# Patient Record
Sex: FEMALE | Race: WHITE | Employment: OTHER | URBAN - METROPOLITAN AREA
[De-identification: names, ages, dates, MRNs, and addresses within clinical notes are randomized per-mention and may not be internally consistent; named-entity substitution may affect disease eponyms.]

---

## 2018-02-08 RX ORDER — OSELTAMIVIR PHOSPHATE 75 MG/1
75 CAPSULE ORAL 2 TIMES DAILY
Qty: 10 CAP | Refills: 0 | Status: SHIPPED | OUTPATIENT
Start: 2018-02-08 | End: 2018-02-13

## 2018-02-08 NOTE — TELEPHONE ENCOUNTER
Pt states she is not feeling well and doesn't really want to come in. She is asking if there is going to be a script called in for her today?

## 2018-02-08 NOTE — TELEPHONE ENCOUNTER
Called patient. Two patient identifiers verified. Patient states she is currently experiencing flu like symptoms. Was exposed by her . She is requesting Tamiflu. Advised patient request is pending with Dr. Everette Martinez. Will update her of status of request as soon as possible. Patient verbalized understanding and states no further questions at this time.

## 2018-02-08 NOTE — TELEPHONE ENCOUNTER
Pt and  have been dx with flu and Dr. Mine Khan filled Tamiflu for , but recommended asking Dr. Pebbles Cifuentes for Tamiflu for pt. Pt's  is out picking up Rx today and would like this expedited to [facility] DR WEI DEL VALLE in [place] on file. Best contact number [phone].        Message received & copied from [facility]

## 2018-04-28 ENCOUNTER — HOSPITAL ENCOUNTER (EMERGENCY)
Age: 78
Discharge: HOME | End: 2018-04-28
Attending: EMERGENCY MEDICINE
Payer: MEDICARE

## 2018-04-28 ENCOUNTER — APPOINTMENT (OUTPATIENT)
Dept: ULTRASOUND IMAGING | Age: 78
End: 2018-04-28
Attending: EMERGENCY MEDICINE
Payer: MEDICARE

## 2018-04-28 VITALS
HEIGHT: 66 IN | TEMPERATURE: 97.7 F | BODY MASS INDEX: 22.85 KG/M2 | SYSTOLIC BLOOD PRESSURE: 107 MMHG | RESPIRATION RATE: 17 BRPM | WEIGHT: 142.2 LBS | HEART RATE: 78 BPM | OXYGEN SATURATION: 97 % | DIASTOLIC BLOOD PRESSURE: 58 MMHG

## 2018-04-28 DIAGNOSIS — R20.0 NUMBNESS OF RIGHT FOOT: Primary | ICD-10-CM

## 2018-04-28 LAB
ALBUMIN SERPL-MCNC: 3.6 G/DL (ref 3.5–5)
ALBUMIN/GLOB SERPL: 1.1 {RATIO} (ref 1.1–2.2)
ALP SERPL-CCNC: 75 U/L (ref 45–117)
ALT SERPL-CCNC: 25 U/L (ref 12–78)
ANION GAP SERPL CALC-SCNC: 7 MMOL/L (ref 5–15)
AST SERPL-CCNC: 17 U/L (ref 15–37)
BASOPHILS # BLD: 0 K/UL (ref 0–0.1)
BASOPHILS NFR BLD: 1 % (ref 0–1)
BILIRUB SERPL-MCNC: 0.4 MG/DL (ref 0.2–1)
BUN SERPL-MCNC: 15 MG/DL (ref 6–20)
BUN/CREAT SERPL: 19 (ref 12–20)
CALCIUM SERPL-MCNC: 9.2 MG/DL (ref 8.5–10.1)
CHLORIDE SERPL-SCNC: 107 MMOL/L (ref 97–108)
CO2 SERPL-SCNC: 28 MMOL/L (ref 21–32)
CREAT SERPL-MCNC: 0.8 MG/DL (ref 0.55–1.02)
DIFFERENTIAL METHOD BLD: NORMAL
EOSINOPHIL # BLD: 0.1 K/UL (ref 0–0.4)
EOSINOPHIL NFR BLD: 2 % (ref 0–7)
ERYTHROCYTE [DISTWIDTH] IN BLOOD BY AUTOMATED COUNT: 12.8 % (ref 11.5–14.5)
GLOBULIN SER CALC-MCNC: 3.2 G/DL (ref 2–4)
GLUCOSE SERPL-MCNC: 105 MG/DL (ref 65–100)
HCT VFR BLD AUTO: 40.9 % (ref 35–47)
HGB BLD-MCNC: 13.6 G/DL (ref 11.5–16)
IMM GRANULOCYTES # BLD: 0 K/UL (ref 0–0.04)
IMM GRANULOCYTES NFR BLD AUTO: 0 % (ref 0–0.5)
LYMPHOCYTES # BLD: 2.1 K/UL (ref 0.8–3.5)
LYMPHOCYTES NFR BLD: 37 % (ref 12–49)
MCH RBC QN AUTO: 30 PG (ref 26–34)
MCHC RBC AUTO-ENTMCNC: 33.3 G/DL (ref 30–36.5)
MCV RBC AUTO: 90.3 FL (ref 80–99)
MONOCYTES # BLD: 0.6 K/UL (ref 0–1)
MONOCYTES NFR BLD: 10 % (ref 5–13)
NEUTS SEG # BLD: 3 K/UL (ref 1.8–8)
NEUTS SEG NFR BLD: 51 % (ref 32–75)
NRBC # BLD: 0 K/UL (ref 0–0.01)
NRBC BLD-RTO: 0 PER 100 WBC
PLATELET # BLD AUTO: 186 K/UL (ref 150–400)
PMV BLD AUTO: 12.3 FL (ref 8.9–12.9)
POTASSIUM SERPL-SCNC: 3.7 MMOL/L (ref 3.5–5.1)
PROT SERPL-MCNC: 6.8 G/DL (ref 6.4–8.2)
RBC # BLD AUTO: 4.53 M/UL (ref 3.8–5.2)
SODIUM SERPL-SCNC: 142 MMOL/L (ref 136–145)
WBC # BLD AUTO: 5.9 K/UL (ref 3.6–11)

## 2018-04-28 PROCEDURE — 99284 EMERGENCY DEPT VISIT MOD MDM: CPT

## 2018-04-28 PROCEDURE — 36415 COLL VENOUS BLD VENIPUNCTURE: CPT | Performed by: EMERGENCY MEDICINE

## 2018-04-28 PROCEDURE — 93922 UPR/L XTREMITY ART 2 LEVELS: CPT

## 2018-04-28 PROCEDURE — 80053 COMPREHEN METABOLIC PANEL: CPT | Performed by: EMERGENCY MEDICINE

## 2018-04-28 PROCEDURE — 85025 COMPLETE CBC W/AUTO DIFF WBC: CPT | Performed by: EMERGENCY MEDICINE

## 2018-04-28 PROCEDURE — 93971 EXTREMITY STUDY: CPT

## 2018-04-28 RX ORDER — CEPHALEXIN 750 MG/1
750 CAPSULE ORAL 4 TIMES DAILY
COMMUNITY
End: 2018-05-10

## 2018-04-28 NOTE — ED NOTES
Dr. Trcai Fowler at bedside to provide discharge paperwork. Vital signs stable. Pt in no apparent distress at this time. Mental status at baseline. Ambulatory to waiting room with steady gate, discharge paperwork in hand. Accompanied by .

## 2018-04-28 NOTE — PROGRESS NOTES
[id number] [place] [facility]  Preliminary Report: Ankle Brachial Index    Pressure (mmHg) Right    Left    Brachial:  136   132  Ankle PTA:  135   157  Ankle DPA:  133   155  Great Toe:  77   100    Right THA:   0.99  Left THA: 1.15  Right TBI: 0.57  Left TBI: 0.74    Final report to follow.     CFA = Common Femoral Artery  PTA = Posterior Tibial Artery  DPA = Dorsalis Pedis Artery  THA = Ankle Brachial Index  TBI = Toe Brachial Index  NC = Non-compressible

## 2018-04-28 NOTE — ED NOTES
Patient reports to ED with complaints of right foot numbness that has been going on for approximately 1 year. She states \"It's not totally numb, it just feels like I have a stocking on. \" She states within the last few months, she has been noticing a purple tinted discoloration to her right foot. She states she decided to come in today because it looked worse than it usually does.  at bedside. Call bell within reach. Patient sitting in position of comfort on stretcher. No further complaints noted.

## 2018-04-28 NOTE — ED NOTES
Bedside and Verbal shift change report given to Reyes Marlow RN (oncoming nurse) by Kelvin Ace RN (offgoing nurse). Report included the following information SBAR, Kardex, ED Summary, Recent Results and Med Rec Status.

## 2018-04-28 NOTE — ED PROVIDER NOTES
EMERGENCY DEPARTMENT HISTORY AND PHYSICAL EXAM      Date: 4/28/2018  Patient Name: Buddy Concepcion    History of Presenting Illness     Chief Complaint   Patient presents with    Numbness     Ambulatory w/ c/o R foot numbness for a year worsened recently, no injury/trauma. 1340  I have seen and evaluated this patient in the Express Care portion of triage for R foot numbness with associated intermittent purple color to diffuse R foot x one year, worse x today. The patients care will begin now and orders have been placed. This patient will be seen and provided further care in the Emergency Room. Written by Rubia Olson, a scribe for [facility][1], ANATOLIY. History Provided By: Patient    HPI: Buddy Concepcion, 78 y.o. female with PMHx significant for GERD, presents via wheelchair to the ED for further evaluation of persistent intermittent right foot color changes progressively worsening over the last several weeks. The pt reports associated sx of right foot numbness x1 year but exacerbated in the last several weeks. She expresses a h/o back surgery ~1 year ago noting that she has had mild numbness since then but over the last several weeks the numbness has increased an her right foot has been intermittently turning white to a dark purple in color. The pt discloses that around 1330 her right foot turned dark purple leading her to the ED for evaluation. She ensures that her sx have improved since arrival to the ED. The pt denies being evaluated by a vascular surgeon PTA and denies being on any blood thinners. She denies any fevers, chills, weakness, gait problem, chest pain, SOB, leg swelling, abdominal pain, nausea, vomiting, or diarrhea. There are no other complaints, changes, or physical findings at this time.     PCP: Jagdish Carty MD    Current Outpatient Prescriptions   Medication Sig Dispense Refill    cyclobenzaprine (FLEXERIL) 10 mg tablet Take 1 Tab by mouth three (3) times daily as needed for Muscle Spasm(s). 30 Tab 1    cholecalciferol (VITAMIN D3) 1,000 unit tablet Take  by mouth daily.  esomeprazole (NEXIUM) 40 mg capsule Take 40 mg by mouth daily. Past History     Past Medical History:  Past Medical History:   Diagnosis Date    Acid reflux     Endocrine disease     thyroid nodules    Thyroid disease     thyroid nodules       Past Surgical History:  Past Surgical History:   Procedure Laterality Date    HX GYN  1996    hysterectomy ? BSO    HX ORTHOPAEDIC      back       Family History:  Family History   Problem Relation Age of Onset    Cancer Mother      lymphoma    Diabetes Paternal Grandmother     Heart Disease Sister     Heart Disease Son     No Known Problems Son     No Known Problems Daughter     No Known Problems Daughter     Heart Disease Maternal Grandfather        Social History:  Social History   Substance Use Topics    Smoking status: Former Smoker    Smokeless tobacco: Never Used    Alcohol use No       Allergies: Allergies   Allergen Reactions    Amoxicillin Other (comments)     \"It makes my heart race\"    Oxycodone Nausea Only         Review of Systems   Review of Systems   Constitutional: Negative for activity change, chills and fever. HENT: Negative for congestion and sore throat. Eyes: Negative for pain and redness. Respiratory: Negative for cough, chest tightness and shortness of breath. Cardiovascular: Negative for chest pain, palpitations and leg swelling. Gastrointestinal: Negative for abdominal pain, diarrhea, nausea and vomiting. Genitourinary: Negative for dysuria, frequency and urgency. Musculoskeletal: Negative for back pain, gait problem and neck pain. Skin: Positive for color change (right foot ). Negative for rash. Neurological: Positive for numbness (right foot ). Negative for syncope, weakness, light-headedness and headaches. Psychiatric/Behavioral: Negative for confusion.    All other systems reviewed and are negative. Physical Exam   Physical Exam   Constitutional: She appears well-developed and well-nourished. No distress. HENT:   Head: Normocephalic and atraumatic. Nose: Nose normal.   Mouth/Throat: Oropharynx is clear and moist.   Eyes: Conjunctivae and EOM are normal. Pupils are equal, round, and reactive to light. No scleral icterus. Conjunctiva clear bilaterally; Neck: Normal range of motion. Neck supple. No JVD present. No tracheal deviation present. No thyromegaly present. Cardiovascular: Normal rate, regular rhythm and intact distal pulses. Exam reveals no gallop and no friction rub. No murmur heard. Pulses:       Dorsalis pedis pulses are 1+ on the right side        Posterior tibial pulses are 1+ on the right side   Pulmonary/Chest: Effort normal and breath sounds normal. No stridor. No respiratory distress. She has no wheezes. She has no rales. She exhibits no tenderness. Abdominal: Soft. Bowel sounds are normal. She exhibits no distension. There is no tenderness. There is no rebound and no guarding. Musculoskeletal: Normal range of motion. She exhibits no edema or tenderness. No calf tenderness or leg swelling    Neurological: She is alert. She displays normal reflexes. No cranial nerve deficit. She exhibits normal muscle tone. Coordination normal.   5/5 strength throughout; no past pointing; good heel to shin; negative romberg; holds both arms extended in front of them for 10 seconds without difficulty or drift; lifts legs off of bed without difficulty; no pronator drift; good equal  strength bilaterally; motor and sensory grossly intact;    Skin: She is not diaphoretic. Right foot is mildly pale and mildly decreased warmth compared to the left foot, no rash   Nursing note and vitals reviewed.         Diagnostic Study Results     Labs -     Recent Results (from the past 12 hour(s))   CBC WITH AUTOMATED DIFF    Collection Time: 04/28/18  2:30 PM   Result Value Ref Range    WBC 5.9 3.6 - 11.0 K/uL    RBC 4.53 3.80 - 5.20 M/uL    HGB 13.6 11.5 - 16.0 g/dL    HCT 40.9 35.0 - 47.0 %    MCV 90.3 80.0 - 99.0 FL    MCH 30.0 26.0 - 34.0 PG    MCHC 33.3 30.0 - 36.5 g/dL    RDW 12.8 11.5 - 14.5 %    PLATELET 116 903 - 793 K/uL    MPV 12.3 8.9 - 12.9 FL    NRBC 0.0 0  WBC    ABSOLUTE NRBC 0.00 0.00 - 0.01 K/uL    NEUTROPHILS 51 32 - 75 %    LYMPHOCYTES 37 12 - 49 %    MONOCYTES 10 5 - 13 %    EOSINOPHILS 2 0 - 7 %    BASOPHILS 1 0 - 1 %    IMMATURE GRANULOCYTES 0 0.0 - 0.5 %    ABS. NEUTROPHILS 3.0 1.8 - 8.0 K/UL    ABS. LYMPHOCYTES 2.1 0.8 - 3.5 K/UL    ABS. MONOCYTES 0.6 0.0 - 1.0 K/UL    ABS. EOSINOPHILS 0.1 0.0 - 0.4 K/UL    ABS. BASOPHILS 0.0 0.0 - 0.1 K/UL    ABS. IMM. GRANS. 0.0 0.00 - 0.04 K/UL    DF AUTOMATED     METABOLIC PANEL, COMPREHENSIVE    Collection Time: 04/28/18  2:30 PM   Result Value Ref Range    Sodium 142 136 - 145 mmol/L    Potassium 3.7 3.5 - 5.1 mmol/L    Chloride 107 97 - 108 mmol/L    CO2 28 21 - 32 mmol/L    Anion gap 7 5 - 15 mmol/L    Glucose 105 (H) 65 - 100 mg/dL    BUN 15 6 - 20 MG/DL    Creatinine 0.80 0.55 - 1.02 MG/DL    BUN/Creatinine ratio 19 12 - 20      GFR est AA >60 >60 ml/min/1.73m2    GFR est non-AA >60 >60 ml/min/1.73m2    Calcium 9.2 8.5 - 10.1 MG/DL    Bilirubin, total 0.4 0.2 - 1.0 MG/DL    ALT (SGPT) 25 12 - 78 U/L    AST (SGOT) 17 15 - 37 U/L    Alk. phosphatase 75 45 - 117 U/L    Protein, total 6.8 6.4 - 8.2 g/dL    Albumin 3.6 3.5 - 5.0 g/dL    Globulin 3.2 2.0 - 4.0 g/dL    A-G Ratio 1.1 1.1 - 2.2         Radiologic Studies -   DUPLEX LOWER EXT VENOUS RIGHT   Final Result   HISTORY: Leg swelling, pain, DVT suspected     COMPARISON: None     FINDINGS:     Grayscale and color Doppler sonographic imaging of the right lower extremity was  performed. There is no evidence of filling defect within the lower extremity  veins, with normal compressibility and flow. Spectral analysis was utilized.     IMPRESSION  IMPRESSION:     1. No deep venous thrombosis. Medical Decision Making   I am the first provider for this patient. I reviewed the vital signs, available nursing notes, past medical history, past surgical history, family history and social history. Vital Signs-Reviewed the patient's vital signs. Patient Vitals for the past 12 hrs:   Temp Pulse Resp BP SpO2   04/28/18 1800 - - - 107/58 97 %   04/28/18 1700 - - - 122/74 97 %   04/28/18 1600 - - - 123/68 97 %   04/28/18 1500 - - - 117/70 95 %   04/28/18 1316 97.7 °F (36.5 °C) 78 17 144/60 98 %       Pulse Oximetry Analysis - 98% on room air    Cardiac Monitor:   Rate: 78 bpm  Rhythm: Normal Sinus Rhythm        Records Reviewed: Nursing Notes, Old Medical Records, Previous Radiology Studies and Previous Laboratory Studies    Provider Notes (Medical Decision Making):     DDx: DVT, PAD. ED Course:   Initial assessment performed. The patients presenting problems have been discussed, and they are in agreement with the care plan formulated and outlined with them. I have encouraged them to ask questions as they arise throughout their visit. Progress Notes:    CONSULT NOTE:  3:08 PM  Kyle Dixon MD spoke with Dr. Nunu James,  Specialty: Vascular Surgery  Discussed pt's hx, disposition, and available diagnostic and imaging results. Reviewed care plans. Consultant recommends obtaining an THA and having the pt follow up in office on Wednesday. Written by Liane Romero ED Scribe, as dictated by Kyle Dixon MD    7:10 PM  The pt has been re-evaluated. She was updated on imaging results and informed of the plan for discharge with vascular surgery follow up upon discharge. Critical Care Time: 0 minutes    Disposition:  Discharge Note:  7:15 PM  The patient is ready for discharge. The patient's signs, symptoms, diagnosis, and discharge instruction have been discussed and the patient has conveyed their understanding.  The patient is to follow up as recommended or return to the ER should their symptoms worsen. Plan has been discussed and the patient is in agreement. Written by Raven Romero ED Scribe, as dictated by Lisa Holt MD      Suspect mild pad; foot pink with palpable 1+ dp pulse; doppler negative for dvt; diandra's within normal range; will dc home with vascular surgery follow up; Lisa Holt MD      PLAN:  1. Discharge Medication List as of 4/28/2018  6:56 PM        2. Follow-up Information     Follow up With Details Comments Contact Info    Jeison Velasco MD Schedule an appointment as soon as possible for a visit in 2 days  [facility] [address] [phone]          Return to ED if worse     Diagnosis     Clinical Impression:   1. Numbness of right foot        Attestations:    Attestation: This note is prepared by Juan Pablo Romero, acting as Scribe for Lisa Holt MD.      Lisa Holt MD: The scribe's documentation has been prepared under my direction and personally reviewed by me in its entirety. I confirm that the note above accurately reflects all work, treatment, procedures, and medical decision making performed by me.

## 2018-04-28 NOTE — DISCHARGE INSTRUCTIONS
Numbness and Tingling: Care Instructions  Your Care Instructions    Many things can cause numbness or tingling. Swelling may put pressure on a nerve. This could cause you to lose feeling or have a pins-and-needles sensation on part of your body. Nerves may be damaged from trauma, toxins, or diseases, such as diabetes or multiple sclerosis (MS). Sometimes, though, the cause is not clear. If there is no clear reason for your symptoms, and you are not having any other symptoms, your doctor may suggest watching and waiting for a while to see if the numbness or tingling goes away on its own. Your doctor may want you to have blood or nerve tests to find the cause of your symptoms. Follow-up care is a key part of your treatment and safety. Be sure to make and go to all appointments, and call your doctor if you are having problems. It's also a good idea to know your test results and keep a list of the medicines you take. How can you care for yourself at home? · If your doctor prescribes medicine, take it exactly as directed. Call your doctor if you think you are having a problem with your medicine. · If you have any swelling, put ice or a cold pack on the area for 10 to 20 minutes at a time. Put a thin cloth between the ice and your skin. When should you call for help? Call 911 anytime you think you may need emergency care. For example, call if:  ? · You have weakness, numbness, or tingling in both legs. ? · You lose bowel or bladder control. ? · You have symptoms of a stroke. These may include:  ¨ Sudden numbness, tingling, weakness, or loss of movement in your face, arm, or leg, especially on only one side of your body. ¨ Sudden vision changes. ¨ Sudden trouble speaking. ¨ Sudden confusion or trouble understanding simple statements. ¨ Sudden problems with walking or balance. ¨ A sudden, severe headache that is different from past headaches. ? Watch closely for changes in your health, and be sure to contact your doctor if you have any problems, or if:  ? · You do not get better as expected. Where can you learn more? Go to http://alon-tracie.info/. Enter I936 in the search box to learn more about \"Numbness and Tingling: Care Instructions. \"  Current as of: October 14, 2016  Content Version: 11.4  © 7722-9079 [employer]. Care instructions adapted under license by [employer] (which disclaims liability or warranty for this information). If you have questions about a medical condition or this instruction, always ask your healthcare professional. [place] [address] any warranty or liability for your use of this information.

## 2018-04-30 NOTE — PROCEDURES
[facility]  *** FINAL REPORT ***    Name: Shannan Zimmerman  MRN: [id number]  : 28 Aug 1940  HIS Order #: [id number]  [address] [facility] Visit #: [id number]  Date: 2018    TYPE OF TEST: Peripheral Arterial Testing    REASON FOR TEST  Cold feet    Right Leg  PVR:        Abnormal  Ankle/Brachial: 0.99    Left Leg  PVR:        Normal  Ankle/Brachial: 1.15    INTERPRETATION/FINDINGS  PROCEDURE:  Ankle, brachial and digital arterial pressures, CW Doppler   waveforms and digital PPG waveforms were performed. 1. No evidence of significant peripheral arterial disease at rest in  the right leg. 2. No evidence of significant peripheral arterial disease at rest in  the left leg. 3. The right ankle/brachial index is 0.99 and the left ankle/brachial  index is 1.15.  4. The right great toe/brachial index is 0.57 and the left great  toe/brachial index is 0.74. ADDITIONAL COMMENTS    I have personally reviewed the data relevant to the interpretation of  this  study. TECHNOLOGIST: Radha Poe RVT  Signed: 2018 10:01 AM    PHYSICIAN: Renae Kumar.  Juanpablo Graham MD  Signed: 2018 04:09 PM

## 2018-05-10 ENCOUNTER — HOSPITAL ENCOUNTER (OUTPATIENT)
Dept: LAB | Age: 78
Discharge: HOME | End: 2018-05-10
Payer: MEDICARE

## 2018-05-10 ENCOUNTER — OFFICE VISIT (OUTPATIENT)
Dept: INTERNAL MEDICINE CLINIC | Age: 78
End: 2018-05-10

## 2018-05-10 VITALS
DIASTOLIC BLOOD PRESSURE: 71 MMHG | RESPIRATION RATE: 16 BRPM | OXYGEN SATURATION: 98 % | WEIGHT: 141.2 LBS | BODY MASS INDEX: 22.69 KG/M2 | TEMPERATURE: 98.4 F | HEIGHT: 66 IN | HEART RATE: 79 BPM | SYSTOLIC BLOOD PRESSURE: 132 MMHG

## 2018-05-10 DIAGNOSIS — Z13.820 SCREENING FOR OSTEOPOROSIS: ICD-10-CM

## 2018-05-10 DIAGNOSIS — Z13.1 SCREENING FOR DIABETES MELLITUS: ICD-10-CM

## 2018-05-10 DIAGNOSIS — E78.49 OTHER HYPERLIPIDEMIA: ICD-10-CM

## 2018-05-10 DIAGNOSIS — Z79.899 OTHER LONG TERM (CURRENT) DRUG THERAPY: ICD-10-CM

## 2018-05-10 DIAGNOSIS — Z00.00 MEDICARE ANNUAL WELLNESS VISIT, SUBSEQUENT: ICD-10-CM

## 2018-05-10 DIAGNOSIS — Z12.11 SCREENING FOR COLON CANCER: ICD-10-CM

## 2018-05-10 DIAGNOSIS — K21.9 GASTROESOPHAGEAL REFLUX DISEASE WITHOUT ESOPHAGITIS: Primary | ICD-10-CM

## 2018-05-10 DIAGNOSIS — Z23 ENCOUNTER FOR IMMUNIZATION: ICD-10-CM

## 2018-05-10 DIAGNOSIS — Z78.0 POSTMENOPAUSAL: ICD-10-CM

## 2018-05-10 DIAGNOSIS — E55.9 VITAMIN D DEFICIENCY: ICD-10-CM

## 2018-05-10 PROCEDURE — 80061 LIPID PANEL: CPT

## 2018-05-10 PROCEDURE — 80053 COMPREHEN METABOLIC PANEL: CPT

## 2018-05-10 PROCEDURE — 36415 COLL VENOUS BLD VENIPUNCTURE: CPT

## 2018-05-10 PROCEDURE — 82306 VITAMIN D 25 HYDROXY: CPT

## 2018-05-10 PROCEDURE — 82607 VITAMIN B-12: CPT

## 2018-05-10 NOTE — MR AVS SNAPSHOT
[address] 
[phone] Patient: Faby Mahmood MRN: [id number] FEW:[birth year] Visit Information Date & Time Provider Department Dept. Phone Encounter #  
 5/10/2018 11:00 AM Bulmaro Rossi, [address] [place] [address],[address] [phone] [id number] Upcoming Health Maintenance Date Due DTaP/Tdap/Td series (1 - Tdap) 8/28/1963 ZOSTER VACCINE AGE 60> 6/28/2002 Bone Densitometry (Dexa) Screening 8/28/2007 Pneumococcal 65+ Low/Medium Risk (2 of 2 - PCV13) 1/1/2012 GLAUCOMA SCREENING Q2Y 12/22/2017 MEDICARE YEARLY EXAM 3/14/2018 Influenza Age 5 to Adult 8/1/2018 Allergies as of 5/10/2018  Review Complete On: 5/10/2018 By: Bulmaro Rossi MD  
  
 Severity Noted Reaction Type Reactions Amoxicillin  04/28/2010    Other (comments) \"It makes my heart race\" Oxycodone  09/30/2016    Nausea Only Current Immunizations  Never Reviewed Name Date Pneumococcal Polysaccharide (PPSV-23) 1/1/2011 Not reviewed this visit You Were Diagnosed With   
  
 Codes Comments Gastroesophageal reflux disease without esophagitis    -  Primary ICD-10-CM: K21.9 ICD-9-CM: 530.81 Medicare annual wellness visit, subsequent     ICD-10-CM: Z00.00 ICD-9-CM: V70.0 Other hyperlipidemia     ICD-10-CM: E78.4 ICD-9-CM: 272.4 Screening for osteoporosis     ICD-10-CM: Z13.820 ICD-9-CM: V82.81 Postmenopausal     ICD-10-CM: Z78.0 ICD-9-CM: V49.81 Screening for colon cancer     ICD-10-CM: Z12.11 ICD-9-CM: V76.51 Screening for diabetes mellitus     ICD-10-CM: Z13.1 ICD-9-CM: V77.1 Vitamin D deficiency     ICD-10-CM: E55.9 ICD-9-CM: 268.9 Other long term (current) drug therapy     ICD-10-CM: Z79.899 ICD-9-CM: V58.69 Vitals BP Pulse Temp Resp Height(growth percentile) Weight(growth percentile) 132/71 (BP 1 Location: Left arm, BP Patient Position: Sitting) 79 98.4 °F (36.9 °C) (Oral) 16 5' 6\" (1.676 m) 141 lb 3.2 oz (64 kg) SpO2 BMI OB Status Smoking Status 98% 22.79 kg/m2 Hysterectomy Former Smoker BMI and BSA Data Body Mass Index Body Surface Area  
 22.79 kg/m 2 1.73 m 2 Preferred Pharmacy Pharmacy Name Phone [facility] [address], [address] [place] [address] Avenue [phone] Your Updated Medication List  
  
   
This list is accurate as of 5/10/18 11:50 AM.  Always use your most recent med list.  
  
  
  
  
 cholecalciferol 1,000 unit tablet Commonly known as:  VITAMIN D3 Take  by mouth daily. NexIUM 40 mg capsule Generic drug:  esomeprazole Take 40 mg by mouth daily. We Performed the Following COLOGUARD TEST (FECAL DNA COLORECTAL CANCER SCREENING) [43974 CPT(R)] LIPID PANEL [78480 CPT(R)] METABOLIC PANEL, COMPREHENSIVE [15551 CPT(R)] VITAMIN B12 [id number] CPT(R)] VITAMIN D, 25 HYDROXY [id number] CPT(R)] To-Do List   
 05/10/2018 Imaging:  DEXA BONE DENSITY STUDY AXIAL Patient Instructions Medicare Wellness Visit, Female The best way to live healthy is to have a healthy lifestyle by eating a well-balanced diet, exercising regularly, limiting alcohol and stopping smoking. Regular physical exams and screening tests are another way to keep healthy. Preventive exams provided by your health care provider can find health problems before they become diseases or illnesses. Preventive services including immunizations, screening tests, monitoring and exams can help you take care of your own health. All people over age 72 should have a pneumovax  and and a prevnar shot to prevent pneumonia. These are once in a lifetime unless you and your provider decide differently. All people over 65 should have a yearly flu shot and a tetanus vaccine every 10 years. A bone mass density to screen for osteoporosis or thinning of the bones should be done every 2 years after 65. Screening for diabetes mellitus with a blood sugar test should be done every year. Glaucoma is a disease of the eye due to increased ocular pressure that can lead to blindness and it should be done every year by an eye professional. 
 
Cardiovascular screening tests that check for elevated lipids (fatty part of blood) which can lead to heart disease and strokes should be done every 5 years. Colorectal screening that evaluates for blood or polyps in your colon should be done yearly as a stool test or every five years as a flexible sigmoidoscope or every 10 years as a colonoscopy up to age 76. Breast cancer screening with a mammogram is recommended biennially  for women age 54-69. Screening for cervical cancer with a pap smear and pelvic exam is recommended for women after age 72 years every 2 years up to age 79 or when the provider and patient decide to stop. If there is a history of cervical abnormalities or other increased risk for cancer then the test is recommended yearly. Hepatitis C screening is also recommended for anyone born between 80 through [address]. A shingles vaccine is also recommended once in a lifetime after age [facility]. Your Medicare Wellness Exam is recommended annually. Here is a list of your current Health Maintenance items with a due date: 
Health Maintenance Due Topic Date Due  
 DTaP/Tdap/Td  (1 - Tdap) 08/28/1963  Shingles Vaccine  06/28/2002  Bone Mineral Density   08/28/2007  Pneumococcal Vaccine (2 of 2 - PCV13) 01/01/2012  Glaucoma Screening   12/22/2017 Alivia Chiu Annual Well Visit  03/14/2018 Introducing [facility]! Willadean Saint introduces [employer] patient portal. Now you can access parts of your medical record, email your doctor's office, and request medication refills online.    
 
1. In your internet browser, go to [email or web address] 2. Click on the First Time User? Click Here link in the Sign In box. You will see the New Member Sign Up page. 3. Enter your ICVRx Access Code exactly as it appears below. You will not need to use this code after youve completed the sign-up process. If you do not sign up before the expiration date, you must request a new code. · ICVRx Access Code: [email or web address] Expires: 7/27/2018  1:43 PM 
 
4. Enter the last four digits of your Social Security Number (xxxx) and Date of Birth (mm/dd/yyyy) as indicated and click Submit. You will be taken to the next sign-up page. 5. Create a Freevert ID. This will be your ICVRx login ID and cannot be changed, so think of one that is secure and easy to remember. 6. Create a ICVRx password. You can change your password at any time. 7. Enter your Password Reset Question and Answer. This can be used at a later time if you forget your password. 8. Enter your e-mail address. You will receive e-mail notification when new information is available in [address] 9. Click Sign Up. You can now view and download portions of your medical record. 10. Click the Download Summary menu link to download a portable copy of your medical information. If you have questions, please visit the Frequently Asked Questions section of the ICVRx website. Remember, ICVRx is NOT to be used for urgent needs. For medical emergencies, dial 911. Now available from your iPhone and Android! Please provide this summary of care documentation to your next provider. Your primary care clinician is listed as Melinda Caballero. If you have any questions after today's visit, please call [phone].

## 2018-05-10 NOTE — PROGRESS NOTES
Reviewed record in preparation for visit and have obtained necessary documentation. Identified pt with two pt identifiers(name and ). Chief Complaint   Patient presents with   Nayely Aguilar Annual Wellness Visit     Pt fasting    ED Follow-up     [facility] (circulatory right foot)    Circulatory problem     right leg and foot       Health Maintenance Due   Topic Date Due    DTaP/Tdap/Td  (1 - Tdap) 1963    Shingles Vaccine  2002    Bone Mineral Density   2007    Pneumococcal Vaccine (2 of 2 - PCV13) 2012    Glaucoma Screening   2017    Annual Well Visit  2018       Ms. Matheus Waddell has a reminder for a \"due or due soon\" health maintenance. I have asked that she discuss this further with her primary care provider for follow-up on this health maintenance. Coordination of Care Questionnaire:  :     1) Have you been to an emergency room, urgent care clinic since your last visit? yes [facility] 2018   Hospitalized since your last visit? no             2) Have you seen or consulted any other health care providers outside of [address] [place] [address] since your last visit? no  (Include any pap smears or colon screenings in this section.)    3) In the event something were to happen to you and you were unable to speak on your behalf, do you have an Advance Directive/ Living Will in place stating your wishes? NO    Do you have an Advance Directive on file? no    4) Are you interested in receiving information on Advance Directives? NO    Patient is accompanied by self I have received verbal consent from Christophe Kyle to discuss any/all medical information while they are present in the room.

## 2018-05-10 NOTE — PROGRESS NOTES
Problem Focused Progress Note    Name: Yuliya Bai Date: 2018  Ethnicity: NON-  Race: WHITE OR   MRN: [id number]  Age: 78 y.o.  : 1940  Sex: Female       HPI:   Vidhya Noel is a 78y.o. year old female who presents today for follow up. Last seen by me 2016. Seen in ED on  with right foot discoloration. Has chronic numbness in both feet from prior back surgery. \"like I have stockings on\" up to the knee. Not painful numbness. Had negative venous US for DVT. Has appt with Dr. Otoniel Weaver, vascular. Still notes the leg discoloration. No claudication. Prior KORY. Last pelvic 12 years ago. No vaginal complaints. Some stress and urge incontinence. Not interested in medications or surgery at this time for the urinary symptoms. does not want to do mammogram due to discomfort. Normal bowel movements, prior colonoscopy. No BRPBR. Not on aspirin, bothered stomach. On nexium for GERD. Tried to stop it and symptoms recurred. Active at home, no sx with exertion. walking regularly. Stable weight. Healthy diet. Feels well.      Prior elevated cholesterol. Took lipitor and had dizziness, stopped medication. Now, on cholesterol medications.       Thyroid nodules, Saw Dr. Russell Valenzuela, endocrinologist.  Normal thyroid function. Was told to follow up as needed.       Prior DEXA with osteopenia, distant. Taking vitamin D daily. No prior shringrix. Prior pneumovax. Refuses prevnar. Chief Complaint   Patient presents with   [facility] Annual Wellness Visit     Pt fasting    ED Follow-up     [facility] (circulatory right foot)    Circulatory problem     right leg and foot   . Review of Systems   A comprehensive review of systems was negative except for that written in the HPI.     Physical Examination     Visit Vitals    /71 (BP 1 Location: Left arm, BP Patient Position: Sitting)    Pulse 79    Temp 98.4 °F (36.9 °C) (Oral)    Resp 16    Ht 5' 6\" (1.676 m)    Wt 141 lb 3.2 oz (64 kg)    SpO2 98%    BMI 22.79 kg/m2     Gen: well appearing  HEENT:   PERRL,normal conjunctiva. External ear and canals normal, TMs no opacification or erythema,  OP no erythema, no exudates, MMM  Neck:  Supple. Nodular goiter, No LAD  Resp:  No wheezing, no rhonchi, no rales. CV:  RRR, normal S1S2, no murmur. GI: soft, nontender, without masses. No hepatosplenomegaly. Extrem:  +1 pulses, no edema, warm distally, varicosities      Assessment/Plan     1. Medicare annual wellness visit, subsequent- REVIEWED MEDICARE WELLNESS      2. Gastroesophageal reflux disease without esophagitis- continue nexium. [place] diet. 3. Other hyperlipidemia- low fat diet and exercise.     - LIPID PANEL    4. Screening for osteoporosis    - DEXA BONE DENSITY STUDY AXIAL; Future    5. Postmenopausal    - DEXA BONE DENSITY STUDY AXIAL; Future    6. Screening for colon cancer    - COLOGUARD TEST (FECAL DNA COLORECTAL CANCER SCREENING)    7. Screening for diabetes mellitus    - METABOLIC PANEL, COMPREHENSIVE    8. Vitamin D deficiency-continue supplement.      - VITAMIN D, 25 HYDROXY    Amandal MD Yazmin  5/13/2018  11:18 AM

## 2018-05-10 NOTE — PROGRESS NOTES
This is the Subsequent Medicare Annual Wellness Exam, performed 12 months or more after the Initial AWV or the last Subsequent AWV    I have reviewed the patient's medical history in detail and updated the computerized patient record. History     Past Medical History:   Diagnosis Date    Acid reflux     Endocrine disease     thyroid nodules    Thyroid disease     thyroid nodules      Past Surgical History:   Procedure Laterality Date    HX GYN  1996    hysterectomy ? BSO    HX ORTHOPAEDIC      back     Current Outpatient Prescriptions   Medication Sig Dispense Refill    cholecalciferol (VITAMIN D3) 1,000 unit tablet Take  by mouth daily.  esomeprazole (NEXIUM) 40 mg capsule Take 40 mg by mouth daily.  cephalexin (KEFLEX) 750 mg capsule Take 750 mg by mouth four (4) times daily. Indications: BACTERIAL URINARY TRACT INFECTION      cyclobenzaprine (FLEXERIL) 10 mg tablet Take 1 Tab by mouth three (3) times daily as needed for Muscle Spasm(s).  30 Tab 1     Allergies   Allergen Reactions    Amoxicillin Other (comments)     \"It makes my heart race\"    Oxycodone Nausea Only     Family History   Problem Relation Age of Onset    Cancer Mother      lymphoma    Diabetes Paternal Grandmother     Heart Disease Sister     Heart Disease Son     No Known Problems Son     No Known Problems Daughter     No Known Problems Daughter     Heart Disease Maternal Grandfather      Social History   Substance Use Topics    Smoking status: Former Smoker    Smokeless tobacco: Never Used    Alcohol use No     Patient Active Problem List   Diagnosis Code    Gastroesophageal reflux disease without esophagitis K21.9    Multiple thyroid nodules E04.2    Hyperlipidemia E78.5       Depression Risk Factor Screening:     PHQ over the last two weeks 5/10/2018   PHQ Not Done -   Little interest or pleasure in doing things Not at all   Feeling down, depressed or hopeless Not at all   Total Score PHQ 2 0     Alcohol Risk Factor Screening: You do not drink alcohol or very rarely. Functional Ability and Level of Safety:   Hearing Loss  Hearing is good. Activities of Daily Living  The home contains: no safety equipment. Patient does total self care    Fall Risk  Fall Risk Assessment, last 12 mths 5/10/2018   Able to walk? Yes   Fall in past 12 months? No       Abuse Screen  Patient is not abused    Cognitive Screening   Evaluation of Cognitive Function:  Has your family/caregiver stated any concerns about your memory: no  Normal    Patient Care Team   Patient Care Team:  Jackie Blount MD as PCP - General (Internal Medicine)  Vance Quesada MD (Family Practice)  Juan Miguel Santiago OD (Optometry)  Meghann Hammond MD (Inactive) as Consulting Provider (Internal Medicine)    Assessment/Plan   Education and counseling provided:  Are appropriate based on today's review and evaluation    Diagnoses and all orders for this visit:    1. Medicare annual wellness visit, subsequent    Other orders  -     DEXA BONE DENSITY STUDY AXIAL;  Future      Health Maintenance Due   Topic Date Due    DTaP/Tdap/Td series (1 - Tdap) 08/28/1963    ZOSTER VACCINE AGE 60>  06/28/2002    Bone Densitometry (Dexa) Screening  08/28/2007    Pneumococcal 65+ Low/Medium Risk (2 of 2 - PCV13) 01/01/2012    GLAUCOMA SCREENING Q2Y  12/22/2017    MEDICARE YEARLY EXAM  03/14/2018

## 2018-05-10 NOTE — PATIENT INSTRUCTIONS

## 2018-05-11 LAB
25(OH)D3+25(OH)D2 SERPL-MCNC: 47 NG/ML (ref 30–100)
ALBUMIN SERPL-MCNC: 4.2 G/DL (ref 3.5–4.8)
ALBUMIN/GLOB SERPL: 2 {RATIO} (ref 1.2–2.2)
ALP SERPL-CCNC: 74 IU/L (ref 39–117)
ALT SERPL-CCNC: 15 IU/L (ref 0–32)
AST SERPL-CCNC: 20 IU/L (ref 0–40)
BILIRUB SERPL-MCNC: 0.4 MG/DL (ref 0–1.2)
BUN SERPL-MCNC: 14 MG/DL (ref 8–27)
BUN/CREAT SERPL: 20 (ref 12–28)
CALCIUM SERPL-MCNC: 9.5 MG/DL (ref 8.7–10.3)
CHLORIDE SERPL-SCNC: 101 MMOL/L (ref 96–106)
CHOLEST SERPL-MCNC: 270 MG/DL (ref 100–199)
CO2 SERPL-SCNC: 26 MMOL/L (ref 18–29)
CREAT SERPL-MCNC: 0.69 MG/DL (ref 0.57–1)
GFR SERPLBLD CREATININE-BSD FMLA CKD-EPI: 85 ML/MIN/1.73
GFR SERPLBLD CREATININE-BSD FMLA CKD-EPI: 98 ML/MIN/1.73
GLOBULIN SER CALC-MCNC: 2.1 G/DL (ref 1.5–4.5)
GLUCOSE SERPL-MCNC: 93 MG/DL (ref 65–99)
HDLC SERPL-MCNC: 55 MG/DL
LDLC SERPL CALC-MCNC: 186 MG/DL (ref 0–99)
POTASSIUM SERPL-SCNC: 3.9 MMOL/L (ref 3.5–5.2)
PROT SERPL-MCNC: 6.3 G/DL (ref 6–8.5)
SODIUM SERPL-SCNC: 143 MMOL/L (ref 134–144)
TRIGL SERPL-MCNC: 143 MG/DL (ref 0–149)
VIT B12 SERPL-MCNC: 1172 PG/ML (ref 232–1245)
VLDLC SERPL CALC-MCNC: 29 MG/DL (ref 5–40)

## 2018-05-16 NOTE — PROGRESS NOTES
Spoke with patient. Two pt identifiers confirmed. Patient notified that her labs normal except mildly elevated LDL cholesterol 186. Goal is less than 100. Her cholesterol has been persistently elevated. Recommend follow low fat diet and exercise as tolerated. Has she ever been on a cholesterol medication? Did she tolerate it? Is she willing to start one to reduce risk of heart disease and stroke? If yes, atorvastatin 20mg  Once a day. Follow up in 3 months on medication with fasting labs on return. Patient states that she has taken lipitor in the past and it made her dizzy. Patient states that she would prefer not to take a statin, would prefer to try diet and exercise. Pt verbalized understanding of information discussed w/ no further questions at this time.

## 2018-09-06 ENCOUNTER — HOSPITAL ENCOUNTER (EMERGENCY)
Age: 78
Discharge: HOME | End: 2018-09-06
Attending: EMERGENCY MEDICINE
Payer: MEDICARE

## 2018-09-06 VITALS
RESPIRATION RATE: 19 BRPM | OXYGEN SATURATION: 100 % | TEMPERATURE: 98 F | HEART RATE: 83 BPM | SYSTOLIC BLOOD PRESSURE: 159 MMHG | DIASTOLIC BLOOD PRESSURE: 80 MMHG | WEIGHT: 140.87 LBS | HEIGHT: 67 IN | BODY MASS INDEX: 22.11 KG/M2

## 2018-09-06 DIAGNOSIS — R32 URINARY INCONTINENCE, UNSPECIFIED TYPE: Primary | ICD-10-CM

## 2018-09-06 DIAGNOSIS — N39.0 URINARY TRACT INFECTION WITHOUT HEMATURIA, SITE UNSPECIFIED: ICD-10-CM

## 2018-09-06 LAB
APPEARANCE UR: CLEAR
BACTERIA URNS QL MICRO: NEGATIVE /HPF
BILIRUB UR QL: NEGATIVE
COLOR UR: ABNORMAL
EPITH CASTS URNS QL MICRO: ABNORMAL /LPF
GLUCOSE UR STRIP.AUTO-MCNC: NEGATIVE MG/DL
HGB UR QL STRIP: NEGATIVE
KETONES UR QL STRIP.AUTO: NEGATIVE MG/DL
LEUKOCYTE ESTERASE UR QL STRIP.AUTO: ABNORMAL
NITRITE UR QL STRIP.AUTO: NEGATIVE
PH UR STRIP: 7 [PH] (ref 5–8)
PROT UR STRIP-MCNC: NEGATIVE MG/DL
RBC #/AREA URNS HPF: ABNORMAL /HPF (ref 0–5)
SP GR UR REFRACTOMETRY: <1.005 (ref 1–1.03)
UA: UC IF INDICATED,UAUC: ABNORMAL
UROBILINOGEN UR QL STRIP.AUTO: 0.2 EU/DL (ref 0.2–1)
WBC URNS QL MICRO: ABNORMAL /HPF (ref 0–4)

## 2018-09-06 PROCEDURE — 81001 URINALYSIS AUTO W/SCOPE: CPT | Performed by: EMERGENCY MEDICINE

## 2018-09-06 PROCEDURE — 99283 EMERGENCY DEPT VISIT LOW MDM: CPT

## 2018-09-06 PROCEDURE — 87077 CULTURE AEROBIC IDENTIFY: CPT | Performed by: EMERGENCY MEDICINE

## 2018-09-06 PROCEDURE — 87086 URINE CULTURE/COLONY COUNT: CPT | Performed by: EMERGENCY MEDICINE

## 2018-09-06 PROCEDURE — 87186 SC STD MICRODIL/AGAR DIL: CPT | Performed by: EMERGENCY MEDICINE

## 2018-09-06 RX ORDER — NITROFURANTOIN 25; 75 MG/1; MG/1
100 CAPSULE ORAL 2 TIMES DAILY
Qty: 10 CAP | Refills: 0 | Status: SHIPPED | OUTPATIENT
Start: 2018-09-06 | End: 2018-09-11

## 2018-09-06 NOTE — DISCHARGE INSTRUCTIONS
Learning About Adult Protective Underwear for Women  Why are they used? Adult protective underwear may be helpful for a person who has incontinence. A person who has incontinence has trouble controlling urine or stool. This underwear helps absorb urine and catch stool. There are different types of adult underwear. A washable type may be useful when a loved one has trouble using the disposable type. When putting on adult underwear, make sure the tabs are in the back. Make sure the underwear is the right size so that it fits well. This is important for people who are very thin or overweight. The elastic at the legs should fit well and not be too loose. A good fit can help stop leaks. And it can keep the skin from getting sore. What are the types for women? Some types of adult underwear are a pull-up style (made of elastic or cloth), and some use adhesive tabs or an elastic band with buttons. 1. Elastic. An elastic pull-up style is made of a stretchy material.  2. Adhesive tab. Adhesive tab styles have tabs to help adjust and secure the underwear. 3. Cloth. Cloth pull-up styles are made to look and feel like standard briefs. 4. Elastic band/button. Elastic band and button styles use stretchy bands with buttons at the end to secure the pull-up on each side. Where can you learn more? Go to http://alon-tracie.info/. Enter J328 in the search box to learn more about \"Learning About Adult Protective Underwear for Women. \"  Current as of: October 6, 2017  Content Version: 11.7  © 3240-7876 [employer]. Care instructions adapted under license by [employer] (which disclaims liability or warranty for this information). If you have questions about a medical condition or this instruction, always ask your healthcare professional. [place] [address] any warranty or liability for your use of this information.     Urinary Tract Infection in Women: Care Instructions  Your Care Instructions    A urinary tract infection, or UTI, is a general term for an infection anywhere between the kidneys and the urethra (where urine comes out). Most UTIs are bladder infections. They often cause pain or burning when you urinate. UTIs are caused by bacteria and can be cured with antibiotics. Be sure to complete your treatment so that the infection goes away. Follow-up care is a key part of your treatment and safety. Be sure to make and go to all appointments, and call your doctor if you are having problems. It's also a good idea to know your test results and keep a list of the medicines you take. How can you care for yourself at home? · Take your antibiotics as directed. Do not stop taking them just because you feel better. You need to take the full course of antibiotics. · Drink extra water and other fluids for the next day or two. This may help wash out the bacteria that are causing the infection. (If you have kidney, heart, or liver disease and have to limit fluids, talk with your doctor before you increase your fluid intake.)  · Avoid drinks that are carbonated or have caffeine. They can irritate the bladder. · Urinate often. Try to empty your bladder each time. · To relieve pain, take a hot bath or lay a heating pad set on low over your lower belly or genital area. Never go to sleep with a heating pad in place. To prevent UTIs  · Drink plenty of water each day. This helps you urinate often, which clears bacteria from your system. (If you have kidney, heart, or liver disease and have to limit fluids, talk with your doctor before you increase your fluid intake.)  · Urinate when you need to. · Urinate right after you have sex. · Change sanitary pads often. · Avoid douches, bubble baths, feminine hygiene sprays, and other feminine hygiene products that have deodorants. · After going to the bathroom, wipe from front to back.   When should you call for help?  Call your doctor now or seek immediate medical care if:    · Symptoms such as fever, chills, nausea, or vomiting get worse or appear for the first time.     · You have new pain in your back just below your rib cage. This is called flank pain.     · There is new blood or pus in your urine.     · You have any problems with your antibiotic medicine.    Watch closely for changes in your health, and be sure to contact your doctor if:    · You are not getting better after taking an antibiotic for 2 days.     · Your symptoms go away but then come back. Where can you learn more? Go to http://alon-tracie.info/. Enter L949 in the search box to learn more about \"Urinary Tract Infection in Women: Care Instructions. \"  Current as of: May 12, 2017  Content Version: 11.7  © 6067-4507 [employer], [employer]. Care instructions adapted under license by [employer] (which disclaims liability or warranty for this information). If you have questions about a medical condition or this instruction, always ask your healthcare professional. [address] any warranty or liability for your use of this information.

## 2018-09-06 NOTE — ED PROVIDER NOTES
EMERGENCY DEPARTMENT HISTORY AND PHYSICAL EXAM 
 
 
Date: 9/6/2018 Patient Name: Howard Sharp History of Presenting Illness Chief Complaint Patient presents with  Urinary Incontinence Ambulatory w/ c/o urinary incontinence x2 days, states it feels like spasms & does not feel like her previous UTIs, denies any other symptoms History Provided By: Patient HPI: Howard Sharp, 70 y.o. female with PMHx significant for thyroid nodules, acid reflux, presents ambulatory to the ED with cc of a mild, burning urinary pain for a couple days. She reports associated symptoms of a stabbing suprapubic pain and urinary incontinence. Pt notes a h/o  UTI's She informs of \"more pain with bearing down. \" Pt only notes the stabbing pain when she has a urinary urgency, but is otherwise not experiencing any pain. Pt denies any back pain, flank pain, fever, chills, blood in her urine, vaginal discharge, or vaginal bleeding. There are no other complaints, changes, or physical findings at this time. PCP: Tangela Oconnor MD 
 
Current Outpatient Prescriptions Medication Sig Dispense Refill  nitrofurantoin, macrocrystal-monohydrate, (MACROBID) 100 mg capsule Take 1 Cap by mouth two (2) times a day for 5 days. 10 Cap 0  cholecalciferol (VITAMIN D3) 1,000 unit tablet Take  by mouth daily.  esomeprazole (NEXIUM) 40 mg capsule Take 40 mg by mouth daily. Past History Past Medical History: 
Past Medical History:  
Diagnosis Date  Acid reflux  Endocrine disease   
 thyroid nodules  Thyroid disease   
 thyroid nodules Past Surgical History: 
Past Surgical History:  
Procedure Laterality Date [address]  
 hysterectomy ? BSO  
 HX ORTHOPAEDIC    
 back Family History: 
Family History Problem Relation Age of Onset  Cancer Mother   
  lymphoma  Diabetes Paternal Grandmother  Heart Disease Sister  Heart Disease Son  No Known Problems Son   
  No Known Problems Daughter  No Known Problems Daughter  Heart Disease Maternal Grandfather Social History: 
Social History Substance Use Topics  Smoking status: Former Smoker  Smokeless tobacco: Never Used  Alcohol use No  
 
 
Allergies: Allergies Allergen Reactions  Amoxicillin Other (comments) \"It makes my heart race\"  Oxycodone Nausea Only Review of Systems Review of Systems Constitutional: Negative for chills, fatigue and fever. HENT: Negative for congestion, ear pain and rhinorrhea. Eyes: Negative for pain and visual disturbance. Respiratory: Negative for cough and shortness of breath. Cardiovascular: Negative for chest pain and leg swelling. Gastrointestinal: Positive for abdominal pain. Negative for diarrhea, nausea and vomiting. Genitourinary: Positive for dysuria. Negative for flank pain, hematuria, vaginal bleeding and vaginal discharge. +urinary incontinence Musculoskeletal: Negative for back pain and neck pain. Skin: Negative for rash and wound. Neurological: Negative for dizziness, syncope and headaches. Psychiatric/Behavioral: Negative for self-injury and suicidal ideas. Physical Exam  
Physical Exam  
 
GENERAL: alert and oriented, no acute distress EYES: PEERL, No injection, discharge or icterus. ENT: Mucous membranes pink and moist. 
NECK: Supple LUNGS: Airway patent. Non-labored respirations. Breath sounds clear with good air entry bilaterally. HEART: Regular rate and rhythm. No peripheral edema ABDOMEN: Non-distended and non-tender, without guarding or rebound. SKIN:  warm, dry EXTREMITIES: Without swelling, tenderness or deformity, symmetric with normal ROM 
NEUROLOGICAL: Alert, oriented Diagnostic Study Results Labs - Recent Results (from the past 12 hour(s)) URINALYSIS W/ REFLEX CULTURE Collection Time: 09/06/18  7:24 AM  
Result Value Ref Range Color YELLOW/STRAW Appearance CLEAR CLEAR Specific gravity <1.005 1.003 - 1.030  
 pH (UA) 7.0 5.0 - 8.0 Protein NEGATIVE  NEG mg/dL Glucose NEGATIVE  NEG mg/dL Ketone NEGATIVE  NEG mg/dL Bilirubin NEGATIVE  NEG Blood NEGATIVE  NEG Urobilinogen 0.2 0.2 - 1.0 EU/dL Nitrites NEGATIVE  NEG Leukocyte Esterase LARGE (A) NEG    
 WBC 10-20 0 - 4 /hpf  
 RBC 0-5 0 - 5 /hpf Epithelial cells FEW FEW /lpf Bacteria NEGATIVE  NEG /hpf  
 UA:UC IF INDICATED URINE CULTURE ORDERED (A) CNI Radiologic Studies - No orders to display Medical Decision Making I am the first provider for this patient. I reviewed the vital signs, available nursing notes, past medical history, past surgical history, family history and social history. Vital Signs-Reviewed the patient's vital signs. Patient Vitals for the past 12 hrs: 
 Temp Pulse Resp BP SpO2  
09/06/18 0723 98 °F (36.7 °C) 83 19 159/80 100 % Records Reviewed: Nursing Notes, Old Medical Records, Previous Radiology Studies and Previous Laboratory Studies Provider Notes (Medical Decision Making): On presentation the patient is well appearing, in no acute distress with normal vital signs. The patient presents for urinary symptoms. Differential diagnosis considered includes UTI, pyelonephritis, STD, interstitial cystitis, constipation, renal colic, mucosal irritation, PID, urethritis, vaginitis, herpes simplex, or bladder cancer  All labs and diagnostic studies were reviewed as reported above and felt to be within normal limits. Clinical history, examination and work-up suggest likely diagnosis of simple cystitis without concern for vaginitis or STD. No systemic symptoms or flank pain to suggest pyelonephritis. Patient was not septic and without signs of clinical instability. Outpatient management was deemed appropriate. Urine culture was sent for follow-up. The patient will be started on MAcrobid.  Patient was encouraged to maintain adequate hydration. Follow-up with PCP is encouraged to assure resolution. The patient is instructed to return for high fevers, worsening symptoms, back pain, lightheadedness, inability to void, or failure of improvement. Plan of care was discussed and reviewed with the patient prior to discharge. Patient understood the diagnosis, treatment, and plan and had no further questions. ED Course:  
Initial assessment performed. The patients presenting problems have been discussed, and they are in agreement with the care plan formulated and outlined with them. I have encouraged them to ask questions as they arise throughout their visit. Disposition: 
DISCHARGE NOTE 
9:31 AM 
The patient has been re-evaluated and is ready for discharge. Reviewed available results with patient. Counseled patient on diagnosis and care plan. Patient has expressed understanding, and all questions have been answered. Patient agrees with plan and agrees to follow up as recommended, or return to the ED if their symptoms worsen. Discharge instructions have been provided and explained to the patient, along with reasons to return to the ED. PLAN: 
1. Discharge Current Discharge Medication List  
  
START taking these medications Details  
nitrofurantoin, macrocrystal-monohydrate, (MACROBID) 100 mg capsule Take 1 Cap by mouth two (2) times a day for 5 days. Qty: 10 Cap, Refills: 0  
  
  
 
2. Follow-up Information Follow up With Details Comments Contact Info Niko Hagan MD In 2 days  Dar[address] Sameera Kaur [address] [facility] 
[phone] Return to ED if worse Diagnosis Clinical Impression: 1. Urinary incontinence, unspecified type 2. Urinary tract infection without hematuria, site unspecified Attestations: This note is prepared by Frankey Krabbe, acting as Scribe for [employer].  Perri Noonan MD. 
 
 Diana Harp MD: The scribe's documentation has been prepared under my direction and personally reviewed by me in its entirety. I confirm that the note above accurately reflects all work, treatment, procedures, and medical decision making performed by me. This note will not be viewable in [address]

## 2018-09-08 LAB
BACTERIA SPEC CULT: ABNORMAL
BACTERIA SPEC CULT: ABNORMAL
CC UR VC: ABNORMAL
SERVICE CMNT-IMP: ABNORMAL

## 2019-05-11 ENCOUNTER — APPOINTMENT (OUTPATIENT)
Dept: GENERAL RADIOLOGY | Age: 79
End: 2019-05-11
Attending: EMERGENCY MEDICINE
Payer: MEDICARE

## 2019-05-11 ENCOUNTER — HOSPITAL ENCOUNTER (EMERGENCY)
Age: 79
Discharge: HOME | End: 2019-05-11
Attending: EMERGENCY MEDICINE
Payer: MEDICARE

## 2019-05-11 VITALS
TEMPERATURE: 98.8 F | HEART RATE: 100 BPM | OXYGEN SATURATION: 93 % | SYSTOLIC BLOOD PRESSURE: 135 MMHG | HEIGHT: 66 IN | DIASTOLIC BLOOD PRESSURE: 63 MMHG | BODY MASS INDEX: 23.74 KG/M2 | RESPIRATION RATE: 18 BRPM | WEIGHT: 147.71 LBS

## 2019-05-11 DIAGNOSIS — R07.9 ACUTE CHEST PAIN: Primary | ICD-10-CM

## 2019-05-11 DIAGNOSIS — T78.40XA ALLERGIC REACTION, INITIAL ENCOUNTER: ICD-10-CM

## 2019-05-11 LAB
ALBUMIN SERPL-MCNC: 3.4 G/DL (ref 3.5–5)
ALBUMIN/GLOB SERPL: 0.9 {RATIO} (ref 1.1–2.2)
ALP SERPL-CCNC: 94 U/L (ref 45–117)
ALT SERPL-CCNC: 39 U/L (ref 12–78)
ANION GAP SERPL CALC-SCNC: 6 MMOL/L (ref 5–15)
APPEARANCE UR: CLEAR
AST SERPL-CCNC: 30 U/L (ref 15–37)
BASOPHILS # BLD: 0.1 K/UL (ref 0–0.1)
BASOPHILS NFR BLD: 0 % (ref 0–1)
BILIRUB SERPL-MCNC: 0.4 MG/DL (ref 0.2–1)
BILIRUB UR QL: NEGATIVE
BUN SERPL-MCNC: 6 MG/DL (ref 6–20)
BUN/CREAT SERPL: 8 (ref 12–20)
CALCIUM SERPL-MCNC: 8.8 MG/DL (ref 8.5–10.1)
CHLORIDE SERPL-SCNC: 105 MMOL/L (ref 97–108)
CK SERPL-CCNC: 47 U/L (ref 26–192)
CO2 SERPL-SCNC: 29 MMOL/L (ref 21–32)
COLOR UR: NORMAL
CREAT SERPL-MCNC: 0.72 MG/DL (ref 0.55–1.02)
DIFFERENTIAL METHOD BLD: ABNORMAL
EOSINOPHIL # BLD: 0.4 K/UL (ref 0–0.4)
EOSINOPHIL NFR BLD: 2 % (ref 0–7)
ERYTHROCYTE [DISTWIDTH] IN BLOOD BY AUTOMATED COUNT: 12.9 % (ref 11.5–14.5)
GLOBULIN SER CALC-MCNC: 3.7 G/DL (ref 2–4)
GLUCOSE SERPL-MCNC: 118 MG/DL (ref 65–100)
GLUCOSE UR STRIP.AUTO-MCNC: NEGATIVE MG/DL
HCT VFR BLD AUTO: 44.1 % (ref 35–47)
HGB BLD-MCNC: 14.2 G/DL (ref 11.5–16)
HGB UR QL STRIP: NEGATIVE
IMM GRANULOCYTES # BLD AUTO: 0.1 K/UL (ref 0–0.04)
IMM GRANULOCYTES NFR BLD AUTO: 0 % (ref 0–0.5)
KETONES UR QL STRIP.AUTO: NEGATIVE MG/DL
LEUKOCYTE ESTERASE UR QL STRIP.AUTO: NEGATIVE
LYMPHOCYTES # BLD: 1.5 K/UL (ref 0.8–3.5)
LYMPHOCYTES NFR BLD: 9 % (ref 12–49)
MCH RBC QN AUTO: 30.2 PG (ref 26–34)
MCHC RBC AUTO-ENTMCNC: 32.2 G/DL (ref 30–36.5)
MCV RBC AUTO: 93.8 FL (ref 80–99)
MONOCYTES # BLD: 1.1 K/UL (ref 0–1)
MONOCYTES NFR BLD: 7 % (ref 5–13)
NEUTS SEG # BLD: 13.1 K/UL (ref 1.8–8)
NEUTS SEG NFR BLD: 82 % (ref 32–75)
NITRITE UR QL STRIP.AUTO: NEGATIVE
NRBC # BLD: 0 K/UL (ref 0–0.01)
NRBC BLD-RTO: 0 PER 100 WBC
PH UR STRIP: 6.5 [PH] (ref 5–8)
PLATELET # BLD AUTO: 187 K/UL (ref 150–400)
PMV BLD AUTO: 11.6 FL (ref 8.9–12.9)
POTASSIUM SERPL-SCNC: 3.5 MMOL/L (ref 3.5–5.1)
PROT SERPL-MCNC: 7.1 G/DL (ref 6.4–8.2)
PROT UR STRIP-MCNC: NEGATIVE MG/DL
RBC # BLD AUTO: 4.7 M/UL (ref 3.8–5.2)
SODIUM SERPL-SCNC: 140 MMOL/L (ref 136–145)
SP GR UR REFRACTOMETRY: 1.01 (ref 1–1.03)
TROPONIN I SERPL-MCNC: <0.05 NG/ML
UROBILINOGEN UR QL STRIP.AUTO: 0.2 EU/DL (ref 0.2–1)
WBC # BLD AUTO: 16.1 K/UL (ref 3.6–11)

## 2019-05-11 PROCEDURE — 81003 URINALYSIS AUTO W/O SCOPE: CPT

## 2019-05-11 PROCEDURE — 74011250636 HC RX REV CODE- 250/636: Performed by: EMERGENCY MEDICINE

## 2019-05-11 PROCEDURE — 93005 ELECTROCARDIOGRAM TRACING: CPT

## 2019-05-11 PROCEDURE — 84484 ASSAY OF TROPONIN QUANT: CPT

## 2019-05-11 PROCEDURE — 96375 TX/PRO/DX INJ NEW DRUG ADDON: CPT

## 2019-05-11 PROCEDURE — 99283 EMERGENCY DEPT VISIT LOW MDM: CPT

## 2019-05-11 PROCEDURE — 71046 X-RAY EXAM CHEST 2 VIEWS: CPT

## 2019-05-11 PROCEDURE — 82550 ASSAY OF CK (CPK): CPT

## 2019-05-11 PROCEDURE — 36415 COLL VENOUS BLD VENIPUNCTURE: CPT

## 2019-05-11 PROCEDURE — 80053 COMPREHEN METABOLIC PANEL: CPT

## 2019-05-11 PROCEDURE — 85025 COMPLETE CBC W/AUTO DIFF WBC: CPT

## 2019-05-11 PROCEDURE — 96374 THER/PROPH/DIAG INJ IV PUSH: CPT

## 2019-05-11 RX ORDER — DIPHENHYDRAMINE HYDROCHLORIDE 50 MG/ML
25 INJECTION, SOLUTION INTRAMUSCULAR; INTRAVENOUS
Status: COMPLETED | OUTPATIENT
Start: 2019-05-11 | End: 2019-05-11

## 2019-05-11 RX ORDER — ONDANSETRON 2 MG/ML
4 INJECTION INTRAMUSCULAR; INTRAVENOUS
Status: COMPLETED | OUTPATIENT
Start: 2019-05-11 | End: 2019-05-11

## 2019-05-11 RX ORDER — CYCLOBENZAPRINE HCL 5 MG
5 TABLET ORAL
Qty: 20 TAB | Refills: 0 | Status: SHIPPED | OUTPATIENT
Start: 2019-05-11 | End: 2019-07-25

## 2019-05-11 RX ORDER — HYDROXYZINE 25 MG/1
25 TABLET, FILM COATED ORAL
Qty: 10 TAB | Refills: 0 | Status: SHIPPED | OUTPATIENT
Start: 2019-05-11 | End: 2019-05-21

## 2019-05-11 RX ORDER — FENTANYL CITRATE 50 UG/ML
25 INJECTION, SOLUTION INTRAMUSCULAR; INTRAVENOUS
Status: COMPLETED | OUTPATIENT
Start: 2019-05-11 | End: 2019-05-11

## 2019-05-11 RX ADMIN — FENTANYL CITRATE 25 MCG: 50 INJECTION, SOLUTION INTRAMUSCULAR; INTRAVENOUS at 07:34

## 2019-05-11 RX ADMIN — ONDANSETRON 4 MG: 2 INJECTION INTRAMUSCULAR; INTRAVENOUS at 07:34

## 2019-05-11 RX ADMIN — DIPHENHYDRAMINE HYDROCHLORIDE 25 MG: 50 INJECTION, SOLUTION INTRAMUSCULAR; INTRAVENOUS at 07:34

## 2019-05-11 NOTE — DISCHARGE INSTRUCTIONS
Patient Education        Chest Pain: Care Instructions  Your Care Instructions    There are many things that can cause chest pain. Some are not serious and will get better on their own in a few days. But some kinds of chest pain need more testing and treatment. Your doctor may have recommended a follow-up visit in the next 8 to 12 hours. If you are not getting better, you may need more tests or treatment. Even though your doctor has released you, you still need to watch for any problems. The doctor carefully checked you, but sometimes problems can develop later. If you have new symptoms or if your symptoms do not get better, get medical care right away. If you have worse or different chest pain or pressure that lasts more than 5 minutes or you passed out (lost consciousness), call 911 or seek other emergency help right away. A medical visit is only one step in your treatment. Even if you feel better, you still need to do what your doctor recommends, such as going to all suggested follow-up appointments and taking medicines exactly as directed. This will help you recover and help prevent future problems. How can you care for yourself at home? · Rest until you feel better. · Take your medicine exactly as prescribed. Call your doctor if you think you are having a problem with your medicine. · Do not drive after taking a prescription pain medicine. When should you call for help? Call 911 if:    · You passed out (lost consciousness).     · You have severe difficulty breathing.     · You have symptoms of a heart attack. These may include:  ? Chest pain or pressure, or a strange feeling in your chest.  ? Sweating. ? Shortness of breath. ? Nausea or vomiting. ? Pain, pressure, or a strange feeling in your back, neck, jaw, or upper belly or in one or both shoulders or arms. ? Lightheadedness or sudden weakness. ? A fast or irregular heartbeat.   After you call 911, the  may tell you to chew 1 adult-strength or 2 to 4 low-dose aspirin. Wait for an ambulance. Do not try to drive yourself.    Call your doctor today if:    · You have any trouble breathing.     · Your chest pain gets worse.     · You are dizzy or lightheaded, or you feel like you may faint.     · You are not getting better as expected.     · You are having new or different chest pain. Where can you learn more? Go to http://alon-tracie.info/. Enter A120 in the search box to learn more about \"Chest Pain: Care Instructions. \"  Current as of: September 23, 2018  Content Version: 11.9  © 9084-0347 [employer]. Care instructions adapted under license by [employer] (which disclaims liability or warranty for this information). If you have questions about a medical condition or this instruction, always ask your healthcare professional. [place] [address] any warranty or liability for your use of this information. Patient Education        Allergic Reaction: Care Instructions  Your Care Instructions    An allergic reaction is an excessive response from your immune system to a medicine, chemical, food, insect bite, or other substance. A reaction can range from mild to life-threatening. Some people have a mild rash, hives, and itching or stomach cramps. In severe reactions, swelling of your tongue and throat can close up your airway so that you cannot breathe. Follow-up care is a key part of your treatment and safety. Be sure to make and go to all appointments, and call your doctor if you are having problems. It's also a good idea to know your test results and keep a list of the medicines you take. How can you care for yourself at home? · If you know what caused your allergic reaction, be sure to avoid it. Your allergy may become more severe each time you have a reaction.   · Take an over-the-counter antihistamine, such as cetirizine (Zyrtec) or loratadine (Claritin), to treat mild symptoms. Read and follow directions on the label. Some antihistamines can make you feel sleepy. Do not give antihistamines to a child unless you have checked with your doctor first. Mild symptoms include sneezing or an itchy or runny nose; an itchy mouth; a few hives or mild itching; and mild nausea or stomach discomfort. · Do not scratch hives or a rash. Put a cold, moist towel on them or take cool baths to relieve itching. Put ice packs on hives, swelling, or insect stings for 10 to 15 minutes at a time. Put a thin cloth between the ice pack and your skin. Do not take hot baths or showers. They will make the itching worse. · Your doctor may prescribe a shot of epinephrine to carry with you in case you have a severe reaction. Learn how to give yourself the shot and keep it with you at all times. Make sure it is not . · Go to the emergency room every time you have a severe reaction, even if you have used your shot of epinephrine and are feeling better. Symptoms can come back after a shot. · Wear medical alert jewelry that lists your allergies. You can buy this at most [employer]. · If your child has a severe allergy, make sure that his or her teachers, babysitters, coaches, and other caregivers know about the allergy. They should have an epinephrine shot, know how and when to give it, and have a plan to take your child to the hospital.  When should you call for help? Give an epinephrine shot if:    · You think you are having a severe allergic reaction.     · You have symptoms in more than one body area, such as mild nausea and an itchy mouth.    After giving an epinephrine shot call 911, even if you feel better.   Call 911 if:    · You have symptoms of a severe allergic reaction. These may include:  ? Sudden raised, red areas (hives) all over your body. ? Swelling of the throat, mouth, lips, or tongue. ? Trouble breathing. ? Passing out (losing consciousness).  Or you may feel very lightheaded or suddenly feel weak, confused, or restless.     · You have been given an epinephrine shot, even if you feel better.    Call your doctor now or seek immediate medical care if:    · You have symptoms of an allergic reaction, such as:  ? A rash or hives (raised, red areas on the skin). ? Itching. ? Swelling. ? Belly pain, nausea, or vomiting.    Watch closely for changes in your health, and be sure to contact your doctor if:    · You do not get better as expected. Where can you learn more? Go to http://alon-tracie.info/. Enter W928 in the search box to learn more about \"Allergic Reaction: Care Instructions. \"  Current as of: June 27, 2018  Content Version: 11.9  © 6377-3449 [employer]. Care instructions adapted under license by [employer] (which disclaims liability or warranty for this information). If you have questions about a medical condition or this instruction, always ask your healthcare professional. [place] [address] any warranty or liability for your use of this information.

## 2019-05-11 NOTE — ED PROVIDER NOTES
EMERGENCY DEPARTMENT HISTORY AND PHYSICAL EXAM 
 
 
Date: 5/11/2019 Patient Name: Irvin Lilly History of Presenting Illness Chief Complaint Patient presents with  Chest Pain  
  ambulatory to triage and reprots chest soreness that started 3 days ago after starting macrobid for a UTI. Believes it is a reaction to the medication,   
 Cough  Rash  
  rash to chest, states that her soreness is not from the rash History Provided By: Patient and Patient's  HPI: Irvin Lilly, 70 y.o. female with PMHx significant for thyroid nodules, presents to the ED with cc of chest pain x3 days, cough and rash. Patient states that she started  Macrobid 3 days ago for UTI. Since then, she has noticed a rash to her chest and chest pain. The chest pain is right-sided and not in the same location as the rash. She denies shortness of breath, nausea or diaphoresis. She does have a cough productive of green sputum. She denies fever or chills. She denies leg pain or swelling. There are no other complaints, changes, or physical findings at this time. PCP: Mian Flor MD 
 
No current facility-administered medications on file prior to encounter. Current Outpatient Medications on File Prior to Encounter Medication Sig Dispense Refill  cholecalciferol (VITAMIN D3) 1,000 unit tablet Take  by mouth daily.  esomeprazole (NEXIUM) 40 mg capsule Take 40 mg by mouth daily. Past History Past Medical History: 
Past Medical History:  
Diagnosis Date  Acid reflux  Endocrine disease   
 thyroid nodules  Thyroid disease   
 thyroid nodules Past Surgical History: 
Past Surgical History:  
Procedure Laterality Date [address]  
 hysterectomy ? BSO  
 HX ORTHOPAEDIC    
 back Family History: 
Family History Problem Relation Age of Onset  Cancer Mother   
     lymphoma  Diabetes Paternal Grandmother  Heart Disease Sister  Heart Disease Son  No Known Problems Son  No Known Problems Daughter  No Known Problems Daughter  Heart Disease Maternal Grandfather Social History: 
Social History Tobacco Use  Smoking status: Former Smoker  Smokeless tobacco: Never Used Substance Use Topics  Alcohol use: No  
 Drug use: No  
 
 
Allergies: Allergies Allergen Reactions  Amoxicillin Other (comments) \"It makes my heart race\"  Oxycodone Nausea Only Review of Systems Review of Systems Constitutional: Negative for chills and fever. HENT: Negative for congestion. Eyes: Negative. Respiratory: Positive for cough. Negative for shortness of breath. Cardiovascular: Positive for chest pain. Negative for leg swelling. Gastrointestinal: Negative for abdominal pain. Endocrine: Negative for polyuria. Genitourinary: Negative for dysuria. Musculoskeletal: Negative for back pain. Skin: Positive for rash. Allergic/Immunologic: Negative for immunocompromised state. Neurological: Negative for headaches. Hematological: Negative. Psychiatric/Behavioral: Negative. All other systems reviewed and are negative. Physical Exam  
Physical Exam  
Constitutional: She is oriented to person, place, and time. She appears well-developed and well-nourished. No distress. HENT:  
Head: Normocephalic and atraumatic. Eyes: Pupils are equal, round, and reactive to light. EOM are normal.  
Neck: Normal range of motion. Cardiovascular: Normal rate, regular rhythm and normal heart sounds. Pulmonary/Chest: Effort normal and breath sounds normal. No respiratory distress. Abdominal: Soft. Bowel sounds are normal. She exhibits no mass. There is no tenderness. Musculoskeletal: Normal range of motion. She exhibits no edema. Neurological: She is alert and oriented to person, place, and time. Coordination normal.  
Skin: Skin is warm and dry. Rash noted. Macular rash on trunk and upper extremities Psychiatric: She has a normal mood and affect. Her behavior is normal.  
Nursing note and vitals reviewed. Diagnostic Study Results Labs - Recent Results (from the past 12 hour(s)) EKG, 12 LEAD, INITIAL Collection Time: 05/11/19  5:35 AM  
Result Value Ref Range Ventricular Rate 97 BPM  
 Atrial Rate 97 BPM  
 P-R Interval 130 ms QRS Duration 68 ms Q-T Interval 338 ms QTC Calculation (Bezet) 429 ms Calculated P Axis 46 degrees Calculated R Axis 20 degrees Calculated T Axis 57 degrees Diagnosis Normal sinus rhythm Nonspecific ST and T wave abnormality No previous ECGs available CBC WITH AUTOMATED DIFF Collection Time: 05/11/19  5:44 AM  
Result Value Ref Range WBC 16.1 (H) 3.6 - 11.0 K/uL  
 RBC 4.70 3.80 - 5.20 M/uL  
 HGB 14.2 11.5 - 16.0 g/dL HCT 44.1 35.0 - 47.0 % MCV 93.8 80.0 - 99.0 FL  
 MCH 30.2 26.0 - 34.0 PG  
 MCHC 32.2 30.0 - 36.5 g/dL  
 RDW 12.9 11.5 - 14.5 % PLATELET 619 630 - 465 K/uL MPV 11.6 8.9 - 12.9 FL  
 NRBC 0.0 0  WBC ABSOLUTE NRBC 0.00 0.00 - 0.01 K/uL NEUTROPHILS 82 (H) 32 - 75 % LYMPHOCYTES 9 (L) 12 - 49 % MONOCYTES 7 5 - 13 % EOSINOPHILS 2 0 - 7 % BASOPHILS 0 0 - 1 % IMMATURE GRANULOCYTES 0 0.0 - 0.5 % ABS. NEUTROPHILS 13.1 (H) 1.8 - 8.0 K/UL  
 ABS. LYMPHOCYTES 1.5 0.8 - 3.5 K/UL  
 ABS. MONOCYTES 1.1 (H) 0.0 - 1.0 K/UL  
 ABS. EOSINOPHILS 0.4 0.0 - 0.4 K/UL  
 ABS. BASOPHILS 0.1 0.0 - 0.1 K/UL  
 ABS. IMM. GRANS. 0.1 (H) 0.00 - 0.04 K/UL  
 DF AUTOMATED METABOLIC PANEL, COMPREHENSIVE Collection Time: 05/11/19  5:44 AM  
Result Value Ref Range Sodium 140 136 - 145 mmol/L Potassium 3.5 3.5 - 5.1 mmol/L Chloride 105 97 - 108 mmol/L  
 CO2 29 21 - 32 mmol/L Anion gap 6 5 - 15 mmol/L Glucose 118 (H) 65 - 100 mg/dL BUN 6 6 - 20 MG/DL  Creatinine 0.72 0.55 - 1.02 MG/DL  
 BUN/Creatinine ratio 8 (L) 12 - 20    
 GFR est AA >60 >60 ml/min/1.73m2 GFR est non-AA >60 >60 ml/min/1.73m2 Calcium 8.8 8.5 - 10.1 MG/DL Bilirubin, total 0.4 0.2 - 1.0 MG/DL  
 ALT (SGPT) 39 12 - 78 U/L  
 AST (SGOT) 30 15 - 37 U/L Alk. phosphatase 94 45 - 117 U/L Protein, total 7.1 6.4 - 8.2 g/dL Albumin 3.4 (L) 3.5 - 5.0 g/dL Globulin 3.7 2.0 - 4.0 g/dL A-G Ratio 0.9 (L) 1.1 - 2.2    
TROPONIN I Collection Time: 05/11/19  5:44 AM  
Result Value Ref Range Troponin-I, Qt. <0.05 <0.05 ng/mL CK W/ REFLX CKMB Collection Time: 05/11/19  5:44 AM  
Result Value Ref Range CK 47 26 - 192 U/L Radiologic Studies -  
XR CHEST PA LAT Final Result IMPRESSION: Minimal bibasilar atelectasis. Otherwise, no acute process. CT Results  (Last 48 hours) None CXR Results  (Last 48 hours) 05/11/19 0623  XR CHEST PA LAT Final result Impression:  IMPRESSION: Minimal bibasilar atelectasis. Otherwise, no acute process. Narrative:  INDICATION: Chest pain FINDINGS: PA and lateral views of the chest demonstrate a normal  
cardiomediastinal silhouette. There is minimal bibasilar atelectasis. No focal  
consolidation or pleural effusion is evident. The visualized osseous structures  
are unremarkable. Medical Decision Making I am the first provider for this patient. I reviewed the vital signs, available nursing notes, past medical history, past surgical history, family history and social history. Vital Signs-Reviewed the patient's vital signs. Patient Vitals for the past 12 hrs: 
 Temp Pulse Resp BP SpO2  
05/11/19 0531 98.8 °F (37.1 °C) 100 18 127/82 94 % Pulse Oximetry Analysis - 94% on  
room air Cardiac Monitor:  
Rate: 100 bpm 
Rhythm: Normal Sinus Rhythm EKG interpretation: (Preliminary) Rhythm: normal sinus rhythm; and regular .  Rate (approx.): 97; Axis: normal; DC interval: normal; QRS interval: normal ; ST/T wave: non-specific changes; Other findings: . Records Reviewed: Nursing Notes, Old Medical Records and Previous electrocardiograms Provider Notes (Medical Decision Making): Allergic reaction, CAD, CHF, bronchitis, pneumonia, musculoskeletal pain ED Course:  
Initial assessment performed. The patients presenting problems have been discussed, and they are in agreement with the care plan formulated and outlined with them. I have encouraged them to ask questions as they arise throughout their visit. Progress note: The patient's results were reviewed. The patient is feeling better. She will be advised to follow-up with cardiology and to stop Macrobid. She is to return to ER if worse Critical Care Time: 0 Disposition: 
home PLAN: 
1. Discharge Medication List as of 5/11/2019  9:31 AM  
  
START taking these medications Details  
cyclobenzaprine (FLEXERIL) 5 mg tablet Take 1 Tab by mouth three (3) times daily as needed for Muscle Spasm(s). , Normal, Disp-20 Tab, R-0  
  
hydrOXYzine HCl (ATARAX) 25 mg tablet Take 1 Tab by mouth every six (6) hours as needed for Itching for up to 10 days. , Normal, Disp-10 Tab, R-0  
  
  
CONTINUE these medications which have NOT CHANGED Details  
cholecalciferol (VITAMIN D3) 1,000 unit tablet Take  by mouth daily. , Historical Med  
  
esomeprazole (NEXIUM) 40 mg capsule Take 40 mg by mouth daily. , Historical Med 2. Follow-up Information Follow up With Specialties Details Why Contact Info Kael Cisneros MD Internal Medicine In 2 days As needed [address] [facility] [address] 
[phone] Sonia Mata MD Cardiology, [address] [facility], Internal Medicine Call in 2 days  [address] 
[phone] [facility] EMERGENCY DEPT Emergency Medicine  If symptoms worsen [address] [facility] [address] [facility] 
[phone] Return to ED if worse Diagnosis Clinical Impression: 1. Acute chest pain 2. Allergic reaction, initial encounter Attestations: This chart was completed by myself, Dr. Deb Glaser

## 2019-05-11 NOTE — ED NOTES
Report received from Nani, [facility]. Tesfaye DE LEON, ED Summary, MAR and Recent Results was discussed.  
 
Pantera Garcia RN

## 2019-05-11 NOTE — ED NOTES
Patient discharged by Dr. Juan Pritchard. Patient provided with discharge instructions Rx and instructions on follow up care. Patient out of ED ambulatory accompanied by family.

## 2019-05-12 LAB
ATRIAL RATE: 97 BPM
CALCULATED P AXIS, ECG09: 46 DEGREES
CALCULATED R AXIS, ECG10: 20 DEGREES
CALCULATED T AXIS, ECG11: 57 DEGREES
DIAGNOSIS, 93000: NORMAL
P-R INTERVAL, ECG05: 130 MS
Q-T INTERVAL, ECG07: 338 MS
QRS DURATION, ECG06: 68 MS
QTC CALCULATION (BEZET), ECG08: 429 MS
VENTRICULAR RATE, ECG03: 97 BPM

## 2019-07-25 ENCOUNTER — APPOINTMENT (OUTPATIENT)
Dept: VASCULAR SURGERY | Age: 79
End: 2019-07-25
Attending: PSYCHIATRY & NEUROLOGY
Payer: MEDICARE

## 2019-07-25 ENCOUNTER — APPOINTMENT (OUTPATIENT)
Dept: MRI IMAGING | Age: 79
End: 2019-07-25
Attending: PSYCHIATRY & NEUROLOGY
Payer: MEDICARE

## 2019-07-25 ENCOUNTER — HOSPITAL ENCOUNTER (OUTPATIENT)
Age: 79
Setting detail: OBSERVATION
Discharge: HOME HEALTH | End: 2019-07-26
Attending: EMERGENCY MEDICINE | Admitting: FAMILY MEDICINE
Payer: MEDICARE

## 2019-07-25 ENCOUNTER — APPOINTMENT (OUTPATIENT)
Dept: MRI IMAGING | Age: 79
End: 2019-07-25
Attending: EMERGENCY MEDICINE
Payer: MEDICARE

## 2019-07-25 ENCOUNTER — APPOINTMENT (OUTPATIENT)
Dept: CT IMAGING | Age: 79
End: 2019-07-25
Attending: EMERGENCY MEDICINE
Payer: MEDICARE

## 2019-07-25 ENCOUNTER — APPOINTMENT (OUTPATIENT)
Dept: NON INVASIVE DIAGNOSTICS | Age: 79
End: 2019-07-25
Attending: FAMILY MEDICINE
Payer: MEDICARE

## 2019-07-25 DIAGNOSIS — M50.10 CERVICAL DISC DISORDER WITH RADICULOPATHY: ICD-10-CM

## 2019-07-25 DIAGNOSIS — R53.1 LEFT-SIDED WEAKNESS: ICD-10-CM

## 2019-07-25 DIAGNOSIS — G45.9 TIA (TRANSIENT ISCHEMIC ATTACK): Primary | ICD-10-CM

## 2019-07-25 LAB
ALBUMIN SERPL-MCNC: 3.5 G/DL (ref 3.5–5)
ALBUMIN/GLOB SERPL: 1.1 {RATIO} (ref 1.1–2.2)
ALP SERPL-CCNC: 84 U/L (ref 45–117)
ALT SERPL-CCNC: 24 U/L (ref 12–78)
ANION GAP SERPL CALC-SCNC: 6 MMOL/L (ref 5–15)
APPEARANCE UR: CLEAR
APTT PPP: 27.6 SEC (ref 22.1–32)
AST SERPL-CCNC: 34 U/L (ref 15–37)
BACTERIA URNS QL MICRO: NEGATIVE /HPF
BASOPHILS # BLD: 0 K/UL (ref 0–0.1)
BASOPHILS NFR BLD: 1 % (ref 0–1)
BILIRUB SERPL-MCNC: 0.6 MG/DL (ref 0.2–1)
BILIRUB UR QL: NEGATIVE
BUN SERPL-MCNC: 13 MG/DL (ref 6–20)
BUN/CREAT SERPL: 19 (ref 12–20)
CALCIUM SERPL-MCNC: 8.8 MG/DL (ref 8.5–10.1)
CHLORIDE SERPL-SCNC: 110 MMOL/L (ref 97–108)
CO2 SERPL-SCNC: 25 MMOL/L (ref 21–32)
COLOR UR: ABNORMAL
CREAT SERPL-MCNC: 0.68 MG/DL (ref 0.55–1.02)
DIFFERENTIAL METHOD BLD: NORMAL
ECHO AO ROOT DIAM: 3.05 CM
ECHO AV AREA PEAK VELOCITY: 2 CM2
ECHO AV CUSP MM: 1.97 CM
ECHO AV PEAK GRADIENT: 7.8 MMHG
ECHO AV PEAK VELOCITY: 139.22 CM/S
ECHO LA MAJOR AXIS: 2.99 CM
ECHO LA TO AORTIC ROOT RATIO: 0.98
ECHO LV E' LATERAL VELOCITY: 7.47 CM/S
ECHO LV E' SEPTAL VELOCITY: 5.54 CM/S
ECHO LV INTERNAL DIMENSION DIASTOLIC: 2.8 CM (ref 3.9–5.3)
ECHO LV INTERNAL DIMENSION SYSTOLIC: 1.94 CM
ECHO LV IVSD: 1.15 CM (ref 0.6–0.9)
ECHO LV MASS 2D: 78.2 G (ref 67–162)
ECHO LV MASS INDEX 2D: 44.5 G/M2 (ref 43–95)
ECHO LV POSTERIOR WALL DIASTOLIC: 0.85 CM (ref 0.6–0.9)
ECHO LVOT DIAM: 1.86 CM
ECHO LVOT PEAK GRADIENT: 4.3 MMHG
ECHO LVOT PEAK VELOCITY: 104.04 CM/S
ECHO MV A VELOCITY: 115.69 CM/S
ECHO MV AREA PHT: 3.2 CM2
ECHO MV E DECELERATION TIME (DT): 238.6 MS
ECHO MV E VELOCITY: 94.67 CM/S
ECHO MV E/A RATIO: 0.82
ECHO MV E/E' LATERAL: 12.67
ECHO MV E/E' RATIO (AVERAGED): 14.88
ECHO MV E/E' SEPTAL: 17.09
ECHO MV PRESSURE HALF TIME (PHT): 69.2 MS
ECHO PV MAX VELOCITY: 115.77 CM/S
ECHO PV PEAK GRADIENT: 5.4 MMHG
ECHO RV INTERNAL DIMENSION: 3.13 CM
ECHO RV TAPSE: 1.95 CM (ref 1.5–2)
EOSINOPHIL # BLD: 0.1 K/UL (ref 0–0.4)
EOSINOPHIL NFR BLD: 2 % (ref 0–7)
EPITH CASTS URNS QL MICRO: ABNORMAL /LPF
ERYTHROCYTE [DISTWIDTH] IN BLOOD BY AUTOMATED COUNT: 13 % (ref 11.5–14.5)
GLOBULIN SER CALC-MCNC: 3.3 G/DL (ref 2–4)
GLUCOSE SERPL-MCNC: 94 MG/DL (ref 65–100)
GLUCOSE UR STRIP.AUTO-MCNC: NEGATIVE MG/DL
HCT VFR BLD AUTO: 44.7 % (ref 35–47)
HGB BLD-MCNC: 14.3 G/DL (ref 11.5–16)
HGB UR QL STRIP: NEGATIVE
HYALINE CASTS URNS QL MICRO: ABNORMAL /LPF (ref 0–5)
IMM GRANULOCYTES # BLD AUTO: 0 K/UL (ref 0–0.04)
IMM GRANULOCYTES NFR BLD AUTO: 0 % (ref 0–0.5)
INR PPP: 1 (ref 0.9–1.1)
KETONES UR QL STRIP.AUTO: 15 MG/DL
LEFT BULB EDV: 17.8 CM/S
LEFT BULB PSV: 63.1 CM/S
LEFT CCA DIST DIAS: 17.7 CM/S
LEFT CCA DIST SYS: 76.4 CM/S
LEFT CCA PROX DIAS: 10.2 CM/S
LEFT CCA PROX SYS: 61 CM/S
LEFT ECA DIAS: 12.52 CM/S
LEFT ECA SYS: 76.4 CM/S
LEFT ICA MID DIAS: 32.1 CM/S
LEFT ICA MID SYS: 93.4 CM/S
LEFT ICA PROX DIAS: 11.2 CM/S
LEFT ICA PROX SYS: 63.4 CM/S
LEFT ICA/CCA SYS: 1.22
LEFT VERTEBRAL DIAS: 15.13 CM/S
LEFT VERTEBRAL SYS: 47.7 CM/S
LEUKOCYTE ESTERASE UR QL STRIP.AUTO: NEGATIVE
LYMPHOCYTES # BLD: 2 K/UL (ref 0.8–3.5)
LYMPHOCYTES NFR BLD: 36 % (ref 12–49)
MCH RBC QN AUTO: 29.8 PG (ref 26–34)
MCHC RBC AUTO-ENTMCNC: 32 G/DL (ref 30–36.5)
MCV RBC AUTO: 93.1 FL (ref 80–99)
MONOCYTES # BLD: 0.6 K/UL (ref 0–1)
MONOCYTES NFR BLD: 10 % (ref 5–13)
NEUTS SEG # BLD: 2.9 K/UL (ref 1.8–8)
NEUTS SEG NFR BLD: 51 % (ref 32–75)
NITRITE UR QL STRIP.AUTO: NEGATIVE
NRBC # BLD: 0 K/UL (ref 0–0.01)
NRBC BLD-RTO: 0 PER 100 WBC
PH UR STRIP: 5.5 [PH] (ref 5–8)
PLATELET # BLD AUTO: 163 K/UL (ref 150–400)
PMV BLD AUTO: 12.8 FL (ref 8.9–12.9)
POTASSIUM SERPL-SCNC: 4.5 MMOL/L (ref 3.5–5.1)
PROT SERPL-MCNC: 6.8 G/DL (ref 6.4–8.2)
PROT UR STRIP-MCNC: NEGATIVE MG/DL
PROTHROMBIN TIME: 10.1 SEC (ref 9–11.1)
RBC # BLD AUTO: 4.8 M/UL (ref 3.8–5.2)
RBC #/AREA URNS HPF: ABNORMAL /HPF (ref 0–5)
RIGHT CCA DIST DIAS: 16.4 CM/S
RIGHT CCA DIST SYS: 85.6 CM/S
RIGHT CCA PROX DIAS: 19 CM/S
RIGHT CCA PROX SYS: 88.2 CM/S
RIGHT ECA DIAS: 24.26 CM/S
RIGHT ECA SYS: 105.1 CM/S
RIGHT ICA DIST DIAS: 21.8 CM/S
RIGHT ICA DIST SYS: 73.4 CM/S
RIGHT ICA MID DIAS: 16.2 CM/S
RIGHT ICA MID SYS: 55.9 CM/S
RIGHT ICA PROX DIAS: 16.2 CM/S
RIGHT ICA PROX SYS: 61.4 CM/S
RIGHT ICA/CCA SYS: 0.9
RIGHT VERTEBRAL DIAS: 12.29 CM/S
RIGHT VERTEBRAL SYS: 35.6 CM/S
SODIUM SERPL-SCNC: 141 MMOL/L (ref 136–145)
SP GR UR REFRACTOMETRY: 1.01 (ref 1–1.03)
THERAPEUTIC RANGE,PTTT: NORMAL SECS (ref 58–77)
TROPONIN I SERPL-MCNC: <0.05 NG/ML
UR CULT HOLD, URHOLD: NORMAL
UROBILINOGEN UR QL STRIP.AUTO: 0.2 EU/DL (ref 0.2–1)
WBC # BLD AUTO: 5.6 K/UL (ref 3.6–11)
WBC URNS QL MICRO: ABNORMAL /HPF (ref 0–4)

## 2019-07-25 PROCEDURE — 99218 HC RM OBSERVATION: CPT

## 2019-07-25 PROCEDURE — 99284 EMERGENCY DEPT VISIT MOD MDM: CPT

## 2019-07-25 PROCEDURE — 96372 THER/PROPH/DIAG INJ SC/IM: CPT

## 2019-07-25 PROCEDURE — 94760 N-INVAS EAR/PLS OXIMETRY 1: CPT

## 2019-07-25 PROCEDURE — 84484 ASSAY OF TROPONIN QUANT: CPT

## 2019-07-25 PROCEDURE — 70551 MRI BRAIN STEM W/O DYE: CPT

## 2019-07-25 PROCEDURE — 72141 MRI NECK SPINE W/O DYE: CPT

## 2019-07-25 PROCEDURE — 80053 COMPREHEN METABOLIC PANEL: CPT

## 2019-07-25 PROCEDURE — 96374 THER/PROPH/DIAG INJ IV PUSH: CPT

## 2019-07-25 PROCEDURE — 81001 URINALYSIS AUTO W/SCOPE: CPT

## 2019-07-25 PROCEDURE — 85610 PROTHROMBIN TIME: CPT

## 2019-07-25 PROCEDURE — 36415 COLL VENOUS BLD VENIPUNCTURE: CPT

## 2019-07-25 PROCEDURE — 74011250637 HC RX REV CODE- 250/637: Performed by: FAMILY MEDICINE

## 2019-07-25 PROCEDURE — 85730 THROMBOPLASTIN TIME PARTIAL: CPT

## 2019-07-25 PROCEDURE — 70544 MR ANGIOGRAPHY HEAD W/O DYE: CPT

## 2019-07-25 PROCEDURE — 93880 EXTRACRANIAL BILAT STUDY: CPT

## 2019-07-25 PROCEDURE — 74011250636 HC RX REV CODE- 250/636: Performed by: FAMILY MEDICINE

## 2019-07-25 PROCEDURE — 70450 CT HEAD/BRAIN W/O DYE: CPT

## 2019-07-25 PROCEDURE — 96361 HYDRATE IV INFUSION ADD-ON: CPT

## 2019-07-25 PROCEDURE — 85025 COMPLETE CBC W/AUTO DIFF WBC: CPT

## 2019-07-25 RX ORDER — HEPARIN SODIUM 5000 [USP'U]/ML
5000 INJECTION, SOLUTION INTRAVENOUS; SUBCUTANEOUS EVERY 8 HOURS
Status: DISCONTINUED | OUTPATIENT
Start: 2019-07-25 | End: 2019-07-26 | Stop reason: HOSPADM

## 2019-07-25 RX ORDER — GUAIFENESIN 100 MG/5ML
81 LIQUID (ML) ORAL DAILY
Status: DISCONTINUED | OUTPATIENT
Start: 2019-07-25 | End: 2019-07-26 | Stop reason: HOSPADM

## 2019-07-25 RX ORDER — ESTRADIOL 0.1 MG/G
2 CREAM VAGINAL
COMMUNITY
Start: 2019-07-27 | End: 2020-10-13

## 2019-07-25 RX ORDER — ACETAMINOPHEN 325 MG/1
650 TABLET ORAL
Status: DISCONTINUED | OUTPATIENT
Start: 2019-07-25 | End: 2019-07-26 | Stop reason: HOSPADM

## 2019-07-25 RX ORDER — SODIUM CHLORIDE 0.9 % (FLUSH) 0.9 %
5-40 SYRINGE (ML) INJECTION AS NEEDED
Status: DISCONTINUED | OUTPATIENT
Start: 2019-07-25 | End: 2019-07-26 | Stop reason: HOSPADM

## 2019-07-25 RX ORDER — GUAIFENESIN 100 MG/5ML
81 LIQUID (ML) ORAL DAILY
Status: DISCONTINUED | OUTPATIENT
Start: 2019-07-26 | End: 2019-07-25

## 2019-07-25 RX ORDER — SODIUM CHLORIDE 0.9 % (FLUSH) 0.9 %
5-40 SYRINGE (ML) INJECTION EVERY 8 HOURS
Status: DISCONTINUED | OUTPATIENT
Start: 2019-07-25 | End: 2019-07-26 | Stop reason: HOSPADM

## 2019-07-25 RX ORDER — SODIUM CHLORIDE 9 MG/ML
75 INJECTION, SOLUTION INTRAVENOUS CONTINUOUS
Status: DISPENSED | OUTPATIENT
Start: 2019-07-25 | End: 2019-07-26

## 2019-07-25 RX ORDER — PANTOPRAZOLE SODIUM 40 MG/1
40 TABLET, DELAYED RELEASE ORAL
Status: DISCONTINUED | OUTPATIENT
Start: 2019-07-26 | End: 2019-07-26 | Stop reason: HOSPADM

## 2019-07-25 RX ORDER — ACETAMINOPHEN 650 MG/1
650 SUPPOSITORY RECTAL
Status: DISCONTINUED | OUTPATIENT
Start: 2019-07-25 | End: 2019-07-26 | Stop reason: HOSPADM

## 2019-07-25 RX ADMIN — ASPIRIN 81 MG 81 MG: 81 TABLET ORAL at 16:16

## 2019-07-25 RX ADMIN — HEPARIN SODIUM 5000 UNITS: 5000 INJECTION INTRAVENOUS; SUBCUTANEOUS at 16:16

## 2019-07-25 RX ADMIN — Medication 10 ML: at 16:17

## 2019-07-25 RX ADMIN — Medication 10 ML: at 21:31

## 2019-07-25 RX ADMIN — SODIUM CHLORIDE 75 ML/HR: 900 INJECTION, SOLUTION INTRAVENOUS at 18:52

## 2019-07-25 NOTE — ED NOTES
[address]  witnessed/unwitnessed fall occurred on 7- (Date) at 65 (Time). The answers to the following questions summarize the fall: In the patient's own words:  · What were you attempting to do when you fell? Trying to get to bedside commode  · Do you know why you fell? \"I guess I was weak\"  · Do you have any pain/discomfort or any other complaints? No  · Which part of your body made contact with the floor or other object? Bottom  Nurse:  Randee Austin Was this an assisted fall? yes   Was fall witnessed? Yes     By [address] If witnessed, what part of the body made contact with the floor or other object? Bottom   Patients mental status after the fall/when found: Alert and oriented   Any apparent injury:  No apparent injury   Immediate interventions for injury/suspected injury? No interventions needed   Patient assisted back to bed? Assist X1   Name of provider notified and time, any comments? Dr Cox Manifold, 0        Name of family member notified and time: , present at time of incident    Document Immediate VS and physical assessment in flowsheets. Document Neuro assessment every hour x 4 (for potential head injury or unwitnessed fall) in flowsheets.         Joesph Heller RN

## 2019-07-25 NOTE — ED NOTES
Dr Yojana Moreno, tele neuro, on screen to evaluate patient.      Pharmacy at bedside in anticipation for TPA

## 2019-07-25 NOTE — ED TRIAGE NOTES
Patient comes to the ER via EMS as Code S Level 1. LKW  0630. At 0700 patient began with slurred speech, balance issues, L facial droop and L sided weakness. En route to ER symptoms began to get better and then at [facility] 64 EMS reports worsening slurred speech, Increased facial droop and weakness. Blood sugar 94  /81    MD and RN in CT to evaluate patient.

## 2019-07-25 NOTE — CONSULTS
INPATIENT NEUROLOGY CONSULTATION  7/25/2019     Consulted by: Diane Leonardo MD        Patient ID:  Sivan Collado  [id number]  70 y.o.  1940    CC: Left-sided weakness numbness    HPI    Mrs. Juan Bey is a 28-year-old woman with a history of thyroid dysfunction, lumbosacral spine disease who is here because this morning she woke up and sometime after waking up she began to feel weakness on the left side. She felt globally weak in general but the left side was worse to include arm and leg. She fell. Now she is feeling better but she seems to have these episodes of left arm and leg transient numbness sometimes weakness. She does not take aspirin. MRI brain was done this morning without acute stroke. MRA of the head without any acute issue. She is still having the symptoms. No vision loss. Looking at her medical record she has C-spine imaging from 2015 showing mild cord compression that she was unaware of she tells me. She has some residual left leg distal weakness and pain due to history of L spine disease. Review of Systems   Eyes: Negative for double vision. Musculoskeletal: Positive for back pain and falls. Neurological: Positive for focal weakness and weakness. Negative for speech change and headaches. All other systems reviewed and are negative.       Past Medical History:   Diagnosis Date    Acid reflux     Endocrine disease     thyroid nodules    Thyroid disease     thyroid nodules     Family History   Problem Relation Age of Onset    Cancer Mother         lymphoma    Diabetes Paternal Grandmother     Heart Disease Sister     Heart Disease Son     No Known Problems Son     No Known Problems Daughter     No Known Problems Daughter     Heart Disease Maternal Grandfather      Social History     Socioeconomic History    Marital status:      Spouse name: Not on file    Number of children: Not on file    Years of education: Not on file    Highest education level: Not on file   Occupational History    Not on file   Social Needs    Financial resource strain: Not on file    Food insecurity:     Worry: Not on file     Inability: Not on file    Transportation needs:     Medical: Not on file     Non-medical: Not on file   Tobacco Use    Smoking status: Former Smoker    Smokeless tobacco: Never Used   Substance and Sexual Activity    Alcohol use: No    Drug use: No    Sexual activity: Not Currently     Partners: Male     Birth control/protection: None   Lifestyle    Physical activity:     Days per week: Not on file     Minutes per session: Not on file    Stress: Not on file   Relationships    Social connections:     Talks on phone: Not on file     Gets together: Not on file     Attends Holiness service: Not on file     Active member of club or organization: Not on file     Attends meetings of clubs or organizations: Not on file     Relationship status: Not on file    Intimate partner violence:     Fear of current or ex partner: Not on file     Emotionally abused: Not on file     Physically abused: Not on file     Forced sexual activity: Not on file   Other Topics Concern    Not on file   Social History Narrative    Not on file     Current Facility-Administered Medications   Medication Dose Route Frequency    [START ON 7/26/2019] pantoprazole (PROTONIX) tablet 40 mg  40 mg Oral ACB    sodium chloride (NS) flush 5-40 mL  5-40 mL IntraVENous Q8H    sodium chloride (NS) flush 5-40 mL  5-40 mL IntraVENous PRN    acetaminophen (TYLENOL) tablet 650 mg  650 mg Oral Q4H PRN    Or    acetaminophen (TYLENOL) solution 650 mg  650 mg Per NG tube Q4H PRN    Or    acetaminophen (TYLENOL) suppository 650 mg  650 mg Rectal Q4H PRN    0.9% sodium chloride infusion  75 mL/hr IntraVENous CONTINUOUS    heparin (porcine) injection 5,000 Units  5,000 Units SubCUTAneous Q8H    aspirin chewable tablet 81 mg  81 mg Oral DAILY     Current Outpatient Medications   Medication Sig    [START ON 7/27/2019] estradiol (ESTRACE) 0.01 % (0.1 mg/gram) vaginal cream Insert 2 g into vagina every three (3) days.  cholecalciferol (VITAMIN D3) 1,000 unit tablet Take  by mouth daily.  esomeprazole (NEXIUM) 40 mg capsule Take 40 mg by mouth daily. Allergies   Allergen Reactions    Iodine Anaphylaxis    Amoxicillin Other (comments)     \"It makes my heart race\"    Macrobid [Nitrofurantoin Monohyd/M-Cryst] Rash    Oxycodone Nausea Only       Visit Vitals  /66 (BP 1 Location: Left arm, BP Patient Position: At rest)   Pulse 89   Temp 98 °F (36.7 °C)   Resp 16   Ht 5' 6\" (1.676 m)   Wt 67.3 kg (148 lb 5.9 oz)   SpO2 96%   BMI 23.95 kg/m²     Physical Exam   Constitutional: She appears well-developed and well-nourished. Cardiovascular: Normal rate. Pulmonary/Chest: Effort normal.   Skin: Skin is warm and dry. Psychiatric: Her behavior is normal.   Vitals reviewed.     Neurologic Exam     Mental Status   Elderly woman sitting upright awake and alert following commands  Pupils are equal, EOMI  Face is grossly symmetric on smile, speech is clear, tongue is midline  Upper extremities 5/5  Right lower extremity 5/5  Left lower extremity 4+ globallybaseline  Finger-to-nose intact  Sensation intact  Gait deferred            Lab Results   Component Value Date/Time    WBC 5.6 07/25/2019 08:02 AM    HGB 14.3 07/25/2019 08:02 AM    HCT 44.7 07/25/2019 08:02 AM    PLATELET 529 95/32/8122 08:02 AM    MCV 93.1 07/25/2019 08:02 AM     Lab Results   Component Value Date/Time    Glucose 94 07/25/2019 08:02 AM    LDL, calculated 186 (H) 05/10/2018 12:04 PM    Creatinine (POC) 0.7 12/23/2015 03:20 PM    Creatinine 0.68 07/25/2019 08:02 AM      Lab Results   Component Value Date/Time    Cholesterol, total 270 (H) 05/10/2018 12:04 PM    HDL Cholesterol 55 05/10/2018 12:04 PM    LDL, calculated 186 (H) 05/10/2018 12:04 PM    Triglyceride 143 05/10/2018 12:04 PM     Lab Results   Component Value Date/Time    ALT (SGPT) 24 07/25/2019 08:02 AM    AST (SGOT) 34 07/25/2019 08:02 AM    Alk. phosphatase 84 07/25/2019 08:02 AM    Bilirubin, total 0.6 07/25/2019 08:02 AM    Albumin 3.5 07/25/2019 08:02 AM    Protein, total 6.8 07/25/2019 08:02 AM    INR 1.0 07/25/2019 08:27 AM    Prothrombin time 10.1 07/25/2019 08:27 AM    PLATELET 202 46/47/8645 08:02 AM        CT Results (maximum last 3): Results from [place] encounter on 07/25/19   CT CODE NEURO HEAD WO CONTRAST    Narrative INDICATION: slurred speech, gait disturbance    EXAM: CT HEAD without contrast.   CT dose reduction was achieved through use of a standardized protocol tailored  for this examination and automatic exposure control for dose modulation. FINDINGS: Unenhanced CT Head is performed. The brain parenchyma is unremarkable  in appearance for age, without evidence for infarct. There is no bleed, mass,  shift, hydrocephalus or extra-axial fluid collection. Bone windows are  unremarkable. Impression IMPRESSION: No Intracranial Disease Evident on Head CT. MRI Results (maximum last 3): Results from [place] encounter on 07/25/19   MRI BRAIN WO CONT    Narrative EXAM: MRI BRAIN WO CONT, MRA BRAIN WO CONT    CLINICAL HISTORY: Left-sided weakness    INDICATION: Left-sided weakness. COMPARISON:  [facility]  TECHNIQUE: MR examination of the brain includes axial and sagittal T1, axial T2,  axial FLAIR, axial gradient echo, axial DWI, coronal T2. Coronal T2  Next, 3-D time-of-flight MRA of the brain was performed. Multiplanar  reconstructions were obtained. Next, 2-D time-of-flight MRA of the neck was performed. Multiplanar  reconstructions were obtained. FINDINGS:   There is no intracranial mass, hemorrhage or evidence of acute infarction. Confluent periventricular scattered foci of increased T2 signal intensity in the  corona radiata and central emboli. There is sulcal and ventricular prominence.   Increased T2 signal intensity foci in the central sixto as well. IACs are  symmetric in size. Minimal ethmoid sinus disease. There is no Chiari or syrinx. The pituitary and infundibulum are grossly  unremarkable. There is no skull base mass. Cerebellopontine angles are grossly  unremarkable. The major intracranial vascular flow-voids are unremarkable. The  cavernous sinuses are symmetric. Optic chiasm and infundibulum grossly  unremarkable. Orbits are grossly symmetric. Dural venous sinuses are grossly  patent. The brain architecture is normal. There is no evidence of midline shift  or mass-effect. There are no extra-axial fluid collections. A 2 segments are within normal limits. There are A1 segments bilaterally. M1  segments within normal limits. M2 segments demonstrate symmetric arborization. P1 and P2 vessels are patent. The right vertebral artery is dominant. . The  basilar artery and its branches are normal. The internal carotid, anterior  cerebral, and middle cerebral arteries are patent. There is no flow-limiting  intracranial stenosis. There is no aneurysm. There are no sizable posterior  communicating arteries. Impression IMPRESSION:   No intracranial mass, hemorrhage or evidence of acute infarction. No aneurysm, dissection or evidence of hemodynamically significant stenosis. Mild chronic microvascular ischemic change and mild cerebral atrophy. MRA BRAIN WO CONT    Narrative EXAM: MRI BRAIN WO CONT, MRA BRAIN WO CONT    CLINICAL HISTORY: Left-sided weakness    INDICATION: Left-sided weakness. COMPARISON:   [id number]  TECHNIQUE: MR examination of the brain includes axial and sagittal T1, axial T2,  axial FLAIR, axial gradient echo, axial DWI, coronal T2. Coronal T2  Next, 3-D time-of-flight MRA of the brain was performed. Multiplanar  reconstructions were obtained. Next, 2-D time-of-flight MRA of the neck was performed. Multiplanar  reconstructions were obtained.     FINDINGS:   There is no intracranial mass, hemorrhage or evidence of acute infarction. Confluent periventricular scattered foci of increased T2 signal intensity in the  corona radiata and central emboli. There is sulcal and ventricular prominence. Increased T2 signal intensity foci in the central sixto as well. IACs are  symmetric in size. Minimal ethmoid sinus disease. There is no Chiari or syrinx. The pituitary and infundibulum are grossly  unremarkable. There is no skull base mass. Cerebellopontine angles are grossly  unremarkable. The major intracranial vascular flow-voids are unremarkable. The  cavernous sinuses are symmetric. Optic chiasm and infundibulum grossly  unremarkable. Orbits are grossly symmetric. Dural venous sinuses are grossly  patent. The brain architecture is normal. There is no evidence of midline shift  or mass-effect. There are no extra-axial fluid collections. A 2 segments are within normal limits. There are A1 segments bilaterally. M1  segments within normal limits. M2 segments demonstrate symmetric arborization. P1 and P2 vessels are patent. The right vertebral artery is dominant. . The  basilar artery and its branches are normal. The internal carotid, anterior  cerebral, and middle cerebral arteries are patent. There is no flow-limiting  intracranial stenosis. There is no aneurysm. There are no sizable posterior  communicating arteries. Impression IMPRESSION:   No intracranial mass, hemorrhage or evidence of acute infarction. No aneurysm, dissection or evidence of hemodynamically significant stenosis. Mild chronic microvascular ischemic change and mild cerebral atrophy. Results from [place] encounter on 12/23/15   MRI LUMB SPINE W WO CONT    Narrative **Final Report**       ICD Codes / Adm. Diagnosis: 724.2  M54.5 / Lumbago  Low back pain  Examination:  MR Sandra Posey Salome  - [id number] - Dec 23 2015  3:27PM  Accession No:  [id number]  Reason:  lumbago      REPORT:  EXAM:  MR L SPINE  W AND WO CON    INDICATION: Lumbago with right-sided sciatica. COMPARISON: None    TECHNIQUE: MR imaging of the lumbar spine was performed using the following   sequences: sagittal T1, T2, STIR;  axial T1, T2. Postcontrast axial and   sagittal T1.    CONTRAST: Pre and post  contrast 6 mL of Gadavist.    FINDINGS:    There is mildly exaggerated lordosis of the lumbar spine. Vertebral body   heights are maintained. Marrow signal is normal.     The conus medullaris terminates at L2. Signal and caliber of the distal   spinal cord are within normal limits. There is a 1.0 cm cyst in the right kidney. There is mild bilateral   hydronephrosis. There is diffuse disc desiccation. Lower thoracic spine: There are minimal central disc bulges at T11-12 and   T12-L1 without significant spinal stenosis or neural foraminal narrowing. L1-L2:  There is a minimal central disc bulge without significant spinal   stenosis or neural foraminal narrowing. L2-L3:  There is a minimal broad-based disc bulge with thickening of   ligamentum flavum without significant spinal stenosis or neural foraminal   narrowing. L3-L4:  There is a mild broad-based disc bulge with significant thickening   of ligamentum flavum causing moderate spinal stenosis. There is no   significant neural foraminal narrowing. Mild bilateral posterior facet   arthropathy. L4-L5:  Mild disc space narrowing. The patient is status post right   hemilaminotomy. No recurrent disc osteophyte formation. No spinal stenosis   or neural foraminal narrowing. Mild bilateral posterior facet arthropathy. L5-S1:  Minimal broad-based disc bulge without significant spinal stenosis   or neural foraminal narrowing. Mild bilateral posterior facet arthropathy. IMPRESSION:    1. Moderate spinal stenosis at L3-4.  2. Status post right hemilaminotomy at L4-5.  3. Mild bilateral hydronephrosis.               Signing/Reading Doctor: Cristal Mccarty ([id number])    Approved: Dat APARICIO ([id number])  Dec 23 2015  4:13PM                                      VAS/US/Carotid Doppler Results (maximum last 3): Results from [facility] encounter on 04/28/18   ANKLE BRACHIAL INDEX       PET Results (maximum last 3): No results found for this or any previous visit. Assessment and Plan        62-year-old woman who presented with left-sided weakness numbness this morning with a fall. Symptoms are getting better but still seem to occur intermittently briefly. MRI is completely benign which I personally reviewed. No stroke. MRA negative. Need to check carotid ultrasound. TIA is still possible. Aspirin 81 mg to start. Telemetry. I am also going to check another MRI of the C-spine given the abnormal finding a few years ago and this new left-sided weakness. She also fell. Need to make sure were not dealing with a compressive myelopathy. Any acute change in exam please activate code stroke. I spoke with her and her  at the bedside. Questions answered. During this evaluation, we also discussed stroke education to include signs and symptoms of stroke and TIA.        [address] [place] [address],   NEUROLOGIST  Diplomate ILYAN  7/25/2019

## 2019-07-25 NOTE — ED NOTES
Verbal shift change report given to Fabi Jones RN (oncoming nurse) by Peg Abbasi (offgoing nurse). Report included the following information SBAR, ED Summary, MAR and Recent Results.

## 2019-07-25 NOTE — H&P
[facility]  HISTORY AND PHYSICAL    Name:  Andrew Carlson  MR#:  [id number]  :  1940  ACCOUNT #:  [de-identified]  ADMIT DATE:  2019    CHIEF COMPLAINT:  Left-sided weakness. HISTORY OF PRESENT ILLNESS:  The patient is a 17-year-old female with no significant past medical history, who presents to the hospital with the above-mentioned symptom. History was obtained from the patient as well as her . Her  reports that the patient woke up at 6:30 this morning in normal state of health. Around 7 o'clock, she suddenly started having some left-sided weakness. The  reports that she was not able to walk. He sat her down on the chair and noticed that she had a left-sided facial droop along with weakness in her arms and legs. The  also reports that the patient had very slurred speech, and he was not able to understand what the patient was saying. EMS was called. On the way to the hospital, the patient's symptoms started getting better, but then it got worse while the patient came to the hospital.  The patient was seen in the ER and was requested to be admitted under the hospitalist service. The patient currently is resting in bed, reports that most of her symptoms have resolved and denies any other complaints or problems, but the patient reports that she still continues to be weak on the left side and is not able to walk well. Of note, before I went to see the patient, the patient was trying to get out of the bed in the ER and had a fall. The patient reports that she sat down and Geno Philip my pride is hurt. \"  The patient denies any pain or discomfort anywhere. Denies hitting her head. The patient denies any headache, blurry vision, sore throat, trouble swallowing, trouble with speech.   Denies any chest pain, shortness of breath, cough, fever, chills, abdominal pain, constipation, diarrhea, urinary symptoms, any hematemesis, melena, hemoptysis, hematuria or any other concerns or problems. PAST MEDICAL HISTORY:  See above. HOME MEDICATIONS:  Currently, the patient is on  1. Estrace 2 g per vagina 3 times a day. 2.  Cholecalciferol. 3.  Omeprazole 40 mg daily. ALLERGIES:  TO IODINE, AMOXICILLIN, MACROBID AND OXYCODONE.    SOCIAL HISTORY:  No history of tobacco abuse. No alcohol. No IV drug abuse. FAMILY HISTORY:  Discussed. Mother has a history of lymphoma. Paternal grandmother has history of diabetes. Sister has history of heart disease. Son has history of heart disease. Maternal grandfather has history of heart disease. REVIEW OF SYMPTOMS:  All systems were reviewed and found to essentially negative except for the symptoms mentioned above. PHYSICAL EXAMINATION:  VITAL SIGNS:  Temperature 98.7, pulse 92, respiratory rate 21, blood pressure 140/69, pulse oximetry 100% on room air. GENERAL:  Alert, oriented x3, awake, in no acute distress, resting in bed, pleasant female, appears to be stated age. HEENT:  Pupils equal and reactive to light. Dry mucous membranes. Tympanic membranes clear. NECK:  Supple. CHEST:  Clear to auscultation bilaterally. HEART:  S1 and S2 are heard. ABDOMEN:  Soft, nontender, nondistended. Bowel sounds are physiological.  EXTREMITIES:  No clubbing, no cyanosis, no edema. NEURO/PSYCH:  Pleasant mood and affect. Cranial nerves II through XII grossly intact. Sensory grossly within normal limits. DTRs 1+ x4. Strength 5/5 right upper and right lower extremity, 3/5 left upper extremity and 2/5 left lower extremity. There is flattening of nasolabial folds on the left side. SKIN:  Warm. LABORATORY DATA:  White count 5.6, hemoglobin 14.3, hematocrit 44.7, platelets 335. Urine shows no signs of infection. INR 1.0. Sodium 141, potassium 4.5, chloride 110, bicarbonate 25, anion gap 6, glucose 94, BUN 13, creatinine 0.68, calcium 8.8, bilirubin total 0.6, ALT 27, AST 37, alkaline phosphatase 84.     MRI of the brain shows no intracranial mass, hemorrhage or evidence of acute infarction. No aneurysm, dissection or evidence of hemodynamically significant stenosis. Mild chronic microvascular changes with mild cerebral atrophy. ASSESSMENT AND PLAN:  1. Left-sided weakness:  Unclear etiology. MRI of the brain is negative for any acute stroke. We will observe the patient on the telemetry bed. Neurology consult, echocardiogram.  Physical therapy, occupational therapy, speech consult. The patient had a fall, thus will be on fall precautions. We will continue the patient on aspirin and await Neurology input. We will get a fasting lipid profile, telemetry monitoring. Any changes in neurological status will mandate emergent intervention. We will continue to closely monitor. Further intervention per hospital course. 2.  Gastrointestinal and deep venous thrombosis prophylaxes: The patient will be on heparin.       Janina Noel MD      [id number]  D:  07/25/2019 12:03  T:  07/25/2019 13:03  JOB #:  [id number]

## 2019-07-25 NOTE — ED PROVIDER NOTES
70 y.o. female with no significant past medical history who presents from home via EMS with chief complaint of left sided weakness. Pt woke up at 0630 this morning in her normal state of health. At 0700 pt suddenly presented with left sided weakness, slurred speech, and left sided facial droop. Per EMS, during transfer to the hospital pt's symptoms started resolving, however at 0740 patient's symptoms started to get worse again. En route, EMS reported pt's glucose levels read at 94 and sB/P of 170. Pt does not have any past medical history of stroke or brain bleeds. Pt is not currently on any anticoagulation medications. There are no other acute medical concerns at this time. Social hx: former smoker; No EtOH use  PCP: Joce Kwon MD    Note written by Carlitos Ramirez, as dictated by Frankey Pap, MD 7:53 AM    The history is provided by the patient and the EMS personnel. No  was used. Past Medical History:   Diagnosis Date    Acid reflux     Endocrine disease     thyroid nodules    Thyroid disease     thyroid nodules       Past Surgical History:   Procedure Laterality Date    HX GYN  1996    hysterectomy ?  BSO    HX ORTHOPAEDIC      back         Family History:   Problem Relation Age of Onset    Cancer Mother         lymphoma    Diabetes Paternal Grandmother     Heart Disease Sister     Heart Disease Son     No Known Problems Son     No Known Problems Daughter     No Known Problems Daughter     Heart Disease Maternal Grandfather        Social History     Socioeconomic History    Marital status:      Spouse name: Not on file    Number of children: Not on file    Years of education: Not on file    Highest education level: Not on file   Occupational History    Not on file   Social Needs    Financial resource strain: Not on file    Food insecurity:     Worry: Not on file     Inability: Not on file    Transportation needs:     Medical: Not on file Non-medical: Not on file   Tobacco Use    Smoking status: Former Smoker    Smokeless tobacco: Never Used   Substance and Sexual Activity    Alcohol use: No    Drug use: No    Sexual activity: Not Currently     Partners: Male     Birth control/protection: None   Lifestyle    Physical activity:     Days per week: Not on file     Minutes per session: Not on file    Stress: Not on file   Relationships    Social connections:     Talks on phone: Not on file     Gets together: Not on file     Attends Muslim service: Not on file     Active member of club or organization: Not on file     Attends meetings of clubs or organizations: Not on file     Relationship status: Not on file    Intimate partner violence:     Fear of current or ex partner: Not on file     Emotionally abused: Not on file     Physically abused: Not on file     Forced sexual activity: Not on file   Other Topics Concern    Not on file   Social History Narrative    Not on file         ALLERGIES: Iodine; Amoxicillin; Macrobid [nitrofurantoin monohyd/m-cryst]; and Oxycodone    Review of Systems   Neurological: Positive for facial asymmetry, speech difficulty and weakness. All other systems reviewed and are negative. Vitals:    07/25/19 0801   BP: 150/90   Pulse: 85   Resp: 18   Temp: 98.6 °F (37 °C)   SpO2: 99%   Weight: 67.3 kg (148 lb 5.9 oz)   Height: 5' 6\" (1.676 m)            Physical Exam   Constitutional: She appears well-developed and well-nourished. No distress. HENT:   Head: Normocephalic and atraumatic. Eyes: Conjunctivae are normal.   Neck: Neck supple. Cardiovascular: Normal rate and regular rhythm. Pulmonary/Chest: Effort normal. No respiratory distress. Abdominal: She exhibits no distension. Musculoskeletal: Normal range of motion. She exhibits no deformity. Neurological: She is alert. No cranial nerve deficit or sensory deficit. 4/5 strength in LUE and LLE. 5/5 strength in RUE and RLE.  No facial droop noted. No pronator drift. Skin: Skin is warm and dry. Psychiatric: Her behavior is normal.   Nursing note and vitals reviewed. Note written by Carlitos Godoy, as dictated by Shin Alvarado MD 7:53 AM    MDM     65-year-old female presents after an episode this morning where she had left facial droop, left arm and left leg weakness. She was last known well at 7 AM when symptoms began, they were waxing and waning in intensity en route to the hospital, were persistent on arrival during CT scan but have since resolved. Tele-neurology has evaluated and recommended the patient be admitted for completion of work-up with MRI and MRA since she has a stated severe allergy to contrast dye. Procedures    CONSULT NOTE:  8:00 AM Shin Alvarado MD spoke with Dr. Estrada Vegas, Consult for stroke evaluation  Discussed available diagnostic tests and clinical findings. Dr. Estrada Vegas will see the patient. 8:16 AM  Dr. Estrada Vegas called back after exam and recommends MRI/MRA and admission. Hospitalist TigerText for Admission  9:42 AM    ED Room Number: ER22/22  Patient Name and age:  Shun Crow 70 y.o.  female  Working Diagnosis:   1. TIA (transient ischemic attack)      Readmission: no  Isolation Requirements:  no  Recommended Level of Care:  telemetry  Code Status:  Full    9:45 AM  Consult called to Dr Claudine Larsen, consult for the hospitalist service. I reviewed available results and clinical information. They will admit the patient.

## 2019-07-25 NOTE — PROGRESS NOTES
Consult received and appreciated. Reviewed chart and note nsg dysphagia screen completed and WNL and patient is tolerating a diet without difficulty. MRI negative for acute infarct and no concerns regarding speech or swallowing function. Formal SLP evaluation is not clinically indicated at this time. Please re-consult if further needs arise. Thanks. Anton Good M.CD.  CCC-SLP

## 2019-07-25 NOTE — PROGRESS NOTES
TRANSFER - IN REPORT:    Verbal report received from [facility] (name) on Wanda Martínez  being received from ED (unit) for routine progression of care      Report consisted of patients Situation, Background, Assessment and   Recommendations(SBAR). Information from the following report(s) SBAR, Kardex, MAR, Recent Results and Cardiac Rhythm SR w/ PVCs was reviewed with the receiving nurse. Opportunity for questions and clarification was provided. Assessment completed upon patients arrival to unit and care assumed.

## 2019-07-25 NOTE — ED NOTES
At this time, Dr Shira Uribe does not reccommended TPA as previous symptoms have resolved.  Will order MRI/MRA as patient is allergic to iodine and unable to have CTA

## 2019-07-25 NOTE — PROGRESS NOTES
Occupational Therapy   -   07.25.2019    Orders acknowledged and chart reviewed in prep for OT evaluation, RN reporting patient currently being transported up to 6S. Of noted, imaging negative for acute process however patient with unwitnessed fall 2* weakness. Will defer OT at this time and f/u later in PM vs. tomorrow AM as able and appropriate. Thank you. Keenan Wu MS, OTR/L

## 2019-07-25 NOTE — ED NOTES
RN notified that patient had fallen. Patient found on ground by bedside.  by side. Reports she was attempting to move to bedside commode and fell to floor. No injury, patient has no complaints. Reiterated to patient and  that she is NOT to get up by herself. Patient assisted off the floor and to bedside commode with RN at side. Assisted back to bed, call bell within reach, side rails up x2.

## 2019-07-25 NOTE — PROGRESS NOTES
Met with family in the ED. Patient is off the floor to vascular. Will follow up for evaluation as time allows. Otherwise PT eval in the AM as appropriate. Thanks!     Paola De La Cruz PT, DPT  Geriatric Clinical Specialist

## 2019-07-25 NOTE — PROGRESS NOTES
Admission Medication Reconciliation:    Information obtained from:  Patient/RxQuery    Comments/Recommendations: Updated PTA meds/reviewed patient's allergies. 1)  Patient has not taken any medications today 7/25. Denies taking cyclobenzaprine. 2)  Medication changes (since last review): Added  - Estradiol 0.01% vaginal cream - Insert 2 g into vagina every three (3) days    Removed  - Cyclobenzaprine 5 mg tablet three times daily as needed for muscle spasms       Allergies:  Iodine; Amoxicillin; Macrobid [nitrofurantoin monohyd/m-cryst]; and Oxycodone    Significant PMH/Disease States:   Past Medical History:   Diagnosis Date    Acid reflux     Endocrine disease     thyroid nodules    Thyroid disease     thyroid nodules       Chief Complaint for this Admission:    Chief Complaint   Patient presents with    Dysarthria       Prior to Admission Medications:   Prior to Admission Medications   Prescriptions Last Dose Informant Patient Reported? Taking? cholecalciferol (VITAMIN D3) 1,000 unit tablet 7/24/2019 at Unknown time  Yes Yes   Sig: Take  by mouth daily. esomeprazole (NEXIUM) 40 mg capsule 7/24/2019 at Unknown time  Yes Yes   Sig: Take 40 mg by mouth daily. estradiol (ESTRACE) 0.01 % (0.1 mg/gram) vaginal cream 7/24/2019  Yes Yes   Sig: Insert 2 g into vagina every three (3) days.       Facility-Administered Medications: None

## 2019-07-26 VITALS
WEIGHT: 139.99 LBS | SYSTOLIC BLOOD PRESSURE: 120 MMHG | HEIGHT: 66 IN | HEART RATE: 83 BPM | RESPIRATION RATE: 15 BRPM | TEMPERATURE: 97.5 F | OXYGEN SATURATION: 95 % | BODY MASS INDEX: 22.5 KG/M2 | DIASTOLIC BLOOD PRESSURE: 70 MMHG

## 2019-07-26 LAB
CHOLEST SERPL-MCNC: 306 MG/DL
ERYTHROCYTE [DISTWIDTH] IN BLOOD BY AUTOMATED COUNT: 12.8 % (ref 11.5–14.5)
EST. AVERAGE GLUCOSE BLD GHB EST-MCNC: 108 MG/DL
HBA1C MFR BLD: 5.4 % (ref 4.2–6.3)
HCT VFR BLD AUTO: 42.7 % (ref 35–47)
HDLC SERPL-MCNC: 68 MG/DL
HDLC SERPL: 4.5 {RATIO} (ref 0–5)
HGB BLD-MCNC: 13.9 G/DL (ref 11.5–16)
LDLC SERPL CALC-MCNC: 223.8 MG/DL (ref 0–100)
LIPID PROFILE,FLP: ABNORMAL
MCH RBC QN AUTO: 30.2 PG (ref 26–34)
MCHC RBC AUTO-ENTMCNC: 32.6 G/DL (ref 30–36.5)
MCV RBC AUTO: 92.8 FL (ref 80–99)
NRBC # BLD: 0 K/UL (ref 0–0.01)
NRBC BLD-RTO: 0 PER 100 WBC
PLATELET # BLD AUTO: 154 K/UL (ref 150–400)
PMV BLD AUTO: 12.5 FL (ref 8.9–12.9)
RBC # BLD AUTO: 4.6 M/UL (ref 3.8–5.2)
TRIGL SERPL-MCNC: 71 MG/DL (ref ?–150)
VLDLC SERPL CALC-MCNC: 14.2 MG/DL
WBC # BLD AUTO: 6 K/UL (ref 3.6–11)

## 2019-07-26 PROCEDURE — 36415 COLL VENOUS BLD VENIPUNCTURE: CPT

## 2019-07-26 PROCEDURE — 96361 HYDRATE IV INFUSION ADD-ON: CPT

## 2019-07-26 PROCEDURE — 74011250637 HC RX REV CODE- 250/637: Performed by: FAMILY MEDICINE

## 2019-07-26 PROCEDURE — 74011250636 HC RX REV CODE- 250/636: Performed by: FAMILY MEDICINE

## 2019-07-26 PROCEDURE — 97530 THERAPEUTIC ACTIVITIES: CPT

## 2019-07-26 PROCEDURE — 85027 COMPLETE CBC AUTOMATED: CPT

## 2019-07-26 PROCEDURE — 97165 OT EVAL LOW COMPLEX 30 MIN: CPT

## 2019-07-26 PROCEDURE — 97116 GAIT TRAINING THERAPY: CPT

## 2019-07-26 PROCEDURE — 96372 THER/PROPH/DIAG INJ SC/IM: CPT

## 2019-07-26 PROCEDURE — 97161 PT EVAL LOW COMPLEX 20 MIN: CPT

## 2019-07-26 PROCEDURE — 97535 SELF CARE MNGMENT TRAINING: CPT

## 2019-07-26 PROCEDURE — 99218 HC RM OBSERVATION: CPT

## 2019-07-26 PROCEDURE — 80061 LIPID PANEL: CPT

## 2019-07-26 PROCEDURE — 83036 HEMOGLOBIN GLYCOSYLATED A1C: CPT

## 2019-07-26 RX ORDER — GUAIFENESIN 100 MG/5ML
81 LIQUID (ML) ORAL DAILY
Qty: 30 TAB | Refills: 0 | Status: SHIPPED | OUTPATIENT
Start: 2019-07-27

## 2019-07-26 RX ADMIN — Medication 10 ML: at 05:15

## 2019-07-26 RX ADMIN — HEPARIN SODIUM 5000 UNITS: 5000 INJECTION INTRAVENOUS; SUBCUTANEOUS at 09:16

## 2019-07-26 RX ADMIN — SODIUM CHLORIDE 75 ML/HR: 900 INJECTION, SOLUTION INTRAVENOUS at 09:15

## 2019-07-26 RX ADMIN — HEPARIN SODIUM 5000 UNITS: 5000 INJECTION INTRAVENOUS; SUBCUTANEOUS at 01:04

## 2019-07-26 RX ADMIN — ASPIRIN 81 MG 81 MG: 81 TABLET ORAL at 09:15

## 2019-07-26 RX ADMIN — PANTOPRAZOLE SODIUM 40 MG: 40 TABLET, DELAYED RELEASE ORAL at 08:11

## 2019-07-26 NOTE — CONSULTS
Neurology Progress Note    Patient ID:  Leonard Park  [id number]  70 y.o.  1940    Chief Complaint: Left-sided numbness weakness    Subjective:     68-year-old woman who presented with left-sided numbness weakness transiently. MRI brain without stroke. C-spine was done and she has multilevel disease with some canal stenosis more towards the left. She is feeling her baseline now. Ambulating. Strength is good. Objective:       ROS:  Per HPI  All other 12 pt ROS negative    Meds:  Current Facility-Administered Medications   Medication Dose Route Frequency    pantoprazole (PROTONIX) tablet 40 mg  40 mg Oral ACB    sodium chloride (NS) flush 5-40 mL  5-40 mL IntraVENous Q8H    sodium chloride (NS) flush 5-40 mL  5-40 mL IntraVENous PRN    acetaminophen (TYLENOL) tablet 650 mg  650 mg Oral Q4H PRN    Or    acetaminophen (TYLENOL) solution 650 mg  650 mg Per NG tube Q4H PRN    Or    acetaminophen (TYLENOL) suppository 650 mg  650 mg Rectal Q4H PRN    0.9% sodium chloride infusion  75 mL/hr IntraVENous CONTINUOUS    heparin (porcine) injection 5,000 Units  5,000 Units SubCUTAneous Q8H    aspirin chewable tablet 81 mg  81 mg Oral DAILY       MRI Results (maximum last 3): Results from [place] encounter on 07/25/19   MRI CERV SPINE WO CONT    Narrative Preliminary report:    No acute fracture or subluxation. C2-C3, C3-C4: No canal stenosis or neural foraminal narrowing. C4-C5: Intervertebral disc narrowing. Broad-based posterior disc osteophyte  complex which partially effaces the ventral thecal sac. Mild left neural  foraminal narrowing and moderate right neural foraminal narrowing. C5-C6: Vertebral disc narrowing. Broad-based posterior disc osteophyte complex,  effacing the ventral thecal sac. Moderate to severe left neural foraminal  narrowing. Mild right neural foraminal narrowing. C6-C7: Intervertebral disc narrowing.  Posterior disc osteophyte complex which  partially effaces the ventral thecal sac. Mild neural foraminal narrowing. C7-T1: No central canal stenosis or neural foraminal narrowing. Final report to follow. CLINICAL HISTORY: Weakness on the left  INDICATION: Neck pain,  left-sided weakness    COMPARISON: Correlation with brain MRI and MRA. TECHNIQUE: MR imaging of the cervical spine was performed including sagittal T1,  T2, STIR;  axial GRE, T1. Contrast was not administered. FINDINGS:    Mild multilevel disc desiccation. Loss of disc height at C4-5 and C5-6., Loss of  disc height at C6-7 as well. . Vertebral body heights are maintained. Marrow  signal is normal.  The craniocervical junction is intact. 2 hyperdense foci in  the thyroid glands fully characterized. . The right vertebral artery is dominant. C2/3:   The spinal canal and neuroforamina are widely patent. C3/4:   The spinal canal and neuroforamina are widely patent. C4/5:  Facet arthropathy and hypertrophy. Disc bulge/osteophyte. Mild to  moderate canal and mild left foraminal stenosis. .    C5/6:  Facet arthropathy and hypertrophy. Disc protrusion/osteophyte the left  neural foramen. Moderate canal stenosis. Severe left foraminal stenosis. Moderate right foraminal stenosis. Mild/trace cord compression without cord  signal abnormality. .    C6/7:  Facet arthropathy. Disc bulge/osteophyte. Canal and foramina are patent. C7/T1:   The spinal canal and neuroforamina are widely patent. Impression IMPRESSION:   Multilevel disc and facet degenerative change. Moderate canal severe left and moderate right foraminal stenosis at C5-6 with  minimal/trace cord compression. No cord signal abnormality. Moderate moderate canal and mild left foraminal stenosis at C4-5    Other less severe degenerative findings are as described above.   Right thyroid lesion is not highly suspicious, Nonemergent outpatient thyroid  ultrasound for further delineation if clinically warranted     MRI BRAIN WO CONT    Narrative EXAM: MRI BRAIN WO CONT, MRA BRAIN WO CONT    CLINICAL HISTORY: Left-sided weakness    INDICATION: Left-sided weakness. COMPARISON:  M 2519  TECHNIQUE: MR examination of the brain includes axial and sagittal T1, axial T2,  axial FLAIR, axial gradient echo, axial DWI, coronal T2. Coronal T2  Next, 3-D time-of-flight MRA of the brain was performed. Multiplanar  reconstructions were obtained. Next, 2-D time-of-flight MRA of the neck was performed. Multiplanar  reconstructions were obtained. FINDINGS:   There is no intracranial mass, hemorrhage or evidence of acute infarction. Confluent periventricular scattered foci of increased T2 signal intensity in the  corona radiata and central emboli. There is sulcal and ventricular prominence. Increased T2 signal intensity foci in the central sixto as well. IACs are  symmetric in size. Minimal ethmoid sinus disease. There is no Chiari or syrinx. The pituitary and infundibulum are grossly  unremarkable. There is no skull base mass. Cerebellopontine angles are grossly  unremarkable. The major intracranial vascular flow-voids are unremarkable. The  cavernous sinuses are symmetric. Optic chiasm and infundibulum grossly  unremarkable. Orbits are grossly symmetric. Dural venous sinuses are grossly  patent. The brain architecture is normal. There is no evidence of midline shift  or mass-effect. There are no extra-axial fluid collections. A 2 segments are within normal limits. There are A1 segments bilaterally. M1  segments within normal limits. M2 segments demonstrate symmetric arborization. P1 and P2 vessels are patent. The right vertebral artery is dominant. . The  basilar artery and its branches are normal. The internal carotid, anterior  cerebral, and middle cerebral arteries are patent. There is no flow-limiting  intracranial stenosis. There is no aneurysm. There are no sizable posterior  communicating arteries.         Impression IMPRESSION:   No intracranial mass, hemorrhage or evidence of acute infarction. No aneurysm, dissection or evidence of hemodynamically significant stenosis. Mild chronic microvascular ischemic change and mild cerebral atrophy. MRA BRAIN WO CONT    Narrative EXAM: MRI BRAIN WO CONT, MRA BRAIN WO CONT    CLINICAL HISTORY: Left-sided weakness    INDICATION: Left-sided weakness. COMPARISON:  M 2519  TECHNIQUE: MR examination of the brain includes axial and sagittal T1, axial T2,  axial FLAIR, axial gradient echo, axial DWI, coronal T2. Coronal T2  Next, 3-D time-of-flight MRA of the brain was performed. Multiplanar  reconstructions were obtained. Next, 2-D time-of-flight MRA of the neck was performed. Multiplanar  reconstructions were obtained. FINDINGS:   There is no intracranial mass, hemorrhage or evidence of acute infarction. Confluent periventricular scattered foci of increased T2 signal intensity in the  corona radiata and central emboli. There is sulcal and ventricular prominence. Increased T2 signal intensity foci in the central sixto as well. IACs are  symmetric in size. Minimal ethmoid sinus disease. There is no Chiari or syrinx. The pituitary and infundibulum are grossly  unremarkable. There is no skull base mass. Cerebellopontine angles are grossly  unremarkable. The major intracranial vascular flow-voids are unremarkable. The  cavernous sinuses are symmetric. Optic chiasm and infundibulum grossly  unremarkable. Orbits are grossly symmetric. Dural venous sinuses are grossly  patent. The brain architecture is normal. There is no evidence of midline shift  or mass-effect. There are no extra-axial fluid collections. A 2 segments are within normal limits. There are A1 segments bilaterally. M1  segments within normal limits. M2 segments demonstrate symmetric arborization. P1 and P2 vessels are patent. The right vertebral artery is dominant. . The  basilar artery and its branches are normal. The internal carotid, anterior  cerebral, and middle cerebral arteries are patent. There is no flow-limiting  intracranial stenosis. There is no aneurysm. There are no sizable posterior  communicating arteries. Impression IMPRESSION:   No intracranial mass, hemorrhage or evidence of acute infarction. No aneurysm, dissection or evidence of hemodynamically significant stenosis. Mild chronic microvascular ischemic change and mild cerebral atrophy.                   Lab Review   Recent Results (from the past 24 hour(s))   DUPLEX CAROTID BILATERAL    Collection Time: 07/25/19  2:19 PM   Result Value Ref Range    Right cca dist sys 85.6 cm/s    Right CCA dist villegas 16.4 cm/s    Right CCA prox sys 88.2 cm/s    Right CCA prox villegas 19.0 cm/s    Right eca sys 105.1 cm/s    RIGHT EXTERNAL CAROTID ARTERY D 24.26 cm/s    Right ICA dist sys 73.4 cm/s    Right ICA dist villegas 21.8 cm/s    Right ICA mid sys 55.9 cm/s    Right ICA mid villegas 16.2 cm/s    Right ICA prox sys 61.4 cm/s    Right ICA prox villegas 16.2 cm/s    Right vertebral sys 35.6 cm/s    RIGHT VERTEBRAL ARTERY D 12.29 cm/s    Right ICA/CCA sys 0.9     Left CCA dist sys 76.4 cm/s    Left CCA dist villegas 17.7 cm/s    Left CCA prox sys 61.0 cm/s    Left CCA prox villegas 10.2 cm/s    Left ECA sys 76.4 cm/s    LEFT EXTERNAL CAROTID ARTERY D 12.52 cm/s    Left ICA mid sys 93.4 cm/s    Left ICA mid villegas 32.1 cm/s    Left ICA prox sys 63.4 cm/s    Left ICA prox vilelgas 11.2 cm/s    Left vertebral sys 47.7 cm/s    LEFT VERTEBRAL ARTERY D 15.13 cm/s    Left Bulb PSV 63.1 cm/s    Left Bulb EDV 17.8 cm/s    Left ICA/CCA sys 1.22    ECHO ADULT COMPLETE    Collection Time: 07/25/19  2:52 PM   Result Value Ref Range    LV E' Lateral Velocity 7.47 cm/s    LV E' Septal Velocity 5.54 cm/s    Tapse 1.95 1.5 - 2.0 cm    Ao Root D 3.05 cm    Aortic Valve Systolic Peak Velocity 126.85 cm/s    Aortic Valve Area by Continuity of Peak Velocity 2.0 cm2    AoV PG 7.8 mmHg    LVIDd 2.80 (A) 3.9 - 5.3 cm LVPWd 0.85 0.6 - 0.9 cm    LVIDs 1.94 cm    IVSd 1.15 (A) 0.6 - 0.9 cm    LVOT d 1.86 cm    LVOT Peak Velocity 104.04 cm/s    LVOT Peak Gradient 4.3 mmHg    MVA (PHT) 3.2 cm2    MV A Saurav 115.69 cm/s    MV E Saurav 94.67 cm/s    MV E/A 0.82     Left Atrium to Aortic Root Ratio 0.98     RVIDd 3.13 cm    LV Mass AL 78.2 67 - 162 g    LV Mass AL Index 44.5 43 - 95 g/m2    E/E' lateral 12.67     E/E' septal 17.09     E/E' ratio (averaged) 14.88     Mitral Valve E Wave Deceleration Time 238.6 ms    Mitral Valve Pressure Half-time 69.2 ms    Left Atrium Major Axis 2.99 cm    Pulmonic Valve Max Velocity 115.77 cm/s    AV Cusp 1.97 cm    PV peak gradient 5.4 mmHg   HEMOGLOBIN A1C WITH EAG    Collection Time: 07/26/19  5:08 AM   Result Value Ref Range    Hemoglobin A1c 5.4 4.2 - 6.3 %    Est. average glucose 108 mg/dL   LIPID PANEL    Collection Time: 07/26/19  5:10 AM   Result Value Ref Range    LIPID PROFILE          Cholesterol, total 306 (H) <200 MG/DL    Triglyceride 71 <150 MG/DL    HDL Cholesterol 68 MG/DL    LDL, calculated 223.8 (H) 0 - 100 MG/DL    VLDL, calculated 14.2 MG/DL    CHOL/HDL Ratio 4.5 0.0 - 5.0     CBC W/O DIFF    Collection Time: 07/26/19  5:10 AM   Result Value Ref Range    WBC 6.0 3.6 - 11.0 K/uL    RBC 4.60 3.80 - 5.20 M/uL    HGB 13.9 11.5 - 16.0 g/dL    HCT 42.7 35.0 - 47.0 %    MCV 92.8 80.0 - 99.0 FL    MCH 30.2 26.0 - 34.0 PG    MCHC 32.6 30.0 - 36.5 g/dL    RDW 12.8 11.5 - 14.5 %    PLATELET 462 430 - 439 K/uL    MPV 12.5 8.9 - 12.9 FL    NRBC 0.0 0  WBC    ABSOLUTE NRBC 0.00 0.00 - 0.01 K/uL       Additional comments:I reviewed the patients new imaging test results. Patient Vitals for the past 8 hrs:   BP Temp Pulse Resp SpO2 Weight   07/26/19 0603 131/75 97.9 °F (36.6 °C) 74 18 95 %    07/26/19 0204 158/77 97.5 °F (36.4 °C) 72 12 96 % 63.5 kg (139 lb 15.9 oz)       No intake/output data recorded. No intake/output data recorded.     Exam:  Visit Vitals  /75 (BP 1 Location: Right arm, BP Patient Position: At rest)   Pulse 74   Temp 97.9 °F (36.6 °C)   Resp 18   Ht 5' 6\" (1.676 m)   Wt 63.5 kg (139 lb 15.9 oz)   SpO2 95%   Breastfeeding? No   BMI 22.60 kg/m²     Gen: Well developed  CV: RRR  Lungs: non labored breathing  Abd: soft, non distended  Neuro: A&O x 3, no dysarthria or aphasia  CN II-XII: PERRL, EOMI, face symmetric, tongue/palate midline  Motor: Left leg 4+/baseline  Sensory: intact to LT  COOR: no limb/truncal ataxia  Gait: Steady a little bit wide    PROBLEM LIST:     Patient Active Problem List   Diagnosis Code    Gastroesophageal reflux disease without esophagitis K21.9    Multiple thyroid nodules E04.2    Hyperlipidemia E78.5    TIA (transient ischemic attack) G45.9       Assessment/Plan:      32-year-old woman who has had intermittent left-sided weakness. MRI without a stroke. I suspect this may be C-spine related. I discussed the results with her and she has notable multilevel disease with some increasing findings on the left but no acute compressive process. I do recommend that she see orthospine closer to her home. I have given her a name to follow-up with. No need for stroke work-up. Discharge once medically cleared. During this evaluation, we also discussed stroke education to include signs and symptoms of stroke and TIA.        Signed:  2 [facility], DO  7/26/2019  9:45 AM

## 2019-07-26 NOTE — PROGRESS NOTES
Problem: Self Care Deficits Care Plan (Adult)  Goal: *Acute Goals and Plan of Care (Insert Text)  Description  Occupational Therapy Goals  Initiated 7/26/2019  1. Patient will perform grooming standing at sink with modified independence within 7 day(s). 2.  Patient will perform bathing withmodified independence within 7 day(s). 3.  Patient will perform bathing with modified independence within 7 day(s). 4.  Patient will perform toilet transfers with modified independence within 7 day(s). 5.  Patient will perform all aspects of toileting with modified independence within 7 day(s). 6.  Patient will perform simulated household IADLs with supervision/ set-up within 7 days. Outcome: Progressing Towards Goal     OCCUPATIONAL THERAPY EVALUATION  Patient: Edilberto Young (70 y.o. female)  Date: 7/26/2019  Primary Diagnosis: TIA (transient ischemic attack) [G45.9]  TIA (transient ischemic attack) [G45.9]        Precautions: fall       ASSESSMENT :  Based on the objective data described below, patient presents with Supervision and Contact guard assistance upper body ADLs, Contact guard assistance lower body ADLs, Contact guard to Minimum assistance functional mobility. Patient reports she feels much stronger than yesterday but still weaker than baseline. LUE overall MMT 4/5 (vs RUE 5/5), but sensation, AROM and coordination intact. Scored 66/66 on LUE Fugl Diaz assessment. Noted patient fell at home PTA and in ED. Today demonstrates unsteady gait, narrow NISHI, intermittent scissoring (especially during turns, improving with practice/ verbal cues), and had one LOB walking back from toilet requiring min-A. Patient practiced ambulating with RW (has one at home but was not using PTA), with improved steadiness and confidence. Patient and  receptive to education on RW management, fall prevention and were provided with gait belt. Also receptive to [facility] education on signs/ symptoms of CVA.     The following are barriers to ADL independence while in acute care:   - Cognitive and/or behavioral: attention to task, impulsive  - Medical condition: L weakness (improving per report), impaired standing balance      Prior level of function: Living with , independent with mobility, ADLs, and household IADLs. Reports no falls other than the one immediately PTA. PLAN :  Recommendations and Planned Interventions: self care training, functional mobility training, therapeutic exercise, balance training, therapeutic activities, endurance activities, patient education, home safety training and family training/education    Frequency/Duration: Patient will be followed by occupational therapy 5 times a week to address goals. Patient will benefit from skilled acute intervention to address the above impairments. Patients rehabilitation potential is considered to be Good    Discharge recommendations: Home health OT and 24/7 assistance from  for steadying/ fall prevention. SUBJECTIVE:   Patient stated I feel much steadier now.  (with RW)    OBJECTIVE DATA SUMMARY:   HISTORY:   Past Medical History:   Diagnosis Date    Acid reflux     Endocrine disease     thyroid nodules    Thyroid disease     thyroid nodules     Past Surgical History:   Procedure Laterality Date    HX GYN  1996    hysterectomy ?  BSO    HX ORTHOPAEDIC      back     Expanded or extensive additional review of patient history:     Home Situation  Home Environment: Private residence  # Steps to Enter: 3  One/Two Story Residence: One story  Living Alone: No  Support Systems: Child(grazyna), Spouse/Significant Other/Partner  Patient Expects to be Discharged to[de-identified] Private residence  Current DME Used/Available at Home: None    Hand dominance: Right    EXAMINATION OF PERFORMANCE DEFICITS:  Cognitive/Behavioral Status:  Neurologic State: Alert     Cognition: Follows commands  Perception: Appears intact  Perseveration: No perseveration noted  Safety/Judgement: Awareness of environment; Insight into deficits    Skin: visible skin appears intact    Edema: none noted    Hearing: Auditory  Auditory Impairment: None    Vision/Perceptual:                   Visual Fields: (able to detect stimuli in all fields)       Acuity: Able to read clock/calendar on wall without difficulty; Impaired near vision    Corrective Lenses: Glasses(not present)    Range of Motion:    AROM: Within functional limits(BUE)                         Strength:    Strength: Within functional limits(RUE 5/5. LUE 4/5)                Coordination:  Coordination: Within functional limits  Fine Motor Skills-Upper: Left Intact; Right Intact    Gross Motor Skills-Upper: Left Intact; Right Intact    Tone & Sensation:    Tone: Normal  Sensation: Intact                      Balance:  Sitting: Intact  Standing: Impaired  Standing - Static: Fair  Standing - Dynamic : Fair    Functional Mobility and Transfers for ADLs:      Transfers:  Sit to Stand: Contact guard assistance  Stand to Sit: Contact guard assistance  Bed to Chair: Contact guard assistance  Toilet Transfer : Contact guard assistance    ADL Assessment:  Feeding: Independent(inferred)    Oral Facial Hygiene/Grooming: Contact guard assistance(washed hands standing at sink)    Bathing: Contact guard assistance(inferred)    Upper Body Dressing: Supervision(inferred)    Lower Body Dressing: Contact guard assistance(for standing, able to don socks in tailor sit)    Toileting: Contact guard assistance(bladder hygiene seated)                ADL Intervention and task modifications:          Cognitive Retraining  Safety/Judgement: Awareness of environment; Insight into deficits    Functional Measure:  Fugl-Diaz Assessment of Motor Recovery after Stroke:     Reflex Activity  Flexors/Biceps/Fingers: Can be elicited  Extensors/Triceps: Can be elicited  Reflex Subtotal: 4    Volitional Movement Within Synergies  Shoulder Retraction: Full  Shoulder Elevation: Full  Shoulder Abduction (90 degrees): Full  Shoulder External Rotation: Full  Elbow Flexion: Full  Forearm Supination: Full  Shoulder Adduction/Internal Rotation: Full  Elbow Extension: Full  Forearm Pronation: Full  Subtotal: 18    Volitional Movement Mixing Synergies  Hand to Lumbar Spine: Full  Shoulder Flexion (0-90 degrees): Full  Pronation-Supination: Full  Subtotal: 6    Volitional Movement With Little or No Synergy  Shoulder Abduction (0-90 degrees): Full  Shoulder Flexion ( degrees): Full  Pronation/Supination: Full  Subtotal : 6    Normal Reflex Activity  Biceps, Triceps, Finger Flexors: Full  Subtotal : 2    Upper Extremity Total   Upper Extremity Total: 36    Wrist  Stability at 15 Degree Dorsiflexion: Full  Repeated Dorsiflexion/ Volar Flexion: Full  Stability at 15 Degree Dorsiflexion: Full  Repeated Dorsiflexion/ Volar Flexion: Full  Circumduction: Full  Wrist Total: 10    Hand  Mass Flexion: Full  Mass Extension: Full  Grasp A: Full  Grasp B: Full  Grasp C: Full  Grasp D: Full  Grasp E: Full  Hand Total: 14    Coordination/Speed  Tremor: None  Dysmetria: None  Time: <1s  Coordination/Speed Total : 6    Total A-D  Total A-D (Motor Function): 66/66       This is a reliable/valid measure of arm function after a neurological event. It has established value to characterize functional status and for measuring spontaneous and therapy-induced recovery; tests proximal and distal motor functions. Fugl-Diaz Assessment - UE scores recorded between five and 30 days post neurologic event can be used to predict UE recovery at six months post neurologic event. Severe = 0-21 points   Moderately Severe = 22-33 points   Moderate = 34-47 points   Mild = 48-66 points  GILBERTO Ruiz, TERESA Culp, & JOHN Calabrese (1992). Measurement of motor recovery after stroke: Outcome assessment and sample size requirements. Stroke, 23, pp. 0463-3493. ------------------------------------------------------------------------------------------------------------------------------------------------------------------  MCID:  Stroke:   Nina Chamberlain et al, 2001; n = 171; mean age 79 (6) years; assessed within 16 (12) days of stroke, Acute Stroke)  FMA Motor Scores from Admission to Discharge   10 point increase in FMA Upper Extremity = 1.5 change in discharge FIM   10 point increase in FMA Lower Extremity = 1.9 change in discharge FIM  MDC:   Stroke:   Tia Alvarez et al, 2008, n = 14, mean age = 59.9 (14.6) years, assessed on average 14 (6.5) months post stroke, Chronic Stroke)   FMA = 5.2 points for the Upper Extremity portion of the assessment       Occupational Therapy Evaluation Charge Determination   History Examination Decision-Making   LOW Complexity : Brief history review  LOW Complexity : 1-3 performance deficits relating to physical, cognitive , or psychosocial skils that result in activity limitations and / or participation restrictions  MEDIUM Complexity : Patient may present with comorbidities that affect occupational performnce. Miniml to moderate modification of tasks or assistance (eg, physical or verbal ) with assesment(s) is necessary to enable patient to complete evaluation       Based on the above components, the patient evaluation is determined to be of the following complexity level: LOW   Pain:  Patient reports no pain    Activity Tolerance:   VSS    After treatment patient left:   Up in chair  Call bell left within reach   RN notified   present  Chair alarm activated     COMMUNICATION/EDUCATION:   The patients plan of care was discussed with: Physical Therapist and Registered Nurse. Patient and/or family was verbally educated on the BE FAST acronym for signs/symptoms of CVA and TIA. Provided with BEFAST handout. All questions answered with patient indicating good understanding.      Home safety education was provided and the patient/caregiver indicated understanding., Patient/family have participated as able in goal setting and plan of care. and Patient/family agree to work toward stated goals and plan of care. This patients plan of care is appropriate for delegation to LEO.     Thank you for this referral.  Wing Renae OT  Time Calculation: 30 mins

## 2019-07-26 NOTE — PROGRESS NOTES
Bedside and Verbal shift change report given to Akil Adhikari, [facility] (oncoming nurse) by Jessica Loredo RN (offgoing nurse). Report included the following information SBAR, Kardex, MAR, Recent Results and Cardiac Rhythm NSR.

## 2019-07-26 NOTE — DISCHARGE INSTRUCTIONS
Patient Education        Cervical Myelopathy: Care Instructions  Your Care Instructions    Cervical myelopathy is a problem caused by pressure on your spinal cord. This pressure may come from a bone spur. Or it may come from a herniated disc. In some cases, aging makes the discs and ligaments around the spine thicker. This can also put pressure on the spinal cord. Cervical myelopathy can cause different symptoms. It may cause numbness and pain. It may also cause weakness in the arms, hands, and legs. For some people, it's hard to walk. Others have neck pain or problems with urination and bowel movements. Treatment depends on the cause and your symptoms. You may need regular checkups or you may need surgery to help take pressure off the nerve. Follow-up care is a key part of your treatment and safety. Be sure to make and go to all appointments, and call your doctor if you are having problems. It's also a good idea to know your test results and keep a list of the medicines you take. How can you care for yourself at home? · Be safe with medicines. Read and follow all instructions on the label. ? If the doctor gave you a prescription medicine for pain, take it as prescribed. ? If you are not taking a prescription pain medicine, ask your doctor if you can take an over-the-counter medicine. · Do exercises recommended by your doctor or physical therapist. Strong muscles can help you do everyday tasks. · If you have trouble using your hands, put Velcro or snaps on clothes. This can make your clothes easier to get on and off. · If you have trouble walking, you can make life easier and safer. Use rails and nonskid tape at home. Try a cane or walker to get around. · Do not smoke. Smoking can slow bone healing. If you need help quitting, talk to your doctor about stop-smoking programs and medicines. These can increase your chances of quitting for good. · Pace yourself.  Everyday activities may take longer than before. Give yourself more time to get things done. This may lower your stress. When should you call for help? Call 911 anytime you think you may need emergency care. For example, call if:    · You are unable to move an arm or a leg at all.   [facility] your doctor now or seek immediate medical care if:    · You have new or worse symptoms in your arms, legs, belly, or buttocks. Symptoms may include:  ? Numbness or tingling. ? Weakness. ? Pain.     · You lose bladder or bowel control.    Watch closely for changes in your health, and be sure to contact your doctor if:    · You do not get better as expected. Where can you learn more? Go to http://alon-tracie.info/. Enter B802 in the search box to learn more about \"Cervical Myelopathy: Care Instructions. \"  Current as of: September 20, 2018  Content Version: 12.1  © 7523-6802 [employer]. Care instructions adapted under license by [employer] (which disclaims liability or warranty for this information). If you have questions about a medical condition or this instruction, always ask your healthcare professional. [place] [address] any warranty or liability for your use of this information.

## 2019-07-26 NOTE — PROGRESS NOTES
Spiritual Care Partner Volunteer visited patient in room 675/01 on 7.22.19. Documented by: : Rev. Dillon Montaño.  Teja Key; [facility], to contact [phone] Marvin Schmidt call: [phone]

## 2019-07-26 NOTE — PROGRESS NOTES
Problem: Falls - Risk of  Goal: *Absence of Falls  Description  Document Carlos Feldman Fall Risk and appropriate interventions in the flowsheet.   Outcome: Progressing Towards Goal  Note:   Fall Risk Interventions:  Mobility Interventions: Bed/chair exit alarm, Communicate number of staff needed for ambulation/transfer, OT consult for ADLs, Patient to call before getting OOB, PT Consult for mobility concerns         Medication Interventions: Bed/chair exit alarm, Evaluate medications/consider consulting pharmacy, Patient to call before getting OOB, Teach patient to arise slowly    Elimination Interventions: Call light in reach, Bed/chair exit alarm, Patient to call for help with toileting needs, Toilet paper/wipes in reach, Toileting schedule/hourly rounds, Stay With Me (per policy)    History of Falls Interventions: Bed/chair exit alarm, Consult care management for discharge planning, Investigate reason for fall, Room close to nurse's station         Problem: TIA/CVA Stroke: 0-24 hours  Goal: Activity/Safety  Outcome: Progressing Towards Goal  Goal: Consults, if ordered  Outcome: Progressing Towards Goal  Goal: Diagnostic Test/Procedures  Outcome: Progressing Towards Goal  Goal: Nutrition/Diet  Outcome: Progressing Towards Goal  Goal: Discharge Planning  Outcome: Progressing Towards Goal  Goal: Medications  Outcome: Progressing Towards Goal  Goal: Respiratory  Outcome: Progressing Towards Goal  Goal: Treatments/Interventions/Procedures  Outcome: Progressing Towards Goal  Goal: Minimize risk of bleeding post-thrombolytic infusion  Outcome: Progressing Towards Goal  Goal: Monitor for complications post-thrombolytic infusion  Outcome: Progressing Towards Goal  Goal: Psychosocial  Outcome: Progressing Towards Goal  Goal: *Hemodynamically stable  Outcome: Progressing Towards Goal  Goal: *Neurologically stable  Description  Absence of additional neurological deficits    Outcome: Progressing Towards Goal  Goal: *Verbalizes anxiety and depression are reduced or absent  Outcome: Progressing Towards Goal  Goal: *Absence of Signs of Aspiration on Current Diet  Outcome: Progressing Towards Goal  Goal: *Absence of deep venous thrombosis signs and symptoms(Stroke Metric)  Outcome: Progressing Towards Goal  Goal: *Ability to perform ADLs and demonstrates progressive mobility and function  Outcome: Progressing Towards Goal  Goal: *Stroke education started(Stroke Metric)  Outcome: Progressing Towards Goal  Goal: *Dysphagia screen performed(Stroke Metric)  Outcome: Progressing Towards Goal  Goal: *Rehab consulted(Stroke Metric)  Outcome: Progressing Towards Goal

## 2019-07-26 NOTE — PROGRESS NOTES
Problem: Mobility Impaired (Adult and Pediatric)  Goal: *Acute Goals and Plan of Care (Insert Text)  Description  Physical Therapy Goals  Initiated 7/26/2019  1. Patient will move from supine to sit and sit to supine  in bed with independence within 7 day(s). 2.  Patient will transfer from bed to chair and chair to bed with independence using the least restrictive device within 7 day(s). 3.  Patient will perform sit to stand with independence within 7 day(s). 4.  Patient will ambulate with independence for 300 feet with the least restrictive device within 7 day(s). 5.  Patient will ascend/descend 3 stairs with 0 handrail(s) with independence within 7 day(s). 6.  Patient will improve Daly Balance score by 7 points within 7 days. Outcome: Progressing Towards Goal   PHYSICAL THERAPY EVALUATION: Neuro  Patient: Sivan Collado (70 y.o. female)  Date: 7/26/2019  Primary Diagnosis: TIA (transient ischemic attack) [G45.9]  TIA (transient ischemic attack) [G45.9]        Precautions:  Fall      ASSESSMENT :   Based on the objective data described below, patient presents with Contact guard assistance overall for functional mobility. Gait training completed at Contact guard assistance, 315 feet and using a gait belt. Patient displays rigid posture, minimal arm swing and noted decreased left foot clearance particularly with fatigue. Patient aware but unable to correct without verbal cueing. Slightly unsteady ambulation and performing transfers in small space of room needing safety cues. Would benefit from use of RW at home initially and HHPT for safety evaluation at the least. Pt did fall while in the ED here d/t weakness. Independent and driving at baseline. History of cervical and lumbar arthritis contributing to weakness and pain.     The following are barriers to independence while in acute care:   -Cognitive and/or behavioral: safety awareness and insight into deficits  -Medical condition: strength and standing balance    -Other:       The patient will benefit from skilled acute intervention to address the above impairments and their rehabilitation potential is considered to be Excellent    Discharge recommendations: Home health (to increase independence and safety)  If above is not an option then recommend: None    Patient's barriers to discharging home, in addition to above impairments: history of falls. Equipment recommendations for successful discharge (if) home: Has all needed devices        PLAN :  Recommendations and Planned Interventions: bed mobility training, transfer training, gait training, therapeutic exercises, neuromuscular re-education, patient and family training/education and therapeutic activities      Frequency/Duration: Patient will be followed by physical therapy  3 times a week to address goals. SUBJECTIVE:   Patient stated I don't feel all the way back to normal yet.     OBJECTIVE DATA SUMMARY:   HISTORY:    Past Medical History:   Diagnosis Date    Acid reflux     Endocrine disease     thyroid nodules    Thyroid disease     thyroid nodules     Past Surgical History:   Procedure Laterality Date    HX GYN  1996    hysterectomy ? BSO    HX ORTHOPAEDIC      back     Prior Level of Function/Home Situation: Independent  Personal factors and/or comorbidities impacting plan of care: orthopedic back and neck    Home Situation  Home Environment: Private residence  # Steps to Enter: 3  One/Two Story Residence: One story  Living Alone: No  Support Systems: Child(grazyna), Spouse/Significant Other/Partner  Patient Expects to be Discharged to[de-identified] Private residence  Current DME Used/Available at Home: None    EXAMINATION/PRESENTATION/DECISION MAKING:   Critical Behavior:  Neurologic State: Alert  Orientation Level: Oriented X4  Cognition: Follows commands  Safety/Judgement: Awareness of environment, Insight into deficits  Hearing:   Auditory  Auditory Impairment: None  Skin:    Edema:   Range Of Motion:  AROM: Within functional limits                       Strength:    Strength: Within functional limits                    Tone & Sensation:   Tone: Normal              Sensation: Intact               Coordination:  Coordination: Grossly decreased, non-functional  Vision:   Visual Fields: (able to detect stimuli in all fields)  Acuity: Able to read clock/calendar on wall without difficulty; Impaired near vision  Corrective Lenses: Glasses(not present)  Functional Mobility and Neuro Re-education:  Bed Mobility:  Rolling: Independent  Supine to Sit: Independent  Sit to Supine: Independent     Transfers:  Sit to Stand: Contact guard assistance  Stand to Sit: Stand-by assistance        Bed to Chair: Contact guard assistance              Balance:   Sitting: Intact  Standing: Impaired  Standing - Static: Fair  Standing - Dynamic : Fair  Ambulation/Gait Training:  Distance (ft): 315 Feet (ft)  Assistive Device: Gait belt  Ambulation - Level of Assistance: Contact guard assistance        Gait Abnormalities: Shuffling gait; Decreased step clearance        Base of Support: Narrowed     Speed/Perlita: Pace decreased (<100 feet/min); Shuffled  Step Length: Right shortened;Left shortened  Swing Pattern: Left asymmetrical                  Stairs:               Therapeutic Exercises:       Functional Measure:  Daly Balance Test:    Sitting to Standing: 3  Standing Unsupported: 3  Sitting with Back Unsupported: 3  Standing to Sitting: 3  Transfers: 3  Standing Unsupported with Eyes Closed: 2  Standing Unsupported with Feet Together: 2  Reach Forward with Outstretched Arm: 2   Object: 3  Turn to Look Over Shoulders: 2  Turn 360 Degrees: 2  Alternate Foot on Step/Stool: 1  Standing Unsupported One Foot in Front: 0  Stand on One Le  Total: 29       56=Maximum possible score;   0-20=High fall risk  21-40=Moderate fall risk   41-56=Low fall risk        Physical Therapy Evaluation Charge Determination   History Examination Presentation Decision-Making   MEDIUM  Complexity : 1-2 comorbidities / personal factors will impact the outcome/ POC  MEDIUM Complexity : 3 Standardized tests and measures addressing body structure, function, activity limitation and / or participation in recreation  LOW Complexity : Stable, uncomplicated  Other outcome measures Daly  LOW       Based on the above components, the patient evaluation is determined to be of the following complexity level: LOW       Activity Tolerance:   Good  Please refer to the flowsheet for vital signs taken during this treatment. After treatment patient left:   Up in chair  Bed alarm/tab alert on  Bed/Chair-wheels locked  Call light within reach  RN notified     COMMUNICATION/EDUCATION:   The patients plan of care was discussed with: Registered Nurse. Patient was educated regarding her deficit(s) of weakness as this relates to her diagnosis of TIA. She demonstrated Good understanding as evidenced by recall. Patient and/or family was verbally educated on the BE FAST acronym for signs/symptoms of CVA and TIA. BE FAST was written on patient's communication board  for visual education and reinforcement. All questions answered with patient indicating good understanding. Fall prevention education was provided and the patient/caregiver indicated understanding., Patient/family have participated as able in goal setting and plan of care. and Patient/family agree to work toward stated goals and plan of care.     Thank you for this referral.  Lora Serrano, PT   Time Calculation: 22 mins

## 2019-07-26 NOTE — PROGRESS NOTES
I have reviewed discharge instructions with the patient and spouse. The patient and spouse verbalized understanding. Bedside RN performed patient education and medication education. Discharge concerns initiated and discussed with patient, including clarification on \"who\" assists the patient at their home and instructions for when the home going patient should call their provider after discharge. Opportunity for questions and clarification was provided. Patient receptive to education: YES  Patient stated: Sully Negrete Understanding  Barriers to Education: None  Diagnosis Education given:  YES    Length of stay: 2  Expected Day of Discharge: 7/26/2019  Ask if they have \"Help at Home\" & add to white board?   YES    Education Day #: 2    Medication Education Given:  YES  M in the box Medication name: Aspirin    Pt aware of HCAHPS survey: YES      Stroke Education documented in Patient Education: YES  Core Measures Documented in [facility]:  Risk Factors: YES  Warning signs of stroke: YES  When to Activate 911: YES  Medication Education for Risk Factors: YES  Smoking cessation if applicable: YES  Written Education Given:  YES    Discharge NIH Completed: YES  Score: 0

## 2019-07-26 NOTE — PROGRESS NOTES
Bedside and Verbal shift change report given to Valerie Barillas RN (oncoming nurse) by Ramila العلي RN (offgoing nurse).  Report included the following information SBAR, Kardex, ED Summary, Intake/Output, MAR, Recent Results and Cardiac Rhythm SR.

## 2019-07-26 NOTE — PROGRESS NOTES
Reason for Admission: TIA                     RRAT Score: 11- LOW                    Plan for utilizing home health: [place] ordered for PT/OT. Pt offered FOC; choice for [facility]. Referral sent;awaiting response. Current Advanced Directive/[facility] Plan: Pt does not have an AMD. Pt would not like further information at this time. Transition of Care Plan: CM met with pt at bedside to discuss CM role and assess pt needs. CM verified demographic information including insurance and emergency contact. Pt lives with spouse in a private residence. Pt reports spouse, Raghu Lopes, will provide transportation home after discharge. Pt has a walker, scooter, and cane at home as DME and reports IADLs prior to admission. Pt uses [address] on [address]. Pt verified PCP but reports last visit was in Fall 2018. Pt reports no concerns for discharge at this time. CM will await further orders should any needs arise. Plan for pt discharge today. Care Management Interventions  PCP Verified by CM: Yes  Palliative Care Criteria Met (RRAT>21 & CHF Dx)?: No  Mode of Transport at Discharge: Other (see comment)(Spouse, Raghu Lopes)  Transition of Care Consult (CM Consult): Discharge Planning, Other, Home Health(Initial Evaluation)  [address] No  Reason Outside [place]: (Pt choice for another agency)  MyChart Signup: No  Discharge Durable Medical Equipment: No  Physical Therapy Consult: Yes  Occupational Therapy Consult: Yes  Speech Therapy Consult: Yes  Current Support Network: Lives with Spouse, Own Home  Confirm Follow Up Transport: Family  Plan discussed with Pt/Family/Caregiver: Yes  Discharge Location  Discharge Placement: Home with home health(Disposition TBD/subject to change pending care recommendations)    Niesha De Oliveira RN, BSN  Care Management Department    [phone][facility] notified that pt is [facility] but [facility] would be Monday.  CM contacted MD to notify of [facility]; MD stated [facility] okay if pt's  able to provide 24/7 supervision but would rather [facility] agency for [facility] tomorrow. MD requested for CM to inquire with family. CM discussed MD preference for another agency for [facility] tomorrow; pt/family prefer not to use another agency. Pt's spouse reports he will provide 24/7 supervision/assiatnec over weekend. MD aware of pt/family choice. CM notified [facility] agency of discharge today.  Mildred Shin RN, BSN- Care Management

## 2019-07-26 NOTE — PROGRESS NOTES
Problem: Falls - Risk of  Goal: *Absence of Falls  Description  Document Bina Marcial Fall Risk and appropriate interventions in the flowsheet.   Outcome: Progressing Towards Goal  Note:   Fall Risk Interventions:  Mobility Interventions: Bed/chair exit alarm, Communicate number of staff needed for ambulation/transfer, Patient to call before getting OOB, PT Consult for mobility concerns, Utilize walker, cane, or other assistive device         Medication Interventions: Bed/chair exit alarm, Evaluate medications/consider consulting pharmacy, Patient to call before getting OOB, Teach patient to arise slowly    Elimination Interventions: Bed/chair exit alarm, Call light in reach, Stay With Me (per policy), Toilet paper/wipes in reach, Toileting schedule/hourly rounds    History of Falls Interventions: Bed/chair exit alarm, Consult care management for discharge planning, Door open when patient unattended, Room close to nurse's station         Problem: TIA/CVA Stroke: 0-24 hours  Goal: Activity/Safety  Outcome: Progressing Towards Goal  Goal: Consults, if ordered  Outcome: Progressing Towards Goal  Goal: Diagnostic Test/Procedures  Outcome: Progressing Towards Goal  Goal: Medications  Outcome: Progressing Towards Goal  Goal: *Hemodynamically stable  Outcome: Progressing Towards Goal  Goal: *Neurologically stable  Description  Absence of additional neurological deficits    Outcome: Progressing Towards Goal

## 2019-07-26 NOTE — DISCHARGE SUMMARY
Hospitalist Discharge Summary     Patient ID:  Dawood Sears  [id number]  70 y.o.  1940 7/25/2019    PCP on record: Fred Al MD    Admit date: 7/25/2019  Discharge date and time: 7/26/2019    DISCHARGE DIAGNOSIS:  Left sided weakness   DJD of cervical spine     CONSULTATIONS:  IP CONSULT TO NEUROLOGY    Excerpted HPI from H&P of David Orr MD:  HISTORY OF PRESENT ILLNESS:  The patient is a 79-year-old female with no significant past medical history, who presents to the hospital with the above-mentioned symptom. History was obtained from the patient as well as her . Her  reports that the patient woke up at 6:30 this morning in normal state of health. Around 7 o'clock, she suddenly started having some left-sided weakness. The  reports that she was not able to walk. He sat her down on the chair and noticed that she had a left-sided facial droop along with weakness in her arms and legs. The  also reports that the patient had very slurred speech, and he was not able to understand what the patient was saying. EMS was called. On the way to the hospital, the patient's symptoms started getting better, but then it got worse while the patient came to the hospital.  The patient was seen in the ER and was requested to be admitted under the hospitalist service. The patient currently is resting in bed, reports that most of her symptoms have resolved and denies any other complaints or problems, but the patient reports that she still continues to be weak on the left side and is not able to walk well. Of note, before I went to see the patient, the patient was trying to get out of the bed in the ER and had a fall. The patient reports that she sat down and Geno Philip my pride is hurt. \"  The patient denies any pain or discomfort anywhere. Denies hitting her head.   The patient denies any headache, blurry vision, sore throat, trouble swallowing, trouble with speech. Denies any chest pain, shortness of breath, cough, fever, chills, abdominal pain, constipation, diarrhea, urinary symptoms, any hematemesis, melena, hemoptysis, hematuria or any other concerns or problems.       ______________________________________________________________________  DISCHARGE SUMMARY/HOSPITAL COURSE:  for full details see H&P, daily progress notes, labs, consult notes. 1.  Left-sided weakness:  Unclear etiology. MRI of the brain is negative for any acute stroke. We will observe the patient on the telemetry bed. Neurology consult, echocardiogram.  Physical therapy, occupational therapy, speech consult. The patient had a fall, thus will be on fall precautions. We will continue the patient on aspirin and await Neurology input. We will get a fasting lipid profile, telemetry monitoring. Any changes in neurological status will mandate emergent intervention. We will continue to closely monitor. Further intervention per hospital course. neurology seen   MRI negative  MRI of neck DJD  Referred to ortho spine   2. Gastrointestinal and deep venous thrombosis prophylaxes: The patient will be on heparin      _______________________________________________________________________  Patient seen and examined by me on discharge day. Pertinent Findings:  Gen:    Not in distress  Chest: Clear lungs  CVS:   Regular rhythm. No edema  Abd:  Soft, not distended, not tender  Neuro:  Alert, oriented x4_______________________________________________________________________  DISCHARGE MEDICATIONS:   Current Discharge Medication List      START taking these medications    Details   aspirin 81 mg chewable tablet Take 1 Tab by mouth daily. Qty: 30 Tab, Refills: 0         CONTINUE these medications which have NOT CHANGED    Details   estradiol (ESTRACE) 0.01 % (0.1 mg/gram) vaginal cream Insert 2 g into vagina every three (3) days.       cholecalciferol (VITAMIN D3) 1,000 unit tablet Take  by mouth daily.      esomeprazole (NEXIUM) 40 mg capsule Take 40 mg by mouth daily. Patient Follow Up Instructions: Activity: Activity as tolerated  Diet: Resume previous diet  Wound Care: None needed    Follow-up with PCP  in 2 weeks.   Follow-up tests/labs NONE   Follow-up Information     Follow up With Specialties Details Why Contact Info    Shirley Yoder MD Internal Medicine   [address]  [phone]          ________________________________________________________________    Risk of deterioration: Moderate    Condition at Discharge:  Stable  __________________________________________________________________    Disposition  Home with family, no needs    ____________________________________________________________________    Code Status: Full Code  ___________________________________________________________________      Total time in minutes spent coordinating this discharge (includes going over instructions, follow-up, prescriptions, and preparing report for sign off to her PCP) :  35 minutes    Signed:  Sanya Larson MD

## 2019-07-31 ENCOUNTER — HOSPITAL ENCOUNTER (OUTPATIENT)
Dept: REHABILITATION | Age: 79
End: 2019-08-17
Attending: PHYSICAL MEDICINE & REHABILITATION | Admitting: PHYSICAL MEDICINE & REHABILITATION

## 2019-08-01 LAB
25(OH)D3 SERPL-MCNC: 32.1 NG/ML (ref 30–100)
ALBUMIN SERPL-MCNC: 3.1 G/DL (ref 3.5–5)
ALBUMIN/GLOB SERPL: 1 {RATIO} (ref 1.1–2.2)
ALP SERPL-CCNC: 76 U/L (ref 45–117)
ALT SERPL-CCNC: 27 U/L (ref 12–78)
ANION GAP SERPL CALC-SCNC: 6 MMOL/L (ref 5–15)
APPEARANCE UR: ABNORMAL
AST SERPL-CCNC: 14 U/L (ref 15–37)
BACTERIA URNS QL MICRO: ABNORMAL /HPF
BILIRUB SERPL-MCNC: 0.5 MG/DL (ref 0.2–1)
BILIRUB UR QL: NEGATIVE
BUN SERPL-MCNC: 18 MG/DL (ref 6–20)
BUN/CREAT SERPL: 25 (ref 12–20)
CALCIUM SERPL-MCNC: 9.1 MG/DL (ref 8.5–10.1)
CHLORIDE SERPL-SCNC: 110 MMOL/L (ref 97–108)
CO2 SERPL-SCNC: 28 MMOL/L (ref 21–32)
COLOR UR: ABNORMAL
CREAT SERPL-MCNC: 0.71 MG/DL (ref 0.55–1.02)
EPITH CASTS URNS QL MICRO: ABNORMAL /LPF
ERYTHROCYTE [DISTWIDTH] IN BLOOD BY AUTOMATED COUNT: 13.1 % (ref 11.5–14.5)
GLOBULIN SER CALC-MCNC: 3 G/DL (ref 2–4)
GLUCOSE SERPL-MCNC: 96 MG/DL (ref 65–100)
GLUCOSE UR STRIP.AUTO-MCNC: NEGATIVE MG/DL
HCT VFR BLD AUTO: 41.8 % (ref 35–47)
HGB BLD-MCNC: 13.5 G/DL (ref 11.5–16)
HGB UR QL STRIP: NEGATIVE
HYALINE CASTS URNS QL MICRO: ABNORMAL /LPF (ref 0–5)
KETONES UR QL STRIP.AUTO: NEGATIVE MG/DL
LEUKOCYTE ESTERASE UR QL STRIP.AUTO: ABNORMAL
MAGNESIUM SERPL-MCNC: 2.1 MG/DL (ref 1.6–2.4)
MCH RBC QN AUTO: 29.7 PG (ref 26–34)
MCHC RBC AUTO-ENTMCNC: 32.3 G/DL (ref 30–36.5)
MCV RBC AUTO: 91.9 FL (ref 80–99)
NITRITE UR QL STRIP.AUTO: NEGATIVE
NRBC # BLD: 0 K/UL (ref 0–0.01)
NRBC BLD-RTO: 0 PER 100 WBC
PH UR STRIP: 6 [PH] (ref 5–8)
PLATELET # BLD AUTO: 157 K/UL (ref 150–400)
PMV BLD AUTO: 12.6 FL (ref 8.9–12.9)
POTASSIUM SERPL-SCNC: 3.6 MMOL/L (ref 3.5–5.1)
PROT SERPL-MCNC: 6.1 G/DL (ref 6.4–8.2)
PROT UR STRIP-MCNC: NEGATIVE MG/DL
RBC # BLD AUTO: 4.55 M/UL (ref 3.8–5.2)
RBC #/AREA URNS HPF: ABNORMAL /HPF (ref 0–5)
SODIUM SERPL-SCNC: 144 MMOL/L (ref 136–145)
SP GR UR REFRACTOMETRY: 1.02 (ref 1–1.03)
UA: UC IF INDICATED,UAUC: ABNORMAL
UROBILINOGEN UR QL STRIP.AUTO: 1 EU/DL (ref 0.2–1)
WBC # BLD AUTO: 5.5 K/UL (ref 3.6–11)
WBC URNS QL MICRO: ABNORMAL /HPF (ref 0–4)

## 2019-08-01 PROCEDURE — 87077 CULTURE AEROBIC IDENTIFY: CPT

## 2019-08-01 PROCEDURE — 81001 URINALYSIS AUTO W/SCOPE: CPT

## 2019-08-01 PROCEDURE — 87086 URINE CULTURE/COLONY COUNT: CPT

## 2019-08-01 PROCEDURE — 87186 SC STD MICRODIL/AGAR DIL: CPT

## 2019-08-01 PROCEDURE — 80053 COMPREHEN METABOLIC PANEL: CPT

## 2019-08-01 PROCEDURE — 36415 COLL VENOUS BLD VENIPUNCTURE: CPT

## 2019-08-01 PROCEDURE — 83735 ASSAY OF MAGNESIUM: CPT

## 2019-08-01 PROCEDURE — 85027 COMPLETE CBC AUTOMATED: CPT

## 2019-08-01 PROCEDURE — 82306 VITAMIN D 25 HYDROXY: CPT

## 2019-08-05 ENCOUNTER — TELEPHONE (OUTPATIENT)
Dept: INTERNAL MEDICINE CLINIC | Age: 79
End: 2019-08-05

## 2019-08-05 NOTE — TELEPHONE ENCOUNTER
Not seen since May 2018. Received home health certification from [address] [place] [address]  [phone]. Advise them not seen since May 2018 and please schedule patient for follow up.

## 2019-08-06 NOTE — TELEPHONE ENCOUNTER
Spoke with [facility].   Advised that patient has not been seen since 2018 and will need to be seen before Dr. Randolph Gramajo can sign [address] certification

## 2019-08-27 ENCOUNTER — TELEPHONE (OUTPATIENT)
Dept: INTERNAL MEDICINE CLINIC | Age: 79
End: 2019-08-27

## 2019-08-27 NOTE — TELEPHONE ENCOUNTER
Spoke with patients . Two pt identifiers confirmed. Offered an appointment with Dr. Marie Castro on 09/05/19. Appointment accepted. Patient advised if anything changes or if unable to keep this appointment to call the office  Pt verbalized understanding of information discussed w/ no further questions at this time.

## 2019-08-27 NOTE — TELEPHONE ENCOUNTER
----- Message from Shila Alvarado sent at 8/27/2019  2:47 PM EDT -----  Regarding: DR Adrianne Lujan / Ana Henderson  Appointment not available    Renan Ferrerfreda  is requesting to schedule an appt per the doctor's at [place] [address].     Best contact number:  [phone]    Monday, October 28, 2019 02:15 PM     Preferred date and time: Tuesday  9/3/19    Reason for appointment: F/UP post stroke in July                Shila Alvarado

## 2019-09-06 ENCOUNTER — OFFICE VISIT (OUTPATIENT)
Dept: INTERNAL MEDICINE CLINIC | Age: 79
End: 2019-09-06

## 2019-09-06 VITALS
WEIGHT: 148 LBS | SYSTOLIC BLOOD PRESSURE: 136 MMHG | TEMPERATURE: 97.7 F | HEART RATE: 67 BPM | OXYGEN SATURATION: 98 % | BODY MASS INDEX: 23.78 KG/M2 | RESPIRATION RATE: 16 BRPM | HEIGHT: 66 IN | DIASTOLIC BLOOD PRESSURE: 72 MMHG

## 2019-09-06 DIAGNOSIS — I63.9 CEREBROVASCULAR ACCIDENT (CVA), UNSPECIFIED MECHANISM (HCC): Primary | ICD-10-CM

## 2019-09-06 DIAGNOSIS — E78.00 PURE HYPERCHOLESTEROLEMIA: ICD-10-CM

## 2019-09-06 RX ORDER — PRAVASTATIN SODIUM 80 MG/1
80 TABLET ORAL DAILY
Qty: 90 TAB | Refills: 3 | Status: SHIPPED | OUTPATIENT
Start: 2019-09-06 | End: 2020-09-15

## 2019-09-06 RX ORDER — PRAVASTATIN SODIUM 80 MG/1
TABLET ORAL
Refills: 0 | COMMUNITY
Start: 2019-08-17 | End: 2019-09-06 | Stop reason: SDUPTHER

## 2019-09-06 NOTE — PROGRESS NOTES
Gualberto Landeros is a 70 y.o. female who presents for hospital follow up. In with . Admit date: 7/25/2019  Discharge date and time: 7/26/2019     DISCHARGE DIAGNOSIS:  Left sided weakness   DJD of cervical spine      Per , she had left facial droop, left sided weakness, slurred speech, change in gait. Per ED doctor, she has left sided weakness on exam.  Seen by neurology, reported normal exam.  CT and MRI negative for CVA. Discharged from [facility]. Per , she still had weakness when discharged. she was home for 2 days. Physical therapist came to see patient and advised she go to ED due to persistent weakness. Was hospitalized at [facility] for CVA that was not diagnosed at [facility]. Transferred to [address] [place] [address], 10 days. In home therapy 3 weeks. To go to [place] [address], next week. Aspirin 81mg daily. Was on aspirin and stopped taking it for a few weeks.  May 2018, no medication. Was intolerant of lipitor with dizziness in the past and stopped taking it.  on July 26. Started on  Pravastatin 80mg daily. No side effects so far. Normal BP. Nonsmoker. Seeing neurologist at [facility]. Done with speech therapy.         Past Medical History:   Diagnosis Date    Acid reflux     Endocrine disease     thyroid nodules    Thyroid disease     thyroid nodules       Family History   Problem Relation Age of Onset    Cancer Mother         lymphoma    Diabetes Paternal Grandmother     Heart Disease Sister     Heart Disease Son     No Known Problems Son     No Known Problems Daughter     No Known Problems Daughter     Heart Disease Maternal Grandfather        Social History     Socioeconomic History    Marital status:      Spouse name: Not on file    Number of children: Not on file    Years of education: Not on file    Highest education level: Not on file   Occupational History    Not on file   Social Needs    Financial resource strain: Not on file    Food insecurity:     Worry: Not on file     Inability: Not on file    Transportation needs:     Medical: Not on file     Non-medical: Not on file   Tobacco Use    Smoking status: Former Smoker    Smokeless tobacco: Never Used   Substance and Sexual Activity    Alcohol use: No    Drug use: No    Sexual activity: Not Currently     Partners: Male     Birth control/protection: None   Lifestyle    Physical activity:     Days per week: Not on file     Minutes per session: Not on file    Stress: Not on file   Relationships    Social connections:     Talks on phone: Not on file     Gets together: Not on file     Attends [employer] service: Not on file     Active member of club or organization: Not on file     Attends meetings of clubs or organizations: Not on file     Relationship status: Not on file    Intimate partner violence:     Fear of current or ex partner: Not on file     Emotionally abused: Not on file     Physically abused: Not on file     Forced sexual activity: Not on file   Other Topics Concern    Not on file   Social History Narrative    Not on file       Current Outpatient Medications on File Prior to Visit   Medication Sig Dispense Refill    aspirin 81 mg chewable tablet Take 1 Tab by mouth daily. 30 Tab 0    cholecalciferol (VITAMIN D3) 1,000 unit tablet Take  by mouth daily.  esomeprazole (NEXIUM) 40 mg capsule Take 40 mg by mouth daily.  estradiol (ESTRACE) 0.01 % (0.1 mg/gram) vaginal cream Insert 2 g into vagina every three (3) days. No current facility-administered medications on file prior to visit. Review of Systems  Pertinent items are noted in HPI. Objective:     Visit Vitals  /72 (BP 1 Location: Left arm, BP Patient Position: Sitting)   Pulse 67   Temp 97.7 °F (36.5 °C) (Oral)   Resp 16   Ht 5' 6\" (1.676 m)   Wt 148 lb (67.1 kg)   SpO2 98%   BMI 23.89 kg/m²     Gen: well appearing female  HEENT:   PERRL,normal conjunctiva.  External ear and canals normal, TMs no opacification or erythema,  OP no erythema, no exudates, MMM  Neck:  . No masses or LAD  Resp:  No wheezing, no rhonchi, no rales. CV:  RRR, normal S1S2, no murmur. GI: soft, nontender, without masses. Extrem:  +2 pulses, no edema, warm distally  Neuro: alert, clear speech, slight left facial droop, 4/5 left , 4/5 left distal LE strength, cautious gait, fair coordination. Assessment/Plan:       ICD-10-CM ICD-9-CM    1. Cerebrovascular accident (CVA), unspecified mechanism ([facility] [address] I63.9 434.91    2. Pure hypercholesterolemia M82.53 581.9 METABOLIC PANEL, COMPREHENSIVE      LIPID PANEL   discussed secondary prevention of CVA.      follow up with [facility] neurology    Mino Elliott MD

## 2019-09-06 NOTE — PATIENT INSTRUCTIONS
Office Policies    Phone calls/patient messages:            Please allow up to 24 hours for someone in the office to contact you about your call or message. Be mindful your provider may be out of the office or your message may require further review. We encourage you to use [employer] for your messages as this is a faster, more efficient way to communicate with our office                         Medication Refills:            Prescription medications require 48-72 business hours to process. We encourage you to use [employer] for your refills. For controlled medications: Please allow 72 business hours to process. Certain medications may require you to  a written prescription at our office. NO narcotic/controlled medications will be prescribed after 4pm Monday through Friday or on weekends              Form/Paperwork Completion:            Please note a $25 fee may incur for all paperwork for completed by our providers. We ask that you allow 7-10 business days. Pre-payment is due prior to picking up/faxing the completed form. You may also download your forms to [employer] to have your doctor print off.

## 2019-09-27 ENCOUNTER — HOSPITAL ENCOUNTER (OUTPATIENT)
Dept: LAB | Age: 79
Discharge: HOME | End: 2019-09-27
Payer: MEDICARE

## 2019-09-27 DIAGNOSIS — E78.00 PURE HYPERCHOLESTEROLEMIA: ICD-10-CM

## 2019-09-27 PROCEDURE — 80053 COMPREHEN METABOLIC PANEL: CPT

## 2019-09-27 PROCEDURE — 80061 LIPID PANEL: CPT

## 2019-09-27 PROCEDURE — 36415 COLL VENOUS BLD VENIPUNCTURE: CPT

## 2019-09-28 LAB
ALBUMIN SERPL-MCNC: 3.9 G/DL (ref 3.5–4.8)
ALBUMIN/GLOB SERPL: 1.9 {RATIO} (ref 1.2–2.2)
ALP SERPL-CCNC: 88 IU/L (ref 39–117)
ALT SERPL-CCNC: 14 IU/L (ref 0–32)
AST SERPL-CCNC: 18 IU/L (ref 0–40)
BILIRUB SERPL-MCNC: 0.4 MG/DL (ref 0–1.2)
BUN SERPL-MCNC: 11 MG/DL (ref 8–27)
BUN/CREAT SERPL: 17 (ref 12–28)
CALCIUM SERPL-MCNC: 9.3 MG/DL (ref 8.7–10.3)
CHLORIDE SERPL-SCNC: 106 MMOL/L (ref 96–106)
CHOLEST SERPL-MCNC: 188 MG/DL (ref 100–199)
CO2 SERPL-SCNC: 25 MMOL/L (ref 20–29)
CREAT SERPL-MCNC: 0.66 MG/DL (ref 0.57–1)
GLOBULIN SER CALC-MCNC: 2.1 G/DL (ref 1.5–4.5)
GLUCOSE SERPL-MCNC: 89 MG/DL (ref 65–99)
HDLC SERPL-MCNC: 62 MG/DL
LDLC SERPL CALC-MCNC: 107 MG/DL (ref 0–99)
POTASSIUM SERPL-SCNC: 4.5 MMOL/L (ref 3.5–5.2)
PROT SERPL-MCNC: 6 G/DL (ref 6–8.5)
SODIUM SERPL-SCNC: 145 MMOL/L (ref 134–144)
TRIGL SERPL-MCNC: 95 MG/DL (ref 0–149)
VLDLC SERPL CALC-MCNC: 19 MG/DL (ref 5–40)

## 2020-04-01 ENCOUNTER — TELEPHONE (OUTPATIENT)
Dept: INTERNAL MEDICINE CLINIC | Age: 80
End: 2020-04-01

## 2020-04-01 NOTE — TELEPHONE ENCOUNTER
Ekaterina Benjamin [address] Office Pool             Caller's first and last name: Rohit Aguilar   Reason for call:   wants to get something called into her pharmacy on file for a bladder infection. , burning and frequency.  Pt is allergic to \"microbid\"   Callback required yes/no and why: yes   Best contact number(s):  [phone]   Details to clarify the request:       Copy/Paste  ENVERA

## 2020-04-02 ENCOUNTER — VIRTUAL VISIT (OUTPATIENT)
Dept: INTERNAL MEDICINE CLINIC | Age: 80
End: 2020-04-02

## 2020-04-02 DIAGNOSIS — N30.00 ACUTE CYSTITIS WITHOUT HEMATURIA: Primary | ICD-10-CM

## 2020-04-02 RX ORDER — CIPROFLOXACIN 500 MG/1
500 TABLET ORAL 2 TIMES DAILY
Qty: 14 TAB | Refills: 0 | Status: SHIPPED | OUTPATIENT
Start: 2020-04-02 | End: 2020-04-11 | Stop reason: ALTCHOICE

## 2020-04-02 NOTE — TELEPHONE ENCOUNTER
Spoke with patient. Two pt identifiers confirmed. Patient advised that we are currently not seeing patients in the office, but can offer her a virtual or telephone visit. Patient states that she would like to be set up for a telephone visit. Patient scheduled for a telephone visit today, 04/02/2020 with Dr. Price Lawrence. Appointment accepted. Patient advised if anything changes or if unable to keep this appointment to call the office  Pt verbalized understanding of information discussed w/ no further questions at this time.

## 2020-04-02 NOTE — PROGRESS NOTES
Subjective:      Patient : This patient is a 70 y.o. female that presents today with possible UTI. Onset of symptoms x 5 days ago. Has urinary urgency, frequency and dysuria. No fever. No back pain. No abdominal pain. Took cipro 500mg once yesterday. Using estrace vaginal cream 3 days a week. This is an established visit conducted via telemedicine, by phone. The patient has been instructed that this meets HIPAA criteria and acknowledges and agrees to this method of visitation. Pursuant to the emergency declaration under the [facility], [facility] waiver authority and the [employer] Act, this Virtual Visit was conducted, with patient's consent, to reduce the patient's risk of exposure to COVID-19 and provide continuity of care for an established patient. Services were provided by phone to substitute for in-person clinic visit. Objective: There were no vitals taken for this visit. Able to answer questions without difficulty. Clear speech, NAD    Past Medical History:   Diagnosis Date    Acid reflux     Endocrine disease     thyroid nodules    Thyroid disease     thyroid nodules     Current Outpatient Medications   Medication Sig Dispense Refill    ciprofloxacin HCl (CIPRO) 500 mg tablet Take 1 Tab by mouth two (2) times a day. 14 Tab 0    pravastatin (PRAVACHOL) 80 mg tablet Take 1 Tab by mouth daily. 90 Tab 3    aspirin 81 mg chewable tablet Take 1 Tab by mouth daily. 30 Tab 0    estradiol (ESTRACE) 0.01 % (0.1 mg/gram) vaginal cream Insert 2 g into vagina every three (3) days.  cholecalciferol (VITAMIN D3) 1,000 unit tablet Take  by mouth daily.  esomeprazole (NEXIUM) 40 mg capsule Take 40 mg by mouth daily.        Allergies   Allergen Reactions    Iodine Anaphylaxis    Amoxicillin Other (comments)     \"It makes my heart race\"    Macrobid [Nitrofurantoin Monohyd/M-Cryst] Rash    Oxycodone Nausea Only           Assessment/Plan:            ICD-10-CM ICD-9-CM    1. Acute cystitis without hematuria N30.00 595.0 ciprofloxacin HCl (CIPRO) 500 mg tablet   increase water intake. Start antibiotic today, continue estrace vaginal cream.     This was a telemedicine visit by phone, duration  8  minutes. Follow-up and Dispositions    · Return if symptoms worsen or fail to improve.          Toñito Alva MD

## 2020-04-11 ENCOUNTER — TELEPHONE (OUTPATIENT)
Dept: INTERNAL MEDICINE CLINIC | Age: 80
End: 2020-04-11

## 2020-04-11 DIAGNOSIS — R30.0 DYSURIA: Primary | ICD-10-CM

## 2020-04-11 RX ORDER — SULFAMETHOXAZOLE AND TRIMETHOPRIM 800; 160 MG/1; MG/1
1 TABLET ORAL 2 TIMES DAILY
Qty: 14 TAB | Refills: 0 | Status: SHIPPED | OUTPATIENT
Start: 2020-04-11 | End: 2020-04-18

## 2020-04-11 NOTE — TELEPHONE ENCOUNTER
On call message: This patient called into for continued symptoms of dysuria and frequency spite completing a course of Cipro. She has had 2 courses of the last 2 months. She does not have any urinalysis or urine culture results in the system. Her last tests were done with her urologist and had a [facility] urgent care. A prescription for Bactrim was called into her pharmacy. Sending this note to her PCP. Results from her neurologist and the ER will need to be faxed to her PCP. She was also advised to contact the on-call doctor for her urologist.  They may have access to her test results. If they are unable to get test results she will need a repeat urinalysis and culture.

## 2020-04-13 ENCOUNTER — TELEPHONE (OUTPATIENT)
Dept: INTERNAL MEDICINE CLINIC | Age: 80
End: 2020-04-13

## 2020-04-13 NOTE — TELEPHONE ENCOUNTER
#[phone] pt states that the Cipro did not work. Pt needs something else called in, but not microbid as she can't take this. Pt is asking for a call back to let her know what you can do. Thanks.

## 2020-08-24 ENCOUNTER — TELEPHONE (OUTPATIENT)
Dept: INTERNAL MEDICINE CLINIC | Age: 80
End: 2020-08-24

## 2020-08-24 DIAGNOSIS — N30.00 ACUTE CYSTITIS WITHOUT HEMATURIA: Primary | ICD-10-CM

## 2020-08-24 RX ORDER — SULFAMETHOXAZOLE AND TRIMETHOPRIM 800; 160 MG/1; MG/1
1 TABLET ORAL 2 TIMES DAILY
Qty: 14 TAB | Refills: 0 | Status: SHIPPED | OUTPATIENT
Start: 2020-08-24 | End: 2020-11-18 | Stop reason: ALTCHOICE

## 2020-08-25 NOTE — TELEPHONE ENCOUNTER
Called and spoke to patient . She will stop the cipro and start the bactrim.    No questions at this time
MD Any Lyon LPN    Caller: Unspecified (Today,  9:41 AM)               Advise patient to stop cipro.  I sent in Bactrim DS one BID for 7 days in place of cipro      Left message for patient to return call
Patient states she needs a call back to get something else prescribed for her Bladder Infection she was diagnosed with a couple of days ago At [facility] 's ER. Patient states \"they prescribed Cipro & it's making her sick\". Patient would like a call back to discuss getting something else prescribed. Please call to discuss.  Thank you
room air

## 2020-09-15 RX ORDER — PRAVASTATIN SODIUM 80 MG/1
TABLET ORAL
Qty: 90 TAB | Refills: 0 | Status: SHIPPED | OUTPATIENT
Start: 2020-09-15 | End: 2020-12-23

## 2020-09-19 ENCOUNTER — TELEPHONE (OUTPATIENT)
Dept: INTERNAL MEDICINE CLINIC | Age: 80
End: 2020-09-19

## 2020-09-19 NOTE — LETTER
9/21/2020 Sue Thibodeaux  
[address] [facility] [facility] [facility] [place] [address] Dear Sue Thibodeaux, Our records indicate that you are due for a Medicare Annual Wellness visit. This is a benefit provided by Medicare, for all part B beneficiaries. I would like all of our patients to take advantage of this visit because it allows us to work with you to review your preventative care plan in addition to your regularly scheduled follow ups. Please call our office at [phone] to schedule this appointment at your earliest convenience. Sincerely, Adryan Sutherland MD  
[address] [place] [address] [id number] [place] [address] 
[phone]

## 2020-09-21 ENCOUNTER — TELEPHONE (OUTPATIENT)
Dept: INTERNAL MEDICINE CLINIC | Age: 80
End: 2020-09-21

## 2020-09-21 NOTE — TELEPHONE ENCOUNTER
----- Message from Michelle Mays sent at 9/19/2020  8:22 AM EDT -----  Regarding: Dr. Marilou Lopes first and last name:  Claribel Cohen, Vu      Reason for call:  Requesting another medication to take      Callback required yes/no and why:  Yes      Best contact number(s):  [phone]      Details to clarify the request:  Pt states the Pravastatin is not helping with her pain. Pt is requesting another medication to take as soon as possible. Pt states she uses the [facility] DR WEI DEL VALLE, Ph. [phone]. Pt was transferred to the answering service for assistance from the on call physician.     Thanks,  Michelle Mays

## 2020-09-23 ENCOUNTER — TELEPHONE (OUTPATIENT)
Dept: INTERNAL MEDICINE CLINIC | Age: 80
End: 2020-09-23

## 2020-09-23 NOTE — TELEPHONE ENCOUNTER
Spoke with patient. Two pt identifiers confirmed. Patient offered an appointment for 09/28/2020. Patient declined states that the appointment time is too early for her. Patient offered the next available appointment on 11/18/2020. Appointment accepted. Patient advised if anything changes or if unable to keep this appointment to call the office  Pt verbalized understanding of information discussed w/ no further questions at this time.

## 2020-09-23 NOTE — TELEPHONE ENCOUNTER
[facility]  Office Pool               Caller's first and last name and relationship (if not the patient): n/a   Best contact number(s): [phone]   Whose call is being returned: Dr. Triplett Medico staff   Details to clarify the request: Pt is returning call regarding a cancellation in order to move her CPE up sooner.      Copy/Paste  ENVERA

## 2020-09-23 NOTE — TELEPHONE ENCOUNTER
[facility]  [facility]               Caller's first and last name: Pt   Reason for call: Requesting a callback if any appts open up sooner for a CPE.    Callback required yes/no and why: yes   Best contact number(s): [phone]   Details to clarify the request: Has appt scheduled for 11/18 at 3:00pm.     Copy/Paste  ENVERA

## 2020-09-24 NOTE — TELEPHONE ENCOUNTER
Noted, patient would like to get a call if Dr. Imelda Claire gets an opening for a sooner CPE appointment

## 2020-10-13 ENCOUNTER — HOSPITAL ENCOUNTER (EMERGENCY)
Age: 80
Discharge: HOME | End: 2020-10-13
Attending: EMERGENCY MEDICINE
Payer: MEDICARE

## 2020-10-13 ENCOUNTER — APPOINTMENT (OUTPATIENT)
Dept: CT IMAGING | Age: 80
End: 2020-10-13
Attending: STUDENT IN AN ORGANIZED HEALTH CARE EDUCATION/TRAINING PROGRAM
Payer: MEDICARE

## 2020-10-13 VITALS
HEIGHT: 67 IN | HEART RATE: 86 BPM | WEIGHT: 156.09 LBS | SYSTOLIC BLOOD PRESSURE: 126 MMHG | RESPIRATION RATE: 16 BRPM | TEMPERATURE: 97.6 F | BODY MASS INDEX: 24.5 KG/M2 | OXYGEN SATURATION: 97 % | DIASTOLIC BLOOD PRESSURE: 63 MMHG

## 2020-10-13 DIAGNOSIS — R42 DIZZINESS: Primary | ICD-10-CM

## 2020-10-13 DIAGNOSIS — I95.1 ORTHOSTATIC HYPOTENSION: ICD-10-CM

## 2020-10-13 LAB
ALBUMIN SERPL-MCNC: 3.4 G/DL (ref 3.5–5)
ALBUMIN/GLOB SERPL: 1 {RATIO} (ref 1.1–2.2)
ALP SERPL-CCNC: 84 U/L (ref 45–117)
ALT SERPL-CCNC: 20 U/L (ref 12–78)
ANION GAP SERPL CALC-SCNC: 5 MMOL/L (ref 5–15)
APPEARANCE UR: CLEAR
AST SERPL-CCNC: 14 U/L (ref 15–37)
ATRIAL RATE: 83 BPM
BACTERIA URNS QL MICRO: NEGATIVE /HPF
BASOPHILS # BLD: 0.1 K/UL (ref 0–0.1)
BASOPHILS NFR BLD: 1 % (ref 0–1)
BILIRUB SERPL-MCNC: 0.5 MG/DL (ref 0.2–1)
BILIRUB UR QL: NEGATIVE
BUN SERPL-MCNC: 15 MG/DL (ref 6–20)
BUN/CREAT SERPL: 20 (ref 12–20)
CALCIUM SERPL-MCNC: 9.1 MG/DL (ref 8.5–10.1)
CALCULATED P AXIS, ECG09: 43 DEGREES
CALCULATED R AXIS, ECG10: -21 DEGREES
CALCULATED T AXIS, ECG11: 85 DEGREES
CHLORIDE SERPL-SCNC: 110 MMOL/L (ref 97–108)
CO2 SERPL-SCNC: 29 MMOL/L (ref 21–32)
COLOR UR: ABNORMAL
COMMENT, HOLDF: NORMAL
CREAT SERPL-MCNC: 0.74 MG/DL (ref 0.55–1.02)
DIAGNOSIS, 93000: NORMAL
DIFFERENTIAL METHOD BLD: NORMAL
EOSINOPHIL # BLD: 0.1 K/UL (ref 0–0.4)
EOSINOPHIL NFR BLD: 2 % (ref 0–7)
EPITH CASTS URNS QL MICRO: ABNORMAL /LPF
ERYTHROCYTE [DISTWIDTH] IN BLOOD BY AUTOMATED COUNT: 12.4 % (ref 11.5–14.5)
GLOBULIN SER CALC-MCNC: 3.3 G/DL (ref 2–4)
GLUCOSE SERPL-MCNC: 99 MG/DL (ref 65–100)
GLUCOSE UR STRIP.AUTO-MCNC: NEGATIVE MG/DL
HCT VFR BLD AUTO: 44.9 % (ref 35–47)
HGB BLD-MCNC: 15 G/DL (ref 11.5–16)
HGB UR QL STRIP: NEGATIVE
IMM GRANULOCYTES # BLD AUTO: 0 K/UL (ref 0–0.04)
IMM GRANULOCYTES NFR BLD AUTO: 0 % (ref 0–0.5)
KETONES UR QL STRIP.AUTO: NEGATIVE MG/DL
LEUKOCYTE ESTERASE UR QL STRIP.AUTO: NEGATIVE
LYMPHOCYTES # BLD: 2.1 K/UL (ref 0.8–3.5)
LYMPHOCYTES NFR BLD: 29 % (ref 12–49)
MCH RBC QN AUTO: 30.5 PG (ref 26–34)
MCHC RBC AUTO-ENTMCNC: 33.4 G/DL (ref 30–36.5)
MCV RBC AUTO: 91.4 FL (ref 80–99)
MONOCYTES # BLD: 0.6 K/UL (ref 0–1)
MONOCYTES NFR BLD: 9 % (ref 5–13)
MUCOUS THREADS URNS QL MICRO: ABNORMAL /LPF
NEUTS SEG # BLD: 4.2 K/UL (ref 1.8–8)
NEUTS SEG NFR BLD: 59 % (ref 32–75)
NITRITE UR QL STRIP.AUTO: NEGATIVE
NRBC # BLD: 0 K/UL (ref 0–0.01)
NRBC BLD-RTO: 0 PER 100 WBC
P-R INTERVAL, ECG05: 146 MS
PH UR STRIP: 5.5 [PH] (ref 5–8)
PLATELET # BLD AUTO: 174 K/UL (ref 150–400)
PMV BLD AUTO: 11.8 FL (ref 8.9–12.9)
POTASSIUM SERPL-SCNC: 3.7 MMOL/L (ref 3.5–5.1)
PROT SERPL-MCNC: 6.7 G/DL (ref 6.4–8.2)
PROT UR STRIP-MCNC: NEGATIVE MG/DL
Q-T INTERVAL, ECG07: 378 MS
QRS DURATION, ECG06: 74 MS
QTC CALCULATION (BEZET), ECG08: 444 MS
RBC # BLD AUTO: 4.91 M/UL (ref 3.8–5.2)
RBC #/AREA URNS HPF: ABNORMAL /HPF (ref 0–5)
SAMPLES BEING HELD,HOLD: NORMAL
SODIUM SERPL-SCNC: 144 MMOL/L (ref 136–145)
SP GR UR REFRACTOMETRY: 1.02 (ref 1–1.03)
TROPONIN I SERPL-MCNC: <0.05 NG/ML
UA: UC IF INDICATED,UAUC: ABNORMAL
UROBILINOGEN UR QL STRIP.AUTO: 0.2 EU/DL (ref 0.2–1)
VENTRICULAR RATE, ECG03: 83 BPM
WBC # BLD AUTO: 7.1 K/UL (ref 3.6–11)
WBC URNS QL MICRO: ABNORMAL /HPF (ref 0–4)

## 2020-10-13 PROCEDURE — 80053 COMPREHEN METABOLIC PANEL: CPT

## 2020-10-13 PROCEDURE — 84484 ASSAY OF TROPONIN QUANT: CPT

## 2020-10-13 PROCEDURE — 74011250636 HC RX REV CODE- 250/636: Performed by: STUDENT IN AN ORGANIZED HEALTH CARE EDUCATION/TRAINING PROGRAM

## 2020-10-13 PROCEDURE — 74011250637 HC RX REV CODE- 250/637: Performed by: STUDENT IN AN ORGANIZED HEALTH CARE EDUCATION/TRAINING PROGRAM

## 2020-10-13 PROCEDURE — 81001 URINALYSIS AUTO W/SCOPE: CPT

## 2020-10-13 PROCEDURE — 70450 CT HEAD/BRAIN W/O DYE: CPT

## 2020-10-13 PROCEDURE — 93005 ELECTROCARDIOGRAM TRACING: CPT

## 2020-10-13 PROCEDURE — 85025 COMPLETE CBC W/AUTO DIFF WBC: CPT

## 2020-10-13 PROCEDURE — 36415 COLL VENOUS BLD VENIPUNCTURE: CPT

## 2020-10-13 PROCEDURE — 96361 HYDRATE IV INFUSION ADD-ON: CPT

## 2020-10-13 PROCEDURE — 96360 HYDRATION IV INFUSION INIT: CPT

## 2020-10-13 PROCEDURE — 99285 EMERGENCY DEPT VISIT HI MDM: CPT

## 2020-10-13 RX ORDER — MECLIZINE HCL 12.5 MG 12.5 MG/1
25 TABLET ORAL
Status: COMPLETED | OUTPATIENT
Start: 2020-10-13 | End: 2020-10-13

## 2020-10-13 RX ORDER — ACETAMINOPHEN 500 MG
1000 TABLET ORAL ONCE
Status: COMPLETED | OUTPATIENT
Start: 2020-10-13 | End: 2020-10-13

## 2020-10-13 RX ORDER — MECLIZINE HYDROCHLORIDE 25 MG/1
25 TABLET ORAL EVERY 8 HOURS
Qty: 15 TAB | Refills: 0 | Status: SHIPPED | OUTPATIENT
Start: 2020-10-13 | End: 2020-10-18

## 2020-10-13 RX ADMIN — ACETAMINOPHEN 1000 MG: 500 TABLET ORAL at 08:30

## 2020-10-13 RX ADMIN — SODIUM CHLORIDE 500 ML: 900 INJECTION, SOLUTION INTRAVENOUS at 08:27

## 2020-10-13 RX ADMIN — MECLIZINE 25 MG: 12.5 TABLET ORAL at 08:30

## 2020-10-13 NOTE — DISCHARGE INSTRUCTIONS

## 2020-10-13 NOTE — ED PROVIDER NOTES
EMERGENCY DEPARTMENT HISTORY AND PHYSICAL EXAM          Date: 10/13/2020  Patient Name: Dheeraj Morales  Attending of Record: Yane Man    History of Presenting Illness     Chief Complaint   Patient presents with    Dizziness     Ambulatory to triage w/ cane. c/o dizziness x1 week, worse last night but reports its all the time, worse with standing. last PO last night-dinner. Denies HA/N/V/D/SOB/vision changes. History Provided By: Patient    HPI: Dheeraj Morales is a 68 y.o. female, pmhx CVA, GERD, HLD, who presents to the ED c/o lightheadedness and dizziness. Patient reports symptoms began about a week ago and seemed to be improving until last night and this morning where it may have gotten worse again. Patient states symptoms are worse when standing up or laying down but will resolve after laying down for a while. Denies vertigo symptoms and reports no trouble with balance. Denies any nausea, vomiting, diarrhea. Patient reports switching cholesterol medications about a month ago. States the switch was due to dizziness with Lipitor. Patient reports a history of recurrent cystitis over the last year but denies any dysuria recently. Denies chest pain, SOB, fever. Does endorse a mild headache since onset of dizziness. PCP: Miller Bergeron MD    There are no other complaints, changes, or physical findings at this time. Current Outpatient Medications   Medication Sig Dispense Refill    meclizine (ANTIVERT) 25 mg tablet Take 1 Tab by mouth every eight (8) hours for 5 days. 15 Tab 0    pravastatin (PRAVACHOL) 80 mg tablet Take 1 tablet by mouth once daily 90 Tab 0    trimethoprim-sulfamethoxazole (BACTRIM DS, SEPTRA DS) 160-800 mg per tablet Take 1 Tab by mouth two (2) times a day. 14 Tab 0    aspirin 81 mg chewable tablet Take 1 Tab by mouth daily. 30 Tab 0    cholecalciferol (VITAMIN D3) 1,000 unit tablet Take  by mouth daily.       esomeprazole (NEXIUM) 40 mg capsule Take 40 mg by mouth daily. Past History     Past Medical History:  Past Medical History:   Diagnosis Date    Acid reflux     Endocrine disease     thyroid nodules    Thyroid disease     thyroid nodules       Past Surgical History:  Past Surgical History:   Procedure Laterality Date    HX GYN  1996    hysterectomy ? BSO    HX ORTHOPAEDIC      back       Family History:  Family History   Problem Relation Age of Onset    Cancer Mother         lymphoma    Diabetes Paternal Grandmother     Heart Disease Sister     Heart Disease Son     No Known Problems Son     No Known Problems Daughter     No Known Problems Daughter     Heart Disease Maternal Grandfather        Social History:  Social History     Tobacco Use    Smoking status: Former Smoker    Smokeless tobacco: Never Used   Substance Use Topics    Alcohol use: No    Drug use: No       Allergies: Allergies   Allergen Reactions    Iodine Anaphylaxis    Amoxicillin Other (comments)     \"It makes my heart race\"    Macrobid [Nitrofurantoin Monohyd/M-Cryst] Rash    Oxycodone Nausea Only         Review of Systems   Review of Systems   Constitutional: Negative for fever. HENT: Negative for congestion and sore throat. Eyes: Negative for visual disturbance. Respiratory: Negative for chest tightness and shortness of breath. Cardiovascular: Negative for chest pain, palpitations and leg swelling. Gastrointestinal: Negative for abdominal pain, constipation, diarrhea, nausea and vomiting. Genitourinary: Negative for dysuria. Skin: Negative for color change and rash. Allergic/Immunologic: Negative for immunocompromised state. Neurological: Positive for dizziness, light-headedness and headaches. Negative for syncope, speech difficulty and numbness. Psychiatric/Behavioral: Negative for agitation and hallucinations. All other systems reviewed and are negative. Physical Exam   Physical Exam  HENT:      Head: Normocephalic and atraumatic. Mouth/Throat:      Mouth: Mucous membranes are moist.   Eyes:      Extraocular Movements: Extraocular movements intact. Conjunctiva/sclera: Conjunctivae normal.      Pupils: Pupils are equal, round, and reactive to light. Neck:      Musculoskeletal: No muscular tenderness. Cardiovascular:      Rate and Rhythm: Normal rate and regular rhythm. Pulses: Normal pulses. Heart sounds: Normal heart sounds. Pulmonary:      Effort: Pulmonary effort is normal.      Breath sounds: Normal breath sounds. Abdominal:      General: There is no distension. Tenderness: There is no abdominal tenderness. Musculoskeletal:         General: No deformity. Skin:     General: Skin is warm. Capillary Refill: Capillary refill takes less than 2 seconds. Neurological:      Mental Status: She is alert and oriented to person, place, and time. Cranial Nerves: No cranial nerve deficit.       Comments: Motor and sensory grossly normal   Psychiatric:         Mood and Affect: Mood normal.         Behavior: Behavior normal.         Diagnostic Study Results     Labs -     Recent Results (from the past 12 hour(s))   EKG, 12 LEAD, INITIAL    Collection Time: 10/13/20  7:14 AM   Result Value Ref Range    Ventricular Rate 83 BPM    Atrial Rate 83 BPM    P-R Interval 146 ms    QRS Duration 74 ms    Q-T Interval 378 ms    QTC Calculation (Bezet) 444 ms    Calculated P Axis 43 degrees    Calculated R Axis -21 degrees    Calculated T Axis 85 degrees    Diagnosis       Normal sinus rhythm  Nonspecific ST and T wave abnormality  When compared with ECG of 11-MAY-2019 05:35,  No significant change was found  Confirmed by Niru Cole M.D. ([id number]) on 10/13/2020 10:27:34 AM     CBC WITH AUTOMATED DIFF    Collection Time: 10/13/20  7:27 AM   Result Value Ref Range    WBC 7.1 3.6 - 11.0 K/uL    RBC 4.91 3.80 - 5.20 M/uL    HGB 15.0 11.5 - 16.0 g/dL    HCT 44.9 35.0 - 47.0 %    MCV 91.4 80.0 - 99.0 FL    MCH 30.5 26.0 - 34.0 PG    MCHC 33.4 30.0 - 36.5 g/dL    RDW 12.4 11.5 - 14.5 %    PLATELET 912 348 - 604 K/uL    MPV 11.8 8.9 - 12.9 FL    NRBC 0.0 0  WBC    ABSOLUTE NRBC 0.00 0.00 - 0.01 K/uL    NEUTROPHILS 59 32 - 75 %    LYMPHOCYTES 29 12 - 49 %    MONOCYTES 9 5 - 13 %    EOSINOPHILS 2 0 - 7 %    BASOPHILS 1 0 - 1 %    IMMATURE GRANULOCYTES 0 0.0 - 0.5 %    ABS. NEUTROPHILS 4.2 1.8 - 8.0 K/UL    ABS. LYMPHOCYTES 2.1 0.8 - 3.5 K/UL    ABS. MONOCYTES 0.6 0.0 - 1.0 K/UL    ABS. EOSINOPHILS 0.1 0.0 - 0.4 K/UL    ABS. BASOPHILS 0.1 0.0 - 0.1 K/UL    ABS. IMM. GRANS. 0.0 0.00 - 0.04 K/UL    DF AUTOMATED     METABOLIC PANEL, COMPREHENSIVE    Collection Time: 10/13/20  7:27 AM   Result Value Ref Range    Sodium 144 136 - 145 mmol/L    Potassium 3.7 3.5 - 5.1 mmol/L    Chloride 110 (H) 97 - 108 mmol/L    CO2 29 21 - 32 mmol/L    Anion gap 5 5 - 15 mmol/L    Glucose 99 65 - 100 mg/dL    BUN 15 6 - 20 MG/DL    Creatinine 0.74 0.55 - 1.02 MG/DL    BUN/Creatinine ratio 20 12 - 20      GFR est AA >60 >60 ml/min/1.73m2    GFR est non-AA >60 >60 ml/min/1.73m2    Calcium 9.1 8.5 - 10.1 MG/DL    Bilirubin, total 0.5 0.2 - 1.0 MG/DL    ALT (SGPT) 20 12 - 78 U/L    AST (SGOT) 14 (L) 15 - 37 U/L    Alk. phosphatase 84 45 - 117 U/L    Protein, total 6.7 6.4 - 8.2 g/dL    Albumin 3.4 (L) 3.5 - 5.0 g/dL    Globulin 3.3 2.0 - 4.0 g/dL    A-G Ratio 1.0 (L) 1.1 - 2.2     TROPONIN I    Collection Time: 10/13/20  7:27 AM   Result Value Ref Range    Troponin-I, Qt. <0.05 <0.05 ng/mL   SAMPLES BEING HELD    Collection Time: 10/13/20  7:27 AM   Result Value Ref Range    SAMPLES BEING HELD  RD, BL, DRK GREEN     COMMENT        Add-on orders for these samples will be processed based on acceptable specimen integrity and analyte stability, which may vary by analyte.    URINALYSIS W/ REFLEX CULTURE    Collection Time: 10/13/20  9:22 AM    Specimen: Urine   Result Value Ref Range    Color YELLOW/STRAW      Appearance CLEAR CLEAR      Specific gravity 1.020 1.003 - 1.030      pH (UA) 5.5 5.0 - 8.0      Protein Negative NEG mg/dL    Glucose Negative NEG mg/dL    Ketone Negative NEG mg/dL    Bilirubin Negative NEG      Blood Negative NEG      Urobilinogen 0.2 0.2 - 1.0 EU/dL    Nitrites Negative NEG      Leukocyte Esterase Negative NEG      WBC 0-4 0 - 4 /hpf    RBC 0-5 0 - 5 /hpf    Epithelial cells FEW FEW /lpf    Bacteria Negative NEG /hpf    UA:UC IF INDICATED CULTURE NOT INDICATED BY UA RESULT CNI      Mucus 1+ (A) NEG /lpf       Radiologic Studies -   CT HEAD WO CONT   Final Result   IMPRESSION: No acute intracranial hemorrhage, mass or infarct. CT Results  (Last 48 hours)               10/13/20 0756  CT HEAD WO CONT Final result    Impression:  IMPRESSION: No acute intracranial hemorrhage, mass or infarct. Narrative:  INDICATION: dizziness, hx of CVA        Exam: Noncontrast CT of the brain is performed with 5 mm collimation. CT dose reduction was achieved with the use of the standardized protocol   tailored for this examination and automatic exposure control for dose   modulation. Direct comparison is made to prior MR dated July 2019. FINDINGS: There is mild, age-appropriate diffuse cortical atrophy. There is no   acute intracranial hemorrhage, mass, mass effect or herniation. Ventricular   system is normal. There are white matter hypodensities consistent with chronic   microvascular ischemic changes. There is no evidence of acute territorial   infarct. The mastoid air cells are well pneumatized. The visualized paranasal   sinuses are normal.               CXR Results  (Last 48 hours)    None            Medical Decision Making   I am the first provider for this patient. I reviewed the vital signs, available nursing notes, past medical history, past surgical history, family history and social history. Vital Signs-Reviewed the patient's vital signs.   Patient Vitals for the past 12 hrs:   Temp Pulse Resp BP SpO2 10/13/20 0845    126/63 97 %   10/13/20 0830    125/63    10/13/20 0829     97 %   10/13/20 0804    118/75 98 %   10/13/20 0802 97.6 °F (36.4 °C)   (!) 146/83    10/13/20 0735    123/67 95 %   10/13/20 0734  86  123/67 98 %   10/13/20 0733  84  (!) 140/71 98 %   10/13/20 0732  83  136/68 96 %   10/13/20 0707 97.8 °F (36.6 °C) 91 16 (!) 151/83 98 %       ECG Interpretation: NSR, 83 bpm, no ST segment elevation or depressions    Records Reviewed: Nursing Notes and Old Medical Records    Provider Notes (Medical Decision Making):   DDx: CVA, TIA, orthostatic hypotension, vertigo, cystitis, electrolyte abnormality    22-year-old female with past medical history of CVA presents emergency department due to lightheadedness x1 week. Low suspicion for CVA at this time but will evaluate with CT scan head without contrast given her past medical history and reported headache. Will get CBC, CMP, UA. Patient's systolic BP decreased by almost 20 when standing up from sitting position. Will give 500cc NS IV bolus. Will treat headache with tylenol. Will give meclizine 25mg. Patient is hemodynamically stable and in no acute distress. Reevaluation: Patient reports improvement in symptoms s/p IVF and meclizine. Patient able to ambulate without symptoms. Advised patient to follow-up with PCP and neurologist for reevaluation. Will discharge home with meclizine prescription. ED Course and Progress Notes:   Initial assessment performed. The patients presenting problems have been discussed, and they are in agreement with the care plan formulated and outlined with them. I have encouraged them to ask questions as they arise throughout their visit. I personally performed a history and physical examination of the patient and discussed the management with the resident.  I have found the following on physical exam:    Awake, alert, moving all extremities  No facial droop  No horizontal nystagmus  TMs clear without effusion bilaterally  No carotid bruits bilaterally  Regular rate and rhythm  No acute respiratory distress  Abdomen soft nontender no midline pulsatile mass on direct palpation  Stable gait. I have reviewed the resident's note and agree with the resident's findings, including all diagnostic interpretations, treatment and plan of care, except as documented below. I was present during the key portions of separately billed procedures. Richar Wooten MD        Diagnosis     Clinical Impression:   1. Dizziness    2. Orthostatic hypotension        Disposition:  Discharge    DISCHARGE PLAN:   1. Discharge Medication List as of 10/13/2020 10:13 AM        2. Follow-up Information     Follow up With Specialties Details Why Contact Info    [facility] EMERGENCY DEPT Emergency Medicine  As needed, If symptoms worsen [address]    Harry Mancia MD Internal Medicine In 2 days for re-eval [address] [facility]  [address] [place] [address]      Kike Cantu MD Neurology Call today for re-evaluation [address]  [address].  [phone]          3.  Return to ED if worse         Resident Signature: Emmanuelle Regan MD

## 2020-10-19 ENCOUNTER — TELEPHONE (OUTPATIENT)
Dept: INTERNAL MEDICINE CLINIC | Age: 80
End: 2020-10-19

## 2020-10-19 NOTE — TELEPHONE ENCOUNTER
Patient states she needs a call back in reference to getting an Acute In Office appt to be seen for dizziness. Please call.  Thank you

## 2020-10-19 NOTE — TELEPHONE ENCOUNTER
Spoke with patient. Two pt identifiers confirmed. Patient offered an appointment for a virtual visit with Dr. Jerrod Orr on 10/20/2020. Appointment accepted. Patient advised if anything changes or if unable to keep this appointment to call the office  Pt verbalized understanding of information discussed w/ no further questions at this time.

## 2020-10-20 ENCOUNTER — VIRTUAL VISIT (OUTPATIENT)
Dept: INTERNAL MEDICINE CLINIC | Age: 80
End: 2020-10-20
Payer: MEDICARE

## 2020-10-20 DIAGNOSIS — H81.10 BENIGN PAROXYSMAL POSITIONAL VERTIGO, UNSPECIFIED LATERALITY: Primary | ICD-10-CM

## 2020-10-20 PROCEDURE — 99441 PR PHYS/QHP TELEPHONE EVALUATION 5-10 MIN: CPT | Performed by: FAMILY MEDICINE

## 2020-10-20 NOTE — PROGRESS NOTES
Kenia Thompson is a 68 y.o. female who presents with concern of vertigo. She had an episode of vertigo yesterday. She rolled over to right side and started spinning, lasted for a few seconds. None today. Denies congestion. No ear symptoms. No nausea. No medications taken for her symptoms. This is an established visit conducted via telemedicine, by phone. The patient has been instructed that this meets HIPAA criteria and acknowledges and agrees to this method of visitation. Pursuant to the emergency declaration under the [address] [place] [address], [facility] waiver authority and the [employer] Act, this Virtual Visit was conducted, with patient's consent, to reduce the patient's risk of exposure to COVID-19 and provide continuity of care for an established patient. Services were provided by phone to substitute for in-person clinic visit.        Past Medical History:   Diagnosis Date    Acid reflux     Endocrine disease     thyroid nodules    Thyroid disease     thyroid nodules       Family History   Problem Relation Age of Onset    Cancer Mother         lymphoma    Diabetes Paternal Grandmother     Heart Disease Sister     Heart Disease Son     No Known Problems Son     No Known Problems Daughter     No Known Problems Daughter     Heart Disease Maternal Grandfather        Social History     Socioeconomic History    Marital status:      Spouse name: Not on file    Number of children: Not on file    Years of education: Not on file    Highest education level: Not on file   Occupational History    Not on file   Social Needs    Financial resource strain: Not on file    Food insecurity     Worry: Not on file     Inability: Not on file    Transportation needs     Medical: Not on file     Non-medical: Not on file   Tobacco Use    Smoking status: Former Smoker    Smokeless tobacco: Never Used   Substance and Sexual Activity    Alcohol use: No    Drug use: No    Sexual activity: Not Currently     Partners: Male     Birth control/protection: None   Lifestyle    Physical activity     Days per week: Not on file     Minutes per session: Not on file    Stress: Not on file   Relationships    Social connections     Talks on phone: Not on file     Gets together: Not on file     Attends Cheondoism service: Not on file     Active member of club or organization: Not on file     Attends meetings of clubs or organizations: Not on file     Relationship status: Not on file    Intimate partner violence     Fear of current or ex partner: Not on file     Emotionally abused: Not on file     Physically abused: Not on file     Forced sexual activity: Not on file   Other Topics Concern    Not on file   Social History Narrative    Not on file       Current Outpatient Medications on File Prior to Visit   Medication Sig Dispense Refill    pravastatin (PRAVACHOL) 80 mg tablet Take 1 tablet by mouth once daily 90 Tab 0    trimethoprim-sulfamethoxazole (BACTRIM DS, SEPTRA DS) 160-800 mg per tablet Take 1 Tab by mouth two (2) times a day. 14 Tab 0    aspirin 81 mg chewable tablet Take 1 Tab by mouth daily. 30 Tab 0    cholecalciferol (VITAMIN D3) 1,000 unit tablet Take  by mouth daily.  esomeprazole (NEXIUM) 40 mg capsule Take 40 mg by mouth daily. No current facility-administered medications on file prior to visit. Review of Systems  Pertinent items are noted in HPI. Objective:     Gen: healthy sounding female  HEENT: no audible congestion, patient does not see oral erythema and sees MMM  Neck: patient does not feel enlarged or tender LAD or masses  Resp: normal respiratory effort, no audible wheezing. CV: patient does not feel palpitations or heart irregularity  Abd: patient does not feel abdominal tenderness or mass, patient does not notice distension  Extrem: patient does not see swelling in ankles or joints. Neuro: Alert and oriented, able to answer questions without difficulty, patient reports able to move all extremities and walk normally        Assessment/Plan:       ICD-10-CM ICD-9-CM    1. Benign paroxysmal positional vertigo, unspecified laterality  H81.10 386.11    vertigo exercises, meclizine prn. This was a telemedicine visit by phone, duration 7 minutes.          Jamie Moscoso MD

## 2020-10-20 NOTE — PATIENT INSTRUCTIONS
Cawthorne Exercises for Vertigo: Care Instructions Your Care Instructions Simple exercises can help you regain your balance when you have vertigo. If you have Ménière's disease, benign paroxysmal positional vertigo (BPPV), or another inner ear problem, you may have vertigo off and on. Do these exercises first thing in the morning and before you go to bed. You might get dizzy when you first start them. If this happens, try to do them for at least 5 minutes. Do a group of exercises at a time, starting at the top of the list. It may take several weeks before you can do all the exercises without feeling dizzy. Follow-up care is a key part of your treatment and safety. Be sure to make and go to all appointments, and call your doctor if you are having problems. It's also a good idea to know your test results and keep a list of the medicines you take. How can you care for yourself at home? Exercise 1 While sitting on the side of the bed and holding your head still: 
· Look up as far as you can. · Look down as far as you can. · Look from side to side as far as you can. · Stretch your arm straight out in front of you. Focus on your index finger. Continue to focus on your finger while you bring it to your nose. Exercise 2 While sitting on the side of the bed: · Bring your head as far back as you can. · Bring your head forward to touch your chin to your chest. 
· Turn your head from side to side. · Do these exercises first with your eyes open. Then try with your eyes closed. Exercise 3 While sitting on the side of the bed: · Shrug your shoulders straight upward, then relax them. · Bend over and try to touch the ground with your fingers. Then go back to a sitting position. · Toss a small ball from one hand to the other. Throw the ball higher than your eyes so you have to look up. Exercise 4 While standing (with someone close by if you feel uncomfortable): 
· Repeat Exercise 1. 
· Repeat Exercise 2. · Pass a ball between your legs and above your head. · Sit down and then stand up. Repeat. Turn around in a [place] a different way each time you stand. · With someone close by to help you, try the above exercises with your eyes closed. Exercise 5 In a room that is cleared of obstacles: 
· Walk to a corner of the room, turn to your right, and walk back to the starting point. Now, repeat and turn left. · Walk up and down a slope. Now try stairs. · While holding on to someone's arm, try these exercises with your eyes closed. When should you call for help? Watch closely for changes in your health, and be sure to contact your doctor if: 
  · You do not get better as expected. Where can you learn more? Go to http://www.gray.com/ Enter D251 in the search box to learn more about \"Cawthorne Exercises for Vertigo: Care Instructions. \" Current as of: April 15, 2020               Content Version: 12.6 © 2006-2020 [employer]. Care instructions adapted under license by [employer] (which disclaims liability or warranty for this information). If you have questions about a medical condition or this instruction, always ask your healthcare professional. [place] [address] any warranty or liability for your use of this information.

## 2020-11-18 ENCOUNTER — OFFICE VISIT (OUTPATIENT)
Dept: INTERNAL MEDICINE CLINIC | Age: 80
End: 2020-11-18
Payer: MEDICARE

## 2020-11-18 VITALS
BODY MASS INDEX: 25.02 KG/M2 | WEIGHT: 159.4 LBS | HEIGHT: 67 IN | HEART RATE: 67 BPM | DIASTOLIC BLOOD PRESSURE: 84 MMHG | RESPIRATION RATE: 18 BRPM | TEMPERATURE: 97.4 F | OXYGEN SATURATION: 98 % | SYSTOLIC BLOOD PRESSURE: 144 MMHG

## 2020-11-18 DIAGNOSIS — Z00.00 MEDICARE ANNUAL WELLNESS VISIT, SUBSEQUENT: ICD-10-CM

## 2020-11-18 DIAGNOSIS — Z13.31 SCREENING FOR DEPRESSION: ICD-10-CM

## 2020-11-18 DIAGNOSIS — I63.9 CEREBROVASCULAR ACCIDENT (CVA), UNSPECIFIED MECHANISM (HCC): ICD-10-CM

## 2020-11-18 DIAGNOSIS — E04.1 THYROID NODULE: ICD-10-CM

## 2020-11-18 DIAGNOSIS — E78.00 PURE HYPERCHOLESTEROLEMIA: Primary | ICD-10-CM

## 2020-11-18 DIAGNOSIS — E53.8 VITAMIN B12 DEFICIENCY: ICD-10-CM

## 2020-11-18 DIAGNOSIS — E55.9 VITAMIN D DEFICIENCY: ICD-10-CM

## 2020-11-18 DIAGNOSIS — Z23 NEEDS FLU SHOT: ICD-10-CM

## 2020-11-18 PROCEDURE — G8536 NO DOC ELDER MAL SCRN: HCPCS | Performed by: FAMILY MEDICINE

## 2020-11-18 PROCEDURE — G8510 SCR DEP NEG, NO PLAN REQD: HCPCS | Performed by: FAMILY MEDICINE

## 2020-11-18 PROCEDURE — G8427 DOCREV CUR MEDS BY ELIG CLIN: HCPCS | Performed by: FAMILY MEDICINE

## 2020-11-18 PROCEDURE — 90694 VACC AIIV4 NO PRSRV 0.5ML IM: CPT

## 2020-11-18 PROCEDURE — 1090F PRES/ABSN URINE INCON ASSESS: CPT | Performed by: FAMILY MEDICINE

## 2020-11-18 PROCEDURE — G0439 PPPS, SUBSEQ VISIT: HCPCS | Performed by: FAMILY MEDICINE

## 2020-11-18 PROCEDURE — G8420 CALC BMI NORM PARAMETERS: HCPCS | Performed by: FAMILY MEDICINE

## 2020-11-18 PROCEDURE — 1101F PT FALLS ASSESS-DOCD LE1/YR: CPT | Performed by: FAMILY MEDICINE

## 2020-11-18 PROCEDURE — 99214 OFFICE O/P EST MOD 30 MIN: CPT | Performed by: FAMILY MEDICINE

## 2020-11-18 PROCEDURE — G8399 PT W/DXA RESULTS DOCUMENT: HCPCS | Performed by: FAMILY MEDICINE

## 2020-11-18 PROCEDURE — G0463 HOSPITAL OUTPT CLINIC VISIT: HCPCS | Performed by: FAMILY MEDICINE

## 2020-11-18 NOTE — PROGRESS NOTES
Reviewed record in preparation for visit and have obtained necessary documentation. Identified pt with two pt identifiers(name and ). Chief Complaint   Patient presents with   [facility] Wellness Visit       Health Maintenance Due   Topic Date Due    DTaP/Tdap/Td  (1 - Tdap) 1963    Shingles Vaccine (1 of 2) 1992    Glaucoma Screening   2017    Cholesterol Test   2020       Ms. Sung Macias has a reminder for a \"due or due soon\" health maintenance. I have asked that she discuss this further with her primary care provider for follow-up on this health maintenance. Coordination of Care Questionnaire:  :     1) Have you been to an emergency room, urgent care clinic since your last visit? yes   Hospitalized since your last visit? no             2) Have you seen or consulted any other health care providers outside of [address] [place] [address] since your last visit? no  (Include any pap smears or colon screenings in this section.)    3) In the event something were to happen to you and you were unable to speak on your behalf, do you have an Advance Directive/ Living Will in place stating your wishes? YES    Do you have an Advance Directive on file? no    4) Are you interested in receiving information on Advance Directives? NO    Patient is accompanied by  I have received verbal consent from Kenia Thompson to discuss any/all medical information while they are present in the room.

## 2020-11-18 NOTE — PROGRESS NOTES
Natan Phillips is a 68 y.o. female who presents for follow up. In with . Last seen by virtual visit on 10/20 with vertigo. Prior CVA in 2019 with left sided weakness and speech changes. Some residual.    Taking pravastatin 80mg once a day. LDL at goal in 2019. No myalgias. Has some joint pain. Both knees and hands. Less active now. 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 no neuropathic pain. Saw Dr. Kahlil Raphael, vascular, negative evaluation.       Prior KORY. Some stress and urge incontinence. Refused mammogram     Normal bowel movements, prior colonoscopy. On nexium for GERD. Tried to stop it and symptoms recurred.      Prior elevated cholesterol. Took lipitor and had dizziness, stopped medication.  Now, on cholesterol medications.       Thyroid nodules, Saw Dr. Elda Garcia, endocrinologist.  Normal thyroid function. Was told to follow up as needed.       Prior DEXA with osteopenia, distant. Taking vitamin D daily.      No prior shringrix. Prior pneumovax. Refused prevnar.          Past Medical History:   Diagnosis Date    Acid reflux     Endocrine disease     thyroid nodules    Thyroid disease     thyroid nodules       Family History   Problem Relation Age of Onset    Cancer Mother         lymphoma    Diabetes Paternal Grandmother     Heart Disease Sister     Heart Disease Son     No Known Problems Son     No Known Problems Daughter     No Known Problems Daughter     Heart Disease Maternal Grandfather        Social History     Socioeconomic History    Marital status:      Spouse name: Not on file    Number of children: Not on file    Years of education: Not on file    Highest education level: Not on file   Occupational History    Not on file   Social Needs    Financial resource strain: Not on file    Food insecurity     Worry: Not on file     Inability: Not on file    Transportation needs     Medical: Not on file     Non-medical: Not on file Tobacco Use    Smoking status: Former Smoker     Packs/day: 1.00     Years: 20.00     Pack years: 20.00     Types: Cigarettes     Last attempt to quit: 1986     Years since quittin.9    Smokeless tobacco: Never Used   Substance and Sexual Activity    Alcohol use: No    Drug use: No     Comment: CBD oil    Sexual activity: Not Currently     Partners: Male     Birth control/protection: None   Lifestyle    Physical activity     Days per week: Not on file     Minutes per session: Not on file    Stress: Not on file   Relationships    Social connections     Talks on phone: Not on file     Gets together: Not on file     Attends [employer] service: Not on file     Active member of club or organization: Not on file     Attends meetings of clubs or organizations: Not on file     Relationship status: Not on file    Intimate partner violence     Fear of current or ex partner: Not on file     Emotionally abused: Not on file     Physically abused: Not on file     Forced sexual activity: Not on file   Other Topics Concern    Not on file   Social History Narrative    Not on file       Current Outpatient Medications on File Prior to Visit   Medication Sig Dispense Refill    pravastatin (PRAVACHOL) 80 mg tablet Take 1 tablet by mouth once daily 90 Tab 0    aspirin 81 mg chewable tablet Take 1 Tab by mouth daily. 30 Tab 0    cholecalciferol (VITAMIN D3) 1,000 unit tablet Take  by mouth daily.  esomeprazole (NEXIUM) 40 mg capsule Take 40 mg by mouth daily.  [DISCONTINUED] trimethoprim-sulfamethoxazole (BACTRIM DS, SEPTRA DS) 160-800 mg per tablet Take 1 Tab by mouth two (2) times a day. 14 Tab 0     No current facility-administered medications on file prior to visit. Review of Systems  Pertinent items are noted in HPI.     Objective:     Visit Vitals  BP (!) 144/84 (BP 1 Location: Left arm, BP Patient Position: Sitting)   Pulse 67   Temp 97.4 °F (36.3 °C) (Temporal)   Resp 18   Ht 5' 7\" (1.702 m) Wt 159 lb 6.4 oz (72.3 kg)   SpO2 98%   BMI 24.97 kg/m²     Gen: well appearing female  HEENT:   PERRL,normal conjunctiva. External ear and canals normal, TMs no opacification or erythema,  OP no erythema, no exudates, MMM  Neck:  Supple. Thyroid normal size, nontender, without nodules. No masses or LAD  Resp:  No wheezing, no rhonchi, no rales. CV:  RRR, normal S1S2, no murmur. GI: soft, nontender, without masses. No hepatosplenomegaly. Extrem:  +2 pulses, no edema, warm distally  Neuro: alert, clear speech, 5/5/ motor, normal gait      Assessment/Plan:       ICD-10-CM ICD-9-CM    1. Pure hypercholesterolemia  E78.00 272.0 LIPID PANEL      METABOLIC PANEL, COMPREHENSIVE   2. Screening for depression  Z13.31 V79.0 DEPRESSION SCREEN ANNUAL   3. Medicare annual wellness visit, subsequent  Z00.00 V70.0    4. Cerebrovascular accident (CVA), unspecified mechanism ([facility] [address]  I63.9 434.91    5. Vitamin D deficiency  E55.9 268.9 VITAMIN D, 25 HYDROXY   6. Thyroid nodule  E04.1 241.0 TSH 3RD GENERATION   7. Vitamin B12 deficiency  E53.8 266.2 VITAMIN B12     Can take stop pravastatin for 2 weeks.         Ethel Avina MD

## 2020-11-18 NOTE — PATIENT INSTRUCTIONS
Medicare Wellness Visit, Female The best way to live healthy is to have a lifestyle where you eat a well-balanced diet, exercise regularly, limit alcohol use, and quit all forms of tobacco/nicotine, if applicable. Regular preventive services are another way to keep healthy. Preventive services (vaccines, screening tests, monitoring & exams) can help personalize your care plan, which helps you manage your own care. Screening tests can find health problems at the earliest stages, when they are easiest to treat. [address] follows the current, evidence-based guidelines published by the [facility] ([facility]) when recommending preventive services for our patients. Because we follow these guidelines, sometimes recommendations change over time as research supports it. (For example, mammograms used to be recommended annually. Even though Medicare will still pay for an annual mammogram, the newer guidelines recommend a mammogram every two years for women of average risk.) Of course, you and your doctor may decide to screen more often for some diseases, based on your risk and your health status. Preventive services for you include: - Medicare offers their members a free annual wellness visit, which is time for you and your primary care provider to discuss and plan for your preventive service needs. Take advantage of this benefit every year! 
-All adults over the age of 72 should receive the recommended pneumonia vaccines. Current USPSTF guidelines recommend a series of two vaccines for the best pneumonia protection.  
-All adults should have a flu vaccine yearly and a tetanus vaccine every 10 years. All adults age 48 and older should receive a shingles vaccine once in their lifetime.   
-A bone mass density test is recommended when a woman turns 65 to screen for osteoporosis. This test is only recommended one time, as a screening. Some providers will use this same test as a disease monitoring tool if you already have osteoporosis. -All adults age 38-68 who are overweight should have a diabetes screening test once every three years.  
-Other screening tests and preventive services for persons with diabetes include: an eye exam to screen for diabetic retinopathy, a kidney function test, a foot exam, and stricter control over your cholesterol.  
-Cardiovascular screening for adults with routine risk involves an electrocardiogram (ECG) at intervals determined by your doctor.  
-Colorectal cancer screenings should be done for adults age 54-65 with no increased risk factors for colorectal cancer. There are a number of acceptable methods of screening for this type of cancer. Each test has its own benefits and drawbacks. Discuss with your doctor what is most appropriate for you during your annual wellness visit. The different tests include: colonoscopy (considered the best screening method), a fecal occult blood test, a fecal DNA test, and sigmoidoscopy. -Breast cancer screenings are recommended every other year for women of normal risk, age 54-69. 
-Cervical cancer screenings for women over age 72 are only recommended with certain risk factors.  
-All adults born between [facility] should be screened once for Hepatitis C. Here is a list of your current Health Maintenance items (your personalized list of preventive services) with a due date: 
Health Maintenance Due Topic Date Due  
 DTaP/Tdap/Td  (1 - Tdap) 08/28/1963  Shingles Vaccine (1 of 2) 08/28/1992  Glaucoma Screening   12/22/2017  Yearly Flu Vaccine (1) 09/01/2020  Cholesterol Test   09/27/2020 Medicare Wellness Visit, Female The best way to live healthy is to have a lifestyle where you eat a well-balanced diet, exercise regularly, limit alcohol use, and quit all forms of tobacco/nicotine, if applicable. Regular preventive services are another way to keep healthy. Preventive services (vaccines, screening tests, monitoring & exams) can help personalize your care plan, which helps you manage your own care. Screening tests can find health problems at the earliest stages, when they are easiest to treat. [facility] follows the current, evidence-based guidelines published by the [facility] ([facility]) when recommending preventive services for our patients. Because we follow these guidelines, sometimes recommendations change over time as research supports it. (For example, mammograms used to be recommended annually. Even though Medicare will still pay for an annual mammogram, the newer guidelines recommend a mammogram every two years for women of average risk). Of course, you and your doctor may decide to screen more often for some diseases, based on your risk and your co-morbidities (chronic disease you are already diagnosed with). Preventive services for you include: - Medicare offers their members a free annual wellness visit, which is time for you and your primary care provider to discuss and plan for your preventive service needs. Take advantage of this benefit every year! 
-All adults over the age of 72 should receive the recommended pneumonia vaccines. Current USPSTF guidelines recommend a series of two vaccines for the best pneumonia protection.  
-All adults should have a flu vaccine yearly and a tetanus vaccine every 10 years.  
-All adults age 48 and older should receive the shingles vaccines (series of two vaccines).      
-All adults age 38-68 who are overweight should have a diabetes screening test once every three years.  
-All adults born between [birth year] and 1963 should be screened once for Hepatitis C. 
-Other screening tests and preventive services for persons with diabetes include: an eye exam to screen for diabetic retinopathy, a kidney function test, a foot exam, and stricter control over your cholesterol.  
-Cardiovascular screening for adults with routine risk involves an electrocardiogram (ECG) at intervals determined by your doctor.  
-Colorectal cancer screenings should be done for adults age 54-65 with no increased risk factors for colorectal cancer. There are a number of acceptable methods of screening for this type of cancer. Each test has its own benefits and drawbacks. Discuss with your doctor what is most appropriate for you during your annual wellness visit. The different tests include: colonoscopy (considered the best screening method), a fecal occult blood test, a fecal DNA test, and sigmoidoscopy. 
 
-A bone mass density test is recommended when a woman turns 65 to screen for osteoporosis. This test is only recommended one time, as a screening. Some providers will use this same test as a disease monitoring tool if you already have osteoporosis. -Breast cancer screenings are recommended every other year for women of normal risk, age 54-69. 
-Cervical cancer screenings for women over age 72 are only recommended with certain risk factors.

## 2020-11-18 NOTE — PROGRESS NOTES
This is the Subsequent Medicare Annual Wellness Exam, performed 12 months or more after the Initial AWV or the last Subsequent AWV    I have reviewed the patient's medical history in detail and updated the computerized patient record. Depression Risk Factor Screening:     3 most recent PHQ Screens 11/18/2020   PHQ Not Done -   Little interest or pleasure in doing things Not at all   Feeling down, depressed, irritable, or hopeless Not at all   Total Score PHQ 2 0       Alcohol Risk Screen   Do you average more than 1 drink per night or more than 7 drinks a week:  No    On any one occasion in the past three months have you have had more than 3 drinks containing alcohol:  No        Functional Ability and Level of Safety:   Hearing: Hearing is good. Activities of Daily Living: The home contains: no safety equipment. Patient does total self care     Ambulation: with no difficulty     Fall Risk:  Fall Risk Assessment, last 12 mths 11/18/2020   Able to walk? Yes   Fall in past 12 months? No   Fall with injury? -   Number of falls in past 12 months -   Fall Risk Score -     Abuse Screen:  Patient is not abused       Cognitive Screening   Has your family/caregiver stated any concerns about your memory: no     Cognitive Screening: Normal - Verbal Fluency Test    Assessment/Plan   Education and counseling provided:  Are appropriate based on today's review and evaluation    Diagnoses and all orders for this visit:    1. Pure hypercholesterolemia  -     LIPID PANEL; Future  -     METABOLIC PANEL, COMPREHENSIVE; Future    2. Screening for depression  -     DEPRESSION SCREEN ANNUAL    3. Medicare annual wellness visit, subsequent    4. Cerebrovascular accident (CVA), unspecified mechanism ([facility] [address]    5. Vitamin D deficiency  -     VITAMIN D, 25 HYDROXY; Future    6. Thyroid nodule  -     TSH 3RD GENERATION; Future    7. Vitamin B12 deficiency  -     VITAMIN B12; Future    8.  Needs flu shot  -     FLU (FLUAD QUAD INFLUENZA VACCINE,QUAD,ADJUVANTED)        Health Maintenance Due     Health Maintenance Due   Topic Date Due    DTaP/Tdap/Td series (1 - Tdap) 08/28/1963    Shingrix Vaccine Age 50> (1 of 2) 08/28/1992    GLAUCOMA SCREENING Q2Y  12/22/2017    Lipid Screen  09/27/2020       Patient Care Team   Patient Care Team:  Demetra Bolanos MD as PCP - General (Internal Medicine)  Demetra Bolanos MD as PCP - [facility] Empaneled Provider  Radha Contreras MD (Family Medicine)  Ana Miller, [address] (Optometry)  Philly Guillaume MD (Inactive) as Consulting Provider (Internal Medicine)    History     Patient Active Problem List   Diagnosis Code    Gastroesophageal reflux disease without esophagitis K21.9    Multiple thyroid nodules E04.2    Hyperlipidemia E78.5    TIA (transient ischemic attack) G45.9     Past Medical History:   Diagnosis Date    Acid reflux     Endocrine disease     thyroid nodules    Thyroid disease     thyroid nodules      Past Surgical History:   Procedure Laterality Date    HX GYN  1996    hysterectomy ? BSO    HX ORTHOPAEDIC      back     Current Outpatient Medications   Medication Sig Dispense Refill    pravastatin (PRAVACHOL) 80 mg tablet Take 1 tablet by mouth once daily 90 Tab 0    aspirin 81 mg chewable tablet Take 1 Tab by mouth daily. 30 Tab 0    cholecalciferol (VITAMIN D3) 1,000 unit tablet Take  by mouth daily.  esomeprazole (NEXIUM) 40 mg capsule Take 40 mg by mouth daily.        Allergies   Allergen Reactions    Iodine Anaphylaxis    Amoxicillin Other (comments)     \"It makes my heart race\"    Macrobid [Nitrofurantoin Monohyd/M-Cryst] Rash    Oxycodone Nausea Only       Family History   Problem Relation Age of Onset    Cancer Mother         lymphoma    Diabetes Paternal Grandmother     Heart Disease Sister     Heart Disease Son     No Known Problems Son     No Known Problems Daughter     No Known Problems Daughter     Heart Disease Maternal Grandfather      Social History Tobacco Use    Smoking status: Former Smoker     Packs/day: 1.00     Years: 20.00     Pack years: 20.00     Types: Cigarettes     Last attempt to quit: 1986     Years since quittin.9    Smokeless tobacco: Never Used   Substance Use Topics    Alcohol use:  No

## 2020-11-19 ENCOUNTER — HOSPITAL ENCOUNTER (OUTPATIENT)
Dept: LAB | Age: 80
Discharge: HOME | End: 2020-11-19
Payer: MEDICARE

## 2020-11-19 PROCEDURE — 82306 VITAMIN D 25 HYDROXY: CPT

## 2020-11-19 PROCEDURE — 82607 VITAMIN B-12: CPT

## 2020-11-19 PROCEDURE — 80053 COMPREHEN METABOLIC PANEL: CPT

## 2020-11-19 PROCEDURE — 80061 LIPID PANEL: CPT

## 2020-11-19 PROCEDURE — 84443 ASSAY THYROID STIM HORMONE: CPT

## 2020-11-19 PROCEDURE — 36415 COLL VENOUS BLD VENIPUNCTURE: CPT

## 2020-11-20 LAB
25(OH)D3+25(OH)D2 SERPL-MCNC: 39.4 NG/ML (ref 30–100)
ALBUMIN SERPL-MCNC: 3.9 G/DL (ref 3.7–4.7)
ALBUMIN/GLOB SERPL: 1.6 {RATIO} (ref 1.2–2.2)
ALP SERPL-CCNC: 93 IU/L (ref 39–117)
ALT SERPL-CCNC: 11 IU/L (ref 0–32)
AST SERPL-CCNC: 14 IU/L (ref 0–40)
BILIRUB SERPL-MCNC: 0.6 MG/DL (ref 0–1.2)
BUN SERPL-MCNC: 16 MG/DL (ref 8–27)
BUN/CREAT SERPL: 21 (ref 12–28)
CALCIUM SERPL-MCNC: 9.2 MG/DL (ref 8.7–10.3)
CHLORIDE SERPL-SCNC: 105 MMOL/L (ref 96–106)
CHOLEST SERPL-MCNC: 204 MG/DL (ref 100–199)
CO2 SERPL-SCNC: 27 MMOL/L (ref 20–29)
CREAT SERPL-MCNC: 0.75 MG/DL (ref 0.57–1)
GLOBULIN SER CALC-MCNC: 2.4 G/DL (ref 1.5–4.5)
GLUCOSE SERPL-MCNC: 95 MG/DL (ref 65–99)
HDLC SERPL-MCNC: 60 MG/DL
LDLC SERPL CALC-MCNC: 127 MG/DL (ref 0–99)
POTASSIUM SERPL-SCNC: 4.3 MMOL/L (ref 3.5–5.2)
PROT SERPL-MCNC: 6.3 G/DL (ref 6–8.5)
SODIUM SERPL-SCNC: 144 MMOL/L (ref 134–144)
TRIGL SERPL-MCNC: 96 MG/DL (ref 0–149)
TSH SERPL DL<=0.005 MIU/L-ACNC: 2.18 UIU/ML (ref 0.45–4.5)
VIT B12 SERPL-MCNC: >2000 PG/ML (ref 232–1245)
VLDLC SERPL CALC-MCNC: 17 MG/DL (ref 5–40)

## 2020-12-23 RX ORDER — PRAVASTATIN SODIUM 80 MG/1
TABLET ORAL
Qty: 90 TAB | Refills: 0 | Status: SHIPPED | OUTPATIENT
Start: 2020-12-23 | End: 2021-03-24

## 2021-02-16 ENCOUNTER — TELEPHONE (OUTPATIENT)
Dept: INTERNAL MEDICINE CLINIC | Age: 81
End: 2021-02-16

## 2021-02-16 NOTE — TELEPHONE ENCOUNTER
#[phone] Mirna states that pt is going to need a BD power wick from [employer]. Their #[phone]    Roz Dc will be sending over an order for this and please be on the look out for.

## 2021-02-16 NOTE — TELEPHONE ENCOUNTER
----- Message from Pam Lisa sent at 2/16/2021 12:40 PM EST -----  Regarding: Dr. Salley Buerger: [phone]  General Message/Vendor Calls    Caller's first and last name: Mindy with [employer]      Reason for call:  In regard to PT (details not provided)      Callback required yes/no and why: yes/discuss PT      Best contact number(s): [phone]      Message from [facility]

## 2021-02-17 ENCOUNTER — TELEPHONE (OUTPATIENT)
Dept: INTERNAL MEDICINE CLINIC | Age: 81
End: 2021-02-17

## 2021-02-17 NOTE — TELEPHONE ENCOUNTER
Please clarify with family. Received request for medical supplies: Urine collection suction pump, female catheter and urine collection canister. I do not have any documentation of her urinary issues. Is she followed by a urologist?  We will need to provide notes regarding who initially ordered this.

## 2021-02-19 NOTE — TELEPHONE ENCOUNTER
----- Message from Greg Millre sent at 2/18/2021 10:53 AM EST -----  Regarding: Dr. Nicole Bartholomew Message/Vendor Calls    Caller's first and last name:Channel with [address]       Reason for call:Authorization for catheter      Callback required yes/no and why: Yes, to obtain authorization       Best contact number(s):[phone]      Details to clarify the request:      Message from [facility]

## 2021-02-19 NOTE — TELEPHONE ENCOUNTER
Attempted to return call to Mindy. Held for 10 minutes and noone ever came to the phone  Will try again later.

## 2021-02-22 NOTE — TELEPHONE ENCOUNTER
[address] [place] [address]  [phone]    Regarding:   Alan Marin (incontinence supply)    Calling to confirm receipt of  Fax that was sent on the 16th. Please call to confirm.

## 2021-02-23 ENCOUNTER — TELEPHONE (OUTPATIENT)
Dept: INTERNAL MEDICINE CLINIC | Age: 81
End: 2021-02-23

## 2021-02-23 NOTE — TELEPHONE ENCOUNTER
General Message/Vendor Calls     Caller's first and last name: Kira Olguin        Reason for call: Incontinence supply approval request       Callback required yes/no and why: yes for approval       Best contact number(s):[phone]       Details to clarify the request: Mr Holly Ruiz is requesting an approval for a BD Pure wik/Blue wik to be called to [employer] [id number].        Angelina Santacruz

## 2021-02-23 NOTE — TELEPHONE ENCOUNTER
Florencia Fothergill, MD Darleene Ingle, MIRIAM   Caller: Unspecified (5 days ago,  9:10 PM)             Urologist office will need to order this.  If we received the paperwork we can forwarded to her urologist     Noted, waiting on paperwork

## 2021-02-23 NOTE — TELEPHONE ENCOUNTER
----- Message from Izzy Ocnonor sent at 2/23/2021  1:52 PM EST -----  Regarding: Dr. Vaughan Rinne first and last name: Gaston Morales  Reason for call: fax confirmation  Callback required yes/no and why: yes. To confirm  Best contact number(s): [phone]  Details to clarify the request: has the form and faxed been completed?  Fax [phone]  [phone] [facility] [employer] is needing a form for \"BD power wick/Power blue\" has not received it    Message from [facility]

## 2021-02-23 NOTE — TELEPHONE ENCOUNTER
Attempted to contact [employer] again. Answering service for the company answered and stated that they could not help me that I would just have to continue to try to call back. Contacted patient for more information. Patient states that she has issues with urinary incontinence and is trying to get the pure wick system for her home. Patient states that she had been seeing a urologist but gave up because she could never get anyone to call her back or never could get an appointment  Patient advised that I have tried to contact [employer] on multiple occassions and have been unsuccessful in getting through. Patient states that she will have her  try to reach out to the company and let them know that they need to refax paperwork. Patient advised that once the paperwork is received, I will place in Dr. Sally Cantu office to be completed. Pt verbalized understanding of information discussed w/ no further questions at this time.

## 2021-03-05 ENCOUNTER — TELEPHONE (OUTPATIENT)
Dept: INTERNAL MEDICINE CLINIC | Age: 81
End: 2021-03-05

## 2021-03-05 RX ORDER — SULFAMETHOXAZOLE AND TRIMETHOPRIM 800; 160 MG/1; MG/1
1 TABLET ORAL 2 TIMES DAILY
Qty: 14 TAB | Refills: 0 | Status: SHIPPED | OUTPATIENT
Start: 2021-03-05 | End: 2021-05-18 | Stop reason: ALTCHOICE

## 2021-03-05 NOTE — TELEPHONE ENCOUNTER
Antibiotic sent to the pharmacy for possible UTI. If not improving the patient will need to be seen.

## 2021-03-05 NOTE — TELEPHONE ENCOUNTER
Pt states she thinks she has a bladder infection coming on. She states symptoms:   urinary burning and frequency. Please call in prescription per patient.     Please send to :    [facility] [id number]

## 2021-03-05 NOTE — TELEPHONE ENCOUNTER
Spoke to patient, she declined to come into office due to [place]. She stated she would like something sent to pharmacy instead.

## 2021-03-05 NOTE — TELEPHONE ENCOUNTER
Spoke with patient. Two pt identifiers confirmed. Patient advised per Dr. Maximiliano Marcum that a prescription for an antibiotic has been sent to her pharmacy on file. Patient advised that if her symptoms do not improve she will need to be seen. Pt verbalized understanding of information discussed w/ no further questions at this time.

## 2021-03-23 ENCOUNTER — HOSPITAL ENCOUNTER (OUTPATIENT)
Dept: GENERAL RADIOLOGY | Age: 81
Discharge: HOME | End: 2021-03-23
Payer: MEDICARE

## 2021-03-23 ENCOUNTER — TRANSCRIBE ORDER (OUTPATIENT)
Dept: REGISTRATION | Age: 81
End: 2021-03-23

## 2021-03-23 DIAGNOSIS — M19.012 ARTHRITIS OF LEFT SHOULDER REGION: ICD-10-CM

## 2021-03-23 DIAGNOSIS — M79.641 RIGHT HAND PAIN: ICD-10-CM

## 2021-03-23 DIAGNOSIS — M19.011 ARTHRITIS OF RIGHT SHOULDER REGION: Primary | ICD-10-CM

## 2021-03-23 DIAGNOSIS — M19.011 ARTHRITIS OF RIGHT SHOULDER REGION: ICD-10-CM

## 2021-03-23 PROCEDURE — 73130 X-RAY EXAM OF HAND: CPT

## 2021-03-23 PROCEDURE — 73030 X-RAY EXAM OF SHOULDER: CPT

## 2021-03-24 RX ORDER — PRAVASTATIN SODIUM 80 MG/1
TABLET ORAL
Qty: 90 TAB | Refills: 0 | Status: SHIPPED | OUTPATIENT
Start: 2021-03-24 | End: 2021-05-18 | Stop reason: SDUPTHER

## 2021-04-05 RX ORDER — MELOXICAM 7.5 MG/1
TABLET ORAL
Qty: 30 TAB | Refills: 0 | Status: SHIPPED | OUTPATIENT
Start: 2021-04-05 | End: 2021-05-09

## 2021-05-18 ENCOUNTER — VIRTUAL VISIT (OUTPATIENT)
Dept: INTERNAL MEDICINE CLINIC | Age: 81
End: 2021-05-18
Payer: MEDICARE

## 2021-05-18 DIAGNOSIS — Z78.0 POSTMENOPAUSAL: ICD-10-CM

## 2021-05-18 DIAGNOSIS — Z13.820 SCREENING FOR OSTEOPOROSIS: ICD-10-CM

## 2021-05-18 DIAGNOSIS — E78.49 OTHER HYPERLIPIDEMIA: Primary | ICD-10-CM

## 2021-05-18 DIAGNOSIS — K21.9 GASTROESOPHAGEAL REFLUX DISEASE WITHOUT ESOPHAGITIS: ICD-10-CM

## 2021-05-18 PROCEDURE — 99442 PR PHYS/QHP TELEPHONE EVALUATION 11-20 MIN: CPT | Performed by: FAMILY MEDICINE

## 2021-05-18 RX ORDER — PRAVASTATIN SODIUM 80 MG/1
TABLET ORAL
Qty: 90 TAB | Refills: 1 | Status: SHIPPED | OUTPATIENT
Start: 2021-05-18 | End: 2022-04-25 | Stop reason: SDUPTHER

## 2021-05-18 NOTE — PROGRESS NOTES
Terrie Hawkins is a 68 y.o. female who presents for follow-up. Patient was last seen November 2020. Had left shoulder pain, saw ortho, had injection, 50% better. Taking mobic 7.5mg daily. Taking tylenol 500mg. Has some joint pain. Both knees and hands. Less active now. Prior CVA in 2019 with left sided weakness and speech changes. Some residual.     Taking pravastatin 80mg once a day. LDL at goal in 2020. No myalgias.        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  no neuropathic pain. Saw Dr. Himanshu Potter, vascular, negative evaluation.        Prior KORY. Some stress and urge incontinence. Refused mammogram     Normal bowel movements, prior colonoscopy.      On nexium for GERD. Tried to stop it and symptoms recurred.          This is an established visit conducted via telemedicine, by phone. The patient has been instructed that this meets HIPAA criteria and acknowledges and agrees to this method of visitation. Pursuant to the emergency declaration under the [facility], [facility] waiver authority and the [employer] and [employer] Act, this Virtual Visit was conducted, with patient's consent, to reduce the patient's risk of exposure to COVID-19 and provide continuity of care for an established patient. Services were provided by phone to substitute for in-person clinic visit.        Past Medical History:   Diagnosis Date    Acid reflux     Endocrine disease     thyroid nodules    Thyroid disease     thyroid nodules       Family History   Problem Relation Age of Onset    Cancer Mother         lymphoma    Diabetes Paternal Grandmother     Heart Disease Sister     Heart Disease Son     No Known Problems Son     No Known Problems Daughter     No Known Problems Daughter     Heart Disease Maternal Grandfather        Social History     Socioeconomic History    Marital status:      Spouse name: Not on file    Number of children: Not on file    Years of education: Not on file    Highest education level: Not on file   Occupational History    Not on file   Social Needs    Financial resource strain: Not on file    Food insecurity     Worry: Not on file     Inability: Not on file    Transportation needs     Medical: Not on file     Non-medical: Not on file   Tobacco Use    Smoking status: Former Smoker     Packs/day: 1.00     Years: 20.00     Pack years: 20.00     Types: Cigarettes     Quit date: 1986     Years since quittin.4    Smokeless tobacco: Never Used   Substance and Sexual Activity    Alcohol use: No    Drug use: No     Comment: CBD oil    Sexual activity: Not Currently     Partners: Male     Birth control/protection: None   Lifestyle    Physical activity     Days per week: Not on file     Minutes per session: Not on file    Stress: Not on file   Relationships    Social connections     Talks on phone: Not on file     Gets together: Not on file     Attends Christian service: Not on file     Active member of club or organization: Not on file     Attends meetings of clubs or organizations: Not on file     Relationship status: Not on file    Intimate partner violence     Fear of current or ex partner: Not on file     Emotionally abused: Not on file     Physically abused: Not on file     Forced sexual activity: Not on file   Other Topics Concern    Not on file   Social History Narrative    Not on file       Current Outpatient Medications on File Prior to Visit   Medication Sig Dispense Refill    meloxicam (MOBIC) 7.5 mg tablet Take 1 tablet by mouth once daily 30 Tab 5    aspirin 81 mg chewable tablet Take 1 Tab by mouth daily. 30 Tab 0    cholecalciferol (VITAMIN D3) 1,000 unit tablet Take  by mouth daily.  esomeprazole (NEXIUM) 40 mg capsule Take 40 mg by mouth daily.       [DISCONTINUED] pravastatin (PRAVACHOL) 80 mg tablet Take 1 tablet by mouth once daily 90 Tab 0    [DISCONTINUED] trimethoprim-sulfamethoxazole (BACTRIM DS, SEPTRA DS) 160-800 mg per tablet Take 1 Tab by mouth two (2) times a day. 14 Tab 0     No current facility-administered medications on file prior to visit. Review of Systems  Pertinent items are noted in HPI. Objective:     Gen: healthy sounding female  HEENT: no audible congestion, patient does not see oral erythema and sees MMM  Neck: patient does not feel enlarged or tender LAD or masses  Resp: normal respiratory effort, no audible wheezing. CV: patient does not feel palpitations or heart irregularity  Abd: patient does not feel abdominal tenderness or mass, patient does not notice distension  Extrem: patient does not see swelling in ankles or joints. Neuro: Alert and oriented, able to answer questions without difficulty, patient reports able to move all extremities and walk normally        Assessment/Plan:       ICD-10-CM ICD-9-CM    1. Other hyperlipidemia  E78.49 272.4 pravastatin (PRAVACHOL) 80 mg tablet   2. Postmenopausal  Z78.0 V49.81 DEXA BONE DENSITY STUDY AXIAL   3. Screening for osteoporosis  Z13.820 V82.81 DEXA BONE DENSITY STUDY AXIAL   4. Gastroesophageal reflux disease without esophagitis  K21.9 530.81        This was a telemedicine visit by phone, duration 12 minutes. Anthony Jose MD    Follow-up and Dispositions    · Return in about 6 months (around 11/18/2021) for medicare wellness/fasting labs. Jaren Parker

## 2021-05-21 ENCOUNTER — TRANSCRIBE ORDER (OUTPATIENT)
Dept: SCHEDULING | Age: 81
End: 2021-05-21

## 2021-05-21 DIAGNOSIS — M85.80 OSTEOPENIA: Primary | ICD-10-CM

## 2021-05-21 DIAGNOSIS — Z78.0 POST-MENOPAUSAL: ICD-10-CM

## 2021-06-03 ENCOUNTER — TELEPHONE (OUTPATIENT)
Dept: INTERNAL MEDICINE CLINIC | Age: 81
End: 2021-06-03

## 2021-06-03 RX ORDER — SULFAMETHOXAZOLE AND TRIMETHOPRIM 800; 160 MG/1; MG/1
1 TABLET ORAL 2 TIMES DAILY
Qty: 14 TABLET | Refills: 0 | Status: SHIPPED | OUTPATIENT
Start: 2021-06-03 | End: 2021-07-30 | Stop reason: SDUPTHER

## 2021-06-03 NOTE — TELEPHONE ENCOUNTER
Patient c/o UTI at this time and is requesting script for antibiotics to be sent to [facility] at this time

## 2021-06-03 NOTE — TELEPHONE ENCOUNTER
#[phone] pt states she has a UTI and needs medication called into [facility] on file. Call pt with any questions.

## 2021-06-04 NOTE — TELEPHONE ENCOUNTER
Called, spoke to pt. Two pt identifiers confirmed. Patient informed per . See message below. Antibiotic sent to the pharmacy. Please advise patient to follow-up if unimproved. Pt verbalized understanding of information discussed w/ no further questions at this time.

## 2021-06-07 ENCOUNTER — HOSPITAL ENCOUNTER (OUTPATIENT)
Dept: MAMMOGRAPHY | Age: 81
Discharge: HOME | End: 2021-06-07
Attending: PHYSICAL MEDICINE & REHABILITATION
Payer: MEDICARE

## 2021-06-07 DIAGNOSIS — Z78.0 POST-MENOPAUSAL: ICD-10-CM

## 2021-06-07 DIAGNOSIS — M85.80 OSTEOPENIA: ICD-10-CM

## 2021-06-07 PROCEDURE — 77080 DXA BONE DENSITY AXIAL: CPT

## 2021-06-23 ENCOUNTER — TELEPHONE (OUTPATIENT)
Dept: INTERNAL MEDICINE CLINIC | Age: 81
End: 2021-06-23

## 2021-06-23 NOTE — TELEPHONE ENCOUNTER
----- Message from Mildred Morocho sent at 6/23/2021  3:25 PM EDT -----  Regarding: Dr. Wendy Mercado Message/Vendor Calls    Caller's first and last name: Janell Pettit, spouse      Reason for call: Wants bone density test results.        Callback required yes/no and why: yes, to discuss       Best contact number(s): [phone]      Message from [facility]

## 2021-06-24 NOTE — TELEPHONE ENCOUNTER
Called out and spoke to pt. Two pt identifiers confirmed. Informed pt of Dr. Ryan Young message and p[t wanted to look into Prolia before starting. Pt verbalized understanding of information discussed w/ no further questions at this time.

## 2021-07-28 ENCOUNTER — TELEPHONE (OUTPATIENT)
Dept: INTERNAL MEDICINE CLINIC | Age: 81
End: 2021-07-28

## 2021-07-28 NOTE — TELEPHONE ENCOUNTER
----- Message from Josh Lee sent at 2021  2:07 PM EDT -----  Regarding: Dr Carlos Hodge (if not patient):pt      Relationship of caller (if not patient):pt      Best contact number(s):[phone]      Name of medication and dosage if known: Rx for Bladder infection      Is patient out of this medication (yes/no):yes      Pharmacy name:[facility] [address] [place] listed in chart? (yes/no):yes  Pharmacy phone [email or web address]      Details to clarify the request:Pt is requesting a Rx for Bladder infection to be called to the pharmacy on file.       Message from [facility]

## 2021-07-28 NOTE — TELEPHONE ENCOUNTER
Offered patient a nurse visit with Dr. Beverley Mc on 7/30/21. Patient declined and stated that she is unable to come into the office at this time due to a recent stroke. Patient requested a phone call visit with Dr. Beverley Mc and has been scheduled on 7/30/21.

## 2021-07-30 ENCOUNTER — VIRTUAL VISIT (OUTPATIENT)
Dept: INTERNAL MEDICINE CLINIC | Age: 81
End: 2021-07-30
Payer: MEDICARE

## 2021-07-30 DIAGNOSIS — N30.00 ACUTE CYSTITIS WITHOUT HEMATURIA: Primary | ICD-10-CM

## 2021-07-30 PROCEDURE — 99441 PR PHYS/QHP TELEPHONE EVALUATION 5-10 MIN: CPT | Performed by: FAMILY MEDICINE

## 2021-07-30 RX ORDER — SULFAMETHOXAZOLE AND TRIMETHOPRIM 800; 160 MG/1; MG/1
1 TABLET ORAL 2 TIMES DAILY
Qty: 14 TABLET | Refills: 0 | Status: SHIPPED | OUTPATIENT
Start: 2021-07-30 | End: 2022-01-02 | Stop reason: SDUPTHER

## 2021-07-30 NOTE — PROGRESS NOTES
Ana Rosa Xiong is a 68 y.o. female who presents with concerns of UTI. The patient has had 3 days of urinary frequency, urgency, and burning. No fevers or chills. No nausea /vomiting. No confusion. This is an established visit conducted via telemedicine, by phone. The patient has been instructed that this meets HIPAA criteria and acknowledges and agrees to this method of visitation. Pursuant to the emergency declaration under the [address] [place] [address] authority and the [employer] and [employer] Act, this Virtual Visit was conducted, with patient's consent, to reduce the patient's risk of exposure to COVID-19 and provide continuity of care for an established patient. Services were provided by phone to substitute for in-person clinic visit.        Past Medical History:   Diagnosis Date    Acid reflux     Endocrine disease     thyroid nodules    Thyroid disease     thyroid nodules       Family History   Problem Relation Age of Onset    Cancer Mother         lymphoma    Diabetes Paternal Grandmother     Heart Disease Sister     Heart Disease Son     No Known Problems Son     No Known Problems Daughter     No Known Problems Daughter     Heart Disease Maternal Grandfather        Social History     Socioeconomic History    Marital status:      Spouse name: Not on file    Number of children: Not on file    Years of education: Not on file    Highest education level: Not on file   Occupational History    Not on file   Tobacco Use    Smoking status: Former Smoker     Packs/day: 1.00     Years: 20.00     Pack years: 20.00     Types: Cigarettes     Quit date: 1986     Years since quittin.6    Smokeless tobacco: Never Used   Substance and Sexual Activity    Alcohol use: No    Drug use: No     Comment: CBD oil    Sexual activity: Not Currently     Partners: Male     Birth control/protection: None Other Topics Concern    Not on file   Social History Narrative    Not on file     Social Determinants of Health     Financial Resource Strain:     Difficulty of Paying Living Expenses:    Food Insecurity:     Worried About Running Out of Food in the Last Year:     [address] in the Last Year:    Transportation Needs:     Lack of Transportation (Medical):  Lack of Transportation (Non-Medical):    Physical Activity:     Days of Exercise per Week:     Minutes of Exercise per Session:    Stress:     Feeling of Stress :    Social Connections:     Frequency of Communication with Friends and Family:     Frequency of Social Gatherings with Friends and Family:     Attends [employer] Services:     Active Member of Clubs or Organizations:     Attends Club or Organization Meetings:     Marital Status:    Intimate Partner Violence:     Fear of Current or Ex-Partner:     Emotionally Abused:     Physically Abused:     Sexually Abused:        Current Outpatient Medications on File Prior to Visit   Medication Sig Dispense Refill    pravastatin (PRAVACHOL) 80 mg tablet Take 1 tablet by mouth once daily 90 Tab 1    meloxicam (MOBIC) 7.5 mg tablet Take 1 tablet by mouth once daily 30 Tab 5    aspirin 81 mg chewable tablet Take 1 Tab by mouth daily. 30 Tab 0    cholecalciferol (VITAMIN D3) 1,000 unit tablet Take  by mouth daily.  esomeprazole (NEXIUM) 40 mg capsule Take 40 mg by mouth daily.  [DISCONTINUED] trimethoprim-sulfamethoxazole (BACTRIM DS, SEPTRA DS) 160-800 mg per tablet Take 1 Tablet by mouth two (2) times a day. (Patient not taking: Reported on 7/30/2021) 14 Tablet 0     No current facility-administered medications on file prior to visit. Review of Systems  Pertinent items are noted in HPI.     Objective:     Gen: healthy sounding female  HEENT: no audible congestion, patient does not see oral erythema and sees MMM  Neck: patient does not feel enlarged or tender LAD or masses  Resp: normal respiratory effort, no audible wheezing. CV: patient does not feel palpitations or heart irregularity  Abd: patient does not feel abdominal tenderness or mass, patient does not notice distension  Extrem: patient does not see swelling in ankles or joints. Neuro: Alert and oriented, able to answer questions without difficulty, patient reports able to move all extremities and walk normally        Assessment/Plan:       ICD-10-CM ICD-9-CM    1. Acute cystitis without hematuria  N30.00 595.0 trimethoprim-sulfamethoxazole (BACTRIM DS, SEPTRA DS) 160-800 mg per tablet       This was a telemedicine visit by phone, duration  5  minutes. Alisa Bardales MD    Follow-up and Dispositions    · Return if symptoms worsen or fail to improve.

## 2021-12-03 ENCOUNTER — HOSPITAL ENCOUNTER (EMERGENCY)
Age: 81
Discharge: HOME | End: 2021-12-03
Attending: EMERGENCY MEDICINE
Payer: MEDICARE

## 2021-12-03 ENCOUNTER — APPOINTMENT (OUTPATIENT)
Dept: CT IMAGING | Age: 81
End: 2021-12-03
Attending: EMERGENCY MEDICINE
Payer: MEDICARE

## 2021-12-03 VITALS
BODY MASS INDEX: 22.73 KG/M2 | RESPIRATION RATE: 18 BRPM | HEIGHT: 68 IN | HEART RATE: 90 BPM | WEIGHT: 150 LBS | DIASTOLIC BLOOD PRESSURE: 94 MMHG | OXYGEN SATURATION: 96 % | SYSTOLIC BLOOD PRESSURE: 165 MMHG | TEMPERATURE: 98.1 F
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
## 2021-12-03 DIAGNOSIS — S22.32XA CLOSED FRACTURE OF ONE RIB OF LEFT SIDE, INITIAL ENCOUNTER: Primary | ICD-10-CM

## 2021-12-03 PROCEDURE — 74011250637 HC RX REV CODE- 250/637: Performed by: EMERGENCY MEDICINE

## 2021-12-03 PROCEDURE — 74176 CT ABD & PELVIS W/O CONTRAST: CPT

## 2021-12-03 PROCEDURE — 77030027138 HC INCENT SPIROMETER -A

## 2021-12-03 PROCEDURE — 71250 CT THORAX DX C-: CPT

## 2021-12-03 PROCEDURE — 99284 EMERGENCY DEPT VISIT MOD MDM: CPT

## 2021-12-03 RX ORDER — ACETAMINOPHEN 500 MG
1000 TABLET ORAL
Status: COMPLETED | OUTPATIENT
Start: 2021-12-03 | End: 2021-12-03

## 2021-12-03 RX ORDER — OXYCODONE HYDROCHLORIDE 5 MG/1
5 TABLET ORAL
Status: COMPLETED | OUTPATIENT
Start: 2021-12-03 | End: 2021-12-03

## 2021-12-03 RX ORDER — NAPROXEN 250 MG/1
500 TABLET ORAL
Status: COMPLETED | OUTPATIENT
Start: 2021-12-03 | End: 2021-12-03

## 2021-12-03 RX ORDER — OXYCODONE HYDROCHLORIDE 5 MG/1
5 TABLET ORAL
Qty: 12 TABLET | Refills: 0 | Status: SHIPPED | OUTPATIENT
Start: 2021-12-03 | End: 2021-12-06

## 2021-12-03 RX ADMIN — NAPROXEN 500 MG: 250 TABLET ORAL at 06:54

## 2021-12-03 RX ADMIN — OXYCODONE 5 MG: 5 TABLET ORAL at 07:31

## 2021-12-03 RX ADMIN — ACETAMINOPHEN 1000 MG: 500 TABLET ORAL at 06:54

## 2021-12-03 NOTE — ED NOTES
Wheelchair out to waiting room for ride home with . Reviewed DC papers including incentive spirometry instructions. DC papers in hand.

## 2021-12-03 NOTE — ED PROVIDER NOTES
EMERGENCY DEPARTMENT HISTORY AND PHYSICAL EXAM      Date: 12/3/2021  Patient Name: Dread Stauffer    History of Presenting Illness     Chief Complaint   Patient presents with   [facility] Fall     Patient fell in bedroom 2 nights ago and has been having back pain since    Back Pain       History Provided By: Patient and Patient's     HPI: Dread Stauffer, 80 y.o. female with history of stroke resulting in baseline left-sided weakness, osteopenia presents to the ED with cc of back pain and left flank pain ever since fall. This occurred 2 days ago at home. Patient was trying to put up drapes and when she felt off balance, she went to brace herself on a table and then slid off of it and fell onto her buttocks. She has had pain radiating from lumbar back up into the thoracic back ever since. She does have some pain that radiates into the left flank. Pain is worsened with any positional changes such as rolling over in bed or walking. She has been able to ambulate but with a very antalgic unsteady gait. She has been using meloxicam with minimal relief of symptoms. Denies any weakness or numbness that radiates into her lower extremities. Denies any saddle anesthesia or urinary or bowel incontinence or retention. Denies any other injury such as head injury, neck pain, chest pain, shortness of breath, abdominal pain, or hematuria. There are no other complaints, changes, or physical findings at this time. PCP: Nicole Lucas MD    No current facility-administered medications on file prior to encounter. Current Outpatient Medications on File Prior to Encounter   Medication Sig Dispense Refill    trimethoprim-sulfamethoxazole (BACTRIM DS, SEPTRA DS) 160-800 mg per tablet Take 1 Tablet by mouth two (2) times a day.  14 Tablet 0    pravastatin (PRAVACHOL) 80 mg tablet Take 1 tablet by mouth once daily 90 Tab 1    meloxicam (MOBIC) 7.5 mg tablet Take 1 tablet by mouth once daily 30 Tab 5    aspirin 81 mg chewable tablet Take 1 Tab by mouth daily. 30 Tab 0    cholecalciferol (VITAMIN D3) 1,000 unit tablet Take  by mouth daily.  esomeprazole (NEXIUM) 40 mg capsule Take 40 mg by mouth daily. Past History     Past Medical History:  Past Medical History:   Diagnosis Date    Acid reflux     Endocrine disease     thyroid nodules    Stroke ([address]     Thyroid disease     thyroid nodules       Past Surgical History:  Past Surgical History:   Procedure Laterality Date    HX GYN  1996    hysterectomy ? BSO    HX ORTHOPAEDIC      back       Family History:  Family History   Problem Relation Age of Onset    Cancer Mother         lymphoma    Diabetes Paternal Grandmother     Heart Disease Sister     Heart Disease Son     No Known Problems Son     No Known Problems Daughter     No Known Problems Daughter     Heart Disease Maternal Grandfather        Social History:  Social History     Tobacco Use    Smoking status: Former Smoker     Packs/day: 1.00     Years: 20.00     Pack years: 20.00     Types: Cigarettes     Quit date: 1986     Years since quittin.9    Smokeless tobacco: Never Used   Substance Use Topics    Alcohol use: No    Drug use: No     Comment: CBD oil       Allergies: Allergies   Allergen Reactions    Iodine Anaphylaxis    Amoxicillin Other (comments)     \"It makes my heart race\"    Macrobid [Nitrofurantoin Monohyd/M-Cryst] Rash    Oxycodone Nausea Only         Review of Systems   Review of Systems   Constitutional: Negative for chills and fever. Eyes: Negative for visual disturbance. Respiratory: Negative for cough and shortness of breath. Cardiovascular: Negative for chest pain. Gastrointestinal: Negative for abdominal pain, nausea and vomiting. Genitourinary: Negative. Musculoskeletal: Positive for back pain and gait problem. Negative for neck pain and neck stiffness. Skin: Negative for color change and rash.    Neurological: Negative for weakness, light-headedness, numbness and headaches. All other systems reviewed and are negative. Physical Exam   Physical Exam  Vitals and nursing note reviewed. Constitutional:       General: She is not in acute distress. Appearance: Normal appearance. She is not ill-appearing or toxic-appearing. Comments: Lying in bed in some pain but no acute distress. HENT:      Head: Normocephalic and atraumatic. Nose: Nose normal.      Mouth/Throat:      Mouth: Mucous membranes are moist.   Eyes:      Extraocular Movements: Extraocular movements intact. Pupils: Pupils are equal, round, and reactive to light. Neck:      Comments: Full range of motion of the neck without reproducible TTP  Cardiovascular:      Rate and Rhythm: Normal rate and regular rhythm. Heart sounds: No murmur heard. Pulmonary:      Effort: Pulmonary effort is normal. No respiratory distress. Breath sounds: Normal breath sounds. No wheezing. Abdominal:      General: There is no distension. Palpations: Abdomen is soft. Tenderness: There is no abdominal tenderness. There is no guarding or rebound. Musculoskeletal:         General: No swelling or tenderness. Normal range of motion. Cervical back: Normal range of motion and neck supple. No tenderness. Right lower leg: No edema. Left lower leg: No edema. Comments: Mild TTP located throughout entire lumbar back including paraspinal musculature and midline without any step-off or deformity. Patient tolerates full range of motion of bilateral lower extremities without hesitation but this does reproduce pain in the lumbar back. Pelvis without TTP. Skin:     General: Skin is warm and dry. Coloration: Skin is not pale. Findings: No bruising or erythema. Neurological:      General: No focal deficit present. Mental Status: She is alert and oriented to person, place, and time.       Comments: Motor 5/5, sensation grossly intact distally. Diagnostic Study Results     Labs -   No results found for this or any previous visit (from the past 12 hour(s)). Radiologic Studies -   CT CHEST WO CONT   Final Result   Probable acute nondisplaced fracture of the lateral left eighth rib. No other   acute abnormality in the chest, abdomen, or pelvis. CT ABD PELV WO CONT   Final Result   Probable acute nondisplaced fracture of the lateral left eighth rib. No other   acute abnormality in the chest, abdomen, or pelvis. CT Results  (Last 48 hours)               12/03/21 0705  CT CHEST WO CONT Final result    Impression:  Probable acute nondisplaced fracture of the lateral left eighth rib. No other   acute abnormality in the chest, abdomen, or pelvis. Narrative:  EXAM:  CT CHEST WO CONT, CT ABD PELV WO CONT       INDICATION: Fall with left rib, thoracic, and left flank pain. COMPARISON: Chest radiographs 5/11/2019. CT abdomen pelvis 1/11/2006. TECHNIQUE: Helical CT of the chest, abdomen  and pelvis without contrast.    Coronal and sagittal reformats are performed. CT dose reduction was achieved   through use of a standardized protocol tailored for this examination and   automatic exposure control for dose modulation. FINDINGS:    CT chest:     A multinodular thyroid gland is noted. The ascending aorta is dilated measuring   4.1 cm in diameter. There is aortic and coronary artery atherosclerosis. The   main pulmonary artery is normal in caliber. The heart size is normal.  There is   no pericardial or pleural effusion. There are no enlarged axillary, mediastinal, or hilar lymph nodes. There is no lung mass or airspace opacity. There is no pneumothorax. The   central airways are clear. There is a small hiatal hernia.        CT abdomen and pelvis:     A subcentimeter low-density liver lesion is too small to characterize but likely   represents a cyst. The spleen, pancreas, and adrenal glands are normal. The gall   bladder is present  without intra- or extra-hepatic biliary dilatation. The kidneys are symmetric without hydronephrosis. A left kidney cyst measures   2.4 cm. Bilateral peripelvic cysts are also noted. No follow-up recommended. There is a large amount of stool in the right hemicolon. There are no dilated   bowel loops. The appendix is normal.         There are no enlarged lymph nodes. There is no free fluid or free air. The   aorta tapers without aneurysm. There is aortoiliac atherosclerosis. The urinary bladder is normal.  There is no pelvic mass. The uterus is   surgically absent. There are probable acute nondisplaced fracture of the lateral left eighth rib. There is no aggressive bony lesion. 12/03/21 0705  CT ABD PELV WO CONT Final result    Impression:  Probable acute nondisplaced fracture of the lateral left eighth rib. No other   acute abnormality in the chest, abdomen, or pelvis. Narrative:  EXAM:  CT CHEST WO CONT, CT ABD PELV WO CONT       INDICATION: Fall with left rib, thoracic, and left flank pain. COMPARISON: Chest radiographs 5/11/2019. CT abdomen pelvis 1/11/2006. TECHNIQUE: Helical CT of the chest, abdomen  and pelvis without contrast.    Coronal and sagittal reformats are performed. CT dose reduction was achieved   through use of a standardized protocol tailored for this examination and   automatic exposure control for dose modulation. FINDINGS:    CT chest:     A multinodular thyroid gland is noted. The ascending aorta is dilated measuring   4.1 cm in diameter. There is aortic and coronary artery atherosclerosis. The   main pulmonary artery is normal in caliber. The heart size is normal.  There is   no pericardial or pleural effusion. There are no enlarged axillary, mediastinal, or hilar lymph nodes. There is no lung mass or airspace opacity. There is no pneumothorax.   The   central airways are clear. There is a small hiatal hernia. CT abdomen and pelvis:     A subcentimeter low-density liver lesion is too small to characterize but likely   represents a cyst. The spleen, pancreas, and adrenal glands are normal. The gall   bladder is present  without intra- or extra-hepatic biliary dilatation. The kidneys are symmetric without hydronephrosis. A left kidney cyst measures   2.4 cm. Bilateral peripelvic cysts are also noted. No follow-up recommended. There is a large amount of stool in the right hemicolon. There are no dilated   bowel loops. The appendix is normal.         There are no enlarged lymph nodes. There is no free fluid or free air. The   aorta tapers without aneurysm. There is aortoiliac atherosclerosis. The urinary bladder is normal.  There is no pelvic mass. The uterus is   surgically absent. There are probable acute nondisplaced fracture of the lateral left eighth rib. There is no aggressive bony lesion. CXR Results  (Last 48 hours)    None          Medical Decision Making   I am the first provider for this patient. I reviewed the vital signs, available nursing notes, past medical history, past surgical history, family history and social history. Vital Signs-Reviewed the patient's vital signs. Patient Vitals for the past 12 hrs:   Temp Pulse Resp BP SpO2   12/03/21 0800    (!) 165/94    12/03/21 0737  90  (!) 159/96    12/03/21 0625 98.1 °F (36.7 °C) (!) 105 18 (!) 130/90 96 %     Records Reviewed: Nursing Notes    Provider Notes (Medical Decision Making):   68-year-old female with osteoporosis presenting for persistent lumbar back pain after mechanical fall 2 days ago. Pain mostly located lumbar region with radiation up into thoracic region. Pain appears musculoskeletal mostly located paraspinal musculature but also some pain in the mid spine. No red flags concerning for cauda equina syndrome.   She has preserved motor and sensation and has no complaints of urinary or bowel incontinence or retention or saddle anesthesia. She denies any syncope or head injury. She has no other complaints today. Differential diagnosis includes lumbar and thoracic fracture, subluxation, ligament strain, musculoskeletal strain of lumbar thoracic region. Rib fracture, intra-abdominal trauma. Will evaluate with CT imaging of chest, abdomen pelvis, with T and L-spine imaging and reassess. CT imaging consistent with nondisplaced acute lateral eighth rib fracture consistent with patient's exam and the pain. Pain is well controlled in the ED. No hypoxia or increased work of breathing. Encouraged use of ICS and patient provided 1 in the ED. Encouraged to use this 10 times per hour and the importance of pain control to prevent atelectasis and pneumonia. Patient given strict return ED precautions, encouraged close PCP follow-up, and all questions answered. Agrees to plan as above. ED Course:   Initial assessment performed. The patients presenting problems have been discussed, and they are in agreement with the care plan formulated and outlined with them. I have encouraged them to ask questions as they arise throughout their visit. Discharge Note:  The patient has been re-evaluated and is ready for discharge. Reviewed available results with patient. Counseled patient on diagnosis and care plan. Patient has expressed understanding, and all questions have been answered. Patient agrees with plan and agrees to follow up as recommended, or to return to the ED if their symptoms worsen. Discharge instructions have been provided and explained to the patient, along with reasons to return to the ED. Disposition:  Discharge      DISCHARGE PLAN:  1.    Discharge Medication List as of 12/3/2021  8:18 AM      START taking these medications    Details   oxyCODONE IR (Roxicodone) 5 mg immediate release tablet Take 1 Tablet by mouth every six (6) hours as needed for Pain for up to 3 days. Max Daily Amount: 20 mg., Normal, Disp-12 Tablet, R-0         CONTINUE these medications which have NOT CHANGED    Details   trimethoprim-sulfamethoxazole (BACTRIM DS, SEPTRA DS) 160-800 mg per tablet Take 1 Tablet by mouth two (2) times a day., Normal, Disp-14 Tablet, R-0      pravastatin (PRAVACHOL) 80 mg tablet Take 1 tablet by mouth once daily, Normal, Disp-90 Tab, R-1HOLD UNTIL NEEDED. meloxicam (MOBIC) 7.5 mg tablet Take 1 tablet by mouth once daily, Normal, Disp-30 Tab, R-5      aspirin 81 mg chewable tablet Take 1 Tab by mouth daily. , Normal, Disp-30 Tab, R-0      cholecalciferol (VITAMIN D3) 1,000 unit tablet Take  by mouth daily. , Historical Med      esomeprazole (NEXIUM) 40 mg capsule Take 40 mg by mouth daily. , Historical Med           2. Follow-up Information     Follow up With Specialties Details Why Scot Muñoz MD Internal Medicine Schedule an appointment as soon as possible for a visit   [address] [place] [address]  [phone]      [facility] EMERGENCY DEPT Emergency Medicine Go to  As needed, If symptoms worsen [address] [id number] [phone]        3. Return to ED if worse     Diagnosis     Clinical Impression:   1. Closed fracture of one rib of left side, initial encounter    2. Fall, initial encounter        Attestations:  I am the first and primary provider of record for this patient's ED encounter. I personally performed the services described above in this documentation. Celai Walker MD    Please note that this dictation was completed with [employer], the [employer] voice recognition software. Quite often unanticipated grammatical, syntax, homophones, and other interpretive errors are inadvertently transcribed by the computer software. Please disregard these errors. Please excuse any errors that have escaped final proofreading. Thank you.

## 2021-12-03 NOTE — ED NOTES
Bedside shift change report given to Antonia Airta (oncoming nurse) by Braden Salmon (offgoing nurse). Report included the following information SBAR, ED Summary and MAR.

## 2021-12-03 NOTE — DISCHARGE INSTRUCTIONS
You were evaluated in the emergency department for fall with back pain and rib pain. Your examination was reassuring as was your work-up including CT scan of your chest, abdomen, pelvis, and thoracolumbar spine. You do have a cracked/fractured left eighth rib. This should heal on its own but it will be important for you to take deep breaths and use the incentive spirometer 10 times every hour. It will be important for you to follow-up with your primary care physician in 2-3 days. If you develop worsening symptoms such as chest pain, shortness of breath, worsening pain, or fevers, please return to the emergency department immediately. You can use Tylenol 1000 mg every 6 hours as needed for pain, please do not exceed 4000 mg in a single day. You can use meloxicam 7.5 mg once or twice a day as needed for pain. You can use oxycodone 5 mg tablets every 6 hours as needed for breakthrough pain. Please try using this only as nighttime as this can make you sleepy, drowsy, and can increase risk of falls. It was a pleasure taking care of you at [facility] Emergency Department today. We know that when you come to [address] [place] [address], you are entrusting us with your health, comfort, and safety. Our physicians and nurses honor that trust, and we truly appreciate the opportunity to care for you and your loved ones. We also value your feedback. If you receive a survey about your Emergency Department experience today, please fill it out. We care about our patients' feedback, and we listen to what you have to say. Thank you!

## 2021-12-03 NOTE — ED NOTES
Back from CT reporting that she is still having back pain, provided PRN analgesia.   at bedside, BR x2

## 2021-12-10 ENCOUNTER — APPOINTMENT (OUTPATIENT)
Dept: CT IMAGING | Age: 81
End: 2021-12-10
Attending: EMERGENCY MEDICINE
Payer: MEDICARE

## 2021-12-10 ENCOUNTER — APPOINTMENT (OUTPATIENT)
Dept: GENERAL RADIOLOGY | Age: 81
End: 2021-12-10
Attending: EMERGENCY MEDICINE
Payer: MEDICARE

## 2021-12-10 ENCOUNTER — HOSPITAL ENCOUNTER (EMERGENCY)
Age: 81
Discharge: HOME | End: 2021-12-10
Attending: EMERGENCY MEDICINE
Payer: MEDICARE

## 2021-12-10 VITALS
HEIGHT: 68 IN | DIASTOLIC BLOOD PRESSURE: 69 MMHG | HEART RATE: 93 BPM | BODY MASS INDEX: 22.73 KG/M2 | OXYGEN SATURATION: 100 % | SYSTOLIC BLOOD PRESSURE: 121 MMHG | RESPIRATION RATE: 18 BRPM | WEIGHT: 150 LBS | TEMPERATURE: 97.4 F

## 2021-12-10 DIAGNOSIS — K59.00 CONSTIPATION, UNSPECIFIED CONSTIPATION TYPE: ICD-10-CM

## 2021-12-10 DIAGNOSIS — S22.080A COMPRESSION FRACTURE OF T12 VERTEBRA, INITIAL ENCOUNTER (HCC): Primary | ICD-10-CM

## 2021-12-10 DIAGNOSIS — N20.0 BILATERAL KIDNEY STONES: ICD-10-CM

## 2021-12-10 DIAGNOSIS — R10.9 ABDOMINAL PAIN, ACUTE: ICD-10-CM

## 2021-12-10 LAB
ALBUMIN SERPL-MCNC: 3.5 G/DL (ref 3.5–5)
ALBUMIN/GLOB SERPL: 0.9 {RATIO} (ref 1.1–2.2)
ALP SERPL-CCNC: 91 U/L (ref 45–117)
ALT SERPL-CCNC: 19 U/L (ref 12–78)
ANION GAP SERPL CALC-SCNC: 6 MMOL/L (ref 5–15)
AST SERPL-CCNC: 13 U/L (ref 15–37)
BASOPHILS # BLD: 0 K/UL (ref 0–0.1)
BASOPHILS NFR BLD: 1 % (ref 0–1)
BILIRUB SERPL-MCNC: 0.6 MG/DL (ref 0.2–1)
BUN SERPL-MCNC: 11 MG/DL (ref 6–20)
BUN/CREAT SERPL: 14 (ref 12–20)
CALCIUM SERPL-MCNC: 9.7 MG/DL (ref 8.5–10.1)
CHLORIDE SERPL-SCNC: 107 MMOL/L (ref 97–108)
CO2 SERPL-SCNC: 27 MMOL/L (ref 21–32)
COMMENT, HOLDF: NORMAL
CREAT SERPL-MCNC: 0.77 MG/DL (ref 0.55–1.02)
DIFFERENTIAL METHOD BLD: ABNORMAL
EOSINOPHIL # BLD: 0.1 K/UL (ref 0–0.4)
EOSINOPHIL NFR BLD: 1 % (ref 0–7)
ERYTHROCYTE [DISTWIDTH] IN BLOOD BY AUTOMATED COUNT: 13 % (ref 11.5–14.5)
GLOBULIN SER CALC-MCNC: 3.9 G/DL (ref 2–4)
GLUCOSE SERPL-MCNC: 95 MG/DL (ref 65–100)
HCT VFR BLD AUTO: 47.5 % (ref 35–47)
HGB BLD-MCNC: 15.9 G/DL (ref 11.5–16)
IMM GRANULOCYTES # BLD AUTO: 0 K/UL (ref 0–0.04)
IMM GRANULOCYTES NFR BLD AUTO: 0 % (ref 0–0.5)
LIPASE SERPL-CCNC: 37 U/L (ref 73–393)
LYMPHOCYTES # BLD: 2 K/UL (ref 0.8–3.5)
LYMPHOCYTES NFR BLD: 28 % (ref 12–49)
MCH RBC QN AUTO: 29.9 PG (ref 26–34)
MCHC RBC AUTO-ENTMCNC: 33.5 G/DL (ref 30–36.5)
MCV RBC AUTO: 89.5 FL (ref 80–99)
MONOCYTES # BLD: 0.5 K/UL (ref 0–1)
MONOCYTES NFR BLD: 8 % (ref 5–13)
NEUTS SEG # BLD: 4.3 K/UL (ref 1.8–8)
NEUTS SEG NFR BLD: 62 % (ref 32–75)
NRBC # BLD: 0 K/UL (ref 0–0.01)
NRBC BLD-RTO: 0 PER 100 WBC
PLATELET # BLD AUTO: 242 K/UL (ref 150–400)
PMV BLD AUTO: 11.8 FL (ref 8.9–12.9)
POTASSIUM SERPL-SCNC: 3.9 MMOL/L (ref 3.5–5.1)
PROT SERPL-MCNC: 7.4 G/DL (ref 6.4–8.2)
RBC # BLD AUTO: 5.31 M/UL (ref 3.8–5.2)
SAMPLES BEING HELD,HOLD: PRESENT
SODIUM SERPL-SCNC: 140 MMOL/L (ref 136–145)
WBC # BLD AUTO: 7 K/UL (ref 3.6–11)

## 2021-12-10 PROCEDURE — 74011250636 HC RX REV CODE- 250/636: Performed by: EMERGENCY MEDICINE

## 2021-12-10 PROCEDURE — 99282 EMERGENCY DEPT VISIT SF MDM: CPT

## 2021-12-10 PROCEDURE — 85025 COMPLETE CBC W/AUTO DIFF WBC: CPT

## 2021-12-10 PROCEDURE — 36415 COLL VENOUS BLD VENIPUNCTURE: CPT

## 2021-12-10 PROCEDURE — 74176 CT ABD & PELVIS W/O CONTRAST: CPT

## 2021-12-10 PROCEDURE — 96375 TX/PRO/DX INJ NEW DRUG ADDON: CPT

## 2021-12-10 PROCEDURE — 96374 THER/PROPH/DIAG INJ IV PUSH: CPT

## 2021-12-10 PROCEDURE — 83690 ASSAY OF LIPASE: CPT

## 2021-12-10 PROCEDURE — 80053 COMPREHEN METABOLIC PANEL: CPT

## 2021-12-10 PROCEDURE — 71046 X-RAY EXAM CHEST 2 VIEWS: CPT

## 2021-12-10 PROCEDURE — 74011000250 HC RX REV CODE- 250: Performed by: EMERGENCY MEDICINE

## 2021-12-10 RX ORDER — ONDANSETRON 4 MG/1
4 TABLET, ORALLY DISINTEGRATING ORAL
Qty: 10 TABLET | Refills: 0 | Status: SHIPPED | OUTPATIENT
Start: 2021-12-10

## 2021-12-10 RX ORDER — FENTANYL CITRATE 50 UG/ML
25 INJECTION, SOLUTION INTRAMUSCULAR; INTRAVENOUS
Status: COMPLETED | OUTPATIENT
Start: 2021-12-10 | End: 2021-12-10

## 2021-12-10 RX ORDER — LIDOCAINE 4 G/100G
1 PATCH TOPICAL EVERY 24 HOURS
Status: DISCONTINUED | OUTPATIENT
Start: 2021-12-10 | End: 2021-12-10

## 2021-12-10 RX ORDER — ONDANSETRON 2 MG/ML
4 INJECTION INTRAMUSCULAR; INTRAVENOUS
Status: COMPLETED | OUTPATIENT
Start: 2021-12-10 | End: 2021-12-10

## 2021-12-10 RX ORDER — POLYETHYLENE GLYCOL 3350 17 G/17G
17 POWDER, FOR SOLUTION ORAL
Qty: 116 G | Refills: 0 | Status: SHIPPED | OUTPATIENT
Start: 2021-12-10 | End: 2022-04-25

## 2021-12-10 RX ORDER — METHOCARBAMOL 750 MG/1
750 TABLET, FILM COATED ORAL
Qty: 20 TABLET | Refills: 0 | Status: SHIPPED | OUTPATIENT
Start: 2021-12-10 | End: 2022-04-25

## 2021-12-10 RX ORDER — HYDROCODONE BITARTRATE AND ACETAMINOPHEN 5; 325 MG/1; MG/1
1 TABLET ORAL
Qty: 10 TABLET | Refills: 0 | Status: SHIPPED | OUTPATIENT
Start: 2021-12-10 | End: 2021-12-13

## 2021-12-10 RX ORDER — MAGNESIUM CITRATE
296 SOLUTION, ORAL ORAL
Status: DISCONTINUED | OUTPATIENT
Start: 2021-12-10 | End: 2021-12-10 | Stop reason: HOSPADM

## 2021-12-10 RX ADMIN — ONDANSETRON 4 MG: 2 INJECTION INTRAMUSCULAR; INTRAVENOUS at 12:21

## 2021-12-10 RX ADMIN — FENTANYL CITRATE 25 MCG: 50 INJECTION INTRAMUSCULAR; INTRAVENOUS at 12:23

## 2021-12-10 NOTE — ED PROVIDER NOTES
EMERGENCY DEPARTMENT HISTORY AND PHYSICAL EXAM      Date: 12/10/2021  Patient Name: Tammy Cruz    History of Presenting Illness     Chief Complaint   Patient presents with    Rib Pain     pt states fractured rib dx last Sunday continues celestino have pain and discomfort. History Provided By: Patient    HPI: Tammy Cruz, 80 y.o. female presents to the ED with cc of back and abdominal pain. Patient was seen in the ER 1 week ago after a fall which occurred 9 days ago. She complained of left flank and back pain at that time. Had a CT abdomen pelvis without contrast and a CT chest without contrast, which revealed a probable lateral left eighth rib fracture. She was prescribed oxycodone, which did help, but now that she is taking Tylenol for the pain she has minimal relief. Pain is currently 10 out of 10 in severity and involves the mid to lower back and abdomen. She denies chest pain or shortness of breath. She denies cough, fever or chills. She has a history of urinary incontinence, related to previous stroke. She states that she urinates quite often. She has baseline left-sided weakness. She normally has a bowel movement daily, but has not had one in a week. There are no other complaints, changes, or physical findings at this time. PCP: Mar Faustin MD    No current facility-administered medications on file prior to encounter. Current Outpatient Medications on File Prior to Encounter   Medication Sig Dispense Refill    trimethoprim-sulfamethoxazole (BACTRIM DS, SEPTRA DS) 160-800 mg per tablet Take 1 Tablet by mouth two (2) times a day. 14 Tablet 0    pravastatin (PRAVACHOL) 80 mg tablet Take 1 tablet by mouth once daily 90 Tab 1    meloxicam (MOBIC) 7.5 mg tablet Take 1 tablet by mouth once daily 30 Tab 5    aspirin 81 mg chewable tablet Take 1 Tab by mouth daily. 30 Tab 0    cholecalciferol (VITAMIN D3) 1,000 unit tablet Take  by mouth daily.       esomeprazole ([address]) 40 mg capsule Take 40 mg by mouth daily. Past History     Past Medical History:  Past Medical History:   Diagnosis Date    Acid reflux     Endocrine disease     thyroid nodules    Stroke ([address]     Thyroid disease     thyroid nodules       Past Surgical History:  Past Surgical History:   Procedure Laterality Date    HX GYN  1996    hysterectomy ? BSO    HX ORTHOPAEDIC      back       Family History:  Family History   Problem Relation Age of Onset    Cancer Mother         lymphoma    Diabetes Paternal Grandmother     Heart Disease Sister     Heart Disease Son     No Known Problems Son     No Known Problems Daughter     No Known Problems Daughter     Heart Disease Maternal Grandfather        Social History:  Social History     Tobacco Use    Smoking status: Former Smoker     Packs/day: 1.00     Years: 20.00     Pack years: 20.00     Types: Cigarettes     Quit date: 1986     Years since quittin.9    Smokeless tobacco: Never Used   Substance Use Topics    Alcohol use: No    Drug use: No     Comment: CBD oil       Allergies: Allergies   Allergen Reactions    Iodine Anaphylaxis    Amoxicillin Other (comments)     \"It makes my heart race\"    Macrobid [Nitrofurantoin Monohyd/M-Cryst] Rash    Oxycodone Nausea Only         Review of Systems   Review of Systems   Constitutional: Negative for fever. HENT: Negative for congestion. Eyes: Negative. Respiratory: Negative for shortness of breath. Cardiovascular: Negative for chest pain. Gastrointestinal: Positive for abdominal pain, constipation and nausea. Endocrine: Negative for heat intolerance. Genitourinary: Negative. Musculoskeletal: Positive for back pain. Skin: Negative for rash. Allergic/Immunologic: Negative for immunocompromised state. Neurological: Positive for light-headedness. Hematological: Does not bruise/bleed easily. Psychiatric/Behavioral: Negative.     All other systems reviewed and are negative. Physical Exam   Physical Exam  Vitals and nursing note reviewed. Constitutional:       General: She is not in acute distress. Appearance: She is well-developed. HENT:      Head: Normocephalic. Cardiovascular:      Rate and Rhythm: Normal rate and regular rhythm. Pulses: Normal pulses. Heart sounds: Normal heart sounds. Pulmonary:      Effort: Pulmonary effort is normal.      Breath sounds: Normal breath sounds. Abdominal:      General: Bowel sounds are normal.      Palpations: Abdomen is soft. Tenderness: There is abdominal tenderness. Comments: Diffusely tender   Musculoskeletal:         General: Normal range of motion. Cervical back: Normal range of motion and neck supple. Skin:     General: Skin is warm and dry. Neurological:      Mental Status: She is alert and oriented to person, place, and time. Comments: Baseline left-sided weakness   Psychiatric:         Mood and Affect: Mood normal.         Behavior: Behavior normal.         Diagnostic Study Results     Labs -     Recent Results (from the past 12 hour(s))   CBC WITH AUTOMATED DIFF    Collection Time: 12/10/21 12:15 PM   Result Value Ref Range    WBC 7.0 3.6 - 11.0 K/uL    RBC 5.31 (H) 3.80 - 5.20 M/uL    HGB 15.9 11.5 - 16.0 g/dL    HCT 47.5 (H) 35.0 - 47.0 %    MCV 89.5 80.0 - 99.0 FL    MCH 29.9 26.0 - 34.0 PG    MCHC 33.5 30.0 - 36.5 g/dL    RDW 13.0 11.5 - 14.5 %    PLATELET 223 005 - 301 K/uL    MPV 11.8 8.9 - 12.9 FL    NRBC 0.0 0  WBC    ABSOLUTE NRBC 0.00 0.00 - 0.01 K/uL    NEUTROPHILS 62 32 - 75 %    LYMPHOCYTES 28 12 - 49 %    MONOCYTES 8 5 - 13 %    EOSINOPHILS 1 0 - 7 %    BASOPHILS 1 0 - 1 %    IMMATURE GRANULOCYTES 0 0.0 - 0.5 %    ABS. NEUTROPHILS 4.3 1.8 - 8.0 K/UL    ABS. LYMPHOCYTES 2.0 0.8 - 3.5 K/UL    ABS. MONOCYTES 0.5 0.0 - 1.0 K/UL    ABS. EOSINOPHILS 0.1 0.0 - 0.4 K/UL    ABS. BASOPHILS 0.0 0.0 - 0.1 K/UL    ABS. IMM.  GRANS. 0.0 0.00 - 0.04 K/UL    DF AUTOMATED METABOLIC PANEL, COMPREHENSIVE    Collection Time: 12/10/21 12:15 PM   Result Value Ref Range    Sodium 140 136 - 145 mmol/L    Potassium 3.9 3.5 - 5.1 mmol/L    Chloride 107 97 - 108 mmol/L    CO2 27 21 - 32 mmol/L    Anion gap 6 5 - 15 mmol/L    Glucose 95 65 - 100 mg/dL    BUN 11 6 - 20 MG/DL    Creatinine 0.77 0.55 - 1.02 MG/DL    BUN/Creatinine ratio 14 12 - 20      GFR est AA >60 >60 ml/min/1.73m2    GFR est non-AA >60 >60 ml/min/1.73m2    Calcium 9.7 8.5 - 10.1 MG/DL    Bilirubin, total 0.6 0.2 - 1.0 MG/DL    ALT (SGPT) 19 12 - 78 U/L    AST (SGOT) 13 (L) 15 - 37 U/L    Alk. phosphatase 91 45 - 117 U/L    Protein, total 7.4 6.4 - 8.2 g/dL    Albumin 3.5 3.5 - 5.0 g/dL    Globulin 3.9 2.0 - 4.0 g/dL    A-G Ratio 0.9 (L) 1.1 - 2.2     LIPASE    Collection Time: 12/10/21 12:15 PM   Result Value Ref Range    Lipase 37 (L) 73 - 393 U/L   SAMPLES BEING HELD    Collection Time: 12/10/21 12:15 PM   Result Value Ref Range    SAMPLES BEING HELD PRESENT      COMMENT        Add-on orders for these samples will be processed based on acceptable specimen integrity and analyte stability, which may vary by analyte. Radiologic Studies -   XR CHEST PA LAT   Final Result   1. The lungs are clear   2. Mild to moderate anterior wedging of the T12 vertebral body age is   uncertain. .. CT ABD PELV WO CONT   Final Result   Bilateral nonobstructive nephrolithiasis. No evidence of hydronephrosis. No   evidence of acute process. CT Results  (Last 48 hours)    None        CXR Results  (Last 48 hours)    None          Medical Decision Making   I am the first provider for this patient. I reviewed the vital signs, available nursing notes, past medical history, past surgical history, family history and social history. Vital Signs-Reviewed the patient's vital signs.   Patient Vitals for the past 12 hrs:   Temp Pulse Resp BP SpO2   12/10/21 1019 97.4 °F (36.3 °C) 93 18 121/69 100 %       Records Reviewed: Nursing Notes, Old Medical Records, Previous Radiology Studies and Previous Laboratory Studies    Provider Notes (Medical Decision Making):   Fracture, constipation, obstruction, pancreatitis, UTI, kidney stone, diverticulitis    ED Course:   Initial assessment performed. The patients presenting problems have been discussed, and they are in agreement with the care plan formulated and outlined with them. I have encouraged them to ask questions as they arise throughout their visit. progress note: The patient is feeling better. Her results were reviewed. She does not want to wait for the urinalysis to be performed. She is advised to follow-up and return to ER if worse           Critical Care Time:   0    Disposition:  home    DISCHARGE PLAN:  1. Discharge Medication List as of 12/10/2021  1:51 PM      START taking these medications    Details   ondansetron (Zofran ODT) 4 mg disintegrating tablet Take 1 Tablet by mouth every eight (8) hours as needed for Nausea., Normal, Disp-10 Tablet, R-0      polyethylene glycol (Miralax) 17 gram/dose powder Take 17 g by mouth daily as needed for Constipation. 1 tablespoon with 8 oz of water daily, Normal, Disp-116 g, R-0      HYDROcodone-acetaminophen (Norco) 5-325 mg per tablet Take 1 Tablet by mouth every six (6) hours as needed for Pain for up to 3 days. Max Daily Amount: 4 Tablets., Normal, Disp-10 Tablet, R-0      methocarbamoL (Robaxin-750) 750 mg tablet Take 1 Tablet by mouth four (4) times daily as needed for Muscle Spasm(s). , Normal, Disp-20 Tablet, R-0         CONTINUE these medications which have NOT CHANGED    Details   trimethoprim-sulfamethoxazole (BACTRIM DS, SEPTRA DS) 160-800 mg per tablet Take 1 Tablet by mouth two (2) times a day., Normal, Disp-14 Tablet, R-0      pravastatin (PRAVACHOL) 80 mg tablet Take 1 tablet by mouth once daily, Normal, Disp-90 Tab, R-1HOLD UNTIL NEEDED.       meloxicam (MOBIC) 7.5 mg tablet Take 1 tablet by mouth once daily, Normal, Disp-30 Tab, R-5      aspirin 81 mg chewable tablet Take 1 Tab by mouth daily. , Normal, Disp-30 Tab, R-0      cholecalciferol (VITAMIN D3) 1,000 unit tablet Take  by mouth daily. , Historical Med      esomeprazole (NEXIUM) 40 mg capsule Take 40 mg by mouth daily. , Historical Med           2. Follow-up Information     Follow up With Specialties Details Why Contact Alix Billings MD Internal Medicine  As needed [address] [facility] [address]  [phone]      [facility] EMERGENCY DEPT Emergency Medicine  If symptoms worsen [address]  [address]   [address] [address] [phone]        3. Return to ED if worse     Diagnosis     Clinical Impression:   1. Compression fracture of T12 vertebra, initial encounter ([facility])    2. Abdominal pain, acute    3. Constipation, unspecified constipation type    4. Bilateral kidney stones        Attestations:    Yun Javed MD    Please note that this dictation was completed with [employer], the computer voice recognition software. Quite often unanticipated grammatical, syntax, homophones, and other interpretive errors are inadvertently transcribed by the computer software. Please disregard these errors. Please excuse any errors that have escaped final proofreading. Thank you.

## 2021-12-16 ENCOUNTER — HOSPITAL ENCOUNTER (EMERGENCY)
Age: 81
Discharge: HOME | End: 2021-12-16
Attending: EMERGENCY MEDICINE
Payer: MEDICARE

## 2021-12-16 ENCOUNTER — APPOINTMENT (OUTPATIENT)
Dept: CT IMAGING | Age: 81
End: 2021-12-16
Attending: EMERGENCY MEDICINE
Payer: MEDICARE

## 2021-12-16 VITALS
DIASTOLIC BLOOD PRESSURE: 76 MMHG | TEMPERATURE: 98.1 F | WEIGHT: 150 LBS | HEART RATE: 94 BPM | BODY MASS INDEX: 23.54 KG/M2 | RESPIRATION RATE: 16 BRPM | OXYGEN SATURATION: 96 % | SYSTOLIC BLOOD PRESSURE: 117 MMHG | HEIGHT: 67 IN

## 2021-12-16 DIAGNOSIS — K59.03 DRUG-INDUCED CONSTIPATION: Primary | ICD-10-CM

## 2021-12-16 PROCEDURE — 99283 EMERGENCY DEPT VISIT LOW MDM: CPT

## 2021-12-16 PROCEDURE — 74011250637 HC RX REV CODE- 250/637: Performed by: EMERGENCY MEDICINE

## 2021-12-16 PROCEDURE — 74176 CT ABD & PELVIS W/O CONTRAST: CPT

## 2021-12-16 RX ORDER — LACTULOSE 10 G/15ML
10 SOLUTION ORAL; RECTAL 2 TIMES DAILY
Qty: 240 ML | Refills: 0 | Status: SHIPPED | OUTPATIENT
Start: 2021-12-16 | End: 2021-12-24

## 2021-12-16 RX ADMIN — LACTULOSE 30 ML: 20 SOLUTION ORAL at 22:40

## 2021-12-17 NOTE — ED PROVIDER NOTES
EMERGENCY DEPARTMENT HISTORY AND PHYSICAL EXAM      Date: 12/16/2021  Patient Name: Dread Stauffer    History of Presenting Illness     Chief Complaint   Patient presents with    Constipation     x 2 weeks. pt was here 2 weeks ago with compression fracture and rib fracture and was placed on pain medicine. pt has used senokot, flaxseed and enemas without success       History Provided By: Patient    HPI: Dread Stauffer, 80 y.o. female with PMHx significant for prior CVA, GERD, recent diagnosis of rib fracture and compression fracture of the spine who presents with a chief complaint of constipation. Patient states she sustained a fall on 12/3 due to her baseline weakness from her prior stroke. It was during this fall that she sustained the fractures. She has been on narcotic pain medicine has not had a good bowel movement in 2 weeks. She has been taking multiple medications at home including fleets enemas, suppositories, Senokot without relief. She denies any nausea or vomiting. Reports that her abdomen has minimal discomfort. Is passing gas. No urinary symptoms. PCP: Nicole Lucas MD    There are no other complaints, changes, or physical findings at this time. Current Outpatient Medications   Medication Sig Dispense Refill    lactulose (CHRONULAC) 10 gram/15 mL solution Take 15 mL by mouth two (2) times a day for 8 days. 240 mL 0    ondansetron (Zofran ODT) 4 mg disintegrating tablet Take 1 Tablet by mouth every eight (8) hours as needed for Nausea. 10 Tablet 0    polyethylene glycol (Miralax) 17 gram/dose powder Take 17 g by mouth daily as needed for Constipation. 1 tablespoon with 8 oz of water daily 116 g 0    methocarbamoL (Robaxin-750) 750 mg tablet Take 1 Tablet by mouth four (4) times daily as needed for Muscle Spasm(s). 20 Tablet 0    trimethoprim-sulfamethoxazole (BACTRIM DS, SEPTRA DS) 160-800 mg per tablet Take 1 Tablet by mouth two (2) times a day.  14 Tablet 0    pravastatin (PRAVACHOL) 80 mg tablet Take 1 tablet by mouth once daily 90 Tab 1    meloxicam (MOBIC) 7.5 mg tablet Take 1 tablet by mouth once daily 30 Tab 5    aspirin 81 mg chewable tablet Take 1 Tab by mouth daily. 30 Tab 0    cholecalciferol (VITAMIN D3) 1,000 unit tablet Take  by mouth daily.  esomeprazole (NEXIUM) 40 mg capsule Take 40 mg by mouth daily. Past History     Past Medical History:  Past Medical History:   Diagnosis Date    Acid reflux     Endocrine disease     thyroid nodules    Stroke ([facility] [address]     Thyroid disease     thyroid nodules     Past Surgical History:  Past Surgical History:   Procedure Laterality Date    HX GYN  1996    hysterectomy ? BSO    HX ORTHOPAEDIC      back     Family History:  Family History   Problem Relation Age of Onset    Cancer Mother         lymphoma    Diabetes Paternal Grandmother     Heart Disease Sister     Heart Disease Son     No Known Problems Son     No Known Problems Daughter     No Known Problems Daughter     Heart Disease Maternal Grandfather      Social History:  Social History     Tobacco Use    Smoking status: Former Smoker     Packs/day: 1.00     Years: 20.00     Pack years: 20.00     Types: Cigarettes     Quit date: 1986     Years since quittin.9    Smokeless tobacco: Never Used   Substance Use Topics    Alcohol use: No    Drug use: No     Comment: CBD oil     Allergies: Allergies   Allergen Reactions    Iodine Anaphylaxis    Amoxicillin Other (comments)     \"It makes my heart race\"    Lidocaine Rash     Get a rash from the patches    Macrobid [Nitrofurantoin Monohyd/M-Cryst] Rash    Oxycodone Nausea Only     Review of Systems   Review of Systems   Gastrointestinal: Positive for constipation. All other systems reviewed and are negative. Physical Exam   Physical Exam  Vitals and nursing note reviewed. Constitutional:       General: She is not in acute distress. Appearance: She is well-developed.    HENT: Head: Normocephalic and atraumatic. Eyes:      Conjunctiva/sclera: Conjunctivae normal.      Pupils: Pupils are equal, round, and reactive to light. Cardiovascular:      Rate and Rhythm: Normal rate and regular rhythm. Pulmonary:      Effort: Pulmonary effort is normal. No respiratory distress. Breath sounds: Normal breath sounds. No stridor. Abdominal:      General: There is distension (mild). Palpations: Abdomen is soft. Tenderness: There is no abdominal tenderness. Musculoskeletal:         General: Normal range of motion. Cervical back: Normal range of motion. Skin:     General: Skin is warm and dry. Neurological:      Mental Status: She is alert and oriented to person, place, and time. Diagnostic Study Results   Labs -   No results found for this or any previous visit (from the past 12 hour(s)). Radiologic Studies -   CT ABD PELV WO CONT   Final Result   1. No change since prior study. 2. Small nonobstructing bilateral renal calculi again shown. 3. 2.5 cm left renal cyst again demonstrated. 4. Mild-moderate retained fecal material throughout colon with sparing of   sigmoid colon and rectum. 5. Mild-moderate T12 vertebral compression fracture again shown. 6. Small indirect left inguinal hernia again demonstrated containing fat. CT ABD PELV WO CONT    Result Date: 12/16/2021  1. No change since prior study. 2. Small nonobstructing bilateral renal calculi again shown. 3. 2.5 cm left renal cyst again demonstrated. 4. Mild-moderate retained fecal material throughout colon with sparing of sigmoid colon and rectum. 5. Mild-moderate T12 vertebral compression fracture again shown. 6. Small indirect left inguinal hernia again demonstrated containing fat. Medical Decision Making   I am the first provider for this patient.     I reviewed the vital signs, available nursing notes, past medical history, past surgical history, family history and social history. Vital Signs-Reviewed the patient's vital signs. Patient Vitals for the past 12 hrs:   Temp Pulse Resp BP SpO2   12/16/21 1837 98.1 °F (36.7 °C) 94 16 117/76 96 %       Pulse Oximetry Analysis - 96% on ra      Records Reviewed: Nursing Notes and Old Medical Records    Provider Notes (Medical Decision Making):   Patient presents with a chief complaint of constipation likely related to her opioid use. On exam she is overall nontoxic-appearing with stable vital signs. Does have some mild abdominal distention, suspect likely related to constipation. Will check CT abdomen to rule out any other acute process. ED Course:   Initial assessment performed. The patients presenting problems have been discussed, and they are in agreement with the care plan formulated and outlined with them. I have encouraged them to ask questions as they arise throughout their visit. CT abdomen with constipation, otherwise unchanged from prior. Offered enema in the emergency department the patient declines. Will give a dose of lactulose here and discharged on lactulose. Procedures:  Procedures    Critical Care:  none    Disposition:  Discharge Note:  The patient has been re-evaluated and is ready for discharge. Reviewed available results with patient. Counseled patient on diagnosis and care plan. Patient has expressed understanding, and all questions have been answered. Patient agrees with plan and agrees to follow up as recommended, or to return to the ED if their symptoms worsen. Discharge instructions have been provided and explained to the patient, along with reasons to return to the ED. PLAN:  1. Discharge Medication List as of 12/16/2021 10:32 PM      START taking these medications    Details   lactulose (CHRONULAC) 10 gram/15 mL solution Take 15 mL by mouth two (2) times a day for 8 days. , Normal, Disp-240 mL, R-0         CONTINUE these medications which have NOT CHANGED    Details   ondansetron (Zofran ODT) 4 mg disintegrating tablet Take 1 Tablet by mouth every eight (8) hours as needed for Nausea., Normal, Disp-10 Tablet, R-0      polyethylene glycol (Miralax) 17 gram/dose powder Take 17 g by mouth daily as needed for Constipation. 1 tablespoon with 8 oz of water daily, Normal, Disp-116 g, R-0      methocarbamoL (Robaxin-750) 750 mg tablet Take 1 Tablet by mouth four (4) times daily as needed for Muscle Spasm(s). , Normal, Disp-20 Tablet, R-0      trimethoprim-sulfamethoxazole (BACTRIM DS, SEPTRA DS) 160-800 mg per tablet Take 1 Tablet by mouth two (2) times a day., Normal, Disp-14 Tablet, R-0      pravastatin (PRAVACHOL) 80 mg tablet Take 1 tablet by mouth once daily, Normal, Disp-90 Tab, R-1HOLD UNTIL NEEDED. meloxicam (MOBIC) 7.5 mg tablet Take 1 tablet by mouth once daily, Normal, Disp-30 Tab, R-5      aspirin 81 mg chewable tablet Take 1 Tab by mouth daily. , Normal, Disp-30 Tab, R-0      cholecalciferol (VITAMIN D3) 1,000 unit tablet Take  by mouth daily. , Historical Med      esomeprazole (NEXIUM) 40 mg capsule Take 40 mg by mouth daily. , Historical Med           2. Follow-up Information     Follow up With Specialties Details Why Jarad Cleary MD Internal Medicine Schedule an appointment as soon as possible for a visit   [address] [place] [address]  [phone]      [facility] EMERGENCY DEPT Emergency Medicine  As needed, If symptoms worsen [address] [place] [address]  [phone]        Return to ED if worse     Diagnosis     Clinical Impression:   1. Drug-induced constipation            Please note that this dictation was completed with [employer], the [employer] voice recognition software. Quite often unanticipated grammatical, syntax, homophones, and other interpretive errors are inadvertently transcribed by the computer software. Please disregard these errors.   Please excuse any errors that have escaped final proofreading

## 2022-01-02 ENCOUNTER — TELEPHONE (OUTPATIENT)
Dept: INTERNAL MEDICINE CLINIC | Age: 82
End: 2022-01-02

## 2022-01-02 DIAGNOSIS — N30.00 ACUTE CYSTITIS WITHOUT HEMATURIA: ICD-10-CM

## 2022-01-02 RX ORDER — SULFAMETHOXAZOLE AND TRIMETHOPRIM 800; 160 MG/1; MG/1
1 TABLET ORAL 2 TIMES DAILY
Qty: 14 TABLET | Refills: 0 | Status: SHIPPED | OUTPATIENT
Start: 2022-01-02 | End: 2022-04-01

## 2022-01-02 NOTE — TELEPHONE ENCOUNTER
Patient reports urinary burning for 3 days. Prior UTI. No blood in urine. No fever. No back pain or abdominal pain. No N/V. Recommend: Increase water intake. Bactrim DS 1 twice daily for 7 days.

## 2022-01-18 ENCOUNTER — TELEPHONE (OUTPATIENT)
Dept: INTERNAL MEDICINE CLINIC | Age: 82
End: 2022-01-18

## 2022-01-18 NOTE — TELEPHONE ENCOUNTER
PATIENT WAS Rx BACTRIM BID 1/2/22, SHE FINISHED   IT AND IS STILL SYMPTOMATIC. NO RELIEF. SHE IS REQUESTING SOMETHING ELSE FOR HER UTI. [address] IF SOMETHING CAN BE CALLED IN.

## 2022-01-18 NOTE — TELEPHONE ENCOUNTER
Patient wants to get a different medication. Bactrim is not working. Can the doctor call in something else? Please   reach out to patient.

## 2022-03-19 PROBLEM — G45.9 TIA (TRANSIENT ISCHEMIC ATTACK): Status: ACTIVE | Noted: 2019-07-25

## 2022-03-29 ENCOUNTER — ANESTHESIA EVENT (OUTPATIENT)
Dept: SURGERY | Age: 82
DRG: 481 | End: 2022-03-29
Payer: MEDICARE

## 2022-03-29 ENCOUNTER — HOSPITAL ENCOUNTER (INPATIENT)
Age: 82
LOS: 3 days | Discharge: NURSING, REHAB OR LONG-TERM CARE | DRG: 481 | End: 2022-04-01
Attending: EMERGENCY MEDICINE | Admitting: INTERNAL MEDICINE
Payer: MEDICARE

## 2022-03-29 ENCOUNTER — APPOINTMENT (OUTPATIENT)
Dept: GENERAL RADIOLOGY | Age: 82
DRG: 481 | End: 2022-03-29
Attending: EMERGENCY MEDICINE
Payer: MEDICARE

## 2022-03-29 ENCOUNTER — ANESTHESIA (OUTPATIENT)
Dept: SURGERY | Age: 82
DRG: 481 | End: 2022-03-29
Payer: MEDICARE

## 2022-03-29 ENCOUNTER — APPOINTMENT (OUTPATIENT)
Dept: GENERAL RADIOLOGY | Age: 82
DRG: 481 | End: 2022-03-29
Attending: ORTHOPAEDIC SURGERY
Payer: MEDICARE

## 2022-03-29 ENCOUNTER — APPOINTMENT (OUTPATIENT)
Dept: CT IMAGING | Age: 82
DRG: 481 | End: 2022-03-29
Attending: INTERNAL MEDICINE
Payer: MEDICARE

## 2022-03-29 DIAGNOSIS — M25.552 ACUTE PAIN OF LEFT HIP: ICD-10-CM

## 2022-03-29 DIAGNOSIS — S50.312A ABRASION OF LEFT ELBOW, INITIAL ENCOUNTER: ICD-10-CM
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
## 2022-03-29 DIAGNOSIS — S72.002A CLOSED DISPLACED FRACTURE OF LEFT FEMORAL NECK (HCC): Primary | ICD-10-CM

## 2022-03-29 DIAGNOSIS — S72.002A CLOSED FRACTURE OF LEFT HIP, INITIAL ENCOUNTER (HCC): ICD-10-CM

## 2022-03-29 PROBLEM — S72.009A HIP FRACTURE (HCC): Status: ACTIVE | Noted: 2022-03-29

## 2022-03-29 LAB
ABO + RH BLD: NORMAL
ALBUMIN SERPL-MCNC: 3.4 G/DL (ref 3.5–5)
ALBUMIN/GLOB SERPL: 1 {RATIO} (ref 1.1–2.2)
ALP SERPL-CCNC: 84 U/L (ref 45–117)
ALT SERPL-CCNC: 24 U/L (ref 12–78)
ANION GAP SERPL CALC-SCNC: 3 MMOL/L (ref 5–15)
APPEARANCE UR: CLEAR
AST SERPL-CCNC: 16 U/L (ref 15–37)
ATRIAL RATE: 94 BPM
BACTERIA URNS QL MICRO: NEGATIVE /HPF
BASOPHILS # BLD: 0 K/UL (ref 0–0.1)
BASOPHILS NFR BLD: 1 % (ref 0–1)
BILIRUB SERPL-MCNC: 0.9 MG/DL (ref 0.2–1)
BILIRUB UR QL: NEGATIVE
BLOOD GROUP ANTIBODIES SERPL: NORMAL
BUN SERPL-MCNC: 12 MG/DL (ref 6–20)
BUN/CREAT SERPL: 16 (ref 12–20)
CALCIUM SERPL-MCNC: 8.8 MG/DL (ref 8.5–10.1)
CALCULATED P AXIS, ECG09: 26 DEGREES
CALCULATED R AXIS, ECG10: -31 DEGREES
CALCULATED T AXIS, ECG11: 76 DEGREES
CHLORIDE SERPL-SCNC: 110 MMOL/L (ref 97–108)
CO2 SERPL-SCNC: 30 MMOL/L (ref 21–32)
COLOR UR: NORMAL
CREAT SERPL-MCNC: 0.73 MG/DL (ref 0.55–1.02)
DIAGNOSIS, 93000: NORMAL
DIFFERENTIAL METHOD BLD: ABNORMAL
EOSINOPHIL # BLD: 0.1 K/UL (ref 0–0.4)
EOSINOPHIL NFR BLD: 1 % (ref 0–7)
EPITH CASTS URNS QL MICRO: NORMAL /LPF
ERYTHROCYTE [DISTWIDTH] IN BLOOD BY AUTOMATED COUNT: 12.5 % (ref 11.5–14.5)
GLOBULIN SER CALC-MCNC: 3.4 G/DL (ref 2–4)
GLUCOSE SERPL-MCNC: 98 MG/DL (ref 65–100)
GLUCOSE UR STRIP.AUTO-MCNC: NEGATIVE MG/DL
HCT VFR BLD AUTO: 46.7 % (ref 35–47)
HGB BLD-MCNC: 15.3 G/DL (ref 11.5–16)
HGB UR QL STRIP: NEGATIVE
HYALINE CASTS URNS QL MICRO: NORMAL /LPF (ref 0–5)
IMM GRANULOCYTES # BLD AUTO: 0.1 K/UL (ref 0–0.04)
IMM GRANULOCYTES NFR BLD AUTO: 1 % (ref 0–0.5)
INR PPP: 1 (ref 0.9–1.1)
KETONES UR QL STRIP.AUTO: NEGATIVE MG/DL
LEUKOCYTE ESTERASE UR QL STRIP.AUTO: NEGATIVE
LYMPHOCYTES # BLD: 1.5 K/UL (ref 0.8–3.5)
LYMPHOCYTES NFR BLD: 26 % (ref 12–49)
MCH RBC QN AUTO: 30.1 PG (ref 26–34)
MCHC RBC AUTO-ENTMCNC: 32.8 G/DL (ref 30–36.5)
MCV RBC AUTO: 91.7 FL (ref 80–99)
MONOCYTES # BLD: 0.4 K/UL (ref 0–1)
MONOCYTES NFR BLD: 7 % (ref 5–13)
NEUTS SEG # BLD: 3.8 K/UL (ref 1.8–8)
NEUTS SEG NFR BLD: 64 % (ref 32–75)
NITRITE UR QL STRIP.AUTO: NEGATIVE
NRBC # BLD: 0 K/UL (ref 0–0.01)
NRBC BLD-RTO: 0 PER 100 WBC
P-R INTERVAL, ECG05: 138 MS
PH UR STRIP: 7 [PH] (ref 5–8)
PLATELET # BLD AUTO: 174 K/UL (ref 150–400)
PMV BLD AUTO: 12.1 FL (ref 8.9–12.9)
POTASSIUM SERPL-SCNC: 4.1 MMOL/L (ref 3.5–5.1)
PROT SERPL-MCNC: 6.8 G/DL (ref 6.4–8.2)
PROT UR STRIP-MCNC: NEGATIVE MG/DL
PROTHROMBIN TIME: 10.1 SEC (ref 9–11.1)
Q-T INTERVAL, ECG07: 372 MS
QRS DURATION, ECG06: 78 MS
QTC CALCULATION (BEZET), ECG08: 465 MS
RBC # BLD AUTO: 5.09 M/UL (ref 3.8–5.2)
RBC #/AREA URNS HPF: NORMAL /HPF (ref 0–5)
SODIUM SERPL-SCNC: 143 MMOL/L (ref 136–145)
SP GR UR REFRACTOMETRY: 1.01 (ref 1–1.03)
SPECIMEN EXP DATE BLD: NORMAL
UA: UC IF INDICATED,UAUC: NORMAL
UROBILINOGEN UR QL STRIP.AUTO: 0.2 EU/DL (ref 0.2–1)
VENTRICULAR RATE, ECG03: 94 BPM
WBC # BLD AUTO: 5.9 K/UL (ref 3.6–11)
WBC URNS QL MICRO: NORMAL /HPF (ref 0–4)

## 2022-03-29 PROCEDURE — 77030026438 HC STYL ET INTUB CARD -A: Performed by: NURSE ANESTHETIST, CERTIFIED REGISTERED

## 2022-03-29 PROCEDURE — 76010000138 HC OR TIME 0.5 TO 1 HR: Performed by: ORTHOPAEDIC SURGERY

## 2022-03-29 PROCEDURE — 77030031139 HC SUT VCRL2 J&J -A: Performed by: ORTHOPAEDIC SURGERY

## 2022-03-29 PROCEDURE — 74011250636 HC RX REV CODE- 250/636: Performed by: NURSE ANESTHETIST, CERTIFIED REGISTERED

## 2022-03-29 PROCEDURE — 74011000250 HC RX REV CODE- 250: Performed by: NURSE ANESTHETIST, CERTIFIED REGISTERED

## 2022-03-29 PROCEDURE — 93005 ELECTROCARDIOGRAM TRACING: CPT

## 2022-03-29 PROCEDURE — 96375 TX/PRO/DX INJ NEW DRUG ADDON: CPT

## 2022-03-29 PROCEDURE — 74011250636 HC RX REV CODE- 250/636: Performed by: ORTHOPAEDIC SURGERY

## 2022-03-29 PROCEDURE — 76060000032 HC ANESTHESIA 0.5 TO 1 HR: Performed by: ORTHOPAEDIC SURGERY

## 2022-03-29 PROCEDURE — 73501 X-RAY EXAM HIP UNI 1 VIEW: CPT

## 2022-03-29 PROCEDURE — 0QH736Z INSERTION OF INTRAMEDULLARY INTERNAL FIXATION DEVICE INTO LEFT UPPER FEMUR, PERCUTANEOUS APPROACH: ICD-10-PCS | Performed by: ORTHOPAEDIC SURGERY

## 2022-03-29 PROCEDURE — C1713 ANCHOR/SCREW BN/BN,TIS/BN: HCPCS | Performed by: ORTHOPAEDIC SURGERY

## 2022-03-29 PROCEDURE — 77030008684 HC TU ET CUF COVD -B: Performed by: NURSE ANESTHETIST, CERTIFIED REGISTERED

## 2022-03-29 PROCEDURE — 51702 INSERT TEMP BLADDER CATH: CPT

## 2022-03-29 PROCEDURE — 74011250637 HC RX REV CODE- 250/637: Performed by: ORTHOPAEDIC SURGERY

## 2022-03-29 PROCEDURE — 2709999900 HC NON-CHARGEABLE SUPPLY: Performed by: ORTHOPAEDIC SURGERY

## 2022-03-29 PROCEDURE — 96374 THER/PROPH/DIAG INJ IV PUSH: CPT

## 2022-03-29 PROCEDURE — 70450 CT HEAD/BRAIN W/O DYE: CPT

## 2022-03-29 PROCEDURE — 74011000250 HC RX REV CODE- 250: Performed by: PHYSICIAN ASSISTANT

## 2022-03-29 PROCEDURE — 85025 COMPLETE CBC W/AUTO DIFF WBC: CPT

## 2022-03-29 PROCEDURE — 73552 X-RAY EXAM OF FEMUR 2/>: CPT

## 2022-03-29 PROCEDURE — 27235 TREAT THIGH FRACTURE: CPT | Performed by: ORTHOPAEDIC SURGERY

## 2022-03-29 PROCEDURE — 80053 COMPREHEN METABOLIC PANEL: CPT

## 2022-03-29 PROCEDURE — 85610 PROTHROMBIN TIME: CPT

## 2022-03-29 PROCEDURE — 77010033678 HC OXYGEN DAILY

## 2022-03-29 PROCEDURE — 81001 URINALYSIS AUTO W/SCOPE: CPT

## 2022-03-29 PROCEDURE — 74011250636 HC RX REV CODE- 250/636: Performed by: EMERGENCY MEDICINE

## 2022-03-29 PROCEDURE — 77030013079 HC BLNKT BAIR HGGR 3M -A: Performed by: NURSE ANESTHETIST, CERTIFIED REGISTERED

## 2022-03-29 PROCEDURE — 76000 FLUOROSCOPY <1 HR PHYS/QHP: CPT

## 2022-03-29 PROCEDURE — 77030008462 HC STPLR SKN PROX J&J -A: Performed by: ORTHOPAEDIC SURGERY

## 2022-03-29 PROCEDURE — 99232 SBSQ HOSP IP/OBS MODERATE 35: CPT | Performed by: ORTHOPAEDIC SURGERY

## 2022-03-29 PROCEDURE — 72170 X-RAY EXAM OF PELVIS: CPT

## 2022-03-29 PROCEDURE — 74011250636 HC RX REV CODE- 250/636: Performed by: PHYSICIAN ASSISTANT

## 2022-03-29 PROCEDURE — 99285 EMERGENCY DEPT VISIT HI MDM: CPT

## 2022-03-29 PROCEDURE — 71045 X-RAY EXAM CHEST 1 VIEW: CPT

## 2022-03-29 PROCEDURE — 65270000029 HC RM PRIVATE

## 2022-03-29 PROCEDURE — 76210000006 HC OR PH I REC 0.5 TO 1 HR: Performed by: ORTHOPAEDIC SURGERY

## 2022-03-29 PROCEDURE — 74011250636 HC RX REV CODE- 250/636: Performed by: INTERNAL MEDICINE

## 2022-03-29 PROCEDURE — 74011000250 HC RX REV CODE- 250: Performed by: ORTHOPAEDIC SURGERY

## 2022-03-29 PROCEDURE — 94760 N-INVAS EAR/PLS OXIMETRY 1: CPT

## 2022-03-29 PROCEDURE — 86900 BLOOD TYPING SEROLOGIC ABO: CPT

## 2022-03-29 PROCEDURE — 36415 COLL VENOUS BLD VENIPUNCTURE: CPT

## 2022-03-29 DEVICE — SCREW BNE L85MM DIA6.5MM THRD L16MM CANC S STL SELF DRL ST: Type: IMPLANTABLE DEVICE | Site: HIP | Status: FUNCTIONAL

## 2022-03-29 RX ORDER — NEOSTIGMINE METHYLSULFATE 1 MG/ML
INJECTION, SOLUTION INTRAVENOUS AS NEEDED
Status: DISCONTINUED | OUTPATIENT
Start: 2022-03-29 | End: 2022-03-29 | Stop reason: HOSPADM

## 2022-03-29 RX ORDER — MIDAZOLAM HYDROCHLORIDE 1 MG/ML
1 INJECTION, SOLUTION INTRAMUSCULAR; INTRAVENOUS AS NEEDED
Status: DISCONTINUED | OUTPATIENT
Start: 2022-03-29 | End: 2022-03-29 | Stop reason: HOSPADM

## 2022-03-29 RX ORDER — HYDROMORPHONE HYDROCHLORIDE 1 MG/ML
0.2 INJECTION, SOLUTION INTRAMUSCULAR; INTRAVENOUS; SUBCUTANEOUS
Status: DISCONTINUED | OUTPATIENT
Start: 2022-03-29 | End: 2022-03-29 | Stop reason: HOSPADM

## 2022-03-29 RX ORDER — CEFAZOLIN SODIUM/WATER 2 G/20 ML
SYRINGE (ML) INTRAVENOUS AS NEEDED
Status: DISCONTINUED | OUTPATIENT
Start: 2022-03-29 | End: 2022-03-29 | Stop reason: HOSPADM

## 2022-03-29 RX ORDER — SODIUM CHLORIDE 0.9 % (FLUSH) 0.9 %
5-40 SYRINGE (ML) INJECTION AS NEEDED
Status: DISCONTINUED | OUTPATIENT
Start: 2022-03-29 | End: 2022-03-29

## 2022-03-29 RX ORDER — FENTANYL CITRATE 50 UG/ML
INJECTION, SOLUTION INTRAMUSCULAR; INTRAVENOUS AS NEEDED
Status: DISCONTINUED | OUTPATIENT
Start: 2022-03-29 | End: 2022-03-29 | Stop reason: HOSPADM

## 2022-03-29 RX ORDER — ONDANSETRON 4 MG/1
4 TABLET, ORALLY DISINTEGRATING ORAL
Status: DISCONTINUED | OUTPATIENT
Start: 2022-03-29 | End: 2022-04-01 | Stop reason: HOSPADM

## 2022-03-29 RX ORDER — LIDOCAINE HYDROCHLORIDE 10 MG/ML
0.1 INJECTION, SOLUTION EPIDURAL; INFILTRATION; INTRACAUDAL; PERINEURAL AS NEEDED
Status: DISCONTINUED | OUTPATIENT
Start: 2022-03-29 | End: 2022-03-29 | Stop reason: HOSPADM

## 2022-03-29 RX ORDER — POLYETHYLENE GLYCOL 3350 17 G/17G
17 POWDER, FOR SOLUTION ORAL DAILY PRN
Status: DISCONTINUED | OUTPATIENT
Start: 2022-03-29 | End: 2022-04-01 | Stop reason: HOSPADM

## 2022-03-29 RX ORDER — ONDANSETRON 2 MG/ML
4 INJECTION INTRAMUSCULAR; INTRAVENOUS
Status: DISCONTINUED | OUTPATIENT
Start: 2022-03-29 | End: 2022-03-29

## 2022-03-29 RX ORDER — FENTANYL CITRATE 50 UG/ML
25 INJECTION, SOLUTION INTRAMUSCULAR; INTRAVENOUS
Status: DISCONTINUED | OUTPATIENT
Start: 2022-03-29 | End: 2022-03-29 | Stop reason: HOSPADM

## 2022-03-29 RX ORDER — ENOXAPARIN SODIUM 100 MG/ML
40 INJECTION SUBCUTANEOUS DAILY
Status: DISCONTINUED | OUTPATIENT
Start: 2022-03-30 | End: 2022-04-01 | Stop reason: HOSPADM

## 2022-03-29 RX ORDER — PRAVASTATIN SODIUM 40 MG/1
80 TABLET ORAL DAILY
Status: DISCONTINUED | OUTPATIENT
Start: 2022-03-30 | End: 2022-04-01 | Stop reason: HOSPADM

## 2022-03-29 RX ORDER — PHENYLEPHRINE HCL IN 0.9% NACL 0.4MG/10ML
SYRINGE (ML) INTRAVENOUS AS NEEDED
Status: DISCONTINUED | OUTPATIENT
Start: 2022-03-29 | End: 2022-03-29 | Stop reason: HOSPADM

## 2022-03-29 RX ORDER — SODIUM CHLORIDE 0.9 % (FLUSH) 0.9 %
5-40 SYRINGE (ML) INJECTION AS NEEDED
Status: DISCONTINUED | OUTPATIENT
Start: 2022-03-29 | End: 2022-03-31

## 2022-03-29 RX ORDER — SODIUM CHLORIDE 0.9 % (FLUSH) 0.9 %
5-40 SYRINGE (ML) INJECTION EVERY 8 HOURS
Status: DISCONTINUED | OUTPATIENT
Start: 2022-03-29 | End: 2022-03-29

## 2022-03-29 RX ORDER — SODIUM CHLORIDE 0.9 % (FLUSH) 0.9 %
5-40 SYRINGE (ML) INJECTION EVERY 8 HOURS
Status: DISCONTINUED | OUTPATIENT
Start: 2022-03-29 | End: 2022-04-01 | Stop reason: HOSPADM

## 2022-03-29 RX ORDER — NALOXONE HYDROCHLORIDE 0.4 MG/ML
0.4 INJECTION, SOLUTION INTRAMUSCULAR; INTRAVENOUS; SUBCUTANEOUS AS NEEDED
Status: DISCONTINUED | OUTPATIENT
Start: 2022-03-29 | End: 2022-04-01 | Stop reason: HOSPADM

## 2022-03-29 RX ORDER — MORPHINE SULFATE 10 MG/ML
6 INJECTION, SOLUTION INTRAMUSCULAR; INTRAVENOUS
Status: COMPLETED | OUTPATIENT
Start: 2022-03-29 | End: 2022-03-29

## 2022-03-29 RX ORDER — ONDANSETRON 2 MG/ML
4 INJECTION INTRAMUSCULAR; INTRAVENOUS
Status: COMPLETED | OUTPATIENT
Start: 2022-03-29 | End: 2022-03-29

## 2022-03-29 RX ORDER — MORPHINE SULFATE 2 MG/ML
2 INJECTION, SOLUTION INTRAMUSCULAR; INTRAVENOUS
Status: ACTIVE | OUTPATIENT
Start: 2022-03-29 | End: 2022-03-30

## 2022-03-29 RX ORDER — MELATONIN
1000 DAILY
Status: DISCONTINUED | OUTPATIENT
Start: 2022-03-30 | End: 2022-04-01 | Stop reason: HOSPADM

## 2022-03-29 RX ORDER — SODIUM CHLORIDE 9 MG/ML
100 INJECTION, SOLUTION INTRAVENOUS CONTINUOUS
Status: DISPENSED | OUTPATIENT
Start: 2022-03-29 | End: 2022-03-30

## 2022-03-29 RX ORDER — PROPOFOL 10 MG/ML
INJECTION, EMULSION INTRAVENOUS AS NEEDED
Status: DISCONTINUED | OUTPATIENT
Start: 2022-03-29 | End: 2022-03-29 | Stop reason: HOSPADM

## 2022-03-29 RX ORDER — ONDANSETRON 2 MG/ML
INJECTION INTRAMUSCULAR; INTRAVENOUS AS NEEDED
Status: DISCONTINUED | OUTPATIENT
Start: 2022-03-29 | End: 2022-03-29 | Stop reason: HOSPADM

## 2022-03-29 RX ORDER — AMOXICILLIN 250 MG
2 CAPSULE ORAL 2 TIMES DAILY
Status: DISCONTINUED | OUTPATIENT
Start: 2022-03-29 | End: 2022-03-29

## 2022-03-29 RX ORDER — OXYCODONE HYDROCHLORIDE 5 MG/1
5 TABLET ORAL
Status: DISCONTINUED | OUTPATIENT
Start: 2022-03-29 | End: 2022-04-01 | Stop reason: HOSPADM

## 2022-03-29 RX ORDER — ACETAMINOPHEN 325 MG/1
650 TABLET ORAL
Status: DISCONTINUED | OUTPATIENT
Start: 2022-03-29 | End: 2022-04-01 | Stop reason: HOSPADM

## 2022-03-29 RX ORDER — VITAMIN E CAP 100 UNIT 100 UNIT
100 CAP ORAL DAILY
COMMUNITY

## 2022-03-29 RX ORDER — AMOXICILLIN 250 MG
1 CAPSULE ORAL 2 TIMES DAILY
Status: DISCONTINUED | OUTPATIENT
Start: 2022-03-29 | End: 2022-04-01 | Stop reason: HOSPADM

## 2022-03-29 RX ORDER — POLYETHYLENE GLYCOL 3350 17 G/17G
17 POWDER, FOR SOLUTION ORAL DAILY
Status: DISCONTINUED | OUTPATIENT
Start: 2022-03-30 | End: 2022-04-01 | Stop reason: HOSPADM

## 2022-03-29 RX ORDER — SODIUM CHLORIDE, SODIUM LACTATE, POTASSIUM CHLORIDE, CALCIUM CHLORIDE 600; 310; 30; 20 MG/100ML; MG/100ML; MG/100ML; MG/100ML
50 INJECTION, SOLUTION INTRAVENOUS CONTINUOUS
Status: DISCONTINUED | OUTPATIENT
Start: 2022-03-29 | End: 2022-03-29 | Stop reason: HOSPADM

## 2022-03-29 RX ORDER — FERROUS SULFATE, DRIED 160(50) MG
1 TABLET, EXTENDED RELEASE ORAL
Status: DISCONTINUED | OUTPATIENT
Start: 2022-03-30 | End: 2022-04-01 | Stop reason: HOSPADM

## 2022-03-29 RX ORDER — ROCURONIUM BROMIDE 10 MG/ML
INJECTION, SOLUTION INTRAVENOUS AS NEEDED
Status: DISCONTINUED | OUTPATIENT
Start: 2022-03-29 | End: 2022-03-29 | Stop reason: HOSPADM

## 2022-03-29 RX ORDER — SODIUM CHLORIDE 9 MG/ML
125 INJECTION, SOLUTION INTRAVENOUS CONTINUOUS
Status: DISPENSED | OUTPATIENT
Start: 2022-03-29 | End: 2022-03-30

## 2022-03-29 RX ORDER — ACETAMINOPHEN 325 MG/1
650 TABLET ORAL EVERY 6 HOURS
Status: DISCONTINUED | OUTPATIENT
Start: 2022-03-29 | End: 2022-04-01 | Stop reason: HOSPADM

## 2022-03-29 RX ORDER — ONDANSETRON 2 MG/ML
4 INJECTION INTRAMUSCULAR; INTRAVENOUS AS NEEDED
Status: DISCONTINUED | OUTPATIENT
Start: 2022-03-29 | End: 2022-03-29 | Stop reason: HOSPADM

## 2022-03-29 RX ORDER — METHOCARBAMOL 750 MG/1
750 TABLET, FILM COATED ORAL
Status: DISCONTINUED | OUTPATIENT
Start: 2022-03-29 | End: 2022-04-01 | Stop reason: HOSPADM

## 2022-03-29 RX ORDER — SUCCINYLCHOLINE CHLORIDE 20 MG/ML
INJECTION INTRAMUSCULAR; INTRAVENOUS AS NEEDED
Status: DISCONTINUED | OUTPATIENT
Start: 2022-03-29 | End: 2022-03-29 | Stop reason: HOSPADM

## 2022-03-29 RX ORDER — GLYCOPYRROLATE 0.2 MG/ML
INJECTION INTRAMUSCULAR; INTRAVENOUS AS NEEDED
Status: DISCONTINUED | OUTPATIENT
Start: 2022-03-29 | End: 2022-03-29 | Stop reason: HOSPADM

## 2022-03-29 RX ORDER — MELOXICAM 7.5 MG/1
7.5 TABLET ORAL DAILY
Status: DISCONTINUED | OUTPATIENT
Start: 2022-03-30 | End: 2022-03-29

## 2022-03-29 RX ORDER — FACIAL-BODY WIPES
10 EACH TOPICAL DAILY PRN
Status: DISCONTINUED | OUTPATIENT
Start: 2022-03-31 | End: 2022-03-30

## 2022-03-29 RX ORDER — HYDROMORPHONE HYDROCHLORIDE 1 MG/ML
1 INJECTION, SOLUTION INTRAMUSCULAR; INTRAVENOUS; SUBCUTANEOUS
Status: COMPLETED | OUTPATIENT
Start: 2022-03-29 | End: 2022-03-29

## 2022-03-29 RX ORDER — PROMETHAZINE HYDROCHLORIDE 25 MG/1
12.5 TABLET ORAL
Status: DISCONTINUED | OUTPATIENT
Start: 2022-03-29 | End: 2022-04-01 | Stop reason: HOSPADM

## 2022-03-29 RX ORDER — ONDANSETRON 2 MG/ML
4 INJECTION INTRAMUSCULAR; INTRAVENOUS
Status: DISCONTINUED | OUTPATIENT
Start: 2022-03-29 | End: 2022-04-01 | Stop reason: HOSPADM

## 2022-03-29 RX ORDER — GUAIFENESIN 100 MG/5ML
81 LIQUID (ML) ORAL DAILY
Status: DISCONTINUED | OUTPATIENT
Start: 2022-03-30 | End: 2022-04-01 | Stop reason: HOSPADM

## 2022-03-29 RX ORDER — ACETAMINOPHEN 325 MG/1
650 TABLET ORAL ONCE
Status: DISCONTINUED | OUTPATIENT
Start: 2022-03-29 | End: 2022-03-29 | Stop reason: HOSPADM

## 2022-03-29 RX ORDER — LIDOCAINE HYDROCHLORIDE 20 MG/ML
INJECTION, SOLUTION EPIDURAL; INFILTRATION; INTRACAUDAL; PERINEURAL AS NEEDED
Status: DISCONTINUED | OUTPATIENT
Start: 2022-03-29 | End: 2022-03-29 | Stop reason: HOSPADM

## 2022-03-29 RX ORDER — PANTOPRAZOLE SODIUM 40 MG/1
40 TABLET, DELAYED RELEASE ORAL
Status: DISCONTINUED | OUTPATIENT
Start: 2022-03-30 | End: 2022-04-01 | Stop reason: HOSPADM

## 2022-03-29 RX ORDER — ACETAMINOPHEN 650 MG/1
650 SUPPOSITORY RECTAL
Status: DISCONTINUED | OUTPATIENT
Start: 2022-03-29 | End: 2022-04-01 | Stop reason: HOSPADM

## 2022-03-29 RX ORDER — SODIUM CHLORIDE 0.9 % (FLUSH) 0.9 %
5-40 SYRINGE (ML) INJECTION AS NEEDED
Status: DISCONTINUED | OUTPATIENT
Start: 2022-03-29 | End: 2022-04-01 | Stop reason: HOSPADM

## 2022-03-29 RX ORDER — ACETAMINOPHEN 325 MG/1
650 TABLET ORAL
Status: DISCONTINUED | OUTPATIENT
Start: 2022-03-29 | End: 2022-03-29 | Stop reason: SDUPTHER

## 2022-03-29 RX ORDER — OXYCODONE HYDROCHLORIDE 5 MG/1
2.5 TABLET ORAL
Status: DISCONTINUED | OUTPATIENT
Start: 2022-03-29 | End: 2022-04-01 | Stop reason: HOSPADM

## 2022-03-29 RX ORDER — SODIUM CHLORIDE 0.9 % (FLUSH) 0.9 %
5-40 SYRINGE (ML) INJECTION EVERY 8 HOURS
Status: DISCONTINUED | OUTPATIENT
Start: 2022-03-29 | End: 2022-03-29 | Stop reason: HOSPADM

## 2022-03-29 RX ORDER — SODIUM CHLORIDE, SODIUM LACTATE, POTASSIUM CHLORIDE, CALCIUM CHLORIDE 600; 310; 30; 20 MG/100ML; MG/100ML; MG/100ML; MG/100ML
INJECTION, SOLUTION INTRAVENOUS
Status: DISCONTINUED | OUTPATIENT
Start: 2022-03-29 | End: 2022-03-29 | Stop reason: HOSPADM

## 2022-03-29 RX ORDER — FENTANYL CITRATE 50 UG/ML
50 INJECTION, SOLUTION INTRAMUSCULAR; INTRAVENOUS AS NEEDED
Status: DISCONTINUED | OUTPATIENT
Start: 2022-03-29 | End: 2022-03-29 | Stop reason: HOSPADM

## 2022-03-29 RX ORDER — NALOXONE HYDROCHLORIDE 0.4 MG/ML
0.4 INJECTION, SOLUTION INTRAMUSCULAR; INTRAVENOUS; SUBCUTANEOUS AS NEEDED
Status: DISCONTINUED | OUTPATIENT
Start: 2022-03-29 | End: 2022-03-29

## 2022-03-29 RX ORDER — DEXAMETHASONE SODIUM PHOSPHATE 4 MG/ML
INJECTION, SOLUTION INTRA-ARTICULAR; INTRALESIONAL; INTRAMUSCULAR; INTRAVENOUS; SOFT TISSUE AS NEEDED
Status: DISCONTINUED | OUTPATIENT
Start: 2022-03-29 | End: 2022-03-29 | Stop reason: HOSPADM

## 2022-03-29 RX ADMIN — DEXAMETHASONE SODIUM PHOSPHATE 4 MG: 4 INJECTION, SOLUTION INTRAMUSCULAR; INTRAVENOUS at 13:03

## 2022-03-29 RX ADMIN — HYDROMORPHONE HYDROCHLORIDE 1 MG: 1 INJECTION, SOLUTION INTRAMUSCULAR; INTRAVENOUS; SUBCUTANEOUS at 10:49

## 2022-03-29 RX ADMIN — SUCCINYLCHOLINE CHLORIDE 100 MG: 20 INJECTION, SOLUTION INTRAMUSCULAR; INTRAVENOUS at 13:05

## 2022-03-29 RX ADMIN — ACETAMINOPHEN 325MG 650 MG: 325 TABLET ORAL at 17:43

## 2022-03-29 RX ADMIN — FENTANYL CITRATE 25 MCG: 50 INJECTION, SOLUTION INTRAMUSCULAR; INTRAVENOUS at 13:41

## 2022-03-29 RX ADMIN — SODIUM CHLORIDE 125 ML/HR: 9 INJECTION, SOLUTION INTRAVENOUS at 14:20

## 2022-03-29 RX ADMIN — ONDANSETRON 4 MG: 2 INJECTION INTRAMUSCULAR; INTRAVENOUS at 09:27

## 2022-03-29 RX ADMIN — Medication 120 MCG: at 13:22

## 2022-03-29 RX ADMIN — Medication 1 AMPULE: at 23:04

## 2022-03-29 RX ADMIN — ONDANSETRON HYDROCHLORIDE 4 MG: 2 INJECTION, SOLUTION INTRAMUSCULAR; INTRAVENOUS at 13:34

## 2022-03-29 RX ADMIN — ROCURONIUM BROMIDE 15 MG: 10 INJECTION INTRAVENOUS at 13:15

## 2022-03-29 RX ADMIN — ACETAMINOPHEN 325MG 650 MG: 325 TABLET ORAL at 23:04

## 2022-03-29 RX ADMIN — LIDOCAINE HYDROCHLORIDE 60 MG: 20 INJECTION, SOLUTION EPIDURAL; INFILTRATION; INTRACAUDAL; PERINEURAL at 13:05

## 2022-03-29 RX ADMIN — WATER 2 G: 1 INJECTION INTRAMUSCULAR; INTRAVENOUS; SUBCUTANEOUS at 22:12

## 2022-03-29 RX ADMIN — Medication 80 MCG: at 13:26

## 2022-03-29 RX ADMIN — SENNOSIDES AND DOCUSATE SODIUM 1 TABLET: 50; 8.6 TABLET ORAL at 17:43

## 2022-03-29 RX ADMIN — SODIUM CHLORIDE, PRESERVATIVE FREE 10 ML: 5 INJECTION INTRAVENOUS at 22:13

## 2022-03-29 RX ADMIN — GLYCOPYRROLATE 0.4 MG: 0.2 INJECTION, SOLUTION INTRAMUSCULAR; INTRAVENOUS at 13:49

## 2022-03-29 RX ADMIN — FENTANYL CITRATE 25 MCG: 50 INJECTION, SOLUTION INTRAMUSCULAR; INTRAVENOUS at 12:58

## 2022-03-29 RX ADMIN — Medication 2 MG: at 13:49

## 2022-03-29 RX ADMIN — PROPOFOL 130 MG: 10 INJECTION, EMULSION INTRAVENOUS at 13:05

## 2022-03-29 RX ADMIN — Medication 2 G: at 13:07

## 2022-03-29 RX ADMIN — SODIUM CHLORIDE 100 ML/HR: 0.9 INJECTION, SOLUTION INTRAVENOUS at 11:13

## 2022-03-29 RX ADMIN — MORPHINE SULFATE 6 MG: 10 INJECTION INTRAVENOUS at 09:28

## 2022-03-29 RX ADMIN — SODIUM CHLORIDE, POTASSIUM CHLORIDE, SODIUM LACTATE AND CALCIUM CHLORIDE: 600; 310; 30; 20 INJECTION, SOLUTION INTRAVENOUS at 13:00

## 2022-03-29 RX ADMIN — FENTANYL CITRATE 50 MCG: 50 INJECTION, SOLUTION INTRAMUSCULAR; INTRAVENOUS at 13:05

## 2022-03-29 NOTE — ACP (ADVANCE CARE PLANNING)
Advance Care Planning Note      NAME: Amanda Caruso   :  1940   MRN:  [id number]     Date/Time:  3/29/2022 11:19 AM    Active Diagnoses:  Hospital Problems  Date Reviewed: 2021          Codes Class Noted POA    Hip fracture [facility]) ICD-10-CM: D05.390Q  ICD-9-CM: 820.8  3/29/2022 Unknown              These active diagnoses are of sufficient risk that focused discussion on advance care planning is indicated in order to allow the patient to thoughtfully consider personal goals of care, and if situations arise that prevent the ability to personally give input, to ensure appropriate representation of their personal desires for different levels and aggressiveness of care. Discussion:   Code status addressed and wants to be a Full Code. Patient wants central line and vasopressors if needed. Patient would also want a feeding tube, if needed, for nutritional support. Patient  would like to assign her  as the surrogate decision maker. Persons present and participating in discussion: Alexa Murphy MD, patient's  at bedside. Time Spent:   Total time spent face-to-face in education and discussion:   16  minutes.          Gilford Mccune, MD   Hospitalist

## 2022-03-29 NOTE — CONSULTS
ORTHO CONSULT    Subjective:     Date of Consultation:  2022    Referring Physician:  ER      Maximus Romano is a 80 y.o. female who is being seen for left hip fracture. Pt reports GLF this morning. Ambulates at baseline with walker, pushes a wheelchair. Pt. 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 Pt's  at bedside-  States pt is functional in her home but hasn't been out in the community for several months. Patient Active Problem List    Diagnosis Date Noted    Hip fracture ([facility] [address] 2022    TIA (transient ischemic attack) 2019    Gastroesophageal reflux disease without esophagitis 2016    Multiple thyroid nodules 2016    Hyperlipidemia 2016     Family History   Problem Relation Age of Onset    Cancer Mother         lymphoma    Diabetes Paternal Grandmother     Heart Disease Sister     Heart Disease Son     No Known Problems Son     No Known Problems Daughter     No Known Problems Daughter     Heart Disease Maternal Grandfather       Social History     Tobacco Use    Smoking status: Former Smoker     Packs/day: 1.00     Years: 20.00     Pack years: 20.00     Types: Cigarettes     Quit date: 1986     Years since quittin.2    Smokeless tobacco: Never Used   Substance Use Topics    Alcohol use: No     Past Medical History:   Diagnosis Date    Acid reflux     Endocrine disease     thyroid nodules    Stroke ([facility] [address]     Thyroid disease     thyroid nodules      Past Surgical History:   Procedure Laterality Date    HX GYN  1996    hysterectomy ? BSO    HX ORTHOPAEDIC      back      Prior to Admission medications    Medication Sig Start Date End Date Taking? Authorizing Provider   vitamin e (E GEMS) 100 unit capsule Take 100 Units by mouth daily. Yes Other, MD Apolinar   ondansetron (Zofran ODT) 4 mg disintegrating tablet Take 1 Tablet by mouth every eight (8) hours as needed for Nausea.  12/10/21  Yes Chen Damon MD pravastatin (PRAVACHOL) 80 mg tablet Take 1 tablet by mouth once daily 5/18/21  Yes Ella Hylton MD   meloxicam [facility]) 7.5 mg tablet Take 1 tablet by mouth once daily 5/9/21  Yes Ella Hylton MD   aspirin 81 mg chewable tablet Take 1 Tab by mouth daily. 7/27/19  Yes Rodrick Riojas MD   cholecalciferol (VITAMIN D3) 1,000 unit tablet Take  by mouth daily. Yes Provider, Historical   esomeprazole (NEXIUM) 40 mg capsule Take 40 mg by mouth daily. Yes Other, MD Apolinar   trimethoprim-sulfamethoxazole (BACTRIM DS, SEPTRA DS) 160-800 mg per tablet Take 1 Tablet by mouth two (2) times a day. 1/2/22   Ella Hylton MD   polyethylene glycol (Miralax) 17 gram/dose powder Take 17 g by mouth daily as needed for Constipation. 1 tablespoon with 8 oz of water daily 12/10/21   Esperanza Hills MD   methocarbamoL (Robaxin-750) 750 mg tablet Take 1 Tablet by mouth four (4) times daily as needed for Muscle Spasm(s).  12/10/21   Esperanza Hills MD     Current Facility-Administered Medications   Medication Dose Route Frequency    [START ON 3/30/2022] aspirin chewable tablet 81 mg  81 mg Oral DAILY    [START ON 3/30/2022] cholecalciferol (VITAMIN D3) (1000 Units /25 mcg) tablet 1,000 Units  1,000 Units Oral DAILY    [START ON 3/30/2022] pantoprazole (PROTONIX) tablet 40 mg  40 mg Oral ACB    methocarbamoL (ROBAXIN) tablet 750 mg  750 mg Oral QID PRN    [START ON 3/30/2022] pravastatin (PRAVACHOL) tablet 80 mg  80 mg Oral DAILY    sodium chloride (NS) flush 5-40 mL  5-40 mL IntraVENous Q8H    sodium chloride (NS) flush 5-40 mL  5-40 mL IntraVENous PRN    acetaminophen (TYLENOL) tablet 650 mg  650 mg Oral Q6H PRN    Or    acetaminophen (TYLENOL) suppository 650 mg  650 mg Rectal Q6H PRN    polyethylene glycol (MIRALAX) packet 17 g  17 g Oral DAILY PRN    promethazine (PHENERGAN) tablet 12.5 mg  12.5 mg Oral Q6H PRN    Or    ondansetron (ZOFRAN) injection 4 mg  4 mg IntraVENous Q6H PRN    0.9% sodium chloride infusion  100 mL/hr IntraVENous CONTINUOUS    naloxone (NARCAN) injection 0.4 mg  0.4 mg IntraVENous PRN    senna-docusate (PERICOLACE) 8.6-50 mg per tablet 2 Tablet  2 Tablet Oral BID    morphine injection 2 mg  2 mg IntraVENous Q2H PRN    lactated Ringers infusion  50 mL/hr IntraVENous CONTINUOUS    sodium chloride (NS) flush 5-40 mL  5-40 mL IntraVENous Q8H    sodium chloride (NS) flush 5-40 mL  5-40 mL IntraVENous PRN    lidocaine (PF) (XYLOCAINE) 10 mg/mL (1 %) injection 0.1 mL  0.1 mL SubCUTAneous PRN    fentaNYL citrate (PF) injection 50 mcg  50 mcg IntraVENous PRN    midazolam (VERSED) injection 1 mg  1 mg IntraVENous PRN    acetaminophen (TYLENOL) tablet 650 mg  650 mg Oral ONCE     Allergies   Allergen Reactions    Iodine Anaphylaxis    Amoxicillin Other (comments)     \"It makes my heart race\"    Lidocaine Rash     Get a rash from the patches    Macrobid [Nitrofurantoin Monohyd/M-Cryst] Rash    Oxycodone Nausea Only        Review of Systems:  Pertinent items are noted in HPI. Objective:     Patient Vitals for the past 8 hrs:   BP Temp Pulse Resp SpO2 Height Weight   22 1242 123/73 98.1 °F (36.7 °C) 97 16 97 %     22 1031 (!) 154/83  65 16 93 %     22 0915 (!) 160/83  68 16 100 %     22 0825 (!) 170/89 97.9 °F (36.6 °C) 90 16 100 % 5' 6\" (1.676 m) 155 lb (70.3 kg)     Temp (24hrs), Av °F (36.7 °C), Min:97.9 °F (36.6 °C), Max:98.1 °F (36.7 °C)      Gen: Well-developed, restless and uncofortable   HEENT: Pink conjunctivae, hearing intact to voice, moist mucous membranes   Neck: Supple  Resp: No respiratory distress   Card: RRR, palpable distal pulse-equal bilaterally, birsk cap refill all distal digits   Abd:  non-distended  Musc: No sig LE shortening or rotational deformity. No knee effusion. NO sign of LE DVT. Intact plant/dorsifleion/EHL/FHL  Skin: No skin breakdown noted.  Skin warm, pink, dry  Neuro: Cranial nerves are grossly intact, no gross motor weakness, follows commands appropriately   Psych: Good insight, oriented to person, place and time, alert     Imaging Review:    Single AP view of the pelvis and 4view femur reviewed-- demonstrates an impaction fracture of the left  femoral neck. Degenerative changes are seen in the hip joints bilaterally.   IMPRESSION---Impaction fracture left femoral neck.         Data Review   Recent Results (from the past 24 hour(s))   CBC WITH AUTOMATED DIFF    Collection Time: 03/29/22  9:30 AM   Result Value Ref Range    WBC 5.9 3.6 - 11.0 K/uL    RBC 5.09 3.80 - 5.20 M/uL    HGB 15.3 11.5 - 16.0 g/dL    HCT 46.7 35.0 - 47.0 %    MCV 91.7 80.0 - 99.0 FL    MCH 30.1 26.0 - 34.0 PG    MCHC 32.8 30.0 - 36.5 g/dL    RDW 12.5 11.5 - 14.5 %    PLATELET 294 085 - 074 K/uL    MPV 12.1 8.9 - 12.9 FL    NRBC 0.0 0  WBC    ABSOLUTE NRBC 0.00 0.00 - 0.01 K/uL    NEUTROPHILS 64 32 - 75 %    LYMPHOCYTES 26 12 - 49 %    MONOCYTES 7 5 - 13 %    EOSINOPHILS 1 0 - 7 %    BASOPHILS 1 0 - 1 %    IMMATURE GRANULOCYTES 1 (H) 0.0 - 0.5 %    ABS. NEUTROPHILS 3.8 1.8 - 8.0 K/UL    ABS. LYMPHOCYTES 1.5 0.8 - 3.5 K/UL    ABS. MONOCYTES 0.4 0.0 - 1.0 K/UL    ABS. EOSINOPHILS 0.1 0.0 - 0.4 K/UL    ABS. BASOPHILS 0.0 0.0 - 0.1 K/UL    ABS. IMM. GRANS. 0.1 (H) 0.00 - 0.04 K/UL    DF AUTOMATED     METABOLIC PANEL, COMPREHENSIVE    Collection Time: 03/29/22  9:30 AM   Result Value Ref Range    Sodium 143 136 - 145 mmol/L    Potassium 4.1 3.5 - 5.1 mmol/L    Chloride 110 (H) 97 - 108 mmol/L    CO2 30 21 - 32 mmol/L    Anion gap 3 (L) 5 - 15 mmol/L    Glucose 98 65 - 100 mg/dL    BUN 12 6 - 20 MG/DL    Creatinine 0.73 0.55 - 1.02 MG/DL    BUN/Creatinine ratio 16 12 - 20      GFR est AA >60 >60 ml/min/1.73m2    GFR est non-AA >60 >60 ml/min/1.73m2    Calcium 8.8 8.5 - 10.1 MG/DL    Bilirubin, total 0.9 0.2 - 1.0 MG/DL    ALT (SGPT) 24 12 - 78 U/L    AST (SGOT) 16 15 - 37 U/L    Alk.  phosphatase 84 45 - 117 U/L    Protein, total 6.8 6.4 - 8.2 g/dL Albumin 3.4 (L) 3.5 - 5.0 g/dL    Globulin 3.4 2.0 - 4.0 g/dL    A-G Ratio 1.0 (L) 1.1 - 2.2     PROTHROMBIN TIME + INR    Collection Time: 03/29/22  9:30 AM   Result Value Ref Range    INR 1.0 0.9 - 1.1      Prothrombin time 10.1 9.0 - 11.1 sec   EKG, 12 LEAD, INITIAL    Collection Time: 03/29/22 10:35 AM   Result Value Ref Range    Ventricular Rate 94 BPM    Atrial Rate 94 BPM    P-R Interval 138 ms    QRS Duration 78 ms    Q-T Interval 372 ms    QTC Calculation (Bezet) 465 ms    Calculated P Axis 26 degrees    Calculated R Axis -31 degrees    Calculated T Axis 76 degrees    Diagnosis       Normal sinus rhythm  Left axis deviation  Low voltage QRS  Nonspecific ST and T wave abnormality     Confirmed by Dustin Gtz M.D. ([id number]) on 3/29/2022 11:11:23 AM     URINALYSIS W/ REFLEX CULTURE    Collection Time: 03/29/22 12:15 PM    Specimen: Urine   Result Value Ref Range    Color YELLOW/STRAW      Appearance CLEAR CLEAR      Specific gravity 1.007 1.003 - 1.030      pH (UA) 7.0 5.0 - 8.0      Protein Negative NEG mg/dL    Glucose Negative NEG mg/dL    Ketone Negative NEG mg/dL    Bilirubin Negative NEG      Blood Negative NEG      Urobilinogen 0.2 0.2 - 1.0 EU/dL    Nitrites Negative NEG      Leukocyte Esterase Negative NEG      WBC 0-4 0 - 4 /hpf    RBC 0-5 0 - 5 /hpf    Epithelial cells FEW FEW /lpf    Bacteria Negative NEG /hpf    UA:UC IF INDICATED CULTURE NOT INDICATED BY UA RESULT CNI      Hyaline cast 0-2 0 - 5 /lpf         Assessment/Plan:     Impacted L Femoral neck fx    Pain control  Npo  Discussed tx options. Plan for L Hip Perc screw fixation. Discussed possible hemiarthroplasty if displaced. The risks of surgery were explained. Risks of infection, blood loss, neurovascular injury, anesthesia risks, and risks secondary to patient comorbidities were explained.     Bedrest prior to surgery  cleared      Melanie Joyce DO

## 2022-03-29 NOTE — PERIOP NOTES
12:25= called spouse Michael Owsald) to verify consent for operation and Anesthesia due to pt drowsiness after receiving narcotics; spouse wants to be able to see pt prior to her going to OR; will bring him back to sign her consent once he arrives.

## 2022-03-29 NOTE — ED NOTES
Report given to Marshall Nolen RN with pre-op holding. Vitals stable, CHG done, greenwood placed, and nasal cannula at 2L . After receiving dilaudid, pt is sleepy, but arousalable.

## 2022-03-29 NOTE — ED PROVIDER NOTES
EMERGENCY DEPARTMENT HISTORY AND PHYSICAL EXAM      Date: 3/29/2022  Patient Name: Dakota Smith    History of Presenting Illness     Chief Complaint   Patient presents with    Hip Pain     left hip       History Provided By: Patient    HPI: Dakota Smith, 80 y.o. female presents to the ED with history of stroke leaving patient with some left-sided weakness, uses assist device at home, was bending over to  something, leveraging the wall to stabilize her and fell, landing on her left hip and left elbow. Patient denies hitting her head or losing consciousness. She has severe hip pain, 10/10 and is having a hard time finding a comfortable position. She said that they put a bandage on her left elbow but it feels fine she can move around without difficulty. She denies any chest pain, shortness of breath, palpitations prior to fall. She says she has a history of osteoporosis but has never broken her hip before. She is accompanied by her . There are no other complaints, changes, or physical findings at this time. PCP: Misha Zimmerman MD    No current facility-administered medications on file prior to encounter. Current Outpatient Medications on File Prior to Encounter   Medication Sig Dispense Refill    vitamin e (E GEMS) 100 unit capsule Take 100 Units by mouth daily.  trimethoprim-sulfamethoxazole (BACTRIM DS, SEPTRA DS) 160-800 mg per tablet Take 1 Tablet by mouth two (2) times a day. 14 Tablet 0    ondansetron (Zofran ODT) 4 mg disintegrating tablet Take 1 Tablet by mouth every eight (8) hours as needed for Nausea. 10 Tablet 0    polyethylene glycol (Miralax) 17 gram/dose powder Take 17 g by mouth daily as needed for Constipation. 1 tablespoon with 8 oz of water daily 116 g 0    methocarbamoL (Robaxin-750) 750 mg tablet Take 1 Tablet by mouth four (4) times daily as needed for Muscle Spasm(s).  20 Tablet 0    pravastatin (PRAVACHOL) 80 mg tablet Take 1 tablet by mouth once daily 90 Tab 1    meloxicam (MOBIC) 7.5 mg tablet Take 1 tablet by mouth once daily 30 Tab 5    aspirin 81 mg chewable tablet Take 1 Tab by mouth daily. 30 Tab 0    cholecalciferol (VITAMIN D3) 1,000 unit tablet Take  by mouth daily.  esomeprazole (NEXIUM) 40 mg capsule Take 40 mg by mouth daily. Past History     Past Medical History:  Past Medical History:   Diagnosis Date    Acid reflux     Endocrine disease     thyroid nodules    Stroke ([address]     Thyroid disease     thyroid nodules       Past Surgical History:  Past Surgical History:   Procedure Laterality Date    HX GYN  1996    hysterectomy ? BSO    HX ORTHOPAEDIC      back       Family History:  Family History   Problem Relation Age of Onset    Cancer Mother         lymphoma    Diabetes Paternal Grandmother     Heart Disease Sister     Heart Disease Son     No Known Problems Son     No Known Problems Daughter     No Known Problems Daughter     Heart Disease Maternal Grandfather        Social History:  Social History     Tobacco Use    Smoking status: Former Smoker     Packs/day: 1.00     Years: 20.00     Pack years: 20.00     Types: Cigarettes     Quit date: 1986     Years since quittin.2    Smokeless tobacco: Never Used   Substance Use Topics    Alcohol use: No    Drug use: No     Comment: CBD oil       Allergies: Allergies   Allergen Reactions    Iodine Anaphylaxis    Amoxicillin Other (comments)     \"It makes my heart race\"    Lidocaine Rash     Get a rash from the patches    Macrobid [Nitrofurantoin Monohyd/M-Cryst] Rash    Oxycodone Nausea Only         Review of Systems   Review of Systems   Constitutional: Negative for activity change and appetite change. HENT: Negative. Respiratory: Negative. Cardiovascular: Negative. Gastrointestinal: Negative for abdominal pain. Genitourinary: Negative for difficulty urinating. Musculoskeletal: Negative for back pain and neck pain.    Neurological: Negative for dizziness, weakness and light-headedness. All other systems reviewed and are negative. Physical Exam   Physical Exam   Vital signs and nursing notes reviewed    CONSTITUTIONAL: Alert, in moderate distress; well-developed; well-nourished. Appears uncomfortable. HEAD:  Normocephalic, atraumatic  EYES: PERRL; EOM's intact. ENTM: Nose: no rhinorrhea; Throat: no erythema or exudate, mucous membranes moist  Neck:  Supple. trachea is midline. No midline C-spine pain  RESP: Chest clear, equal breath sounds. - W/R/R  CV: S1 and S2 WNL; No murmurs, gallops or rubs. 2+ radial and DP pulses bilaterally. GI: non-distended, normal bowel sounds, abdomen soft and non-tender. No masses or organomegaly. : No costo-vertebral angle tenderness. Pain noted along the left lateral hip area with no overlying abrasions contusions or lacerations. BACK:  Non-tender, normal appearance  UPPER EXT:  Normal inspection. no joint or soft tissue swelling save for small abrasion over the left posterior superior forearm without active bleeding. No joint deformity. LOWER EXT: No edema, no calf tenderness. Patient holding left leg in flexed position, leg compartments soft, distal PMS intact. NEURO: Alert and oriented x3, 5/5 strength and light touch sensation intact in bilateral upper and lower extremities. SKIN: No rashes;  Warm and dry  PSYCH: Normal mood, normal affect    Diagnostic Study Results     Labs -     Recent Results (from the past 12 hour(s))   CBC WITH AUTOMATED DIFF    Collection Time: 03/29/22  9:30 AM   Result Value Ref Range    WBC 5.9 3.6 - 11.0 K/uL    RBC 5.09 3.80 - 5.20 M/uL    HGB 15.3 11.5 - 16.0 g/dL    HCT 46.7 35.0 - 47.0 %    MCV 91.7 80.0 - 99.0 FL    MCH 30.1 26.0 - 34.0 PG    MCHC 32.8 30.0 - 36.5 g/dL    RDW 12.5 11.5 - 14.5 %    PLATELET 555 645 - 358 K/uL    MPV 12.1 8.9 - 12.9 FL    NRBC 0.0 0  WBC    ABSOLUTE NRBC 0.00 0.00 - 0.01 K/uL    NEUTROPHILS 64 32 - 75 % LYMPHOCYTES 26 12 - 49 %    MONOCYTES 7 5 - 13 %    EOSINOPHILS 1 0 - 7 %    BASOPHILS 1 0 - 1 %    IMMATURE GRANULOCYTES 1 (H) 0.0 - 0.5 %    ABS. NEUTROPHILS 3.8 1.8 - 8.0 K/UL    ABS. LYMPHOCYTES 1.5 0.8 - 3.5 K/UL    ABS. MONOCYTES 0.4 0.0 - 1.0 K/UL    ABS. EOSINOPHILS 0.1 0.0 - 0.4 K/UL    ABS. BASOPHILS 0.0 0.0 - 0.1 K/UL    ABS. IMM. GRANS. 0.1 (H) 0.00 - 0.04 K/UL    DF AUTOMATED     METABOLIC PANEL, COMPREHENSIVE    Collection Time: 03/29/22  9:30 AM   Result Value Ref Range    Sodium 143 136 - 145 mmol/L    Potassium 4.1 3.5 - 5.1 mmol/L    Chloride 110 (H) 97 - 108 mmol/L    CO2 30 21 - 32 mmol/L    Anion gap 3 (L) 5 - 15 mmol/L    Glucose 98 65 - 100 mg/dL    BUN 12 6 - 20 MG/DL    Creatinine 0.73 0.55 - 1.02 MG/DL    BUN/Creatinine ratio 16 12 - 20      GFR est AA >60 >60 ml/min/1.73m2    GFR est non-AA >60 >60 ml/min/1.73m2    Calcium 8.8 8.5 - 10.1 MG/DL    Bilirubin, total 0.9 0.2 - 1.0 MG/DL    ALT (SGPT) 24 12 - 78 U/L    AST (SGOT) 16 15 - 37 U/L    Alk. phosphatase 84 45 - 117 U/L    Protein, total 6.8 6.4 - 8.2 g/dL    Albumin 3.4 (L) 3.5 - 5.0 g/dL    Globulin 3.4 2.0 - 4.0 g/dL    A-G Ratio 1.0 (L) 1.1 - 2.2     PROTHROMBIN TIME + INR    Collection Time: 03/29/22  9:30 AM   Result Value Ref Range    INR 1.0 0.9 - 1.1      Prothrombin time 10.1 9.0 - 11.1 sec   EKG, 12 LEAD, INITIAL    Collection Time: 03/29/22 10:35 AM   Result Value Ref Range    Ventricular Rate 94 BPM    Atrial Rate 94 BPM    P-R Interval 138 ms    QRS Duration 78 ms    Q-T Interval 372 ms    QTC Calculation (Bezet) 465 ms    Calculated P Axis 26 degrees    Calculated R Axis -31 degrees    Calculated T Axis 76 degrees    Diagnosis       Normal sinus rhythm  Left axis deviation  Low voltage QRS  Nonspecific ST and T wave abnormality  When compared with ECG of 13-OCT-2020 07:14,  No significant change was found         Radiologic Studies -   XR CHEST PORT   Final Result   No acute process.          XR PELV AP ONLY   Final Result   Impaction fracture left femoral neck. XR FEMUR LT 2 V   Final Result   Impaction fracture left femoral neck. CT Results  (Last 48 hours)    None        CXR Results  (Last 48 hours)               03/29/22 1028  XR CHEST PORT Final result    Impression:  No acute process. Narrative: Indication: Preoperative examination, history of thyroid disease, reflux       Comparison: 12/10/2021       Portable exam of the chest obtained at 1028 demonstrates normal heart size. There is no acute process in the lung fields. The osseous structures are   unremarkable. Medical Decision Making   I am the first provider for this patient. I reviewed the vital signs, available nursing notes, past medical history, past surgical history, family history and social history. Vital Signs-Reviewed the patient's vital signs. Patient Vitals for the past 12 hrs:   Temp Pulse Resp BP SpO2   03/29/22 0825 97.9 °F (36.6 °C) 90 16 (!) 170/89 100 %       EKG interpretation: (Preliminary)  EKG performed at 10:35 AM shows a normal sinus rhythm at a rate of 94 with left axis deviation, normal QRS interval without ischemic change. Records Reviewed: Nursing Notes and Ambulance Run Sheet    Provider Notes (Medical Decision Making):   72-year-old female status post mechanical fall with concern for left femoral neck fracture that will require surgical repair. Plan for pain control, n.p.o. status, preop work-up. Patient is not on any anticoagulation at baseline. Plan for hospice admission with orthopedic consultation. ED Course:   Initial assessment performed. The patients presenting problems have been discussed, and they are in agreement with the care plan formulated and outlined with them. I have encouraged them to ask questions as they arise throughout their visit. 10:42am: [employer] conversation with orthopedic CRISTHIAN Cohn.   To send message to Dr. Barber Engel about patient's femoral neck fracture. Disposition:  Admit    Admit Note:  10:48 AM  Pt is being admitted by Dr. Matilda Stevenson. The results of their tests and reason(s) for their admission have been discussed with pt and/or available family. They convey agreement and understanding for the need to be admitted and for admission diagnosis. Diagnosis     Clinical Impression:   1. Closed displaced fracture of left femoral neck ([address]    2. Acute pain of left hip    3. Abrasion of left elbow, initial encounter    4. Fall, initial encounter        Attestations:    Lorraine Gallagher MD    Please note that this dictation was completed with [email or web address], the computer voice recognition software. Quite often unanticipated grammatical, syntax, homophones, and other interpretive errors are inadvertently transcribed by the computer software. Please disregard these errors. Please excuse any errors that have escaped final proofreading. Thank you.

## 2022-03-29 NOTE — ED NOTES
Pt arrives to ED via EMS with a cc of left hip/leg pain secondary to a GLF this am; pt states she slipped and caught herself and slid down the bedroom wall, however  told EMS that he heard her fall from downstairs; pt remembers falling and did not hit her head; pt states the pain is worse with movement. No obvious deformity to left leg and able to move extremity. Pt uses cane/walker at home. Pt is alert and oriented x 4, resting in stretcher with side rails up and call bell within reach. Pt has had stroke in the past, weakness and right sided facial droop from stroke .

## 2022-03-29 NOTE — ED NOTES
Pt back from xray. O2 saturation 88% on room air. Pt placed on 2L and sitting up as high as she can tolerate. Pt still in extreme pain. MD Cordero Manus aware.

## 2022-03-29 NOTE — H&P
Hospitalist Admission Note    NAME: Judah Marlow   :  1940   MRN:  [id number]     Date/Time:  3/29/2022 11:12 AM    Patient PCP: Rhiannon Anderson MD  ______________________________________________________________________  Given the patient's current clinical presentation, I have a high level of concern for decompensation if discharged from the emergency department. Complex decision making was performed, which includes reviewing the patient's available past medical records, laboratory results, and x-ray films. My assessment of this patient's clinical condition and my plan of care is as follows.     Assessment / Plan:    Left femoral fracture, POA  Mechanical fall, POA  Baseline left-sided weakness residual from previous CVA 2 years ago  Had mechanical fall this morning  No head trauma reported  Did not lose her consciousness  X-ray with impaction fracture left femoral neck  Discussed with orthopedic surgery at bedside, keep n.p.o. for possible surgical intervention  Fall precaution  Bedrest  PT OT after surgery  Analgesia and DVT prophylaxis as per orthopedic surgery recommendations  Preop evaluation with revised cardiac index one-point with 6% 30-day risk of MI, death, or cardiac arrest.  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 Patient reported that she was in the bathroom using her walker and trying to stand on the wall when she fell. She denied any loss of consciousness, head trauma, chest pain, abdominal pain, nausea, vomiting, diarrhea, palpitations. She states she cannot remember what happened but she lost her balance and had a fall. Patient had a chronic left-sided weakness from previous CVA 3 years ago. In the emergency department, x-ray with left hip fracture. Orthopedic surgery evaluated patient at bedside in the ED    We were asked to admit for work up and evaluation of the above problems. Past Medical History:   Diagnosis Date    Acid reflux     Endocrine disease     thyroid nodules    Stroke [facility])     Thyroid disease     thyroid nodules        Past Surgical History:   Procedure Laterality Date    HX GYN  1996    hysterectomy ? BSO    HX ORTHOPAEDIC      back       Social History     Tobacco Use    Smoking status: Former Smoker     Packs/day: 1.00     Years: 20.00     Pack years: 20.00     Types: Cigarettes     Quit date: 1986     Years since quittin.2    Smokeless tobacco: Never Used   Substance Use Topics    Alcohol use: No        Family History   Problem Relation Age of Onset    Cancer Mother         lymphoma    Diabetes Paternal Grandmother     Heart Disease Sister     Heart Disease Son     No Known Problems Son     No Known Problems Daughter     No Known Problems Daughter     Heart Disease Maternal Grandfather      Allergies   Allergen Reactions    Iodine Anaphylaxis    Amoxicillin Other (comments)     \"It makes my heart race\"    Lidocaine Rash     Get a rash from the patches    Macrobid [Nitrofurantoin Monohyd/M-Cryst] Rash    Oxycodone Nausea Only        Prior to Admission medications    Medication Sig Start Date End Date Taking? Authorizing Provider   vitamin e (E GEMS) 100 unit capsule Take 100 Units by mouth daily.    Yes Other, MD Apolinar trimethoprim-sulfamethoxazole (BACTRIM DS, SEPTRA DS) 160-800 mg per tablet Take 1 Tablet by mouth two (2) times a day. 1/2/22   Elvis Lennox, MD   ondansetron (Zofran ODT) 4 mg disintegrating tablet Take 1 Tablet by mouth every eight (8) hours as needed for Nausea. 12/10/21   Boni Foote MD   polyethylene glycol (Miralax) 17 gram/dose powder Take 17 g by mouth daily as needed for Constipation. 1 tablespoon with 8 oz of water daily 12/10/21   Boni Foote MD   methocarbamoL (Robaxin-750) 750 mg tablet Take 1 Tablet by mouth four (4) times daily as needed for Muscle Spasm(s). 12/10/21   Boni Foote MD   pravastatin (PRAVACHOL) 80 mg tablet Take 1 tablet by mouth once daily 5/18/21   Elvis Lennox, MD   meloxicam NAVEEN TELLO [facility]) 7.5 mg tablet Take 1 tablet by mouth once daily 5/9/21   Elvis Lennox, MD   aspirin 81 mg chewable tablet Take 1 Tab by mouth daily. 7/27/19   Janae Hayes MD   cholecalciferol (VITAMIN D3) 1,000 unit tablet Take  by mouth daily. Provider, Historical   esomeprazole (NEXIUM) 40 mg capsule Take 40 mg by mouth daily. Other, MD Apolinar       REVIEW OF SYSTEMS:     I am not able to complete the review of systems because:    The patient is intubated and sedated    The patient has altered mental status due to his acute medical problems    The patient has baseline aphasia from prior stroke(s)    The patient has baseline dementia and is not reliable historian    The patient is in acute medical distress and unable to provide information           Total of 12 systems reviewed as follows:       POSITIVE= underlined text  Negative = text not underlined  General:  fever, chills, sweats, generalized weakness, weight loss/gain,      loss of appetite, fall   Eyes:    blurred vision, eye pain, loss of vision, double vision  ENT:    rhinorrhea, pharyngitis   Respiratory:   cough, sputum production, SOB, AVITIA, wheezing, pleuritic pain   Cardiology:   chest pain, palpitations, orthopnea, PND, edema, syncope   Gastrointestinal:  abdominal pain , N/V, diarrhea, dysphagia, constipation, bleeding   Genitourinary:  frequency, urgency, dysuria, hematuria, incontinence   Muskuloskeletal :  arthralgia, myalgia, back pain  Hematology:  easy bruising, nose or gum bleeding, lymphadenopathy   Dermatological: rash, ulceration, pruritis, color change / jaundice  Endocrine:   hot flashes or polydipsia   Neurological:  headache, dizziness, confusion, focal weakness, paresthesia,     Speech difficulties, memory loss, gait difficulty  Psychological: Feelings of anxiety, depression, agitation    Objective:   VITALS:    Visit Vitals  BP (!) 154/83 (BP 1 Location: Left arm, BP Patient Position: At rest)   Pulse 65   Temp 97.9 °F (36.6 °C)   Resp 16   Ht 5' 6\" (1.676 m)   Wt 70.3 kg (155 lb)   SpO2 93%   BMI 25.02 kg/m²       PHYSICAL EXAM:    General:    Alert, cooperative, no distress, appears stated age. HEENT: Atraumatic, anicteric sclerae, pink conjunctivae     No oral ulcers, mucosa moist, throat clear, dentition fair  Neck:  Supple, symmetrical,  thyroid: non tender  Lungs:   Clear to auscultation bilaterally. No Wheezing or Rhonchi. No rales. Chest wall:  No tenderness  No Accessory muscle use. Heart:   Regular  rhythm,  No  murmur   No edema  Abdomen:   Soft, non-tender. Not distended. Bowel sounds normal  Extremities: No cyanosis. No clubbing, left hip tenderness    Skin turgor normal, Capillary refill normal, Radial dial pulse 2+  Skin:     Not pale. Not Jaundiced  No rashes   Psych:  Good insight. Not depressed. Not anxious or agitated. Neurologic: EOMs intact. No facial asymmetry. No aphasia or slurred speech. Symmetrical strength except in the left lower extremity 1/5 due to previous CVA and range of motion is limited currently due to left hip fracture, Sensation grossly intact.  Alert and oriented X 4.     _______________________________________________________________________  Care Plan discussed with:    Comments   Patient x    Family  x  patient's  at bedside   RN x    Care Manager                    Consultant:  akanksha Penny from emergency department and Masoud Mccarthy from orthopedic surgery   _______________________________________________________________________  Expected  Disposition:   Home with Family    HH/PT/OT/RN    SNF/LTC x   JESSIE    ________________________________________________________________________  TOTAL TIME: 61 Minutes    Critical Care Provided     Minutes non procedure based      Comments    x Reviewed previous records   >50% of visit spent in counseling and coordination of care x Discussion with patient and/or family and questions answered       ________________________________________________________________________  Signed: Ifrah Bryant MD    Procedures: see electronic medical records for all procedures/Xrays and details which were not copied into this note but were reviewed prior to creation of Plan. LAB DATA REVIEWED:    Recent Results (from the past 24 hour(s))   CBC WITH AUTOMATED DIFF    Collection Time: 03/29/22  9:30 AM   Result Value Ref Range    WBC 5.9 3.6 - 11.0 K/uL    RBC 5.09 3.80 - 5.20 M/uL    HGB 15.3 11.5 - 16.0 g/dL    HCT 46.7 35.0 - 47.0 %    MCV 91.7 80.0 - 99.0 FL    MCH 30.1 26.0 - 34.0 PG    MCHC 32.8 30.0 - 36.5 g/dL    RDW 12.5 11.5 - 14.5 %    PLATELET 459 245 - 529 K/uL    MPV 12.1 8.9 - 12.9 FL    NRBC 0.0 0  WBC    ABSOLUTE NRBC 0.00 0.00 - 0.01 K/uL    NEUTROPHILS 64 32 - 75 %    LYMPHOCYTES 26 12 - 49 %    MONOCYTES 7 5 - 13 %    EOSINOPHILS 1 0 - 7 %    BASOPHILS 1 0 - 1 %    IMMATURE GRANULOCYTES 1 (H) 0.0 - 0.5 %    ABS. NEUTROPHILS 3.8 1.8 - 8.0 K/UL    ABS. LYMPHOCYTES 1.5 0.8 - 3.5 K/UL    ABS. MONOCYTES 0.4 0.0 - 1.0 K/UL    ABS. EOSINOPHILS 0.1 0.0 - 0.4 K/UL    ABS. BASOPHILS 0.0 0.0 - 0.1 K/UL    ABS. IMM.  GRANS. 0.1 (H) 0.00 - 0.04 K/UL    DF AUTOMATED     METABOLIC PANEL, COMPREHENSIVE    Collection Time: 03/29/22  9:30 AM   Result Value Ref Range    Sodium 143 136 - 145 mmol/L    Potassium 4.1 3.5 - 5.1 mmol/L    Chloride 110 (H) 97 - 108 mmol/L    CO2 30 21 - 32 mmol/L    Anion gap 3 (L) 5 - 15 mmol/L    Glucose 98 65 - 100 mg/dL    BUN 12 6 - 20 MG/DL    Creatinine 0.73 0.55 - 1.02 MG/DL    BUN/Creatinine ratio 16 12 - 20      GFR est AA >60 >60 ml/min/1.73m2    GFR est non-AA >60 >60 ml/min/1.73m2    Calcium 8.8 8.5 - 10.1 MG/DL    Bilirubin, total 0.9 0.2 - 1.0 MG/DL    ALT (SGPT) 24 12 - 78 U/L    AST (SGOT) 16 15 - 37 U/L    Alk.  phosphatase 84 45 - 117 U/L    Protein, total 6.8 6.4 - 8.2 g/dL    Albumin 3.4 (L) 3.5 - 5.0 g/dL    Globulin 3.4 2.0 - 4.0 g/dL    A-G Ratio 1.0 (L) 1.1 - 2.2     PROTHROMBIN TIME + INR    Collection Time: 03/29/22  9:30 AM   Result Value Ref Range    INR 1.0 0.9 - 1.1      Prothrombin time 10.1 9.0 - 11.1 sec   EKG, 12 LEAD, INITIAL    Collection Time: 03/29/22 10:35 AM   Result Value Ref Range    Ventricular Rate 94 BPM    Atrial Rate 94 BPM    P-R Interval 138 ms    QRS Duration 78 ms    Q-T Interval 372 ms    QTC Calculation (Bezet) 465 ms    Calculated P Axis 26 degrees    Calculated R Axis -31 degrees    Calculated T Axis 76 degrees    Diagnosis       Normal sinus rhythm  Left axis deviation  Low voltage QRS  Nonspecific ST and T wave abnormality     Confirmed by Jadiel Coelho M.D. ([id number]) on 3/29/2022 11:11:23 AM

## 2022-03-29 NOTE — ANESTHESIA POSTPROCEDURE EVALUATION
Procedure(s):  LEFT HIP PERCUTANEOUS PINNING CANNULATED SCREWS. general    Anesthesia Post Evaluation      Multimodal analgesia: multimodal analgesia used between 6 hours prior to anesthesia start to PACU discharge  Patient location during evaluation: bedside  Patient participation: complete - patient participated  Level of consciousness: awake  Pain management: adequate  Airway patency: patent  Anesthetic complications: no  Cardiovascular status: acceptable  Respiratory status: acceptable  Hydration status: acceptable  Post anesthesia nausea and vomiting:  controlled  Final Post Anesthesia Temperature Assessment:  Normothermia (36.0-37.5 degrees C)      INITIAL Post-op Vital signs:   Vitals Value Taken Time   /77 03/29/22 1445   Temp 36.5 °C (97.7 °F) 03/29/22 1402   Pulse 82 03/29/22 1446   Resp 19 03/29/22 1446   SpO2 96 % 03/29/22 1446   Vitals shown include unvalidated device data.

## 2022-03-29 NOTE — PERIOP NOTES
TRANSFER - IN REPORT:    Verbal report received from [facility] ANTONELLA RN(name) on Cortland Severs  being received from ED Room 12(unit) for ordered procedure      Report consisted of patients Situation, Background, Assessment and   Recommendations(SBAR). Information from the following report(s) SBAR, Kardex, Intake/Output, MAR, Recent Results, Med Rec Status and Procedure Verification was reviewed with the receiving nurse. Opportunity for questions and clarification was provided. Assessment completed upon patients arrival to unit and care assumed.

## 2022-03-29 NOTE — PROGRESS NOTES
End of Shift Note    Bedside shift change report given to LISA Farley (oncoming nurse) by Talib Vincent RN (offgoing nurse). Report included the following information SBAR and Kardex    Shift worked:  7-7p     Shift summary and any significant changes:     n/a     Concerns for physician to address:  n/a     Zone phone for oncoming shift:   n/a       Activity:  Activity Level: Up with Assistance  Number times ambulated in hallways past shift: 0  Number of times OOB to chair past shift: 0    Cardiac:   Cardiac Monitoring: No      Cardiac Rhythm: Sinus Rhythm    Access:   Current line(s): PIV     Genitourinary:   Urinary status: greenwood    Respiratory:   O2 Device: Nasal cannula  Chronic home O2 use?: NO  Incentive spirometer at bedside: YES       GI:  Last Bowel Movement Date: 03/27/22  Current diet:  ADULT ORAL NUTRITION SUPPLEMENT AM Snack; Standard High Calorie/High Protein  ADULT DIET Regular  Passing flatus: YES  Tolerating current diet: YES       Pain Management:   Patient states pain is manageable on current regimen: YES    Skin:  Calderon Score: 20  Interventions: float heels, PT/OT consult, limit briefs, internal/external urinary devices and nutritional support     Patient Safety:  Fall Score:  Total Score: 4  Interventions: bed/chair alarm, assistive device (walker, cane, etc), gripper socks and pt to call before getting OOB  High Fall Risk: Yes    Length of Stay:  Expected LOS: - - -  Actual LOS: 0      Talib Vincent RN

## 2022-03-29 NOTE — PERIOP NOTES
Handoff Report from Operating Room to PACU    Report received from Giorgio Molina RN and AMBER Gregorio CRNA regarding Analisa Willis. Surgeon(s):  Akhil Choi DO  And Procedure(s) (LRB):  LEFT HIP PERCUTANEOUS PINNING CANNULATED SCREWS (Left)  confirmed   with dressings discussed. Anesthesia type, drugs, patient history, complications, estimated blood loss, vital signs, intake and output, and last pain medication, lines and temperature were reviewed.

## 2022-03-29 NOTE — PERIOP NOTES
TRANSFER - OUT REPORT:    Verbal report given to Anderson RN(name) on Brianda Mcclelland  being transferred to [facility](unit) for routine post - op       Report consisted of patients Situation, Background, Assessment and   Recommendations(SBAR). Information from the following report(s) OR Summary, Procedure Summary, Intake/Output and MAR was reviewed with the receiving nurse. Opportunity for questions and clarification was provided.       Patient transported with:   O2 @ 2 liters  Tech

## 2022-03-29 NOTE — CONSULTS
ORTHOPAEDIC CONSULT NOTE    Subjective:     Date of Consultation:  2022      Alen Dunbar is a 80 y.o. female who is being seen for left hip fracture. Pt reports GLF this morning. Ambulates at baseline with walker, pushes a wheelchair. Pt. 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 Pt's  at bedside-  States pt is functional in her home but hasn't been out in the community for several months. Patient Active Problem List    Diagnosis Date Noted    Hip fracture ([facility] 2022    TIA (transient ischemic attack) 2019    Gastroesophageal reflux disease without esophagitis 2016    Multiple thyroid nodules 2016    Hyperlipidemia 2016     Family History   Problem Relation Age of Onset    Cancer Mother         lymphoma    Diabetes Paternal Grandmother     Heart Disease Sister     Heart Disease Son     No Known Problems Son     No Known Problems Daughter     No Known Problems Daughter     Heart Disease Maternal Grandfather       Social History     Tobacco Use    Smoking status: Former Smoker     Packs/day: 1.00     Years: 20.00     Pack years: 20.00     Types: Cigarettes     Quit date: 1986     Years since quittin.2    Smokeless tobacco: Never Used   Substance Use Topics    Alcohol use: No     Past Medical History:   Diagnosis Date    Acid reflux     Endocrine disease     thyroid nodules    Stroke ([facility]     Thyroid disease     thyroid nodules      Past Surgical History:   Procedure Laterality Date    HX GYN  1996    hysterectomy ? BSO    HX ORTHOPAEDIC      back      Prior to Admission medications    Medication Sig Start Date End Date Taking? Authorizing Provider   vitamin e (E GEMS) 100 unit capsule Take 100 Units by mouth daily. Yes Other, MD Apolinar   trimethoprim-sulfamethoxazole (BACTRIM DS, SEPTRA DS) 160-800 mg per tablet Take 1 Tablet by mouth two (2) times a day.  22   Dewey Foreman MD   ondansetron (Zofran ODT) 4 mg disintegrating tablet Take 1 Tablet by mouth every eight (8) hours as needed for Nausea. 12/10/21   Silvia Cannon MD   polyethylene glycol (Miralax) 17 gram/dose powder Take 17 g by mouth daily as needed for Constipation. 1 tablespoon with 8 oz of water daily 12/10/21   Silvia Cannon MD   methocarbamoL (Robaxin-750) 750 mg tablet Take 1 Tablet by mouth four (4) times daily as needed for Muscle Spasm(s). 12/10/21   Silvia Cannon MD   pravastatin (PRAVACHOL) 80 mg tablet Take 1 tablet by mouth once daily 5/18/21   Jose Iglesias MD   meloxicam NAVEEN TELLO [facility]) 7.5 mg tablet Take 1 tablet by mouth once daily 5/9/21   Jose Iglesias MD   aspirin 81 mg chewable tablet Take 1 Tab by mouth daily. 7/27/19   Bigg Vasqeuz MD   cholecalciferol (VITAMIN D3) 1,000 unit tablet Take  by mouth daily. Provider, Historical   esomeprazole (NEXIUM) 40 mg capsule Take 40 mg by mouth daily.     Other, MD Apolinar     Current Facility-Administered Medications   Medication Dose Route Frequency    [START ON 3/30/2022] aspirin chewable tablet 81 mg  81 mg Oral DAILY    [START ON 3/30/2022] cholecalciferol (VITAMIN D3) (1000 Units /25 mcg) tablet 1,000 Units  1,000 Units Oral DAILY    [START ON 3/30/2022] pantoprazole (PROTONIX) tablet 40 mg  40 mg Oral ACB    methocarbamoL (ROBAXIN) tablet 750 mg  750 mg Oral QID PRN    [START ON 3/30/2022] pravastatin (PRAVACHOL) tablet 80 mg  80 mg Oral DAILY    sodium chloride (NS) flush 5-40 mL  5-40 mL IntraVENous Q8H    sodium chloride (NS) flush 5-40 mL  5-40 mL IntraVENous PRN    acetaminophen (TYLENOL) tablet 650 mg  650 mg Oral Q6H PRN    Or    acetaminophen (TYLENOL) suppository 650 mg  650 mg Rectal Q6H PRN    polyethylene glycol (MIRALAX) packet 17 g  17 g Oral DAILY PRN    promethazine (PHENERGAN) tablet 12.5 mg  12.5 mg Oral Q6H PRN    Or    ondansetron (ZOFRAN) injection 4 mg  4 mg IntraVENous Q6H PRN    0.9% sodium chloride infusion  100 mL/hr IntraVENous CONTINUOUS  naloxone (NARCAN) injection 0.4 mg  0.4 mg IntraVENous PRN    senna-docusate (PERICOLACE) 8.6-50 mg per tablet 2 Tablet  2 Tablet Oral BID    morphine injection 2 mg  2 mg IntraVENous Q2H PRN     Current Outpatient Medications   Medication Sig    vitamin e (E GEMS) 100 unit capsule Take 100 Units by mouth daily.  trimethoprim-sulfamethoxazole (BACTRIM DS, SEPTRA DS) 160-800 mg per tablet Take 1 Tablet by mouth two (2) times a day.  ondansetron (Zofran ODT) 4 mg disintegrating tablet Take 1 Tablet by mouth every eight (8) hours as needed for Nausea.  polyethylene glycol (Miralax) 17 gram/dose powder Take 17 g by mouth daily as needed for Constipation. 1 tablespoon with 8 oz of water daily    methocarbamoL (Robaxin-750) 750 mg tablet Take 1 Tablet by mouth four (4) times daily as needed for Muscle Spasm(s).  pravastatin (PRAVACHOL) 80 mg tablet Take 1 tablet by mouth once daily    meloxicam (MOBIC) 7.5 mg tablet Take 1 tablet by mouth once daily    aspirin 81 mg chewable tablet Take 1 Tab by mouth daily.  cholecalciferol (VITAMIN D3) 1,000 unit tablet Take  by mouth daily.  esomeprazole (NEXIUM) 40 mg capsule Take 40 mg by mouth daily. Allergies   Allergen Reactions    Iodine Anaphylaxis    Amoxicillin Other (comments)     \"It makes my heart race\"    Lidocaine Rash     Get a rash from the patches    Macrobid [Nitrofurantoin Monohyd/M-Cryst] Rash    Oxycodone Nausea Only        Review of Systems:  A comprehensive review of systems was negative except for that written in the HPI.     Mental Status: no dementia    Objective:     Patient Vitals for the past 8 hrs:   BP Temp Pulse Resp SpO2 Height Weight   22 1031 (!) 154/83  65 16 93 %     22 0915 (!) 160/83  68 16 100 %     22 0825 (!) 170/89 97.9 °F (36.6 °C) 90 16 100 % 5' 6\" (1.676 m) 70.3 kg (155 lb)     Temp (24hrs), Av.9 °F (36.6 °C), Min:97.9 °F (36.6 °C), Max:97.9 °F (36.6 °C)      Gen: Well-developed, restless and uncofortable   HEENT: Pink conjunctivae, hearing intact to voice, moist mucous membranes   Neck: Supple  Resp: No respiratory distress   Card: RRR, palpable distal pulse-equal bilaterally, birsk cap refill all distal digits   Abd:  non-distended  Musc: No sig LE shortening or rotational deformity. No knee effusion. NO sign of LE DVT. Intact plant/dorsifleion/EHL/FHL  Skin: No skin breakdown noted. Skin warm, pink, dry  Neuro: Cranial nerves are grossly intact, no gross motor weakness, follows commands appropriately   Psych: Good insight, oriented to person, place and time, alert    Imaging Review:    Single AP view of the pelvis and 4view femur reviewed-- demonstrates an impaction fracture of the left  femoral neck. Degenerative changes are seen in the hip joints bilaterally.   IMPRESSION---Impaction fracture left femoral neck. Labs:   Recent Results (from the past 24 hour(s))   CBC WITH AUTOMATED DIFF    Collection Time: 03/29/22  9:30 AM   Result Value Ref Range    WBC 5.9 3.6 - 11.0 K/uL    RBC 5.09 3.80 - 5.20 M/uL    HGB 15.3 11.5 - 16.0 g/dL    HCT 46.7 35.0 - 47.0 %    MCV 91.7 80.0 - 99.0 FL    MCH 30.1 26.0 - 34.0 PG    MCHC 32.8 30.0 - 36.5 g/dL    RDW 12.5 11.5 - 14.5 %    PLATELET 599 809 - 281 K/uL    MPV 12.1 8.9 - 12.9 FL    NRBC 0.0 0  WBC    ABSOLUTE NRBC 0.00 0.00 - 0.01 K/uL    NEUTROPHILS 64 32 - 75 %    LYMPHOCYTES 26 12 - 49 %    MONOCYTES 7 5 - 13 %    EOSINOPHILS 1 0 - 7 %    BASOPHILS 1 0 - 1 %    IMMATURE GRANULOCYTES 1 (H) 0.0 - 0.5 %    ABS. NEUTROPHILS 3.8 1.8 - 8.0 K/UL    ABS. LYMPHOCYTES 1.5 0.8 - 3.5 K/UL    ABS. MONOCYTES 0.4 0.0 - 1.0 K/UL    ABS. EOSINOPHILS 0.1 0.0 - 0.4 K/UL    ABS. BASOPHILS 0.0 0.0 - 0.1 K/UL    ABS. IMM.  GRANS. 0.1 (H) 0.00 - 0.04 K/UL    DF AUTOMATED     METABOLIC PANEL, COMPREHENSIVE    Collection Time: 03/29/22  9:30 AM   Result Value Ref Range    Sodium 143 136 - 145 mmol/L    Potassium 4.1 3.5 - 5.1 mmol/L Chloride 110 (H) 97 - 108 mmol/L    CO2 30 21 - 32 mmol/L    Anion gap 3 (L) 5 - 15 mmol/L    Glucose 98 65 - 100 mg/dL    BUN 12 6 - 20 MG/DL    Creatinine 0.73 0.55 - 1.02 MG/DL    BUN/Creatinine ratio 16 12 - 20      GFR est AA >60 >60 ml/min/1.73m2    GFR est non-AA >60 >60 ml/min/1.73m2    Calcium 8.8 8.5 - 10.1 MG/DL    Bilirubin, total 0.9 0.2 - 1.0 MG/DL    ALT (SGPT) 24 12 - 78 U/L    AST (SGOT) 16 15 - 37 U/L    Alk. phosphatase 84 45 - 117 U/L    Protein, total 6.8 6.4 - 8.2 g/dL    Albumin 3.4 (L) 3.5 - 5.0 g/dL    Globulin 3.4 2.0 - 4.0 g/dL    A-G Ratio 1.0 (L) 1.1 - 2.2     PROTHROMBIN TIME + INR    Collection Time: 03/29/22  9:30 AM   Result Value Ref Range    INR 1.0 0.9 - 1.1      Prothrombin time 10.1 9.0 - 11.1 sec   EKG, 12 LEAD, INITIAL    Collection Time: 03/29/22 10:35 AM   Result Value Ref Range    Ventricular Rate 94 BPM    Atrial Rate 94 BPM    P-R Interval 138 ms    QRS Duration 78 ms    Q-T Interval 372 ms    QTC Calculation (Bezet) 465 ms    Calculated P Axis 26 degrees    Calculated R Axis -31 degrees    Calculated T Axis 76 degrees    Diagnosis       Normal sinus rhythm  Left axis deviation  Low voltage QRS  Nonspecific ST and T wave abnormality     Confirmed by Ham Turner M.D. ([id number]) on 3/29/2022 11:11:23 AM           Impression:     Patient Active Problem List    Diagnosis Date Noted    Hip fracture ([facility] 03/29/2022    TIA (transient ischemic attack) 07/25/2019    Gastroesophageal reflux disease without esophagitis 02/22/2016    Multiple thyroid nodules 02/22/2016    Hyperlipidemia 02/22/2016     Active Problems:    Hip fracture ([facility] (3/29/2022)        Plan:     -  Keep NPO  -  [place]    -  Bedrest  -  Plan for percutaneous pinning at the earliest convenience   -  Medicine for Pre-Operative Clearence/ Post-Operative management.     -  DVT Prophylaxis - SCDs pre-op  -  D/C planning TBD- SNF likely       Dr. Ortega Client aware and agrees with plan as above.         Gu[address]upe Or [address], [place] [id number]

## 2022-03-29 NOTE — ANESTHESIA PREPROCEDURE EVALUATION
Relevant Problems   NEUROLOGY   (+) TIA (transient ischemic attack)      GASTROINTESTINAL   (+) Gastroesophageal reflux disease without esophagitis       Anesthetic History   No history of anesthetic complications            Review of Systems / Medical History  Patient summary reviewed, nursing notes reviewed and pertinent labs reviewed    Pulmonary  Within defined limits                 Neuro/Psych       CVA       Cardiovascular  Within defined limits                Exercise tolerance: >4 METS     GI/Hepatic/Renal  Within defined limits              Endo/Other      Hypothyroidism       Other Findings   Comments: Hip fracture  Left sided weakness           Physical Exam    Airway  Mallampati: II  TM Distance: 4 - 6 cm  Neck ROM: normal range of motion   Mouth opening: Normal     Cardiovascular    Rhythm: regular  Rate: normal         Dental  No notable dental hx       Pulmonary  Breath sounds clear to auscultation               Abdominal  Abdominal exam normal       Other Findings            Anesthetic Plan    ASA: 3  Anesthesia type: general          Induction: Intravenous  Anesthetic plan and risks discussed with: Patient

## 2022-03-30 LAB
ALBUMIN SERPL-MCNC: 3 G/DL (ref 3.5–5)
ALBUMIN/GLOB SERPL: 1 {RATIO} (ref 1.1–2.2)
ALP SERPL-CCNC: 75 U/L (ref 45–117)
ALT SERPL-CCNC: 20 U/L (ref 12–78)
ANION GAP SERPL CALC-SCNC: 5 MMOL/L (ref 5–15)
AST SERPL-CCNC: 15 U/L (ref 15–37)
BASOPHILS # BLD: 0 K/UL (ref 0–0.1)
BASOPHILS NFR BLD: 0 % (ref 0–1)
BILIRUB SERPL-MCNC: 0.8 MG/DL (ref 0.2–1)
BUN SERPL-MCNC: 13 MG/DL (ref 6–20)
BUN/CREAT SERPL: 19 (ref 12–20)
CALCIUM SERPL-MCNC: 8.6 MG/DL (ref 8.5–10.1)
CHLORIDE SERPL-SCNC: 111 MMOL/L (ref 97–108)
CO2 SERPL-SCNC: 25 MMOL/L (ref 21–32)
CREAT SERPL-MCNC: 0.7 MG/DL (ref 0.55–1.02)
DIFFERENTIAL METHOD BLD: ABNORMAL
EOSINOPHIL # BLD: 0 K/UL (ref 0–0.4)
EOSINOPHIL NFR BLD: 0 % (ref 0–7)
ERYTHROCYTE [DISTWIDTH] IN BLOOD BY AUTOMATED COUNT: 12.9 % (ref 11.5–14.5)
GLOBULIN SER CALC-MCNC: 3.1 G/DL (ref 2–4)
GLUCOSE SERPL-MCNC: 118 MG/DL (ref 65–100)
HCT VFR BLD AUTO: 43.2 % (ref 35–47)
HGB BLD-MCNC: 14 G/DL (ref 11.5–16)
IMM GRANULOCYTES # BLD AUTO: 0 K/UL (ref 0–0.04)
IMM GRANULOCYTES NFR BLD AUTO: 0 % (ref 0–0.5)
LYMPHOCYTES # BLD: 1.3 K/UL (ref 0.8–3.5)
LYMPHOCYTES NFR BLD: 12 % (ref 12–49)
MCH RBC QN AUTO: 29.9 PG (ref 26–34)
MCHC RBC AUTO-ENTMCNC: 32.4 G/DL (ref 30–36.5)
MCV RBC AUTO: 92.1 FL (ref 80–99)
MONOCYTES # BLD: 1.1 K/UL (ref 0–1)
MONOCYTES NFR BLD: 9 % (ref 5–13)
NEUTS SEG # BLD: 9 K/UL (ref 1.8–8)
NEUTS SEG NFR BLD: 79 % (ref 32–75)
NRBC # BLD: 0 K/UL (ref 0–0.01)
NRBC BLD-RTO: 0 PER 100 WBC
PLATELET # BLD AUTO: 167 K/UL (ref 150–400)
PMV BLD AUTO: 11.8 FL (ref 8.9–12.9)
POTASSIUM SERPL-SCNC: 4.1 MMOL/L (ref 3.5–5.1)
PROT SERPL-MCNC: 6.1 G/DL (ref 6.4–8.2)
RBC # BLD AUTO: 4.69 M/UL (ref 3.8–5.2)
SODIUM SERPL-SCNC: 141 MMOL/L (ref 136–145)
WBC # BLD AUTO: 11.5 K/UL (ref 3.6–11)

## 2022-03-30 PROCEDURE — 97116 GAIT TRAINING THERAPY: CPT

## 2022-03-30 PROCEDURE — 51798 US URINE CAPACITY MEASURE: CPT

## 2022-03-30 PROCEDURE — 74011250637 HC RX REV CODE- 250/637: Performed by: ORTHOPAEDIC SURGERY

## 2022-03-30 PROCEDURE — 77030019905 HC CATH URETH INTMIT MDII -A

## 2022-03-30 PROCEDURE — 36415 COLL VENOUS BLD VENIPUNCTURE: CPT

## 2022-03-30 PROCEDURE — 77010033678 HC OXYGEN DAILY

## 2022-03-30 PROCEDURE — 74011250637 HC RX REV CODE- 250/637: Performed by: INTERNAL MEDICINE

## 2022-03-30 PROCEDURE — 94760 N-INVAS EAR/PLS OXIMETRY 1: CPT

## 2022-03-30 PROCEDURE — 74011000250 HC RX REV CODE- 250: Performed by: ORTHOPAEDIC SURGERY

## 2022-03-30 PROCEDURE — 74011250636 HC RX REV CODE- 250/636: Performed by: ORTHOPAEDIC SURGERY

## 2022-03-30 PROCEDURE — 74011000250 HC RX REV CODE- 250: Performed by: PHYSICIAN ASSISTANT

## 2022-03-30 PROCEDURE — 65270000029 HC RM PRIVATE

## 2022-03-30 PROCEDURE — 97535 SELF CARE MNGMENT TRAINING: CPT | Performed by: OCCUPATIONAL THERAPIST

## 2022-03-30 PROCEDURE — 97165 OT EVAL LOW COMPLEX 30 MIN: CPT | Performed by: OCCUPATIONAL THERAPIST

## 2022-03-30 PROCEDURE — 97162 PT EVAL MOD COMPLEX 30 MIN: CPT

## 2022-03-30 PROCEDURE — 74011250637 HC RX REV CODE- 250/637: Performed by: PHYSICIAN ASSISTANT

## 2022-03-30 PROCEDURE — 74011250636 HC RX REV CODE- 250/636: Performed by: PHYSICIAN ASSISTANT

## 2022-03-30 PROCEDURE — 80053 COMPREHEN METABOLIC PANEL: CPT

## 2022-03-30 PROCEDURE — 85025 COMPLETE CBC W/AUTO DIFF WBC: CPT

## 2022-03-30 RX ORDER — CALCIUM CARBONATE 200(500)MG
200 TABLET,CHEWABLE ORAL
Status: DISCONTINUED | OUTPATIENT
Start: 2022-03-30 | End: 2022-04-01 | Stop reason: HOSPADM

## 2022-03-30 RX ORDER — MAG HYDROX/ALUMINUM HYD/SIMETH 200-200-20
30 SUSPENSION, ORAL (FINAL DOSE FORM) ORAL
Status: DISCONTINUED | OUTPATIENT
Start: 2022-03-30 | End: 2022-04-01 | Stop reason: HOSPADM

## 2022-03-30 RX ORDER — FACIAL-BODY WIPES
10 EACH TOPICAL DAILY PRN
Status: DISCONTINUED | OUTPATIENT
Start: 2022-03-30 | End: 2022-04-01 | Stop reason: HOSPADM

## 2022-03-30 RX ADMIN — Medication 1 TABLET: at 12:07

## 2022-03-30 RX ADMIN — OXYCODONE HYDROCHLORIDE 2.5 MG: 5 TABLET ORAL at 08:35

## 2022-03-30 RX ADMIN — WATER 2 G: 1 INJECTION INTRAMUSCULAR; INTRAVENOUS; SUBCUTANEOUS at 04:50

## 2022-03-30 RX ADMIN — ACETAMINOPHEN 325MG 650 MG: 325 TABLET ORAL at 17:44

## 2022-03-30 RX ADMIN — SODIUM CHLORIDE 125 ML/HR: 9 INJECTION, SOLUTION INTRAVENOUS at 01:13

## 2022-03-30 RX ADMIN — ENOXAPARIN SODIUM 40 MG: 40 INJECTION SUBCUTANEOUS at 08:37

## 2022-03-30 RX ADMIN — SODIUM CHLORIDE, PRESERVATIVE FREE 10 ML: 5 INJECTION INTRAVENOUS at 23:38

## 2022-03-30 RX ADMIN — ACETAMINOPHEN 325MG 650 MG: 325 TABLET ORAL at 12:07

## 2022-03-30 RX ADMIN — Medication 1 AMPULE: at 23:37

## 2022-03-30 RX ADMIN — ASPIRIN 81 MG: 81 TABLET, CHEWABLE ORAL at 08:37

## 2022-03-30 RX ADMIN — SENNOSIDES AND DOCUSATE SODIUM 1 TABLET: 50; 8.6 TABLET ORAL at 08:35

## 2022-03-30 RX ADMIN — POLYETHYLENE GLYCOL 3350 17 G: 17 POWDER, FOR SOLUTION ORAL at 08:36

## 2022-03-30 RX ADMIN — Medication 1 AMPULE: at 08:38

## 2022-03-30 RX ADMIN — OXYCODONE 5 MG: 5 TABLET ORAL at 13:47

## 2022-03-30 RX ADMIN — Medication 1 TABLET: at 08:37

## 2022-03-30 RX ADMIN — ACETAMINOPHEN 325MG 650 MG: 325 TABLET ORAL at 23:37

## 2022-03-30 RX ADMIN — Medication 1 TABLET: at 17:44

## 2022-03-30 RX ADMIN — PRAVASTATIN SODIUM 80 MG: 40 TABLET ORAL at 08:37

## 2022-03-30 RX ADMIN — Medication 1000 UNITS: at 08:37

## 2022-03-30 RX ADMIN — OXYCODONE 5 MG: 5 TABLET ORAL at 04:51

## 2022-03-30 RX ADMIN — PANTOPRAZOLE SODIUM 40 MG: 40 TABLET, DELAYED RELEASE ORAL at 08:35

## 2022-03-30 RX ADMIN — ANTACID TABLETS 200 MG: 500 TABLET, CHEWABLE ORAL at 12:06

## 2022-03-30 RX ADMIN — SENNOSIDES AND DOCUSATE SODIUM 1 TABLET: 50; 8.6 TABLET ORAL at 17:44

## 2022-03-30 RX ADMIN — SODIUM CHLORIDE, PRESERVATIVE FREE 10 ML: 5 INJECTION INTRAVENOUS at 14:46

## 2022-03-30 RX ADMIN — ACETAMINOPHEN 325MG 650 MG: 325 TABLET ORAL at 06:56

## 2022-03-30 NOTE — PROGRESS NOTES
Problem: Falls - Risk of  Goal: *Absence of Falls  Description: Document Paul Shook Fall Risk and appropriate interventions in the flowsheet.   Outcome: Progressing Towards Goal  Note: Fall Risk Interventions:  Mobility Interventions: Assess mobility with egress test,Communicate number of staff needed for ambulation/transfer         Medication Interventions: Patient to call before getting OOB    Elimination Interventions: Call light in reach,Patient to call for help with toileting needs    History of Falls Interventions: Evaluate medications/consider consulting pharmacy,Investigate reason for fall         Problem: Patient Education: Go to Patient Education Activity  Goal: Patient/Family Education  Outcome: Progressing Towards Goal     Problem: Patient Education: Go to Patient Education Activity  Goal: Patient/Family Education  Outcome: Progressing Towards Goal     Problem: Hip Fracture: Day of Surgery Post-op Care  Goal: Off Pathway (Use only if patient is Off Pathway)  Outcome: Progressing Towards Goal  Goal: Activity/Safety  Outcome: Progressing Towards Goal  Goal: Consults, if ordered  Outcome: Progressing Towards Goal  Goal: Diagnostic Test/Procedures  Outcome: Progressing Towards Goal  Goal: Nutrition/Diet  Outcome: Progressing Towards Goal  Goal: Medications  Outcome: Progressing Towards Goal  Goal: Respiratory  Outcome: Progressing Towards Goal  Goal: Treatments/Interventions/Procedures  Outcome: Progressing Towards Goal  Goal: Psychosocial  Outcome: Progressing Towards Goal  Goal: *Absence of skin breakdown  Outcome: Progressing Towards Goal  Goal: *Optimal pain control at patient's stated goal  Outcome: Progressing Towards Goal  Goal: *Hemodynamically stable  Outcome: Progressing Towards Goal     Problem: Hip Fracture: Post-Op Day 1  Goal: Off Pathway (Use only if patient is Off Pathway)  Outcome: Progressing Towards Goal  Goal: Activity/Safety  Outcome: Progressing Towards Goal  Goal: Diagnostic Test/Procedures  Outcome: Progressing Towards Goal  Goal: Nutrition/Diet  Outcome: Progressing Towards Goal  Goal: Medications  Outcome: Progressing Towards Goal  Goal: Respiratory  Outcome: Progressing Towards Goal  Goal: Treatments/Interventions/Procedures  Outcome: Progressing Towards Goal  Goal: Psychosocial  Outcome: Progressing Towards Goal  Goal: Discharge Planning  Outcome: Progressing Towards Goal  Goal: *Demonstrates progressive activity  Outcome: Progressing Towards Goal  Goal: *Optimal pain control at patient's stated goal  Outcome: Progressing Towards Goal  Goal: *Hemodynamically stable  Outcome: Progressing Towards Goal  Goal: *Discharge plan identified  Outcome: Progressing Towards Goal  Goal: *Absence of skin breakdown  Outcome: Progressing Towards Goal

## 2022-03-30 NOTE — PROGRESS NOTES
Problem: Mobility Impaired (Adult and Pediatric)  Goal: *Acute Goals and Plan of Care (Insert Text)  Description: FUNCTIONAL STATUS PRIOR TO ADMISSION: Patient was modified independent using a rolling walker for functional mobility. She also sometimes \"pushes a w/c\" during gait. HOME SUPPORT PRIOR TO ADMISSION: The patient lived with spouse and spouse provided assistance with IADL's and some ADL's. 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 Pt does not utilize an AFO. Physical Therapy Goals  Initiated 3/30/2022    1. Patient will move from supine to sit and sit to supine , scoot up and down, and roll side to side in bed with modified independence within 4 days. 2. Patient will perform sit to stand with contact guard assist within 4 days. 3. Patient will ambulate with contact guard assist for 100 feet with the least restrictive device within 4 days. 4. Patient will ascend/descend 1 stairs with handrail(s) with minimal assistance/contact guard assist within 4 days. 5. Patient will perform BLE home exercise program per protocol with modified independence within 4 days. Outcome: Progressing Towards Goal     PHYSICAL THERAPY EVALUATION  Patient: Vanesa Davis (80 y.o. female)  Date: 3/30/2022  Primary Diagnosis: Hip fracture ([facility]) [S72.009A]  Procedure(s) (LRB):  LEFT HIP PERCUTANEOUS PINNING CANNULATED SCREWS (Left) 1 Day Post-Op   Precautions:   Fall,WBAT      ASSESSMENT  Nurse clears pt for mobility. Based on the objective data described below, the patient presents with L hip/LE pre-existing weakness/hemiparesis now on top of L hip fx and repair. She is mostly sliding LLE on the floor with gait but no toe drag witnessed today (pt reports it's mostly when she is fatigued during gait). She is motivated and initiating gait well right after surgical intervention. Pt has great family support. Continue to follow.     Current Level of Function Impacting Discharge (mobility/balance): bed mob. Min. ; transfers min./mod. x 2 ; gait with RW min. X 2    Functional Outcome Measure: The patient scored Total Score: 7/28 on the Tinetti outcome measure which is indicative of high fall risk. Other factors to consider for discharge: pt has supportive family     Patient will benefit from skilled therapy intervention to address the above noted impairments. PLAN :  Recommendations and Planned Interventions: bed mobility training, transfer training, gait training, therapeutic exercises, edema management/control, patient and family training/education, and therapeutic activities      Frequency/Duration: Patient will be followed by physical therapy:  daily to address goals. Recommendation for discharge: (in order for the patient to meet his/her long term goals)  Therapy 3 hours per day 5-7 days per week vs. SNF    This discharge recommendation:  Has been made in collaboration with the attending provider and/or case management    IF patient discharges home will need the following DME: patient owns DME required for discharge         SUBJECTIVE:   Patient stated I get his help with a lot of things.  (Pt referring to her spouse)    OBJECTIVE DATA SUMMARY:   HISTORY:    Past Medical History:   Diagnosis Date    Acid reflux     Endocrine disease     thyroid nodules    Stroke [facility])     Thyroid disease     thyroid nodules     Past Surgical History:   Procedure Laterality Date    HX GYN  1996    hysterectomy ?  BSO    HX ORTHOPAEDIC      back       Personal factors and/or comorbidities impacting plan of care:     Home Situation  Home Environment: Private residence  # Steps to Enter: 1  Rails to Enter: Yes  Hand Rails : Right  One/Two Story Residence: One story  Living Alone: No  Support Systems: Spouse/Significant Other  Patient Expects to be Discharged to[de-identified] Rehab hospital/unit acute  Current DME Used/Available at Home: Grab bars,Commode, bedside,Walker, rolling,Cane, straight,Wheelchair,Other (comment) (built in seat in shower ; rail on bed to pull on)  Tub or Shower Type: Shower    EXAMINATION/PRESENTATION/DECISION MAKING:   Critical Behavior:  Neurologic State: Alert,Appropriate for age  Orientation Level: Appropriate for age,Disoriented to place,Oriented to person,Oriented to time,Oriented to situation  Cognition: Follows commands,Appropriate for age attention/concentration  Safety/Judgement: Awareness of environment  Hearing: Auditory  Auditory Impairment: None  Skin:  Intact (L hip bandage dry/clean)  Edema: L hip (post-op)  Range Of Motion:  AROM: Generally decreased, functional           PROM: Generally decreased, functional           Strength:    Strength: Generally decreased, functional                    Tone & Sensation:   Tone: Abnormal (L ankle)              Sensation: Impaired (since back sx, LLE)               Coordination:  Coordination: Within functional limits  Vision:   Acuity:  (not formally assessed)  Corrective Lenses: Reading glasses  Functional Mobility:  Bed Mobility:     Supine to Sit: Minimum assistance     Scooting: Minimum assistance  Transfers:  Sit to Stand: Minimum assistance; Moderate assistance; Additional time;Assist x2  Stand to Sit: Minimum assistance; Moderate assistance;Assist x2                       Balance:   Sitting: Intact  Standing: Impaired; With support  Standing - Static: Constant support; Fair  Standing - Dynamic : Constant support; Fair    Ambulation/Gait Training:  Distance (ft): 15 Feet (ft)  Assistive Device: Gait belt;Walker, rolling  Ambulation - Level of Assistance: Minimal assistance; Additional time;Assist x2     Gait Description (WDL): Exceptions to WDL  Gait Abnormalities: Antalgic;Decreased step clearance; Step to gait     Left Side Weight Bearing: As tolerated  Base of Support: Widened     Speed/Perlita: Slow  Step Length: Left shortened;Right shortened        Interventions: Safety awareness training; Tactile cues; Verbal cues       Functional Measure:  Tinetti test:    Sitting Balance: 1  Arises: 0  Attempts to Rise: 0  Immediate Standing Balance: 1  Standing Balance: 0  Nudged: 1  Eyes Closed: 1  Turn 360 Degrees - Continuous/Discontinuous: 0  Turn 360 Degrees - Steady/Unsteady: 0  Sitting Down: 1  Balance Score: 5 Balance total score  Indication of Gait: 0  R Step Length/Height: 0  L Step Length/Height: 0  R Foot Clearance: 1  L Foot Clearance: 0  Step Symmetry: 0  Step Continuity: 0  Path: 1  Trunk: 0  Walking Time: 0  Gait Score: 2 Gait total score  Total Score: 7/28 Overall total score         Tinetti Tool Score Risk of Falls  <19 = High Fall Risk  19-24 = Moderate Fall Risk  25-28 = Low Fall Risk  Tinetti ME. Performance-Oriented Assessment of Mobility Problems in Elderly Patients. [facility]; [id number].  (Scoring Description: PT Bulletin Feb. 10, 1993)    Older adults: Cuate Silva et al, 2009; n = 1000 [facility] elderly evaluated with ABC, RICK, ADL, and IADL)  · Mean RICK score for males aged 69-68 years = 26.21(3.40)  · Mean RICK score for females age 69-68 years = 25.16(4.30)  · Mean RCIK score for males over 80 years = 23.29(6.02)  · Mean RICK score for females over 80 years = 17.20(8.32)        Physical Therapy Evaluation Charge Determination   History Examination Presentation Decision-Making   MEDIUM  Complexity : 1-2 comorbidities / personal factors will impact the outcome/ POC  MEDIUM Complexity : 3 Standardized tests and measures addressing body structure, function, activity limitation and / or participation in recreation  MEDIUM Complexity : Evolving with changing characteristics  Other outcome measures Tinetti  MEDIUM      Based on the above components, the patient evaluation is determined to be of the following complexity level: MEDIUM    Pain Rating:  C/o 5/10 pain L hip with mobility    Activity Tolerance:   Fair      After treatment patient left in no apparent distress:   Sitting in chair, Call bell within reach, Caregiver / family present, and nurse notified     COMMUNICATION/EDUCATION:   The patients plan of care was discussed with: Occupational therapist and Registered nurse. Fall prevention education was provided and the patient/caregiver indicated understanding., Patient/family have participated as able in goal setting and plan of care. , and Patient/family agree to work toward stated goals and plan of care.     Thank you for this referral.  Elie Chanel, PT, DPT   Time Calculation: 41 mins

## 2022-03-30 NOTE — DISCHARGE INSTRUCTIONS
Discharge Instructions: How to [facility] [employer]  Surgery: Hip Fracture Repair  Surgeon:   Huber Gupta DO  Surgery Date:  3/29/2022     To relieve pain:   Use ice/gel packs.  Put the ice pack directly over the wound, or anywhere you are hurting or swollen.  To control pain and swelling, keep ice on regularly, especially after physical activity.  The packs should stay cold for 3-4 hours. When it is not cold anymore, rotate with the packs in the freezer.  Elevate your leg. This will also keep swelling down.  Rest for at least 20 minutes between activity or exercises.  To keep track of your pain medications, write down what you take and when you take it.  The last dose of pain medication you got in the hospital was:     Medication    Dose    Date & Time           Choose your medications based on the pain scale below:     To keep your pain under control, take Tylenol every 6 hours for 14 days - even if you feel like you dont need it.  For mild to moderate pain (1-6 on pain scale), take one pain pill every 3-4 hours as needed.  For severe pain (7-10 on pain scale), take two pain pills every 3-4 hours as needed.  To prevent nausea, take your pain medications with food. Pain Scale                 As your pain lessens:     Slowly start taking less pain medication. You may do this by waiting longer between doses or by taking smaller doses.  Stop using the pain medications as soon as you no longer need it, usually in 2-3 weeks.  Lovenox   To prevent blood clots, you will will need to take    Lovenox 40 mg  daily for 21 days.  It is an injection that you receive in the side of your  stomach.  Your nursing will teach you or a caregiver how to     do this before you go home.               To prevent stomach upset or bleeding:   Take Pepcid 20 mg twice a day, or a similar home medication, while you are taking a blood thinner.  Do not take non-steroidal anti-inflammatory (NSAID) medications (Ibuprofen, Advil, Motrin, Naproxen, etc.)                                                 OPSITE (Honeycomb dressing)     Keep your clear, waterproof dressing in place for 5-7 days after your leave the hospital.      If you are still having drainage, you will need to change your dressing in 5-7 days. You will be given one extra dressing to use at home.  If there is no more drainage from the wound, you may leave it open to the air. OPSITE DRESSING INSTRUCTIONS                 DO NOTs:   Do not rub your surgical wound   Do not put lotion or oils on your wound.  Do not take a tub bath or go swimming until your doctor says it is ok.  You may shower with this dressing over your wound.  After showering pat the dressing dry.  If you have staples a home health nurse will remove them in about 10 days.  To increase and promote healing:     Stop Smoking (or at least cut back on       Smoking).  Eat a well-balanced diet (high in protein       and vitamin C).  If you have a poor appetite, drink Ensure, Glucerna, or CarnationInstant Breakfast for the next 30 days.  If you are diabetic, you should control your blood         sugars to prevent infection and help your wound         to heal.       To prevent constipation, stay active & drink plenty of fluid.  While using pain medications, you should also take stool softeners and laxatives, such as Pericolace and Miralax.  If you are having too many bowel movements, then you may need  to stop taking the laxatives.  You should have a bowel movement 3-4 days after surgery and then at least every other day while on pain medication.  To improve your recovery, you must be active!      WEIGHT BEARING   As Tolerated = You can put as much weight on your leg as you can tolerate while walking.  THERAPY   If you go to a rehab facility, physical therapy (PT) will need to work with you daily, and sometimes twice a day to teach you how to:     Get in and out of the bed   Walk (gait training) and climb stairs   Do exercises and gain strength   Use a walker, crutches or cane     You may also need to have occupational therapy (OT) work with you to help you practice:     Getting on and off the toilet   Self-care (brushing teeth, eating, bathing, etc)     If you go directly home, home health physical therapy will come the day after you leave the hospital.  They will visit about 4 times over the next couple of weeks to teach you how to get out of bed, to safely walk in your home, and to do your exercises.  NO DRIVING until your surgeon tells you it is ok.  You can return to work when cleared by a physician.  Please call your physician immediately if you have:     Constant bleeding from your wound.  Increasing redness or swelling around your wound (Some warmth, bruising and swelling is normal).  Change in wound drainage (increase in amount, color, or bad smell).  Change in mental status (unusual behavior)     Temperature over 101.5 degrees Fahrenheit     Increased pain, swelling, or redness in the calf (back of your lower leg), thigh, ankle or foot.  Emergency: CALL 911 if you have:   Shortness of breath   Chest pain when you cough or taking a deep breath     Please call your surgeons office at [address] [place] [address] for a follow up appointment.  You should call as soon as you get home or the next day after discharge. Ask to make an appointment in 2 weeks.

## 2022-03-30 NOTE — PROGRESS NOTES
Transition of Care Plan:    RUR: 9%  Disposition: IPR vs SNF pending progress   Follow up appointments: PCP, specialists as indicated  DME needed: has [address], [address], [address] [facility], wheelchair, and shower chair at home   Transportation at Discharge: likely medical transport   [address] or means to access home: yes    IM Medicare Letter: to be reviewed prior to d/c   Is patient a BCPI-A Bundle: No       If yes, was Bundle Letter given?:    Is patient a  and connected with the [place]? No             If yes, was [employer] transfer form completed and [facility] notified? Caregiver Contact: Diego Engel (spouse) [phone]  Discharge Caregiver contacted prior to discharge? To be contacted prior to d/c   Care Conference needed?:  No    Reason for Admission:  Hip fracture                      RUR Score:  9%                  Plan for utilizing home health:  No       PCP: First and Last name:  Pamela Muhammad MD     Name of Practice:    Are you a current patient: Yes/No: yes   Approximate date of last visit: 6 months ago    Can you participate in a virtual visit with your PCP: yes                    Current Advanced Directive/Advance Care Plan: Full Code      Advance Care Planning   Healthcare Decision Maker: Diego Engel (spouse)       Today we documented Decision Maker(s) consistent with Legal Next of Kin hierarchy. Transition of Care Plan:  IPR vs SNF placement     Chart reviewed. Met with pt at bedside to introduce self and role. Verified contact information and demographics. Pt resides with spouse in a 2 level home with 1 AMA. Pt reports they reside on first floor of the home. Pt ambulates with a cane and/or RW at home when she feels that she needs it. She has access to a wheelchair and shower chair as well. She is typically independent with ADLs. Her spouse shares household tasks/IADLs with pt at home. Spouse typically drives and can transport home at d/c if able to travel by car.  PCP is Dr. Zofia Gould with last visit about 6 months ago. Preferred pharmacy is [facility]. No prior hx of SNF stay. Pt with hx of IPR stay at [address] [place] [address] about 2 years ago. She is interested in returning to [facility] and prefers [facility] again. Will send referral and continue to follow. Care Management Interventions  PCP Verified by CM:  Yes  Palliative Care Criteria Met (RRAT>21 & CHF Dx)?: No  Mode of Transport at Discharge: BLS  Transition of Care Consult (CM Consult): Discharge Planning  Discharge Durable Medical Equipment: No  Physical Therapy Consult: Yes  Occupational Therapy Consult: Yes  Speech Therapy Consult: No  Support Systems: Spouse/Significant Other,Child(grazyna)  Confirm Follow Up Transport: Family  The Patient and/or Patient Representative was Provided with a Choice of Provider and Agrees with the Discharge Plan?: Yes  Freedom of Choice List was Provided with Basic Dialogue that Supports the Patient's Individualized Plan of Care/Goals, Treatment Preferences and Shares the Quality Data Associated with the Providers?: No  [place] Resource Information Provided?: No  Discharge Location  Patient Expects to be Discharged to[de-identified] Rehab hospital/unit acute    JO ANN Carroll   Care Manager, [address]  [phone]

## 2022-03-30 NOTE — PROGRESS NOTES
ORTHO - Progress Note  Post Op day: 1 Day [facility]     [id number]  female    80 y.o.    1940    Admit date:3/29/2022  Date of Surgery:3/29/2022   Procedures:Procedure(s):LEFT HIP PERCUTANEOUS PINNING CANNULATED SCREWS  Surgeon:Surgeon(s) and Role:   Mello Enciso Age, DO - Primary        SUBJECTIVE:     Krystyna Napier is a 80 y.o. female sitting on the edge of the bed working with PT/OT. States she is feeling much better than before surgery. Patient has complaints of appropriate post-op pain, tolerating PO pain medications, PRN OXY . Denies F/C, nausea, vomiting, dizziness, lightheadedness, chest pain, or shortness of breath. OBJECTIVE:       Physical Exam:  General: alert, cooperative, no distress. Gastrointestinal:  non-distended . Cardiovascular: equal pulses in the  lower extremities,  Brisk cap refill in all distal extremities   Genitourinary: Voiding independently   Respiratory: No respiratory distress   Neurological:Neurovascular exam within normal limits. Senstion intact: LE bilat. Motor: + DF/PF/EHL. Active knee ext w/o discomfort  Musculoskeletal: Austyn's sign negative in bilateral lower extremities. Calves soft, supple, non-tender upon palpation or with passive stretch. Dressing/Wound:  Clean, dry and intact. No significant erythema or swelling.     Vital Signs:       Patient Vitals for the past 8 hrs:   BP Temp Pulse Resp SpO2   22 1146 121/72 97.8 °F (36.6 °C) 88 16 95 %   22 1120 120/73  93  94 %   22 1110 127/70  92  93 %   22 1102     90 %   22 1057 124/61  88  92 %   22 0717 (!) 153/76 97.8 °F (36.6 °C) 79 16 95 %                                          Temp (24hrs), Av.9 °F (36.6 °C), Min:97.5 °F (36.4 °C), Max:98.6 °F (37 °C)      Labs:        Recent Labs     22  0208 22  0930 22  0930   HCT 43.2   < > 46.7   HGB 14.0   < > 15.3   INR  --   --  1.0    < > = values in this interval not displayed.      PT/OT:              ASSESSMENT / PLAN:   Active Problems:    Hip fracture ([address] (3/29/2022)         -  Continue PT/OT WBAT  -  DVT prophylaxis- SCD w/ Lovenox 40QD  -  DC planning - SNF     Signed By: Betito Loera PA-C

## 2022-03-30 NOTE — PROGRESS NOTES
Bedside and Verbal shift change report given to Elizabeth (oncoming nurse) by Sly Ashley (offgoing nurse). Report included the following information SBAR and Kardex.

## 2022-03-30 NOTE — PROGRESS NOTES
Problem: Self Care Deficits Care Plan (Adult)  Goal: *Acute Goals and Plan of Care (Insert Text)  Description: FUNCTIONAL STATUS PRIOR TO ADMISSION: Pt lives with her  and reports that she is independent in self care bathing and dressing.  performs IADLs. Pt has hx of CVA and has LLE weakness/foot drop (no brace); she uses a RW or pushes a w/c at baseline. HOME SUPPORT: The patient lived with  and has family support. Occupational Therapy Goals  Initiated 3/30/2022  1. Patient will perform grooming in standing with minimal assistance/contact guard assist within 7 day(s). 2.  Patient will perform lower body bathing using adaptive aids, prn,  with minimal assistance/contact guard assist within 7 day(s). 3.  Patient will perform standing adls for at least 5 minutes with minimal assistance/contact guard assist within 7 day(s). 4.  Patient will ambulate to bathroom to  perform toilet transfers with moderate assistance  within 7 day(s). 5.  Patient will perform all aspects of toileting with moderate assistance  within 7 day(s). 6.  Patient will participate in upper extremity therapeutic exercise/activities with supervision/set-up for 5 minutes within 7 day(s). Outcome: Resolved/Not Met       OCCUPATIONAL THERAPY EVALUATION  Patient: Vanesa Davis (80 y.o. female)  Date: 3/30/2022  Primary Diagnosis: Hip fracture ([facility] [S72.009A]  Procedure(s) (LRB):  LEFT HIP PERCUTANEOUS PINNING CANNULATED SCREWS (Left) 1 Day Post-Op   Precautions:   Fall,WBAT    ASSESSMENT  Based on the objective data described below, the patient presents with hx of cva with baseline LLE weakness, decreased balance, controlled pain 5/10, generalized weakness and decreased functional tolerance impairing adls and functional mobility on POD 1 of L hip pinning surgery. Pt reports independence at baseline for self care and is functioning below her baseline at this time.   Pt tolerated treatment well today and was able to perform adl mobility with assist X2, VS stable and eating her lunch in chair. Pt will benefit from rehab at discharge. .    Current Level of Function Impacting Discharge (ADLs/self-care): up to max A for adls at this time. Assist X2 for mobility using RW    Functional Outcome Measure: The patient scored Total: 35/100 on the Barthel Index outcome measure which is indicative of being \"very depedent\"  in basic self-care. Other factors to consider for discharge: hx of cva affecting L side, mostly LLE (does not have brace)--went to [facility] for rehab post stroke     Patient will benefit from skilled therapy intervention to address the above noted impairments. PLAN :  Recommendations and Planned Interventions: self care training, functional mobility training, therapeutic exercise, balance training, therapeutic activities, endurance activities, patient education, home safety training and family training/education    Frequency/Duration: Patient will be followed by occupational therapy 5 times a week to address goals. Recommendation for discharge: (in order for the patient to meet his/her long term goals)  Therapy up to 5 days/week in SNF setting vs. Inpatient rehab  (has been to [facility] in past for cva)    This discharge recommendation:  Has not yet been discussed the attending provider and/or case management    IF patient discharges home will need the following DME: tbd        SUBJECTIVE:   Patient stated my  does all that .   (IADLs)    OBJECTIVE DATA SUMMARY:   HISTORY:   Past Medical History:   Diagnosis Date    Acid reflux     Endocrine disease     thyroid nodules    St[address]     Thyroid disease     thyroid nodules     Past Surgical History:   Procedure Laterality Date    HX GYN  1996    hysterectomy ?  BSO    HX ORTHOPAEDIC      back       Expanded or extensive additional review of patient history:  Back surgery many years ago, went to [facility] post stroke    [address] Environment: Private residence  # Steps to Enter: 1  Rails to Enter: Yes  [employer] : Right  One/Two Story Residence: One story  Living Alone: No  Support Systems: Spouse/Significant Other  Patient Expects to be Discharged to[de-identified] Rehab hospital/unit acute  Current DME Used/Available at Home: Grab bars,Commode, bedside,Walker, rolling,Cane, straight,Wheelchair,Other (comment) (built in seat in shower ; rail on bed to pull on)  Tub or Shower Type: Shower    Hand dominance: Right    EXAMINATION OF PERFORMANCE DEFICITS:  Cognitive/Behavioral Status:  Neurologic State: Alert; Appropriate for age  Orientation Level: Appropriate for age;Disoriented to place;Oriented to person;Oriented to time;Oriented to situation  Cognition: Follows commands; Appropriate for age attention/concentration        Safety/Judgement: Awareness of environment    Skin: surgical dressing clean and intact    Edema: expected post surgery    Hearing: Auditory  Auditory Impairment: None    Vision/Perceptual:                           Acuity:  (not formally assessed)    Corrective Lenses: Reading glasses    Range of Motion:  BUEs:    AROM: Generally decreased, functional  PROM: Generally decreased, functional                      Strength:  BUEs:    Strength: Generally decreased, functional                Coordination:  Coordination: Within functional limits  Fine Motor Skills-Upper: Left Intact; Right Intact    Gross Motor Skills-Upper: Left Intact; Right Intact (has mild residual LUE-functional-  s/p CVA-pt is R dominant)    Tone & Sensation:    Tone: Abnormal (L ankle)  Sensation: Impaired (since back sx, LLE)                      Balance:  Sitting: Intact  Standing: Impaired; With support  Standing - Static: Constant support; Fair  Standing - Dynamic : Constant support; Fair    Functional Mobility and Transfers for ADLs:  Bed Mobility:  Supine to Sit: Minimum assistance  Scooting: Minimum assistance    Transfers:  Sit to Stand: Minimum assistance; Moderate assistance; Additional time;Assist x2  Stand to Sit: Minimum assistance; Moderate assistance;Assist x2  Assistive Device : Walker    ADL Assessment:  Feeding: Independent    Oral Facial Hygiene/Grooming: Stand-by assistance;Setup (seated or at bed level)    Bathing: Moderate assistance  (needs assist for lower body)    Upper Body Dressing: Minimum assistance    Lower Body Dressing: Total assistance    Toileting: Maximum assistance                ADL Intervention and task modifications:     Pt incontinent with standing during transfer training. States at home she wears incontinent pads. A brief was placed for her protection. Pt able to take steps in room using RW requiring assist X2, transfer to [facility] and to chair this session. Needs assist with all adls at this time. She is a good rehab candidate. Cognitive Retraining  Safety/Judgement: Awareness of environment    Therapeutic Exercise:  Encouraged seated exercises   Functional Measure:    Barthel Index:  Bathin  Bladder: 0  Bowels: 10  Groomin  Dressin  Feeding: 10  Mobility: 0  Stairs: 0  Toilet Use: 0  Transfer (Bed to Chair and Back): 5  Total: 35/100      The Barthel ADL Index: Guidelines  1. The index should be used as a record of what a patient does, not as a record of what a patient could do. 2. The main aim is to establish degree of independence from any help, physical or verbal, however minor and for whatever reason. 3. The need for supervision renders the patient not independent. 4. A patient's performance should be established using the best available evidence. Asking the patient, friends/relatives and nurses are the usual sources, but direct observation and common sense are also important. However direct testing is not needed. 5. Usually the patient's performance over the preceding 24-48 hours is important, but occasionally longer periods will be relevant.   6. Middle categories imply that the patient supplies over 50 per cent of the effort. 7. Use of aids to be independent is allowed. Score Interpretation (from [facility])    Independent   60-79 Minimally independent   40-59 Partially dependent   20-39 Very dependent   <20 Totally dependent     -Boo Esteban., Barthel, D.W. (1965). Functional evaluation: the Barthel Index. [address] ([address] (). The Barthel activities of daily living index: self-reporting versus actual performance in the old (> or = 75 years). Journal of [address] [place] [address] 45(7), [facility], J.SIVANF, Mildred Dent., Angela Jackson. (1999). Measuring the change in disability after inpatient rehabilitation; comparison of the responsiveness of the Barthel Index and Functional Haakon Measure. Journal of Neurology, Neurosurgery, and Psychiatry, 66(4), 226-924. Lucero Telles, N.J.A, DIXIE Fine, & Daniella Peres, M.A. (2004) Assessment of post-stroke quality of life in cost-effectiveness studies: The usefulness of the Barthel Index and the EuroQoL-5D. Quality of Life Research, 15, 351-46     Occupational Therapy Evaluation Charge Determination   History Examination Decision-Making   MEDIUM Complexity : Expanded review of history including physical, cognitive and psychosocial  history  MEDIUM Complexity : 3-5 performance deficits relating to physical, cognitive , or psychosocial skils that result in activity limitations and / or participation restrictions MEDIUM Complexity : Patient may present with comorbidities that affect occupational performnce.  Miniml to moderate modification of tasks or assistance (eg, physical or verbal ) with assesment(s) is necessary to enable patient to complete evaluation       Based on the above components, the patient evaluation is determined to be of the following complexity level: MEDIUM  Pain Ratin/10--did not change at rest nor during mobility    Activity Tolerance:   Fair, desaturates with exertion and requires oxygen and requires rest breaks   Was on 1/2 Liter NC upon arrival.    Placed on 1 liter nasal cannula due to desat to 90 on room air at rest  BP stable no complaints of dizziness this session. After treatment patient left in no apparent distress:    Sitting in chair in preparation for lunch, Call bell within reach and Caregiver / family present    COMMUNICATION/EDUCATION:   The patients plan of care was discussed with: Physical therapist and Registered nurse. Patient/family have participated as able in goal setting and plan of care. This patients plan of care is appropriate for delegation to [facility].     Thank you for this referral.  Sridhar Kaye, OTR/L  Time Calculation: 41 mins

## 2022-03-30 NOTE — PROGRESS NOTES
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 Discussed with RN events overnight. Review of Systems:  Symptom Y/N Comments  Symptom Y/N Comments   Fever/Chills n   Chest Pain n    Poor Appetite n   Edema n    Cough n   Abdominal Pain n    Sputum n   Joint Pain     SOB/AVITIA n   Pruritis/Rash     Nausea/vomit n   Tolerating PT/OT     Diarrhea n   Tolerating Diet     Constipation n   Other       Could NOT obtain due to:      Objective:     VITALS:   Last 24hrs VS reviewed since prior progress note.  Most recent are:  Patient Vitals for the past 24 hrs:   Temp Pulse Resp BP SpO2   22 1146 97.8 °F (36.6 °C) 88 16 121/72 95 %   22 1120  93  120/73 94 %   22 1110  92  127/70 93 %   22 1102     90 %   22 1057  88  124/61 92 %   22 0717 97.8 °F (36.6 °C) 79 16 (!) 153/76 95 %   22 0345 97.5 °F (36.4 °C) 74 16 (!) 142/71 95 %   22 2342 98.1 °F (36.7 °C) 70 16 114/63 97 %   22 1956 98.3 °F (36.8 °C) 78 16 117/74 95 %   03/29/22 1943 97.7 °F (36.5 °C) 84 16 124/65 95 %   03/29/22 1822 97.6 °F (36.4 °C) 95 16 (!) 150/75 94 %   03/29/22 1732 98 °F (36.7 °C) (!) 103 18 (!) 143/83 94 %   03/29/22 1632 98 °F (36.7 °C) 90 20 131/77 97 %   03/29/22 1532 97.6 °F (36.4 °C) 80 22 (!) 159/81 96 %   03/29/22 1500 98.6 °F (37 °C) 76 26 132/66 97 %   03/29/22 1445  89 22 130/77 95 %   03/29/22 1430  74 18 (!) 138/56 98 %   03/29/22 1425  74 21 138/68 97 %   03/29/22 1420  74 11 134/63 96 %   03/29/22 1415  82 15 133/76 97 %   03/29/22 1410  82 12 132/71 97 %   03/29/22 1405  90 11 136/73 97 %   03/29/22 1402 97.7 °F (36.5 °C) 97 19 136/70 98 %       Intake/Output Summary (Last 24 hours) at 3/30/2022 1347  Last data filed at 3/30/2022 0552  Gross per 24 hour   Intake 100 ml   Output 1435 ml   Net -1335 ml        PHYSICAL EXAM:  General: WD, WN. Alert, cooperative, no acute distress    EENT:  EOMI. Anicteric sclerae. MMM  Resp:  CTA bilaterally, no wheezing or rales. No accessory muscle use  CV:  Regular  rhythm,  No edema  GI:  Soft, Non distended, Non tender.  +Bowel sounds  Neurologic:  Alert and oriented X 3, normal speech,   Psych:   Good insight. Not anxious nor agitated  Skin:  No rashes. No jaundice    Reviewed most current lab test results and cultures  YES  Reviewed most current radiology test results   YES  Review and summation of old records today    NO  Reviewed patient's current orders and MAR    YES  PMH/SH reviewed - no change compared to H&P  ________________________________________________________________________  Care Plan discussed with:    Comments   Patient x    Family  x    RN x    Care Manager     Consultant  xx  orthopedic                    x Multidiciplinary team rounds were held today with , nursing, pharmacist and clinical coordinator. Patient's plan of care was discussed; medications were reviewed and discharge planning was addressed. ________________________________________________________________________  Total NON critical care TIME: 30   Minutes    Total CRITICAL CARE TIME Spent:   Minutes non procedure based      Comments   >50% of visit spent in counseling and coordination of care     ________________________________________________________________________  Shawnee Yanes MD     Procedures: see electronic medical records for all procedures/Xrays and details which were not copied into this note but were reviewed prior to creation of Plan. LABS:  I reviewed today's most current labs and imaging studies.   Pertinent labs include:  Recent Labs     03/30/22  0208 03/29/22  0930   WBC 11.5* 5.9   HGB 14.0 15.3   HCT 43.2 46.7    174     Recent Labs     03/30/22  0208 03/29/22  0930    143   K 4.1 4.1   * 110*   CO2 25 30   * 98   BUN 13 12   CREA 0.70 0.73   CA 8.6 8.8   ALB 3.0* 3.4*   TBILI 0.8 0.9   ALT 20 24   INR  --  1.0       Signed: Shawnee Yanes MD

## 2022-03-30 NOTE — PROGRESS NOTES
.End of Shift Note    Bedside shift change report given to Anderson WONG (oncoming nurse) by Rupal Mortensen (offgoing nurse). Report included the following information SBAR, Kardex, Intake/Output and MAR    Shift worked:  9637-0060     Shift summary and any significant changes:     no changes overnight. Ramon pulled at 2711     Concerns for physician to address:        Zone phone for oncoming shift:   [phone]       Activity:  Activity Level: Up with Assistance  Number times ambulated in hallways past shift: 0  Number of times OOB to chair past shift: 0    Cardiac:   Cardiac Monitoring: No      Cardiac Rhythm: Sinus Rhythm    Access:   Current line(s): PIV     Genitourinary:   Urinary status: due to void    Respiratory:   O2 Device: Nasal cannula  Chronic home O2 use?: NO  Incentive spirometer at bedside: YES       GI:  Last Bowel Movement Date: 03/27/22  Current diet:  ADULT ORAL NUTRITION SUPPLEMENT AM Snack; Standard High Calorie/High Protein  ADULT DIET Regular  Passing flatus: YES  Tolerating current diet: YES       Pain Management:   Patient states pain is manageable on current regimen: YES    Skin:  Calderon Score: 20  Interventions: increase time out of bed    Patient Safety:  Fall Score:  Total Score: 4  Interventions: bed/chair alarm, assistive device (walker, cane, etc), gripper socks and pt to call before getting OOB  High Fall Risk: Yes    Length of Stay:  Expected LOS: 4d 7h  [facility]

## 2022-03-30 NOTE — PROGRESS NOTES
Problem: Patient Education: Go to Patient Education Activity  Goal: Patient/Family Education  Outcome: Progressing Towards Goal     Problem: Hip Fracture: Day of Surgery Post-op Care  Goal: Off Pathway (Use only if patient is Off Pathway)  Outcome: Progressing Towards Goal  Goal: Activity/Safety  Outcome: Progressing Towards Goal  Goal: Consults, if ordered  Outcome: Progressing Towards Goal  Goal: Diagnostic Test/Procedures  Outcome: Progressing Towards Goal  Goal: Nutrition/Diet  Outcome: Progressing Towards Goal  Goal: Medications  Outcome: Progressing Towards Goal  Goal: Respiratory  Outcome: Progressing Towards Goal  Goal: Psychosocial  Outcome: Progressing Towards Goal  Goal: *Absence of skin breakdown  Outcome: Progressing Towards Goal  Goal: *Optimal pain control at patient's stated goal  Outcome: Progressing Towards Goal  Goal: *Hemodynamically stable  Outcome: Progressing Towards Goal

## 2022-03-31 LAB
HGB BLD-MCNC: 12.1 G/DL (ref 11.5–16)
SARS-COV-2, COV2: NORMAL

## 2022-03-31 PROCEDURE — 65270000029 HC RM PRIVATE

## 2022-03-31 PROCEDURE — U0005 INFEC AGEN DETEC AMPLI PROBE: HCPCS

## 2022-03-31 PROCEDURE — 36415 COLL VENOUS BLD VENIPUNCTURE: CPT

## 2022-03-31 PROCEDURE — 97535 SELF CARE MNGMENT TRAINING: CPT | Performed by: OCCUPATIONAL THERAPIST

## 2022-03-31 PROCEDURE — 97116 GAIT TRAINING THERAPY: CPT

## 2022-03-31 PROCEDURE — 97110 THERAPEUTIC EXERCISES: CPT

## 2022-03-31 PROCEDURE — 74011250636 HC RX REV CODE- 250/636: Performed by: ORTHOPAEDIC SURGERY

## 2022-03-31 PROCEDURE — 85018 HEMOGLOBIN: CPT

## 2022-03-31 PROCEDURE — 77010033678 HC OXYGEN DAILY

## 2022-03-31 PROCEDURE — 74011250637 HC RX REV CODE- 250/637: Performed by: INTERNAL MEDICINE

## 2022-03-31 PROCEDURE — 94760 N-INVAS EAR/PLS OXIMETRY 1: CPT

## 2022-03-31 PROCEDURE — 74011000250 HC RX REV CODE- 250: Performed by: ORTHOPAEDIC SURGERY

## 2022-03-31 PROCEDURE — 74011250637 HC RX REV CODE- 250/637: Performed by: ORTHOPAEDIC SURGERY

## 2022-03-31 RX ADMIN — Medication 1 AMPULE: at 20:10

## 2022-03-31 RX ADMIN — PANTOPRAZOLE SODIUM 40 MG: 40 TABLET, DELAYED RELEASE ORAL at 05:53

## 2022-03-31 RX ADMIN — SENNOSIDES AND DOCUSATE SODIUM 1 TABLET: 50; 8.6 TABLET ORAL at 08:56

## 2022-03-31 RX ADMIN — SENNOSIDES AND DOCUSATE SODIUM 1 TABLET: 50; 8.6 TABLET ORAL at 17:28

## 2022-03-31 RX ADMIN — Medication 1 TABLET: at 17:28

## 2022-03-31 RX ADMIN — SODIUM CHLORIDE, PRESERVATIVE FREE 10 ML: 5 INJECTION INTRAVENOUS at 13:31

## 2022-03-31 RX ADMIN — Medication 1 TABLET: at 11:08

## 2022-03-31 RX ADMIN — ACETAMINOPHEN 325MG 650 MG: 325 TABLET ORAL at 23:54

## 2022-03-31 RX ADMIN — PRAVASTATIN SODIUM 80 MG: 40 TABLET ORAL at 08:55

## 2022-03-31 RX ADMIN — ACETAMINOPHEN 325MG 650 MG: 325 TABLET ORAL at 05:52

## 2022-03-31 RX ADMIN — ACETAMINOPHEN 325MG 650 MG: 325 TABLET ORAL at 11:08

## 2022-03-31 RX ADMIN — Medication 1 AMPULE: at 09:05

## 2022-03-31 RX ADMIN — ASPIRIN 81 MG: 81 TABLET, CHEWABLE ORAL at 08:56

## 2022-03-31 RX ADMIN — ENOXAPARIN SODIUM 40 MG: 40 INJECTION SUBCUTANEOUS at 08:56

## 2022-03-31 RX ADMIN — OXYCODONE 5 MG: 5 TABLET ORAL at 08:56

## 2022-03-31 RX ADMIN — SODIUM CHLORIDE, PRESERVATIVE FREE 10 ML: 5 INJECTION INTRAVENOUS at 21:19

## 2022-03-31 RX ADMIN — Medication 1000 UNITS: at 08:55

## 2022-03-31 RX ADMIN — ACETAMINOPHEN 325MG 650 MG: 325 TABLET ORAL at 17:27

## 2022-03-31 RX ADMIN — OXYCODONE 5 MG: 5 TABLET ORAL at 21:20

## 2022-03-31 RX ADMIN — SODIUM CHLORIDE, PRESERVATIVE FREE 10 ML: 5 INJECTION INTRAVENOUS at 05:53

## 2022-03-31 RX ADMIN — POLYETHYLENE GLYCOL 3350 17 G: 17 POWDER, FOR SOLUTION ORAL at 08:56

## 2022-03-31 RX ADMIN — Medication 1 TABLET: at 08:55

## 2022-03-31 NOTE — PROGRESS NOTES
End of Shift Note    Bedside shift change report given to Massimo Cox RN (oncoming nurse) by Ronel Alvarez RN (offgoing nurse). Report included the following information SBAR, Kardex, Intake/Output, MAR and Recent Results    Shift worked:  Day     Shift summary and any significant changes:    Patient has had stable vitals signs and able to ambulate with one assist and walker. Concerns for physician to address:  n/a     Zone phone for oncoming shift:  [phone]     Activity:  Activity Level: Up with Assistance  Number times ambulated in hallways past shift: 0  Number of times OOB to chair past shift: 0    Cardiac:   Cardiac Monitoring: No      Cardiac Rhythm: Sinus Rhythm    Access:   Current line(s): PIV     Genitourinary:   Urinary status: voiding    Respiratory:   O2 Device: None (Room air)  Chronic home O2 use?: NO  Incentive spirometer at bedside: YES  Actual Volume (ml): 975 ml    GI:  Last Bowel Movement Date: 03/27/22  Current diet:  ADULT ORAL NUTRITION SUPPLEMENT AM Snack; Standard High Calorie/High Protein  ADULT DIET Regular  Passing flatus: YES  Tolerating current diet: YES       Pain Management:   Patient states pain is manageable on current regimen: YES    Skin:  Calderon Score: 19  Interventions: float heels and increase time out of bed    Patient Safety:  Fall Score:  Total Score: 4  Interventions: gripper socks and pt to call before getting OOB  High Fall Risk: Yes    Length of Stay:  Expected LOS: 4d 7h  Actual LOS: 2      Ronel Alvarez RN

## 2022-03-31 NOTE — PROGRESS NOTES
Problem: Self Care Deficits Care Plan (Adult)  Goal: *Acute Goals and Plan of Care (Insert Text)  Description: FUNCTIONAL STATUS PRIOR TO ADMISSION: Pt lives with her  and reports that she is independent in self care bathing and dressing.  performs IADLs. Pt has hx of CVA and has LLE weakness/foot drop (no brace); she uses a RW or pushes a w/c at baseline. HOME SUPPORT: The patient lived with  and has family support. Occupational Therapy Goals  Initiated 3/30/2022  1. Patient will perform grooming in standing with minimal assistance/contact guard assist within 7 day(s). 2.  Patient will perform lower body bathing using adaptive aids, prn,  with minimal assistance/contact guard assist within 7 day(s). 3.  Patient will perform standing adls for at least 5 minutes with minimal assistance/contact guard assist within 7 day(s). 4.  Patient will perform toilet transfers with moderate assistance  within 7 day(s). 5.  Patient will perform all aspects of toileting with moderate assistance  within 7 day(s). 6.  Patient will participate in upper extremity therapeutic exercise/activities with supervision/set-up for 5 minutes within 7 day(s). Outcome: Progressing Towards Goal   OCCUPATIONAL THERAPY TREATMENT  Patient: Yokasta Farr (80 y.o. female)  Date: 3/31/2022  Diagnosis: Hip fracture ([facility] [address] Madhu Gore <principal problem not specified>  Procedure(s) (LRB):  LEFT HIP PERCUTANEOUS PINNING CANNULATED SCREWS (Left) 2 Days Post-Op  Precautions: Fall,WBAT  Chart, occupational therapy assessment, plan of care, and goals were reviewed. ASSESSMENT  Patient continues with skilled OT services and is progressing towards goals. Pt's BP was on the low side and HR mildly elevated this session and nurse was informed.   Pt was seated upon arrival and was humming throughout tx session (no particular tune.)  Pt was seen to be reaching for objects on the floor - impulsively and without regard to safety while seated in the chair (nursing informed.)  Pt needed Khoi and additional time to stand; ambulated to chair and bathroom with CGA/Khoi using RW. Pt performed upper body dressing and changing her brief with generally moderate assistance this date. Pt remains incontinent, especially when she stands. Initially pt required cues and physical assist when transferring to chair for safe technique. But at the end of tx session, pt able to demonstrate safe technique when performing stand to sit. Anticipate that pt will need reinforcement. Pt is cooperative and verbalizes/demonstrates desire to return to her reported independent baseline. She is an excellent inpatient rehab candidate. Current Level of Function Impacting Discharge (ADLs): up to mod/max A for adls    Other factors to consider for discharge: has supportive , hx cva, impaired orientation/date and decreased safety awareness         PLAN :  Patient continues to benefit from skilled intervention to address the above impairments. Continue treatment per established plan of care to address goals. Recommend with staff: encourage upright for meals         Recommendation for discharge: (in order for the patient to meet his/her long term goals)  Therapy 3 hours per day 5-7 days per week    This discharge recommendation:  Has not yet been discussed the attending provider and/or case management    IF patient discharges home will need the following DME: tbd in rehab setting       SUBJECTIVE:   Patient stated It's so different at home.     OBJECTIVE DATA SUMMARY:   Cognitive/Behavioral Status:  Neurologic State: Alert  Orientation Level: Oriented to person;Oriented to place (multiple cues for date and approaching April 1)  Cognition: Follows commands; Impaired decision making; Impulsive;Memory loss;Poor safety awareness  Perception: Appears intact  Perseveration: No perseveration noted  Safety/Judgement: Decreased awareness of need for assistance;Decreased awareness of need for safety;Decreased insight into deficits; Fall prevention    Functional Mobility and Transfers for ADLs:  Bed Mobility:  Rolling:  (pt was seated upon arrival)  Transfers:  Sit to Stand: Minimum assistance; Additional time;Assist x1  Functional Transfers  Bathroom Mobility: Contact guard assistance  Toilet Transfer : Minimum assistance  Adaptive Equipment: Grab bars; Walker (comment)       Balance:  Sitting: Intact  Standing: Impaired; With support  Standing - Static: Constant support;Good (cues)  Standing - Dynamic : Constant support; Fair    ADL Intervention:       Grooming  Washing Hands:  (did not perform due to decreased activity tolerance )         Lower Body Bathing  Perineal  : Contact guard assistance;Minimum assistance  Position Performed: Standing (partial standing)  Cues: Verbal cues provided (for thoroughness)  Adaptive Equipment: Grab bar;Walker; Wipes(personal cleansing cloth)    Upper Body [address]: Moderate assistance    Lower Body Dressing Assistance  Protective Undergarmet: Moderate assistance  Socks:  (able to reach forward and adjust socks)    Toileting  Toileting Assistance: Moderate assistance;Maximum assistance  Bladder Hygiene: Minimum assistance  Clothing Management: Maximum assistance  Cues: Physical assistance for pants down;Physical assistance for pants up;Verbal cues provided;Visual cues provided  Adaptive Equipment: Grab bars; Walker    Cognitive Retraining  Problem Solving:  (cues for prob. solving date)  Safety/Judgement: Decreased awareness of need for assistance;Decreased awareness of need for safety;Decreased insight into deficits; Fall prevention  Cues: Verbal cues provided;Visual cues provided (white board in room with date)    Pain:  Pt rated pain as 5/10 and reported that pain did not increase with ambulation. Pt is likely not an accurate  when using the pain scale.  INformed nurse    Activity Tolerance: Fair  BP on the low side, but stable, HR increased up to 1 teens this session  After treatment patient left in no apparent distress:   Sitting in chair, Call bell within reach, and nurse informed    COMMUNICATION/COLLABORATION:   The patients plan of care was discussed with: Registered nurse.      TAYLOR Awad/L  Time Calculation: 44 mins

## 2022-03-31 NOTE — PROGRESS NOTES
End of Shift Note    Bedside shift change report given to Tomas Aragon (oncoming nurse) by Nicholas Calvillo RN (offgoing nurse). Report included the following information SBAR, Kardex, OR Summary, Procedure Summary, Intake/Output, MAR, Recent Results and Med Rec Status    Shift worked:  7p-7a     Shift summary and any significant changes:    Pt incontinent yesterday and overnight, placed on [place]   Concerns for physician to address:       Zone phone for oncoming shift:   [phone]       Activity:  Activity Level: Up with Assistance  Number times ambulated in hallways past shift: 0  Number of times OOB to chair past shift: 0    Cardiac:   Cardiac Monitoring: Yes      Cardiac Rhythm: Sinus Rhythm    Access:   Current line(s): PIV     Genitourinary:   Urinary status: incontinent and external catheter    Respiratory:   O2 Device: Nasal cannula  Chronic home O2 use?: NO  Incentive spirometer at bedside: NO       GI:  Last Bowel Movement Date: 03/27/22  Current diet:  ADULT ORAL NUTRITION SUPPLEMENT AM Snack; Standard High Calorie/High Protein  ADULT DIET Regular  Passing flatus: NO  Tolerating current diet: YES       Pain Management:   Patient states pain is manageable on current regimen: YES    Skin:  Calderon Score: 18  Interventions: float heels, increase time out of bed and PT/OT consult    Patient Safety:  Fall Score:  Total Score: 4  Interventions: assistive device (walker, cane, etc), gripper socks, pt to call before getting OOB and stay with me (per policy)  High Fall Risk: Yes    Length of Stay:  Expected LOS: 4d 7h  Actual LOS: 2      Elizabeth Pradhan RN

## 2022-03-31 NOTE — PROGRESS NOTES
Problem: Mobility Impaired (Adult and Pediatric)  Goal: *Acute Goals and Plan of Care (Insert Text)  Description: FUNCTIONAL STATUS PRIOR TO ADMISSION: Patient was modified independent using a rolling walker for functional mobility. She also sometimes \"pushes a w/c\" during gait. HOME SUPPORT PRIOR TO ADMISSION: The patient lived with spouse and spouse provided assistance with IADL's and some ADL's. 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 Pt does not utilize an AFO. Physical Therapy Goals  Initiated 3/30/2022    1. Patient will move from supine to sit and sit to supine , scoot up and down, and roll side to side in bed with modified independence within 4 days. 2. Patient will perform sit to stand with contact guard assist within 4 days. 3. Patient will ambulate with contact guard assist for 100 feet with the least restrictive device within 4 days. 4. Patient will ascend/descend 1 stairs with handrail(s) with minimal assistance/contact guard assist within 4 days. 5. Patient will perform BLE home exercise program per protocol with modified independence within 4 days. Outcome: Progressing Towards Goal   PHYSICAL THERAPY TREATMENT  Patient: Camila Troy (80 y.o. female)  Date: 3/31/2022  Diagnosis: Hip fracture ([facility] [address] Miranda Byrd <principal problem not specified>  Procedure(s) (LRB):  LEFT HIP PERCUTANEOUS PINNING CANNULATED SCREWS (Left) 2 Days Post-Op  Precautions: Fall,WBAT  Chart, physical therapy assessment, plan of care and goals were reviewed. ASSESSMENT  Patient continues with skilled PT services and is progressing towards goals. Pt received in supine and very willing to participate with therapy. Pt requires head of bed elevated and able to utilize bed railings with BUE's to transition from supine to sitting, and requires Khoi to scoot EOB.  Pt ambulated 60ft CGA w/RW, noted with antalgic gait however no complaints of pain, decreased stance of LLE, and step-to gait pattern. Pt with improved LLE clearance with ambulation this session secondary to history of stroke and residual L side weakness. Performed seated LE therapeutic exercises seated in chair. Pt educated on importance of increased OOB mobility with sitting upright 3x daily with meals and to ambulate to the bathroom with assistance to improve strength and endurance. Pt very receptive to education. Recommend IPR upon discharge to address above mentioned deficits as pt is highly motivated with therapy. Current Level of Function Impacting Discharge (mobility/balance): Khoi to scoot EOB, minAx2 w/transfers, CGA w/RW for gait    Other factors to consider for discharge: highly motivated, residual L sided weakness, supportive family         PLAN :  Patient continues to benefit from skilled intervention to address the above impairments. Continue treatment per established plan of care. to address goals. Recommendation for discharge: (in order for the patient to meet his/her long term goals)  Therapy 3 hours per day 5-7 days per week    This discharge recommendation:  Has been made in collaboration with the attending provider and/or case management    IF patient discharges home will need the following DME: patient owns DME required for discharge       SUBJECTIVE:   Patient stated Jessica Monica worked me really hard when I had my stroke.     OBJECTIVE DATA SUMMARY:   Critical Behavior:  Neurologic State: Alert,Appropriate for age  Orientation Level: Oriented X4  Cognition: Appropriate decision making,Appropriate for age attention/concentration,Appropriate safety awareness,Follows commands  Safety/Judgement: Awareness of environment  Functional Mobility Training:  Bed Mobility:     Supine to Sit: Contact guard assistance; Additional time;Bed Modified     Scooting: Minimum assistance    Transfers:  Sit to Stand: Minimum assistance;Assist x2  Stand to Sit: Minimum assistance    Balance:  Sitting: Intact  Standing: Impaired; With support  Standing - Static: Good;Constant support  Standing - Dynamic : Fair;Constant support  Ambulation/Gait Training:  Distance (ft): 60 Feet (ft)  Assistive Device: Gait belt;Walker, rolling  Ambulation - Level of Assistance: Contact guard assistance; Additional time    Gait Abnormalities: Antalgic;Decreased step clearance; Step to gait     Left Side Weight Bearing: As tolerated  Base of Support: Widened     Speed/Perlita: Slow  Step Length: Left shortened;Right shortened    Therapeutic Exercises:   Seated marching, LAQ, hip abduction, ankle pumps 1x10    Pain Rating:  No pain rating received from pt    Activity Tolerance:   Fair    After treatment patient left in no apparent distress:   Sitting in chair and Call bell within reach    COMMUNICATION/COLLABORATION:   The patients plan of care was discussed with: Registered nurse.      Hitesh Nash PTA   Time Calculation: 23 mins

## 2022-03-31 NOTE — PROGRESS NOTES
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 Discussed with RN events overnight. Review of Systems:  Symptom Y/N Comments  Symptom Y/N Comments   Fever/Chills n   Chest Pain n    Poor Appetite n   Edema n    Cough n   Abdominal Pain n    Sputum n   Joint Pain     SOB/AVITIA n   Pruritis/Rash     Nausea/vomit n   Tolerating PT/OT     Diarrhea n   Tolerating Diet     Constipation n   Other       Could NOT obtain due to:      Objective:     VITALS:   Last 24hrs VS reviewed since prior progress note.  Most recent are:  Patient Vitals for the past 24 hrs:   Temp Pulse Resp BP SpO2   22 1053 97.6 °F (36.4 °C) (!) 110 18 108/68 90 %   22 0800 97.8 °F (36.6 °C) 84 18 (!) 141/56 95 %   22 0441 98.2 °F (36.8 °C) 83 18 (!) 150/74 93 %   22 2248 98 °F (36.7 °C) 70 18 106/77 96 %   22 97.7 °F (36.5 °C) 75 18 97/66 97 %   22 1553 98.2 °F (36.8 °C) 81 20 (!) 117/54 94 %       Intake/Output Summary (Last 24 hours) at 3/31/2022 1205  Last data filed at 3/31/2022 1366  Gross per 24 hour   Intake 3054.17 ml   Output 925 ml   Net 2129.17 ml        PHYSICAL EXAM:  General: WD, WN. Alert, cooperative, no acute distress    EENT:  EOMI. Anicteric sclerae. MMM  Resp:  CTA bilaterally, no wheezing or rales. No accessory muscle use  CV:  Regular  rhythm,  No edema  GI:  Soft, Non distended, Non tender.  +Bowel sounds  Neurologic:  Alert and oriented X 3, normal speech,   Psych:   Good insight. Not anxious nor agitated  Skin:  No rashes. No jaundice    Reviewed most current lab test results and cultures  YES  Reviewed most current radiology test results   YES  Review and summation of old records today    NO  Reviewed patient's current orders and MAR    YES  PMH/SH reviewed - no change compared to H&P  ________________________________________________________________________  Care Plan discussed with:    Comments   Patient x    Family  x    RN x    Care Manager     Consultant  xx  orthopedic                    x Multidiciplinary team rounds were held today with , nursing, pharmacist and clinical coordinator. Patient's plan of care was discussed; medications were reviewed and discharge planning was addressed. ________________________________________________________________________  Total NON critical care TIME: 30   Minutes    Total CRITICAL CARE TIME Spent:   Minutes non procedure based      Comments   >50% of visit spent in counseling and coordination of care     ________________________________________________________________________  Lashell Victoria MD     Procedures: see electronic medical records for all procedures/Xrays and details which were not copied into this note but were reviewed prior to creation of Plan. LABS:  I reviewed today's most current labs and imaging studies.   Pertinent labs include:  Recent Labs 03/31/22  0441 03/30/22  0208 03/29/22  0930   WBC  --  11.5* 5.9   HGB 12.1 14.0 15.3   HCT  --  43.2 46.7   PLT  --  167 174     Recent Labs     03/30/22  0208 03/29/22 0930    143   K 4.1 4.1   * 110*   CO2 25 30   * 98   BUN 13 12   CREA 0.70 0.73   CA 8.6 8.8   ALB 3.0* 3.4*   TBILI 0.8 0.9   ALT 20 24   INR  --  1.0       Signed: Silvia Perry MD

## 2022-03-31 NOTE — PROGRESS NOTES
Problem: Falls - Risk of  Goal: *Absence of Falls  Description: Document [place] Fall Risk and appropriate interventions in the flowsheet.   Outcome: Progressing Towards Goal  Note: Fall Risk Interventions:  Mobility Interventions: Bed/chair exit alarm         Medication Interventions: Patient to call before getting OOB,Teach patient to arise slowly    Elimination Interventions: Call light in reach    History of Falls Interventions: Consult care management for discharge planning         Problem: Patient Education: Go to Patient Education Activity  Goal: Patient/Family Education  Outcome: Progressing Towards Goal     Problem: Patient Education: Go to Patient Education Activity  Goal: Patient/Family Education  Outcome: Progressing Towards Goal     Problem: Hip Fracture: Post-Op Day 2  Goal: Off Pathway (Use only if patient is Off Pathway)  Outcome: Progressing Towards Goal  Goal: Activity/Safety  Outcome: Progressing Towards Goal  Goal: Diagnostic Test/Procedures  Outcome: Progressing Towards Goal  Goal: Nutrition/Diet  Outcome: Progressing Towards Goal  Goal: Medications  Outcome: Progressing Towards Goal  Goal: Respiratory  Outcome: Progressing Towards Goal  Goal: Treatments/Interventions/Procedures  Outcome: Progressing Towards Goal  Goal: Psychosocial  Outcome: Progressing Towards Goal  Goal: Discharge Planning  Outcome: Progressing Towards Goal  Goal: *Optimal pain control at patient's stated goal  Outcome: Progressing Towards Goal  Goal: *Hemodynamically stable  Outcome: Progressing Towards Goal  Goal: *Adequate oxygenation  Outcome: Progressing Towards Goal  Goal: *Tolerates increased activity  Outcome: Progressing Towards Goal  Goal: *Tolerates nutrition therapy  Outcome: Progressing Towards Goal  Goal: *Absence of skin breakdown  Outcome: Progressing Towards Goal

## 2022-03-31 NOTE — PROGRESS NOTES
Transition of Care Plan:  RUR: 8% low   Disposition: IPR- [facility]   Follow up appointments: PCP, specialists as indicated  DME needed: has Alexsander Diaz, [address], wheelchair, and shower chair at home   Transportation at Discharge: likely medical transport   [address] or means to access home: yes    IM Medicare Letter: provided 3/31   Is patient a BCPI-A Bundle: No                  If yes, was Bundle Letter given?:    Is patient a [place] and connected with the [place]? No             If yes, was [employer] transfer form completed and [facility] notified? Caregiver Contact: Daniela Woodall (spouse) [phone]  Discharge Caregiver contacted prior to discharge? To be contacted prior to d/c   Care Conference needed?:  No    CM received phone call from [facility] who reported they can accept patient, and will place patient on the list for admission tomorrow. PCR covid test pending. CM made room visit with patient and informed her of the plan, pt agrees, very excited [facility] accepted. 2nd IM letter provided, see scanned document.      Kevin Rosales, [address] [facility]

## 2022-04-01 VITALS
WEIGHT: 158.4 LBS | SYSTOLIC BLOOD PRESSURE: 111 MMHG | BODY MASS INDEX: 25.46 KG/M2 | TEMPERATURE: 98.1 F | OXYGEN SATURATION: 91 % | HEART RATE: 78 BPM | RESPIRATION RATE: 18 BRPM | HEIGHT: 66 IN | DIASTOLIC BLOOD PRESSURE: 56 MMHG

## 2022-04-01 LAB
SARS-COV-2, XPLCVT: NOT DETECTED
SOURCE, COVRS: NORMAL

## 2022-04-01 PROCEDURE — 74011250637 HC RX REV CODE- 250/637: Performed by: ORTHOPAEDIC SURGERY

## 2022-04-01 PROCEDURE — 94760 N-INVAS EAR/PLS OXIMETRY 1: CPT

## 2022-04-01 PROCEDURE — 74011000250 HC RX REV CODE- 250: Performed by: ORTHOPAEDIC SURGERY

## 2022-04-01 PROCEDURE — 74011250637 HC RX REV CODE- 250/637: Performed by: INTERNAL MEDICINE

## 2022-04-01 PROCEDURE — 74011250636 HC RX REV CODE- 250/636: Performed by: ORTHOPAEDIC SURGERY

## 2022-04-01 RX ORDER — OXYCODONE HYDROCHLORIDE 5 MG/1
5 TABLET ORAL
Qty: 12 TABLET | Refills: 0 | Status: SHIPPED | OUTPATIENT
Start: 2022-04-01 | End: 2022-04-04

## 2022-04-01 RX ORDER — ENOXAPARIN SODIUM 100 MG/ML
40 INJECTION SUBCUTANEOUS DAILY
Qty: 13.2 ML | Refills: 0 | Status: SHIPPED | OUTPATIENT
Start: 2022-04-02 | End: 2022-04-25

## 2022-04-01 RX ADMIN — ACETAMINOPHEN 325MG 650 MG: 325 TABLET ORAL at 05:39

## 2022-04-01 RX ADMIN — ASPIRIN 81 MG: 81 TABLET, CHEWABLE ORAL at 08:50

## 2022-04-01 RX ADMIN — Medication 1 AMPULE: at 08:56

## 2022-04-01 RX ADMIN — OXYCODONE 5 MG: 5 TABLET ORAL at 15:00

## 2022-04-01 RX ADMIN — POLYETHYLENE GLYCOL 3350 17 G: 17 POWDER, FOR SOLUTION ORAL at 08:50

## 2022-04-01 RX ADMIN — Medication 1000 UNITS: at 08:53

## 2022-04-01 RX ADMIN — PRAVASTATIN SODIUM 40 MG: 40 TABLET ORAL at 08:54

## 2022-04-01 RX ADMIN — Medication 1 TABLET: at 08:53

## 2022-04-01 RX ADMIN — SODIUM CHLORIDE, PRESERVATIVE FREE 10 ML: 5 INJECTION INTRAVENOUS at 05:39

## 2022-04-01 RX ADMIN — ACETAMINOPHEN 325MG 650 MG: 325 TABLET ORAL at 11:35

## 2022-04-01 RX ADMIN — OXYCODONE 5 MG: 5 TABLET ORAL at 08:51

## 2022-04-01 RX ADMIN — PANTOPRAZOLE SODIUM 40 MG: 40 TABLET, DELAYED RELEASE ORAL at 05:40

## 2022-04-01 RX ADMIN — ENOXAPARIN SODIUM 40 MG: 40 INJECTION SUBCUTANEOUS at 08:56

## 2022-04-01 RX ADMIN — SENNOSIDES AND DOCUSATE SODIUM 1 TABLET: 50; 8.6 TABLET ORAL at 08:50

## 2022-04-01 RX ADMIN — Medication 1 TABLET: at 11:35

## 2022-04-01 NOTE — PROGRESS NOTES
End of Shift Note    Bedside shift change report given to Steven Tyler (oncoming nurse) by Cristela Galicia RN (offgoing nurse). Report included the following information SBAR, Kardex, OR Summary, Intake/Output and MAR    Shift worked:  7p -7a     Shift summary and any significant changes:     turns self. Pain control with minimal narcotics. Voiding with use of pur wick overnight to promoted rest     Concerns for physician to address:  dc plan     Zone phone for oncoming shift:          Activity:  Activity Level: Up with Assistance  Number times ambulated in hallways past shift: 0  Number of times OOB to chair past shift: 0    Cardiac:   Cardiac Monitoring: No      Cardiac Rhythm: Sinus Rhythm    Access:   Current line(s): PIV     Genitourinary:   Urinary status: voiding and external catheter    Respiratory:   O2 Device: None (Room air)  Chronic home O2 use?: NO  Incentive spirometer at bedside: YES  Actual Volume (ml): 1000 ml    GI:  Last Bowel Movement Date: 03/27/22  Current diet:  ADULT ORAL NUTRITION SUPPLEMENT AM Snack; Standard High Calorie/High Protein  ADULT DIET Regular  Passing flatus: YES  Tolerating current diet: YES       Pain Management:   Patient states pain is manageable on current regimen: YES    Skin:  Calderon Score: 19  Interventions: float heels, increase time out of bed, PT/OT consult, limit briefs, internal/external urinary devices and nutritional support     Patient Safety:  Fall Score:  Total Score: 4  Interventions: bed/chair alarm, assistive device (walker, cane, etc), gripper socks, pt to call before getting OOB and stay with me (per policy)  High Fall Risk: Yes    Length of Stay:  Expected LOS: 4d 7h  Actual LOS: Jose Slaughter RN

## 2022-04-01 NOTE — PROGRESS NOTES
Problem: Falls - Risk of  Goal: *Absence of Falls  Description: Document Kodi Romero Fall Risk and appropriate interventions in the flowsheet.   Outcome: Progressing Towards Goal  Note: Fall Risk Interventions:  Mobility Interventions: Communicate number of staff needed for ambulation/transfer         Medication Interventions: Teach patient to arise slowly,Patient to call before getting OOB    Elimination Interventions: Call light in reach    History of Falls Interventions: Consult care management for discharge planning         Problem: Patient Education: Go to Patient Education Activity  Goal: Patient/Family Education  Outcome: Progressing Towards Goal     Problem: Patient Education: Go to Patient Education Activity  Goal: Patient/Family Education  Outcome: Progressing Towards Goal     Problem: Hip Fracture: Day of Surgery Post-op Care  Goal: Off Pathway (Use only if patient is Off Pathway)  Outcome: Progressing Towards Goal  Goal: Activity/Safety  Outcome: Progressing Towards Goal  Goal: Consults, if ordered  Outcome: Progressing Towards Goal  Goal: Diagnostic Test/Procedures  Outcome: Progressing Towards Goal  Goal: Nutrition/Diet  Outcome: Progressing Towards Goal  Goal: Medications  Outcome: Progressing Towards Goal  Goal: Respiratory  Outcome: Progressing Towards Goal  Goal: Treatments/Interventions/Procedures  Outcome: Progressing Towards Goal  Goal: Psychosocial  Outcome: Progressing Towards Goal  Goal: *Absence of skin breakdown  Outcome: Progressing Towards Goal  Goal: *Optimal pain control at patient's stated goal  Outcome: Progressing Towards Goal  Goal: *Hemodynamically stable  Outcome: Progressing Towards Goal     Problem: Hip Fracture: Post-Op Day 1  Goal: Off Pathway (Use only if patient is Off Pathway)  Outcome: Progressing Towards Goal  Goal: Activity/Safety  Outcome: Progressing Towards Goal  Goal: Diagnostic Test/Procedures  Outcome: Progressing Towards Goal  Goal: Nutrition/Diet  Outcome: Progressing Towards Goal  Goal: Medications  Outcome: Progressing Towards Goal  Goal: Respiratory  Outcome: Progressing Towards Goal  Goal: Treatments/Interventions/Procedures  Outcome: Progressing Towards Goal  Goal: Psychosocial  Outcome: Progressing Towards Goal  Goal: Discharge Planning  Outcome: Progressing Towards Goal  Goal: *Demonstrates progressive activity  Outcome: Progressing Towards Goal  Goal: *Optimal pain control at patient's stated goal  Outcome: Progressing Towards Goal  Goal: *Hemodynamically stable  Outcome: Progressing Towards Goal  Goal: *Discharge plan identified  Outcome: Progressing Towards Goal  Goal: *Absence of skin breakdown  Outcome: Progressing Towards Goal

## 2022-04-01 NOTE — PROGRESS NOTES
[facility] to [facility]                                                                        80 y.o.   female    [address] [facility]   Room: 3265/01    [facility] 3 ORTHOPEDICS  Unit Phone# :  [phone]      [address] [address]  Dept: [phone]  Loc: [phone]                    SITUATION     Admitted:  3/29/2022         Attending Provider:  Euell Boas, MD       Consultations:  IP CONSULT TO ORTHOPEDIC SURGERY  IP CONSULT TO HOSPITALIST    PCP:  Jackie Justice MD   [phone]    Treatment Team: Attending Provider: Euell Boas, MD; Consulting Provider: Bulmaro Zepeda; Consulting Provider: Rosi Dye MD; Utilization Review: Camille Frye RN; Care Manager: Ashley Liriano Primary Nurse: Arabella Santoro RN; Primary Nurse: Yakov Rondon RN    Admitting Dx: Hip fracture ([facility] [address] [S72.009A]       Principal Problem: <principal problem not specified>    3 Days Post-Op of   Procedure(s):  LEFT HIP PERCUTANEOUS PINNING CANNULATED SCREWS   BY: Pepe Diaz DO             ON: 3/29/2022                  Code Status: Full Code                Advance Directives:   Advance Care Planning 3/31/2022   Confirm Advance Directive None   Patient Would Like to Complete Advance Directive No    (Send w/patient)   Not Received       Isolation:  There are currently no Active Isolations       MDRO: No current active infections    Pain Medications given:  oxycodone    Last dose: 4/1/22 at  0900    Special Equipment needed: no  Type of equipment:    (Not currently on dialysis)     BACKGROUND     Allergies:   Allergies   Allergen Reactions    Iodine Anaphylaxis    Amoxicillin Other (comments)     \"It makes my heart race\"    Lidocaine Rash     Get a rash from the patches    Macrobid [Nitrofurantoin Monohyd/M-Cryst] Rash    Oxycodone Nausea Only       Past Medical History:   Diagnosis Date    Acid reflux     Endocrine disease     thyroid nodules    Stroke [facility])     Thyroid disease     thyroid nodules       Past Surgical History:   Procedure Laterality Date    HX GYN  1996    hysterectomy ? BSO    HX ORTHOPAEDIC      back       Medications Prior to Admission   Medication Sig    vitamin e (E GEMS) 100 unit capsule Take 100 Units by mouth daily.  ondansetron (Zofran ODT) 4 mg disintegrating tablet Take 1 Tablet by mouth every eight (8) hours as needed for Nausea.  pravastatin (PRAVACHOL) 80 mg tablet Take 1 tablet by mouth once daily    meloxicam (MOBIC) 7.5 mg tablet Take 1 tablet by mouth once daily    aspirin 81 mg chewable tablet Take 1 Tab by mouth daily.  cholecalciferol (VITAMIN D3) 1,000 unit tablet Take  by mouth daily.  esomeprazole (NEXIUM) 40 mg capsule Take 40 mg by mouth daily.  trimethoprim-sulfamethoxazole (BACTRIM DS, SEPTRA DS) 160-800 mg per tablet Take 1 Tablet by mouth two (2) times a day.  polyethylene glycol (Miralax) 17 gram/dose powder Take 17 g by mouth daily as needed for Constipation. 1 tablespoon with 8 oz of water daily    methocarbamoL (Robaxin-750) 750 mg tablet Take 1 Tablet by mouth four (4) times daily as needed for Muscle Spasm(s). Hard scripts included in transfer packet yes    Vaccinations:    Immunization History   Administered Date(s) Administered    Influenza, Quadrivalent, Adjuvanted (>65 Yrs FLUAD QUAD [id number]) 11/18/2020    Pneumococcal Polysaccharide (PPSV-23) 01/01/2011       Readmission Risks:    Known Risks: n/a        The Charlson CoMorbitiy Index tool is an evidenced based tool that has more automatic generated information. The tool looks at many different items such as the age of the patient, how many times they were admitted in the last calendar year, current length of stay in the hospital and their diagnosis.  All of these items are pulled automatically from information documented in the chart from various places and will generate a score that predicts whether a patient is at low (less than 13), medium (13-20) or high (21 or greater) risk of being readmitted.         ASSESSMENT                Temp: 98.1 °F (36.7 °C) (04/01/22 1153) Pulse (Heart Rate): 78 (04/01/22 1153)     Resp Rate: 18 (04/01/22 1153)           BP: (!) 111/56 (04/01/22 1153)     O2 Sat (%): 91 % (04/01/22 1153)     Weight: 71.8 kg (158 lb 6.4 oz)    Height: 5' 6\" (167.6 cm) (03/29/22 0825)       If above not within 1 hour of discharge:    BP:_____  P:____  R:____ T:_____ O2 Sat: ___%  O2: ______    Active Orders   Diet    ADULT DIET Regular         Orientation: oriented to time, place, person and situation     Active Behaviors: None                                   Active Lines/Drains:  (Peg Tube / Navarro / CL or S/L?): no    Urinary Status: External catheter,Incontinent     Last BM: Last Bowel Movement Date: 03/29/22     Skin Integrity: Incision (comment) (left hip,dressing dry)             Mobility: Slightly limited   Weight Bearing Status: WBAT (Weight Bearing as Tolerated)      Gait Training  Assistive Device: Gait belt,Walker, rolling  Ambulation - Level of Assistance: Contact guard assistance,Additional time  Distance (ft): 60 Feet (ft)  Interventions: Safety awareness training,Tactile cues,Verbal cues         Lab Results   Component Value Date/Time    Glucose 118 (H) 03/30/2022 02:08 AM    Hemoglobin A1c 5.4 07/26/2019 05:08 AM    INR 1.0 03/29/2022 09:30 AM    INR 1.0 07/25/2019 08:27 AM    HGB 12.1 03/31/2022 04:41 AM    HGB 14.0 03/30/2022 02:08 AM        RECOMMENDATION     See After Visit Summary (AVS) for:  · Discharge instructions  · After [address]   · Special equipment needed (entered pre-discharge by Care Management)  · Medication Reconciliation    · Follow up Appointment(s)         Report given/sent by:  Epifanio Candy, RN                    Verbal report given to: Mika Yang RN  FAXED to:  paper copy         Estimated discharge time: 4/1/2022 at 1600

## 2022-04-01 NOTE — PROGRESS NOTES
Transition of Care Plan:  RUR: 8% low   Disposition: IPR- [facility]- call report [phone] going to room 2050   Follow up appointments: PCP, specialists as indicated  DME needed: has Leia King, wheelchair, and shower chair at [facility] at [address] @ 4:00pm accepting physician Dr. Agatha Goltz or means to access home: yes    IM Medicare Letter: provided 3/31   Is patient a BCPI-A Bundle: No       Is patient a [place] and connected with the . Marilee Haney [address] (spouse) [phone]  Discharge Caregiver contacted prior to discharge? To be contacted prior to d/c   Care Conference needed?:  No    1:50pm  CM spoke with Aramis Chavarria and obtained call report, room assignment, and accepting physician number, listed above. 11:20am  [facility] ETA 4:00pm.    11:00am  Per Aramis Chavarria at [facility] they can accept patient today. Aramis Chavarria requested [facility] transport for 3:00pm or after. Per Aramis Chavarria she will call back with call report, room assignment, and accepting physician. Referral sent to [facility] requesting 3:00pm transport, waiting for response.      Christohper WashingtonzacharyLens, [address] \[facility] [place] [facility]\""

## 2022-04-01 NOTE — DISCHARGE SUMMARY
Hospitalist Discharge Summary     Patient ID:  Lalitha Dunbar  [id number]  80 y.o.  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 Patient reported that she was in the bathroom using her walker and trying to stand on the wall when she fell. She denied any loss of consciousness, head trauma, chest pain, abdominal pain, nausea, vomiting, diarrhea, palpitations. She states she cannot remember what happened but she lost her balance and had a fall. Patient had a chronic left-sided weakness from previous CVA 3 years ago.     In the emergency department, x-ray with left hip fracture. Orthopedic surgery evaluated patient at bedside in the ED     ______________________________________________________________________  DISCHARGE SUMMARY/HOSPITAL COURSE:  for full details see H&P, daily progress notes, labs, consult notes.        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 Pertinent Findings:  Gen:    Not in distress  Chest: Clear lungs  CVS:   Regular rhythm. No edema  Abd:  Soft, not distended, not tender  Neuro:  Alert, oriented x4  _______________________________________________________________________  DISCHARGE MEDICATIONS:   Current Discharge Medication List      START taking these medications    Details   enoxaparin (LOVENOX) 40 mg/0.4 mL 0.4 mL by SubCUTAneous route daily for 33 days. Qty: 13.2 mL, Refills: 0  Start date: 4/2/2022, End date: 5/5/2022      oxyCODONE IR (ROXICODONE) 5 mg immediate release tablet Take 1 Tablet by mouth every four (4) hours as needed for Pain for up to 3 days. Max Daily Amount: 30 mg.  Qty: 12 Tablet, Refills: 0  Start date: 4/1/2022, End date: 4/4/2022    Associated Diagnoses: Closed fracture of left hip, [facility] [place] [facility]         CONTINUE these medications which have NOT CHANGED    Details   vitamin e (E GEMS) 100 unit capsule Take 100 Units by mouth daily. ondansetron (Zofran ODT) 4 mg disintegrating tablet Take 1 Tablet by mouth every eight (8) hours as needed for Nausea. Qty: 10 Tablet, Refills: 0      pravastatin (PRAVACHOL) 80 mg tablet Take 1 tablet by mouth once daily  Qty: 90 Tab, Refills: 1    Comments: HOLD UNTIL NEEDED. Associated Diagnoses: Other hyperlipidemia      meloxicam (MOBIC) 7.5 mg tablet Take 1 tablet by mouth once daily  Qty: 30 Tab, Refills: 5      aspirin 81 mg chewable tablet Take 1 Tab by mouth daily. Qty: 30 Tab, Refills: 0      cholecalciferol (VITAMIN D3) 1,000 unit tablet Take  by mouth daily. esomeprazole (NEXIUM) 40 mg capsule Take 40 mg by mouth daily. polyethylene glycol (Miralax) 17 gram/dose powder Take 17 g by mouth daily as needed for Constipation. 1 tablespoon with 8 oz of water daily  Qty: 116 g, Refills: 0      methocarbamoL (Robaxin-750) 750 mg tablet Take 1 Tablet by mouth four (4) times daily as needed for Muscle Spasm(s).   Qty: 20 Tablet, Refills: 0 STOP taking these medications       trimethoprim-sulfamethoxazole (BACTRIM DS, SEPTRA DS) 160-800 mg per tablet Comments:   Reason for Stopping:                 Patient Follow Up Instructions:    Activity: Activity as tolerated  Diet: Regular Diet  Wound Care:Keep wound clean and dry    Follow-up with PCP and ortho  Follow-up tests/labs none  Follow-up Information     Follow up With Specialties Details Why Contact Bahman Gamble, DO Orthopedic Surgery In 2 weeks For Follow up after surgery [address]  [place]  [facility]  Lucie Maldonado MD Internal Medicine In 1 week  [address]  MOB  [facility]  [address]  [facility]  [phone]          ________________________________________________________________    Risk of deterioration: High    Condition at Discharge:  Stable  __________________________________________________________________    Disposition  IP Rehab    ____________________________________________________________________    Code Status: Full Code  ___________________________________________________________________      Total time in minutes spent coordinating this discharge (includes going over instructions, follow-up, prescriptions, and preparing report for sign off to her PCP) :  38 minutes    Signed:  Ramya Slaughter MD

## 2022-04-01 NOTE — PROGRESS NOTES
End of Shift Note    Bedside shift change report given to Natali Lugo RN (oncoming nurse) by Navarro Crabtree RN (offgoing nurse). Report included the following information SBAR, Kardex, Intake/Output, MAR and Recent Results    Shift worked:  Day     Shift summary and any significant changes:    Patient has had stable vital signs and to be transferred to [address] [place] [address] today. Concerns for physician to address:  n/a     Zone phone for oncoming shift:  [phone]     Activity:  Activity Level: Up with Assistance  Number times ambulated in hallways past shift: 0  Number of times OOB to chair past shift: 0    Cardiac:   Cardiac Monitoring: No      Cardiac Rhythm: Sinus Rhythm    Access:   Current line(s): no access     Genitourinary:   Urinary status: voiding and incontinent    Respiratory:   O2 Device: None (Room air)  Chronic home O2 use?: NO  Incentive spirometer at bedside: YES  Actual Volume (ml): 1000 ml    GI:  Last Bowel Movement Date: 03/29/22  Current diet:  ADULT ORAL NUTRITION SUPPLEMENT AM Snack; Standard High Calorie/High Protein  ADULT DIET Regular  Passing flatus: YES  Tolerating current diet: YES       Pain Management:   Patient states pain is manageable on current regimen: YES    Skin:  Calderon Score: 19  Interventions: float heels and increase time out of bed    Patient Safety:  Fall Score:  Total Score: 4  Interventions: gripper socks and pt to call before getting OOB  High Fall Risk: Yes    Length of Stay:  Expected LOS: 4d 7h  Actual LOS: 3      Navarro Crabtree RN

## 2022-04-12 ENCOUNTER — TELEPHONE (OUTPATIENT)
Dept: INTERNAL MEDICINE CLINIC | Age: 82
End: 2022-04-12

## 2022-04-12 NOTE — TELEPHONE ENCOUNTER
----- Message from [phone] sent at 4/12/2022  2:08 PM EDT -----  Subject: Message to Provider    QUESTIONS  Information for Provider? The patient has has an order for occupational &   physical therapy will the PCP office provide orders and sign off on them   for Home Health. ([address] [place] )  ---------------------------------------------------------------------------  --------------  [address]  What is the best way for the office to contact you? OK to leave message on   voicemail  Preferred Call Back Phone Number? [phone]  ---------------------------------------------------------------------------  --------------  SCRIPT ANSWERS  Relationship to Patient? Third Party  Third Party Type? Home Health Care? Representative Name?  Patrizia

## 2022-04-13 NOTE — TELEPHONE ENCOUNTER
Spoke to [address] on call service and notified them of  message below stating ortho spec would follow the patient or she would require an appt with our office for an eval.

## 2022-04-16 NOTE — PATIENT INSTRUCTIONS

## 2022-04-18 ENCOUNTER — OFFICE VISIT (OUTPATIENT)
Dept: INTERNAL MEDICINE CLINIC | Age: 82
End: 2022-04-18

## 2022-04-18 DIAGNOSIS — S72.002D CLOSED FRACTURE OF LEFT HIP WITH ROUTINE HEALING, SUBSEQUENT ENCOUNTER: Primary | ICD-10-CM

## 2022-04-18 DIAGNOSIS — Z13.31 SCREENING FOR DEPRESSION: ICD-10-CM

## 2022-04-18 DIAGNOSIS — Z00.00 MEDICARE ANNUAL WELLNESS VISIT, SUBSEQUENT: ICD-10-CM

## 2022-04-24 NOTE — PATIENT INSTRUCTIONS
Medicare Wellness Visit, Female     The best way to live healthy is to have a lifestyle where you eat a well-balanced diet, exercise regularly, limit alcohol use, and quit all forms of tobacco/nicotine, if applicable. Regular preventive services are another way to keep healthy. Preventive services (vaccines, screening tests, monitoring & exams) can help personalize your care plan, which helps you manage your own care. Screening tests can find health problems at the earliest stages, when they are easiest to treat. [facility] follows the current, evidence-based guidelines published by the [facility] Omer Rios ([facility]) when recommending preventive services for our patients. Because we follow these guidelines, sometimes recommendations change over time as research supports it. (For example, mammograms used to be recommended annually. Even though Medicare will still pay for an annual mammogram, the newer guidelines recommend a mammogram every two years for women of average risk). Of course, you and your doctor may decide to screen more often for some diseases, based on your risk and your co-morbidities (chronic disease you are already diagnosed with). Preventive services for you include:  - Medicare offers their members a free annual wellness visit, which is time for you and your primary care provider to discuss and plan for your preventive service needs. Take advantage of this benefit every year!  -All adults over the age of 72 should receive the recommended pneumonia vaccines. Current USPSTF guidelines recommend a series of two vaccines for the best pneumonia protection.   -All adults should have a flu vaccine yearly and a tetanus vaccine every 10 years.   -All adults age 48 and older should receive the shingles vaccines (series of two vaccines).       -All adults age 38-68 who are overweight should have a diabetes screening test once every three years.   -All adults born between [birth year] and 1965 should be screened once for Hepatitis C.  -Other screening tests and preventive services for persons with diabetes include: an eye exam to screen for diabetic retinopathy, a kidney function test, a foot exam, and stricter control over your cholesterol.   -Cardiovascular screening for adults with routine risk involves an electrocardiogram (ECG) at intervals determined by your doctor.   -Colorectal cancer screenings should be done for adults age 54-65 with no increased risk factors for colorectal cancer. There are a number of acceptable methods of screening for this type of cancer. Each test has its own benefits and drawbacks. Discuss with your doctor what is most appropriate for you during your annual wellness visit. The different tests include: colonoscopy (considered the best screening method), a fecal occult blood test, a fecal DNA test, and sigmoidoscopy.    -A bone mass density test is recommended when a woman turns 65 to screen for osteoporosis. This test is only recommended one time, as a screening. Some providers will use this same test as a disease monitoring tool if you already have osteoporosis. -Breast cancer screenings are recommended every other year for women of normal risk, age 54-69.  -Cervical cancer screenings for women over age 72 are only recommended with certain risk factors.      Orthopedics    Garth Michael DO Consulting Provider Orthopedic Surgery 04/25/2022 End  4/25/22   Phone: [phone];           Schedule eye exam.

## 2022-04-24 NOTE — PROGRESS NOTES
Camila Troy is a 80 y.o. female who presents for follow up.  present. Hospitalized 3/29- with left hip fx after fall. S/p left hip pinning, Dr Sam Brenner. Was seen by PT/OT, 2-3 days a week. Up with walker, with assistance. Taking tylenol with relief. Eating well. Normal BM. Had UTI during hospitalization. No urinary symptoms today. Prior CVA, left LE weakness. On daily aspirin. Treated for HLP. On statin. Treated for GERD, on PPI. On vitamin D supplement.      Past Medical History:   Diagnosis Date    Acid reflux     Endocrine disease     thyroid nodules    St[facility] ([facility]     Thyroid disease     thyroid nodules       Family History   Problem Relation Age of Onset    Cancer Mother         lymphoma    Diabetes Paternal Grandmother     Heart Disease Sister     Heart Disease Son     No Known Problems Son     No Known Problems Daughter     No Known Problems Daughter     Heart Disease Maternal Grandfather        Social History     Socioeconomic History    Marital status:      Spouse name: Not on file    Number of children: Not on file    Years of education: Not on file    Highest education level: Not on file   Occupational History    Not on file   Tobacco Use    Smoking status: Former Smoker     Packs/day: 1.00     Years: 20.00     Pack years: 20.00     Types: Cigarettes     Quit date: 1986     Years since quittin.3    Smokeless tobacco: Never Used   Substance and Sexual Activity    Alcohol use: No    Drug use: No     Comment: CBD oil    Sexual activity: Not Currently     Partners: Male     Birth control/protection: None   Other Topics Concern    Not on file   Social History Narrative    Not on file     Social Determinants of Health     Financial Resource Strain:     Difficulty of Paying Living Expenses: Not on file   Food Insecurity:     Worried About Running Out of Food in the Last Year: Not on file    Leilani of Food in the Last Year: Not on file   Transportation Needs:     Lack of Transportation (Medical): Not on file    Lack of Transportation (Non-Medical): Not on file   Physical Activity:     Days of Exercise per Week: Not on file    Minutes of Exercise per Session: Not on file   Stress:     Feeling of Stress : Not on file   Social Connections:     Frequency of Communication with Friends and Family: Not on file    Frequency of Social Gatherings with Friends and Family: Not on file    Attends Episcopal Services: Not on file    Active Member of [address] [place] [address] [employer] or Organizations: Not on file    Attends Club or Organization Meetings: Not on file    Marital Status: Not on file   Intimate Partner Violence:     Fear of Current or Ex-Partner: Not on file    Emotionally Abused: Not on file    Physically Abused: Not on file    Sexually Abused: Not on file   Housing Stability:     Unable to Pay for Housing in the Last Year: Not on file    Number of [place] in the Last Year: Not on file    Unstable Housing in the Last Year: Not on file       Current Outpatient Medications on File Prior to Visit   Medication Sig Dispense Refill    aspirin 81 mg chewable tablet Take 1 Tab by mouth daily. 30 Tab 0    cholecalciferol (VITAMIN D3) 1,000 unit tablet Take  by mouth daily.  esomeprazole (NEXIUM) 40 mg capsule Take 40 mg by mouth daily.  [DISCONTINUED] enoxaparin (LOVENOX) 40 mg/0.4 mL 0.4 mL by SubCUTAneous route daily for 33 days. (Patient not taking: Reported on 4/25/2022) 13.2 mL 0    vitamin e (E GEMS) 100 unit capsule Take 100 Units by mouth daily.  ondansetron (Zofran ODT) 4 mg disintegrating tablet Take 1 Tablet by mouth every eight (8) hours as needed for Nausea. (Patient not taking: Reported on 4/25/2022) 10 Tablet 0    [DISCONTINUED] polyethylene glycol (Miralax) 17 gram/dose powder Take 17 g by mouth daily as needed for Constipation.  1 tablespoon with 8 oz of water daily (Patient not taking: Reported on 4/25/2022) 116 g 0    [DISCONTINUED] methocarbamoL (Robaxin-750) 750 mg tablet Take 1 Tablet by mouth four (4) times daily as needed for Muscle Spasm(s). (Patient not taking: Reported on 4/25/2022) 20 Tablet 0    [DISCONTINUED] pravastatin (PRAVACHOL) 80 mg tablet Take 1 tablet by mouth once daily 90 Tab 1    [DISCONTINUED] meloxicam (MOBIC) 7.5 mg tablet Take 1 tablet by mouth once daily 30 Tab 5     No current facility-administered medications on file prior to visit. Review of Systems  Pertinent items are noted in HPI. Objective:     Visit Vitals  /74 (BP 1 Location: Left arm, BP Patient Position: Sitting, BP Cuff Size: Adult)   Pulse (!) 58   Temp 97.9 °F (36.6 °C) (Temporal)   Resp 18   Ht 5' 6\" (1.676 m)   SpO2 95%   BMI 25.57 kg/m²     Gen: well appearing female in wheelchair. Resp:  No wheezing, no rhonchi, no rales. CV:  RRR, normal S1S2  GI: soft, nontender, upright exam.   Extrem:  +2 pulses, +1 bilateral ankle edema, varicose veins, warm distally      Assessment/Plan:       ICD-10-CM ICD-9-CM    1. Closed fracture of left hip, initial encounter ([email or web address]  S72.002A 820.8    2. Screening for depression  Z13.31 V79.0 DEPRESSION SCREEN ANNUAL   3. Other hyperlipidemia  E78.49 272.4 HEPATIC FUNCTION PANEL      LIPID PANEL      pravastatin (PRAVACHOL) 80 mg tablet      LIPID PANEL      HEPATIC FUNCTION PANEL   4. Vitamin D deficiency  E55.9 268.9 VITAMIN D, 25 HYDROXY      VITAMIN D, 25 HYDROXY   5. Medicare annual wellness visit, subsequent  Z00.00 V70.0    continue PT/OT. Using tylenol for pain, follow up with howard Black. Schedule eye exam.      Follow-up and Dispositions    · Return in about 6 months (around 10/25/2022) for follow up on medications.  Marek Tsang MD    This is the Subsequent Medicare Annual Wellness Exam, performed 12 months or more after the Initial AWV or the last Subsequent AWV    I have reviewed the patient's medical history in detail and updated the computerized patient record. Assessment/Plan   Education and counseling provided:  Are appropriate based on today's review and evaluation    1. Closed fracture of left hip, initial encounter ([facility] [address]  2. Screening for depression  -     DEPRESSION SCREEN ANNUAL  3. Other hyperlipidemia  -     HEPATIC FUNCTION PANEL; Future  -     LIPID PANEL; Future  -     pravastatin (PRAVACHOL) 80 mg tablet; Take 1 tablet by mouth once daily, Normal, Disp-90 Tablet, R-3HOLD UNTIL NEEDED. 4. Vitamin D deficiency  -     VITAMIN D, 25 HYDROXY; Future  5. Medicare annual wellness visit, subsequent       Depression Risk Factor Screening     3 most recent PHQ Screens 4/25/2022   PHQ Not Done -   Little interest or pleasure in doing things Not at all   Feeling down, depressed, irritable, or hopeless Not at all   Total Score PHQ 2 0       Alcohol & Drug Abuse Risk Screen    Do you average more than 1 drink per night or more than 7 drinks a week:  No    On any one occasion in the past three months have you have had more than 3 drinks containing alcohol:  No          Functional Ability and Level of Safety    Hearing: Hearing is good. Activities of Daily Living: The home contains: using wheelchair and walker. Patient needs help with:  transportation, shopping, preparing meals, laundry and housework      Ambulation: with mild difficulty     Fall Risk:  Fall Risk Assessment, last 12 mths 4/25/2022   Able to walk? Yes   Fall in past 12 months? 1   Do you feel unsteady? 1   Are you worried about falling 1   Is the gait abnormal? 1   Number of falls in past 12 months 2   Fall with injury?  1      Abuse Screen:  Patient is not abused       Cognitive Screening    Has your family/caregiver stated any concerns about your memory: no     Cognitive Screening: Normal - Verbal Fluency Test    Health Maintenance Due     Health Maintenance Due   Topic Date Due    COVID-19 Vaccine (1) Never done    DTaP/Tdap/Td series (1 - Tdap) Never done    Shingrix Vaccine Age 50> (1 of 2) Never done    Pneumococcal 65+ years (2 - PCV) 01/01/2012    Lipid Screen  11/19/2021       Patient Care Team   Patient Care Team:  Angélica Fournier MD as PCP - General (Internal Medicine)  Angélica Fournier MD as PCP - [facility] Empaneled Provider  Ashly King MD (Family Medicine)  Sharon Reece, [address] (Optometry)  Monique Nguyen MD (Inactive) as Consulting Provider (Internal Medicine)  Renae Reddy DO as Consulting Provider (Orthopedic Surgery)    History     Patient Active Problem List   Diagnosis Code    Gastroesophageal reflux disease without esophagitis K21.9    Multiple thyroid nodules E04.2    Hyperlipidemia E78.5    TIA (transient ischemic attack) G45.9    Hip fracture ([address] S72.009A     Past Medical History:   Diagnosis Date    Acid reflux     Endocrine disease     thyroid nodules    Stroke [facility])     Thyroid disease     thyroid nodules      Past Surgical History:   Procedure Laterality Date    HX GYN  1996    hysterectomy ? BSO    HX HIP REPLACEMENT Left 03/27/2022    HX ORTHOPAEDIC      back     Current Outpatient Medications   Medication Sig Dispense Refill    pravastatin (PRAVACHOL) 80 mg tablet Take 1 tablet by mouth once daily 90 Tablet 3    aspirin 81 mg chewable tablet Take 1 Tab by mouth daily. 30 Tab 0    cholecalciferol (VITAMIN D3) 1,000 unit tablet Take  by mouth daily.  esomeprazole (NEXIUM) 40 mg capsule Take 40 mg by mouth daily.  vitamin e (E GEMS) 100 unit capsule Take 100 Units by mouth daily.  ondansetron (Zofran ODT) 4 mg disintegrating tablet Take 1 Tablet by mouth every eight (8) hours as needed for Nausea.  (Patient not taking: Reported on 4/25/2022) 10 Tablet 0     Allergies   Allergen Reactions    Iodine Anaphylaxis    Amoxicillin Other (comments)     \"It makes my heart race\"    Lidocaine Rash     Get a rash from the patches    Macrobid [Nitrofurantoin Monohyd/M-Cryst] Rash    Oxycodone Nausea Only       Family History   Problem Relation Age of Onset    Cancer Mother         lymphoma    Diabetes Paternal Grandmother     Heart Disease Sister     Heart Disease Son     No Known Problems Son     No Known Problems Daughter     No Known Problems Daughter     Heart Disease Maternal Grandfather      Social History     Tobacco Use    Smoking status: Former Smoker     Packs/day: 1.00     Years: 20.00     Pack years: 20.00     Types: Cigarettes     Quit date: 1986     Years since quittin.3    Smokeless tobacco: Never Used   Substance Use Topics    Alcohol use: No         Torres Blair MD

## 2022-04-25 ENCOUNTER — OFFICE VISIT (OUTPATIENT)
Dept: INTERNAL MEDICINE CLINIC | Age: 82
End: 2022-04-25
Payer: MEDICARE

## 2022-04-25 VITALS
HEART RATE: 58 BPM | TEMPERATURE: 97.9 F | BODY MASS INDEX: 25.57 KG/M2 | HEIGHT: 66 IN | RESPIRATION RATE: 18 BRPM | OXYGEN SATURATION: 95 % | SYSTOLIC BLOOD PRESSURE: 119 MMHG | DIASTOLIC BLOOD PRESSURE: 74 MMHG

## 2022-04-25 DIAGNOSIS — Z00.00 MEDICARE ANNUAL WELLNESS VISIT, SUBSEQUENT: ICD-10-CM

## 2022-04-25 DIAGNOSIS — Z13.31 SCREENING FOR DEPRESSION: ICD-10-CM

## 2022-04-25 DIAGNOSIS — E78.49 OTHER HYPERLIPIDEMIA: ICD-10-CM

## 2022-04-25 DIAGNOSIS — E55.9 VITAMIN D DEFICIENCY: ICD-10-CM

## 2022-04-25 DIAGNOSIS — S72.002A CLOSED FRACTURE OF LEFT HIP, INITIAL ENCOUNTER (HCC): Primary | ICD-10-CM

## 2022-04-25 PROCEDURE — 1101F PT FALLS ASSESS-DOCD LE1/YR: CPT | Performed by: FAMILY MEDICINE

## 2022-04-25 PROCEDURE — G0463 HOSPITAL OUTPT CLINIC VISIT: HCPCS | Performed by: FAMILY MEDICINE

## 2022-04-25 PROCEDURE — G8419 CALC BMI OUT NRM PARAM NOF/U: HCPCS | Performed by: FAMILY MEDICINE

## 2022-04-25 PROCEDURE — G0439 PPPS, SUBSEQ VISIT: HCPCS | Performed by: FAMILY MEDICINE

## 2022-04-25 PROCEDURE — G8510 SCR DEP NEG, NO PLAN REQD: HCPCS | Performed by: FAMILY MEDICINE

## 2022-04-25 PROCEDURE — G8399 PT W/DXA RESULTS DOCUMENT: HCPCS | Performed by: FAMILY MEDICINE

## 2022-04-25 PROCEDURE — G8536 NO DOC ELDER MAL SCRN: HCPCS | Performed by: FAMILY MEDICINE

## 2022-04-25 PROCEDURE — G8427 DOCREV CUR MEDS BY ELIG CLIN: HCPCS | Performed by: FAMILY MEDICINE

## 2022-04-25 PROCEDURE — 1090F PRES/ABSN URINE INCON ASSESS: CPT | Performed by: FAMILY MEDICINE

## 2022-04-25 PROCEDURE — 1111F DSCHRG MED/CURRENT MED MERGE: CPT | Performed by: FAMILY MEDICINE

## 2022-04-25 PROCEDURE — 99214 OFFICE O/P EST MOD 30 MIN: CPT | Performed by: FAMILY MEDICINE

## 2022-04-25 RX ORDER — PRAVASTATIN SODIUM 80 MG/1
TABLET ORAL
Qty: 90 TABLET | Refills: 3 | Status: SHIPPED | OUTPATIENT
Start: 2022-04-25 | End: 2022-05-16 | Stop reason: SDUPTHER

## 2022-04-26 LAB
25(OH)D3 SERPL-MCNC: 35.1 NG/ML (ref 30–100)
ALBUMIN SERPL-MCNC: 3.7 G/DL (ref 3.5–5)
ALBUMIN/GLOB SERPL: 1.3 {RATIO} (ref 1.1–2.2)
ALP SERPL-CCNC: 106 U/L (ref 45–117)
ALT SERPL-CCNC: 26 U/L (ref 12–78)
AST SERPL-CCNC: 16 U/L (ref 15–37)
BILIRUB DIRECT SERPL-MCNC: 0.1 MG/DL (ref 0–0.2)
BILIRUB SERPL-MCNC: 0.3 MG/DL (ref 0.2–1)
CHOLEST SERPL-MCNC: 232 MG/DL
GLOBULIN SER CALC-MCNC: 2.9 G/DL (ref 2–4)
HDLC SERPL-MCNC: 66 MG/DL
HDLC SERPL: 3.5 {RATIO} (ref 0–5)
LDLC SERPL CALC-MCNC: 121 MG/DL (ref 0–100)
PROT SERPL-MCNC: 6.6 G/DL (ref 6.4–8.2)
TRIGL SERPL-MCNC: 225 MG/DL (ref ?–150)
VLDLC SERPL CALC-MCNC: 45 MG/DL

## 2022-04-27 ENCOUNTER — OFFICE VISIT (OUTPATIENT)
Dept: ORTHOPEDIC SURGERY | Age: 82
End: 2022-04-27
Payer: MEDICARE

## 2022-04-27 VITALS — BODY MASS INDEX: 25.39 KG/M2 | WEIGHT: 158 LBS | HEIGHT: 66 IN

## 2022-04-27 DIAGNOSIS — Z09 SURGICAL FOLLOW-UP CARE: ICD-10-CM

## 2022-04-27 DIAGNOSIS — S72.002A CLOSED FRACTURE OF LEFT HIP, INITIAL ENCOUNTER (HCC): Primary | ICD-10-CM

## 2022-04-27 PROCEDURE — 99024 POSTOP FOLLOW-UP VISIT: CPT | Performed by: ORTHOPAEDIC SURGERY

## 2022-04-27 NOTE — PROGRESS NOTES
Alen Dunbar (: 1940) is a 80 y.o. female, established patient, here for evaluation of the following chief complaint(s):  Post OP Follow Up and Hip Pain       ASSESSMENT/PLAN:  Below is the assessment and plan developed based on review of pertinent history, physical exam, labs, studies, and medications. Overall she seems to be doing well. She may progress her activities as she can tolerate. 1. Cl[facility] of [facility], [facility]  -     XR HIP LT W OR WO PELV 2-3 VWS; Future  2. Surgical follow-up care      Return in about 6 weeks (around 2022). SUBJECTIVE/OBJECTIVE:  Alen Dunbar (: 1940) is a 80 y.o. female. She is status post left hip percutaneous screw fixation. No complaints today. She notes pain has been tolerable she is working through her recovery process        Allergies   Allergen Reactions    Iodine Anaphylaxis    Amoxicillin Other (comments)     \"It makes my heart race\"    Lidocaine Rash     Get a rash from the patches    Macrobid [Nitrofurantoin Monohyd/M-Cryst] Rash    Oxycodone Nausea Only       Current Outpatient Medications   Medication Sig    pravastatin (PRAVACHOL) 80 mg tablet Take 1 tablet by mouth once daily    vitamin e (E GEMS) 100 unit capsule Take 100 Units by mouth daily.  ondansetron (Zofran ODT) 4 mg disintegrating tablet Take 1 Tablet by mouth every eight (8) hours as needed for Nausea. (Patient not taking: Reported on 2022)    aspirin 81 mg chewable tablet Take 1 Tab by mouth daily.  cholecalciferol (VITAMIN D3) 1,000 unit tablet Take  by mouth daily.  esomeprazole (NEXIUM) 40 mg capsule Take 40 mg by mouth daily. No current facility-administered medications for this visit.        Social History     Socioeconomic History    Marital status:      Spouse name: Not on file    Number of children: Not on file    Years of education: Not on file    Highest education level: Not on file Occupational History    Not on file   Tobacco Use    Smoking status: Former Smoker     Packs/day: 1.00     Years: 20.00     Pack years: 20.00     Types: Cigarettes     Quit date: 1986     Years since quittin.3    Smokeless tobacco: Never Used   Substance and Sexual Activity    Alcohol use: No    Drug use: No     Comment: CBD oil    Sexual activity: Not Currently     Partners: Male     Birth control/protection: None   Other Topics Concern    Not on file   Social History Narrative    Not on file     Social Determinants of Health     Financial Resource Strain:     Difficulty of Paying Living Expenses: Not on file   Food Insecurity:     Worried About Running Out of Food in the Last Year: Not on file    Leilani of Food in the Last Year: Not on file   Transportation Needs:     Lack of Transportation (Medical): Not on file    Lack of Transportation (Non-Medical): Not on file   Physical Activity:     Days of Exercise per Week: Not on file    Minutes of Exercise per Session: Not on file   Stress:     Feeling of Stress : Not on file   Social Connections:     Frequency of Communication with Friends and Family: Not on file    Frequency of Social Gatherings with Friends and Family: Not on file    Attends Temple Services: Not on file    Active Member of [address] [place] [address] [employer] or Organizations: Not on file    Attends Club or Organization Meetings: Not on file    Marital Status: Not on file   Intimate Partner Violence:     Fear of Current or Ex-Partner: Not on file    Emotionally Abused: Not on file    Physically Abused: Not on file    Sexually Abused: Not on file   Housing Stability:     Unable to Pay for Housing in the Last Year: Not on file    Number of [place] in the Last Year: Not on file    Unstable Housing in the Last Year: Not on file       Past Surgical History:   Procedure Laterality Date    [address] [facility]    hysterectomy ?  BSO    HX HIP REPLACEMENT Left 2022    HX ORTHOPAEDIC back       Family History   Problem Relation Age of Onset    Cancer Mother         lymphoma    Diabetes Paternal Grandmother     Heart Disease Sister     Heart Disease Son     No Known Problems Son     No Known Problems Daughter     No Known Problems Daughter     Heart Disease Maternal Grandfather         OB History    No obstetric history on file. REVIEW OF SYSTEMS:  ROS     Positive for: Musculoskeletal    Last edited by Rocio Schwartz on 4/27/2022 10:50 AM. (History)        Patient denies any recent fever, chills, nausea, vomiting, chest pain, or shortness of breath. Vitals:  Ht 5' 6\" (1.676 m)   Wt 158 lb (71.7 kg)   BMI 25.50 kg/m²    Body mass index is 25.5 kg/m². PHYSICAL EXAM:  General exam: Patient is awake, alert, and oriented x3. Well-appearing. No acute distress. Ambulates with an antalgic gait    Left hip: Neurovascular and sensory intact. Incisions well-healed with no erythema or drainage. Normal stability. Mild swelling and ecchymosis. IMAGING:    XR Results (most recent):  Results from Appointment encounter on 04/27/22    XR HIP LT W OR WO PELV 2-3 VWS    Narrative  X-rays of the left hip 2 views done today show evidence of Stevenson normal alignment of the hip joint. Percutaneous screw fixation hardware is intact with no signs of hardware failure or      Results from Hospital Encounter encounter on 03/29/22    XR HIP LT DURING OR PROC    Narrative  EXAM: XR HIP LT DURING OR PROC    INDICATION: Surgery. COMPARISON: Earlier same day    FINDINGS: A single portable AP intraoperative view of the left hip was obtained  at  1356 hours.  3 cannulated screw fixating the subcapital left femoral neck  fracture    Fluoroscopy dose (air kerma): 0.25 mGy      Please see operative notes for full details    Impression  INTERPRETATION PROVIDED FOR COMPLIANCE ONLY AT NO CHARGE      XR CHEST PORT    Narrative  Indication: Preoperative examination, history of thyroid disease, reflux    Comparison: 12/10/2021    Portable exam of the chest obtained at 1028 demonstrates normal heart size. There is no acute process in the lung fields. The osseous structures are  unremarkable. Impression  No acute process. Orders Placed This Encounter    XR HIP LT W OR WO PELV 2-3 VWS     5     Standing Status:   Future     Number of Occurrences:   1     Standing Expiration Date:   8/27/2022              An electronic signature was used to authenticate this note.   -- Maureen Campa, DO

## 2022-04-27 NOTE — LETTER
4/27/2022    Patient: Tripp Bryant   YOB: 1940   Date of Visit: 4/27/2022     Heber Spicer MD  [address] [facility] [address]  [facility]  [place]    Dear Heber Spicer MD,      Thank you for referring Ms. Nadeen Walker to [facility] for evaluation. My notes for this consultation are attached. If you have questions, please do not hesitate to call me. I look forward to following your patient along with you.       Sincerely,    Magali Alfonso, DO

## 2022-05-08 ENCOUNTER — TELEPHONE (OUTPATIENT)
Dept: INTERNAL MEDICINE CLINIC | Age: 82
End: 2022-05-08

## 2022-05-08 NOTE — TELEPHONE ENCOUNTER
Please notify patient/family vitamin D is normal.  LDL cholesterol 127, goal is less than 100. Liver tests are normal.  Continue present medications.   Follow-up in 6 months

## 2022-05-16 DIAGNOSIS — E78.49 OTHER HYPERLIPIDEMIA: ICD-10-CM

## 2022-05-16 RX ORDER — PRAVASTATIN SODIUM 80 MG/1
TABLET ORAL
Qty: 90 TABLET | Refills: 3 | Status: SHIPPED | OUTPATIENT
Start: 2022-05-16

## 2022-05-16 NOTE — TELEPHONE ENCOUNTER
Future Appointments:  Future Appointments   Date Time Provider Payton Fidelia   6/8/2022 10:50 AM Gabriela Degree, DO [facility]        Last Appointment With Me:  4/25/2022     Requested Prescriptions     Pending Prescriptions Disp Refills    pravastatin (PRAVACHOL) 80 mg tablet 90 Tablet 3     Sig: Take 1 tablet by mouth once daily

## 2022-05-16 NOTE — TELEPHONE ENCOUNTER
----- Message from Karmen Sotelo sent at 5/16/2022 12:54 PM EDT -----  Subject: Refill Request    QUESTIONS  Name of Medication? pravastatin (PRAVACHOL) 80 mg tablet  Patient-reported dosage and instructions? 1 daily  How many days do you have left? 2  Preferred Pharmacy? [facility]  Pharmacy phone number (if available)? [phone]  ---------------------------------------------------------------------------  --------------  Bonnie GONZALEZ  What is the best way for the office to contact you? OK to leave message on   voicemail  Preferred Call Back Phone Number? [phone]  ---------------------------------------------------------------------------  --------------  SCRIPT ANSWERS  Relationship to Patient? Other  Representative Name? Ramiro Ha   Is the Representative on the appropriate HIPAA document in Epic?  Yes

## 2022-06-08 ENCOUNTER — OFFICE VISIT (OUTPATIENT)
Dept: ORTHOPEDIC SURGERY | Age: 82
End: 2022-06-08
Payer: MEDICARE

## 2022-06-08 VITALS — HEIGHT: 66 IN | WEIGHT: 158 LBS | BODY MASS INDEX: 25.39 KG/M2

## 2022-06-08 DIAGNOSIS — S72.002A CLOSED FRACTURE OF LEFT HIP, INITIAL ENCOUNTER (HCC): Primary | ICD-10-CM

## 2022-06-08 PROCEDURE — 99024 POSTOP FOLLOW-UP VISIT: CPT | Performed by: ORTHOPAEDIC SURGERY

## 2022-06-08 NOTE — PROGRESS NOTES
Magdalene Escalante (: 1940) is a 80 y.o. female, established patient, here for evaluation of the following chief complaint(s):  Hip Pain       ASSESSMENT/PLAN:  Below is the assessment and plan developed based on review of pertinent history, physical exam, labs, studies, and medications. Findings were discussed with the patient and her  today. She is doing well overall and may continue to progress her activities as she can tolerate. I will see her back in 2 months as needed    1. [facility]  -     XR HIP LT W OR WO PELV 2-3 VWS; Future      Return in about 2 months (around 2022), or if symptoms worsen or fail to improve. SUBJECTIVE/OBJECTIVE:  Magdalene Escalante (: 1940) is a 80 y.o. female. She is status post left hip percutaneous screw fixation. She notes that overall she is progressing well and has no complaints. Allergies   Allergen Reactions    Iodine Anaphylaxis    Amoxicillin Other (comments)     \"It makes my heart race\"    Lidocaine Rash     Get a rash from the patches    Macrobid [Nitrofurantoin Monohyd/M-Cryst] Rash    Oxycodone Nausea Only       Current Outpatient Medications   Medication Sig    pravastatin (PRAVACHOL) 80 mg tablet Take 1 tablet by mouth once daily    vitamin e (E GEMS) 100 unit capsule Take 100 Units by mouth daily.  ondansetron (Zofran ODT) 4 mg disintegrating tablet Take 1 Tablet by mouth every eight (8) hours as needed for Nausea. (Patient not taking: Reported on 2022)    aspirin 81 mg chewable tablet Take 1 Tab by mouth daily.  cholecalciferol (VITAMIN D3) 1,000 unit tablet Take  by mouth daily.  esomeprazole (NEXIUM) 40 mg capsule Take 40 mg by mouth daily. No current facility-administered medications for this visit.        Social History     Socioeconomic History    Marital status:      Spouse name: Not on file    Number of children: Not on file    Years of education: Not on file    Highest education level: Not on file   Occupational History    Not on file   Tobacco Use    Smoking status: Former Smoker     Packs/day: 1.00     Years: 20.00     Pack years: 20.00     Types: Cigarettes     Quit date: 1986     Years since quittin.4    Smokeless tobacco: Never Used   Substance and Sexual Activity    Alcohol use: No    Drug use: No     Comment: CBD oil    Sexual activity: Not Currently     Partners: Male     Birth control/protection: None   Other Topics Concern    Not on file   Social History Narrative    Not on file     Social Determinants of Health     Financial Resource Strain:     Difficulty of Paying Living Expenses: Not on file   Food Insecurity:     Worried About Running Out of Food in the Last Year: Not on file    Leilani of Food in the Last Year: Not on file   Transportation Needs:     Lack of Transportation (Medical): Not on file    Lack of Transportation (Non-Medical): Not on file   Physical Activity:     Days of Exercise per Week: Not on file    Minutes of Exercise per Session: Not on file   Stress:     Feeling of Stress : Not on file   Social Connections:     Frequency of Communication with Friends and Family: Not on file    Frequency of Social Gatherings with Friends and Family: Not on file    Attends Sikh Services: Not on file    Active Member of [address] [place] [address] or Organizations: Not on file    Attends Club or Organization Meetings: Not on file    Marital Status: Not on file   Intimate Partner Violence:     Fear of Current or Ex-Partner: Not on file    Emotionally Abused: Not on file    Physically Abused: Not on file    Sexually Abused: Not on file   Housing Stability:     Unable to Pay for Housing in the Last Year: Not on file    Number of [place] in the Last Year: Not on file    Unstable Housing in the Last Year: Not on file       Past Surgical History:   Procedure Laterality Date    [facility]    hysterectomy ?  BSO  HX HIP REPLACEMENT Left 03/27/2022    HX ORTHOPAEDIC      back       Family History   Problem Relation Age of Onset   [place] Cancer Mother         lymphoma    Diabetes Paternal Grandmother     Heart Disease Sister     Heart Disease Son     No Known Problems Son     No Known Problems Daughter     No Known Problems Daughter     Heart Disease Maternal Grandfather         OB History    No obstetric history on file. REVIEW OF SYSTEMS:  ROS     Positive for: Musculoskeletal    Last edited by Fredrick Silva on 6/8/2022 10:46 AM. (History)        Patient denies any recent fever, chills, nausea, vomiting, chest pain, or shortness of breath. Vitals:  Ht 5' 6\" (1.676 m)   Wt 158 lb (71.7 kg)   BMI 25.50 kg/m²    Body mass index is 25.5 kg/m². PHYSICAL EXAM:  General exam: Patient is awake, alert, and oriented x3. Well-appearing. No acute distress. Is in a wheelchair    Left hip: Improving range of motion. No pain with passive hip flexion and rotation. Normal stability  IMAGING:    XR Results (most recent):  Results from Appointment encounter on 06/08/22    XR HIP LT W OR WO PELV 2-3 VWS    Narrative  X-rays of the left hip 3 views done today show evidence of well aligned hip joint and percutaneous screw fixation. No signs of hardware failure or loosening. Results from Appointment encounter on 04/27/22    XR HIP LT W OR WO PELV 2-3 VWS    Narrative  X-rays of the left hip 2 views done today show evidence of Stevenson normal alignment of the hip joint. Percutaneous screw fixation hardware is intact with no signs of hardware failure or      Results from Hospital Encounter encounter on 03/29/22    XR HIP LT DURING OR PROC    Narrative  EXAM: XR HIP LT DURING OR PROC    INDICATION: Surgery. COMPARISON: Earlier same day    FINDINGS: A single portable AP intraoperative view of the left hip was obtained  at  1356 hours.  3 cannulated screw fixating the subcapital left femoral neck  fracture    Fluoroscopy dose (air kerma): 0.25 mGy      Please see operative notes for full details    Impression  INTERPRETATION PROVIDED FOR COMPLIANCE ONLY AT NO CHARGE         Orders Placed This Encounter    XR HIP LT W OR WO PELV 2-3 VWS     1     Standing Status:   Future     Number of Occurrences:   1     Standing Expiration Date:   6/8/2023              An electronic signature was used to authenticate this note.   -- Mildred Soni, DO

## 2022-06-08 NOTE — LETTER
6/8/2022    Patient: Eunice Rosales   YOB: 1940   Date of Visit: 6/8/2022     Emiliano Alejandro MD  [address] [facility] [address]  [facility]  [place]    Dear Emiliano Alejandro MD,      Thank you for referring Ms. Jany Kim to [facility] for evaluation. My notes for this consultation are attached. If you have questions, please do not hesitate to call me. I look forward to following your patient along with you.       Sincerely,    Atiya Tavarez, DO

## 2022-11-30 RX ORDER — MELOXICAM 7.5 MG/1
7.5 TABLET ORAL DAILY
Qty: 30 TABLET | Refills: 0 | Status: SHIPPED | OUTPATIENT
Start: 2022-11-30

## 2022-11-30 RX ORDER — MELOXICAM 7.5 MG/1
TABLET ORAL
Qty: 30 TABLET | Refills: 0 | Status: SHIPPED | OUTPATIENT
Start: 2022-11-30

## 2022-11-30 NOTE — TELEPHONE ENCOUNTER
Future Appointments:  No future appointments. Last Appointment With Me:  Visit date not found     Requested Prescriptions     Pending Prescriptions Disp Refills    meloxicam (MOBIC) 7.5 mg tablet 30 Tablet 0     Sig: Take 1 Tablet by mouth daily.   ----- Message from Shine Talamantes sent at 11/30/2022 11:36 AM EST -----  Subject: Refill Request    QUESTIONS  Name of Medication? Other - Meloxicam 7.5 mg  Patient-reported dosage and instructions? 7.5 mg. Take 1 tablet as needed   for pain  How many days do you have left? 0  Preferred Pharmacy? [facility]  Pharmacy phone number (if available)? [phone]  Additional Information for Provider? Please f/u [phone]  ---------------------------------------------------------------------------  --------------  CALL BACK INFO  What is the best way for the office to contact you? OK to leave message on   voicemail  Preferred Call Back Phone Number? [phone]  ---------------------------------------------------------------------------  --------------  SCRIPT ANSWERS  Relationship to Patient? Other  Representative Name? Claudell Denver  Is the Representative on the appropriate HIPAA document in Epic?  Yes

## 2022-11-30 NOTE — TELEPHONE ENCOUNTER
----- Message from Efren Martin sent at 11/30/2022 11:36 AM EST -----  Subject: Refill Request    QUESTIONS  Name of Medication? Other - Meloxicam 7.5 mg  Patient-reported dosage and instructions? 7.5 mg. Take 1 tablet as needed   for pain  How many days do you have left? 0  Preferred Pharmacy? [facility]  Pharmacy phone number (if available)? [phone]  Additional Information for Provider? Please f/u [phone]  ---------------------------------------------------------------------------  --------------  CALL BACK INFO  What is the best way for the office to contact you? OK to leave message on   voicemail  Preferred Call Back Phone Number? [phone]  ---------------------------------------------------------------------------  --------------  SCRIPT ANSWERS  Relationship to Patient? Other  Representative Name? Libby Dow  Is the Representative on the appropriate HIPAA document in Epic?  Yes

## 2023-04-05 ENCOUNTER — TELEPHONE (OUTPATIENT)
Dept: INTERNAL MEDICINE CLINIC | Age: 83
End: 2023-04-05

## 2023-04-05 RX ORDER — SULFAMETHOXAZOLE AND TRIMETHOPRIM 800; 160 MG/1; MG/1
1 TABLET ORAL 2 TIMES DAILY
Qty: 14 TABLET | Refills: 0 | Status: SHIPPED
Start: 2023-04-05

## 2023-04-05 NOTE — TELEPHONE ENCOUNTER
States pt has another bladder infection   Symptoms:  Burning   Frequency      States was told by pcp to call if happens again and she will prescribe med    States can med be sent in immediately

## 2023-05-15 ENCOUNTER — HOSPITAL ENCOUNTER (EMERGENCY)
Facility: HOSPITAL | Age: 83
Discharge: HOME | DRG: 281 | End: 2023-05-18
Payer: MEDICARE

## 2023-05-15 ENCOUNTER — HOSPITAL ENCOUNTER (EMERGENCY)
Facility: HOSPITAL | Age: 83
Discharge: HOME | DRG: 281 | End: 2023-05-15
Attending: EMERGENCY MEDICINE
Payer: MEDICARE

## 2023-05-15 VITALS
WEIGHT: 150 LBS | BODY MASS INDEX: 23.54 KG/M2 | OXYGEN SATURATION: 94 % | HEIGHT: 67 IN | TEMPERATURE: 97.6 F | HEART RATE: 85 BPM | DIASTOLIC BLOOD PRESSURE: 85 MMHG | SYSTOLIC BLOOD PRESSURE: 143 MMHG | RESPIRATION RATE: 20 BRPM

## 2023-05-15 DIAGNOSIS — S92.154A CLOSED NONDISPLACED AVULSION FRACTURE OF RIGHT TALUS, INITIAL ENCOUNTER: Primary | ICD-10-CM

## 2023-05-15 PROCEDURE — 73630 X-RAY EXAM OF FOOT: CPT

## 2023-05-15 PROCEDURE — 73610 X-RAY EXAM OF ANKLE: CPT

## 2023-05-15 PROCEDURE — 6370000000 HC RX 637 (ALT 250 FOR IP): Performed by: EMERGENCY MEDICINE

## 2023-05-15 RX ORDER — ACETAMINOPHEN 500 MG
1000 TABLET ORAL
Status: COMPLETED | OUTPATIENT
Start: 2023-05-15 | End: 2023-05-15

## 2023-05-15 RX ADMIN — ACETAMINOPHEN 1000 MG: 500 TABLET ORAL at 08:39

## 2023-05-15 ASSESSMENT — PAIN SCALES - GENERAL
PAINLEVEL_OUTOF10: 5
PAINLEVEL_OUTOF10: 3
PAINLEVEL_OUTOF10: 5

## 2023-05-15 ASSESSMENT — PAIN DESCRIPTION - LOCATION
LOCATION: ANKLE
LOCATION: ANKLE

## 2023-05-15 ASSESSMENT — PAIN - FUNCTIONAL ASSESSMENT: PAIN_FUNCTIONAL_ASSESSMENT: 0-10

## 2023-05-15 ASSESSMENT — PAIN DESCRIPTION - DESCRIPTORS
DESCRIPTORS: DISCOMFORT
DESCRIPTORS: DISCOMFORT

## 2023-05-15 ASSESSMENT — LIFESTYLE VARIABLES
HOW MANY STANDARD DRINKS CONTAINING ALCOHOL DO YOU HAVE ON A TYPICAL DAY: PATIENT DOES NOT DRINK
HOW OFTEN DO YOU HAVE A DRINK CONTAINING ALCOHOL: NEVER

## 2023-05-15 ASSESSMENT — PAIN DESCRIPTION - ORIENTATION
ORIENTATION: RIGHT
ORIENTATION: RIGHT

## 2023-05-15 NOTE — ED PROVIDER NOTES
worsen        DISCHARGE MEDICATIONS:     Medication List        ASK your doctor about these medications      aspirin 81 MG chewable tablet     esomeprazole 40 MG delayed release capsule  Commonly known as: NEXIUM     meloxicam 7.5 MG tablet  Commonly known as: MOBIC     ondansetron 4 MG disintegrating tablet  Commonly known as: ZOFRAN-ODT     pravastatin 80 MG tablet  Commonly known as: PRAVACHOL     vitamin D 25 MCG (1000 UT) Tabs tablet  Commonly known as: CHOLECALCIFEROL     vitamin E 45 MG (100 UNIT) capsule                DISCONTINUED MEDICATIONS:  Discharge Medication List as of 5/15/2023 10:03 AM          I am the Primary Clinician of Record. Gisele Pozo MD (electronically signed)    (Please note that parts of this dictation were completed with voice recognition software. Quite often unanticipated grammatical, syntax, homophones, and other interpretive errors are inadvertently transcribed by the computer software. Please disregards these errors.  Please excuse any errors that have escaped final proofreading.)       Gisele Pozo MD  05/15/23 2679

## 2023-05-15 NOTE — DISCHARGE INSTRUCTIONS
You were evaluated in the emergency department for right ankle/foot pain and swelling after a fall. Your examination was reassuring as was your work-up including xrays which show a mild avulsion fracture of the right talus (foot/ankle joint bone). It will be important for you to follow-up with your primary care physician in 2-3 days, you can also make an appointment with Orthopedics, Dr Danny Carranza. If you develop worsening symptoms such as worsening pain, swelling, or fevers, please return to the emergency department immediately. You can use Tylenol 1000 mg every 6 hours as needed for pain, please do not exceed 4000 mg in a single day.

## 2023-05-17 ENCOUNTER — TELEPHONE (OUTPATIENT)
Age: 83
End: 2023-05-17

## 2023-05-17 ENCOUNTER — HOSPITAL ENCOUNTER (INPATIENT)
Facility: HOSPITAL | Age: 83
LOS: 7 days | Discharge: NURSING, REHAB OR LONG-TERM CARE | DRG: 281 | End: 2023-05-24
Attending: STUDENT IN AN ORGANIZED HEALTH CARE EDUCATION/TRAINING PROGRAM | Admitting: INTERNAL MEDICINE
Payer: MEDICARE

## 2023-05-17 ENCOUNTER — APPOINTMENT (OUTPATIENT)
Facility: HOSPITAL | Age: 83
DRG: 281 | End: 2023-05-17
Payer: MEDICARE

## 2023-05-17 DIAGNOSIS — J96.01 ACUTE RESPIRATORY FAILURE WITH HYPOXIA (HCC): Primary | ICD-10-CM

## 2023-05-17 DIAGNOSIS — R53.1 GENERAL WEAKNESS: ICD-10-CM

## 2023-05-17 PROBLEM — J81.0 PULMONARY EDEMA, ACUTE (HCC): Status: ACTIVE | Noted: 2023-05-17

## 2023-05-17 PROBLEM — R09.02 HYPOXIA: Status: ACTIVE | Noted: 2023-05-17

## 2023-05-17 LAB
ALBUMIN SERPL-MCNC: 3.1 G/DL (ref 3.5–5)
ALBUMIN/GLOB SERPL: 1 (ref 1.1–2.2)
ALP SERPL-CCNC: 80 U/L (ref 45–117)
ALT SERPL-CCNC: 21 U/L (ref 12–78)
AMMONIA PLAS-SCNC: <10 UMOL/L
ANION GAP SERPL CALC-SCNC: 7 MMOL/L (ref 5–15)
APPEARANCE UR: CLEAR
APPEARANCE UR: CLEAR
AST SERPL-CCNC: 18 U/L (ref 15–37)
B PERT DNA SPEC QL NAA+PROBE: NOT DETECTED
BACTERIA URNS QL MICRO: ABNORMAL /HPF
BACTERIA URNS QL MICRO: ABNORMAL /HPF
BASOPHILS # BLD: 0 K/UL (ref 0–0.1)
BASOPHILS NFR BLD: 0 % (ref 0–1)
BILIRUB SERPL-MCNC: 1.1 MG/DL (ref 0.2–1)
BILIRUB UR QL: NEGATIVE
BILIRUB UR QL: NEGATIVE
BORDETELLA PARAPERTUSSIS BY PCR: NOT DETECTED
BUN SERPL-MCNC: 11 MG/DL (ref 6–20)
BUN/CREAT SERPL: 19 (ref 12–20)
C PNEUM DNA SPEC QL NAA+PROBE: NOT DETECTED
CALCIUM SERPL-MCNC: 9.1 MG/DL (ref 8.5–10.1)
CHLORIDE SERPL-SCNC: 110 MMOL/L (ref 97–108)
CK SERPL-CCNC: 97 U/L (ref 26–192)
CO2 SERPL-SCNC: 24 MMOL/L (ref 21–32)
COLOR UR: ABNORMAL
COLOR UR: ABNORMAL
COMMENT:: NORMAL
CREAT SERPL-MCNC: 0.57 MG/DL (ref 0.55–1.02)
DIFFERENTIAL METHOD BLD: ABNORMAL
EKG ATRIAL RATE: 106 BPM
EKG DIAGNOSIS: NORMAL
EKG P AXIS: 60 DEGREES
EKG P-R INTERVAL: 168 MS
EKG Q-T INTERVAL: 356 MS
EKG QRS DURATION: 72 MS
EKG QTC CALCULATION (BAZETT): 472 MS
EKG R AXIS: -39 DEGREES
EKG T AXIS: 266 DEGREES
EKG VENTRICULAR RATE: 106 BPM
EOSINOPHIL # BLD: 0.1 K/UL (ref 0–0.4)
EOSINOPHIL NFR BLD: 1 % (ref 0–7)
EPITH CASTS URNS QL MICRO: ABNORMAL /LPF
EPITH CASTS URNS QL MICRO: ABNORMAL /LPF
ERYTHROCYTE [DISTWIDTH] IN BLOOD BY AUTOMATED COUNT: 12.8 % (ref 11.5–14.5)
FLUAV SUBTYP SPEC NAA+PROBE: NOT DETECTED
FLUBV RNA SPEC QL NAA+PROBE: NOT DETECTED
GLOBULIN SER CALC-MCNC: 3 G/DL (ref 2–4)
GLUCOSE SERPL-MCNC: 130 MG/DL (ref 65–100)
GLUCOSE UR STRIP.AUTO-MCNC: NEGATIVE MG/DL
GLUCOSE UR STRIP.AUTO-MCNC: NEGATIVE MG/DL
HADV DNA SPEC QL NAA+PROBE: NOT DETECTED
HCOV 229E RNA SPEC QL NAA+PROBE: NOT DETECTED
HCOV HKU1 RNA SPEC QL NAA+PROBE: NOT DETECTED
HCOV NL63 RNA SPEC QL NAA+PROBE: NOT DETECTED
HCOV OC43 RNA SPEC QL NAA+PROBE: NOT DETECTED
HCT VFR BLD AUTO: 43.7 % (ref 35–47)
HGB BLD-MCNC: 14.5 G/DL (ref 11.5–16)
HGB UR QL STRIP: NEGATIVE
HGB UR QL STRIP: NEGATIVE
HMPV RNA SPEC QL NAA+PROBE: NOT DETECTED
HPIV1 RNA SPEC QL NAA+PROBE: NOT DETECTED
HPIV2 RNA SPEC QL NAA+PROBE: NOT DETECTED
HPIV3 RNA SPEC QL NAA+PROBE: NOT DETECTED
HPIV4 RNA SPEC QL NAA+PROBE: NOT DETECTED
IMM GRANULOCYTES # BLD AUTO: 0 K/UL (ref 0–0.04)
IMM GRANULOCYTES NFR BLD AUTO: 0 % (ref 0–0.5)
KETONES UR QL STRIP.AUTO: 15 MG/DL
KETONES UR QL STRIP.AUTO: 40 MG/DL
LEUKOCYTE ESTERASE UR QL STRIP.AUTO: NEGATIVE
LEUKOCYTE ESTERASE UR QL STRIP.AUTO: NEGATIVE
LYMPHOCYTES # BLD: 1.2 K/UL (ref 0.8–3.5)
LYMPHOCYTES NFR BLD: 11 % (ref 12–49)
M PNEUMO DNA SPEC QL NAA+PROBE: NOT DETECTED
MCH RBC QN AUTO: 29.4 PG (ref 26–34)
MCHC RBC AUTO-ENTMCNC: 33.2 G/DL (ref 30–36.5)
MCV RBC AUTO: 88.5 FL (ref 80–99)
MONOCYTES # BLD: 0.6 K/UL (ref 0–1)
MONOCYTES NFR BLD: 5 % (ref 5–13)
NEUTS SEG # BLD: 9.1 K/UL (ref 1.8–8)
NEUTS SEG NFR BLD: 83 % (ref 32–75)
NITRITE UR QL STRIP.AUTO: NEGATIVE
NITRITE UR QL STRIP.AUTO: POSITIVE
NRBC # BLD: 0 K/UL (ref 0–0.01)
NRBC BLD-RTO: 0 PER 100 WBC
PH UR STRIP: 5.5 (ref 5–8)
PH UR STRIP: 5.5 (ref 5–8)
PLATELET # BLD AUTO: 167 K/UL (ref 150–400)
PMV BLD AUTO: 12 FL (ref 8.9–12.9)
POTASSIUM SERPL-SCNC: 3.6 MMOL/L (ref 3.5–5.1)
PROCALCITONIN SERPL-MCNC: 0.58 NG/ML
PROT SERPL-MCNC: 6.1 G/DL (ref 6.4–8.2)
PROT UR STRIP-MCNC: 30 MG/DL
PROT UR STRIP-MCNC: ABNORMAL MG/DL
RBC # BLD AUTO: 4.94 M/UL (ref 3.8–5.2)
RBC #/AREA URNS HPF: ABNORMAL /HPF (ref 0–5)
RBC #/AREA URNS HPF: ABNORMAL /HPF (ref 0–5)
RSV RNA SPEC QL NAA+PROBE: NOT DETECTED
RV+EV RNA SPEC QL NAA+PROBE: NOT DETECTED
SARS-COV-2 RNA RESP QL NAA+PROBE: NOT DETECTED
SODIUM SERPL-SCNC: 141 MMOL/L (ref 136–145)
SP GR UR REFRACTOMETRY: 1.01
SP GR UR REFRACTOMETRY: 1.02
SPECIMEN HOLD: NORMAL
URINE CULTURE IF INDICATED: ABNORMAL
URINE CULTURE IF INDICATED: ABNORMAL
UROBILINOGEN UR QL STRIP.AUTO: 1 EU/DL (ref 0.2–1)
UROBILINOGEN UR QL STRIP.AUTO: 1 EU/DL (ref 0.2–1)
VIT B12 SERPL-MCNC: 1324 PG/ML (ref 193–986)
WBC # BLD AUTO: 11 K/UL (ref 3.6–11)
WBC URNS QL MICRO: ABNORMAL /HPF (ref 0–4)
WBC URNS QL MICRO: ABNORMAL /HPF (ref 0–4)

## 2023-05-17 PROCEDURE — 36415 COLL VENOUS BLD VENIPUNCTURE: CPT

## 2023-05-17 PROCEDURE — 73030 X-RAY EXAM OF SHOULDER: CPT

## 2023-05-17 PROCEDURE — 80053 COMPREHEN METABOLIC PANEL: CPT

## 2023-05-17 PROCEDURE — 85025 COMPLETE CBC W/AUTO DIFF WBC: CPT

## 2023-05-17 PROCEDURE — 82550 ASSAY OF CK (CPK): CPT

## 2023-05-17 PROCEDURE — 84145 PROCALCITONIN (PCT): CPT

## 2023-05-17 PROCEDURE — 74176 CT ABD & PELVIS W/O CONTRAST: CPT

## 2023-05-17 PROCEDURE — 72125 CT NECK SPINE W/O DYE: CPT

## 2023-05-17 PROCEDURE — 82140 ASSAY OF AMMONIA: CPT

## 2023-05-17 PROCEDURE — 0202U NFCT DS 22 TRGT SARS-COV-2: CPT

## 2023-05-17 PROCEDURE — 71250 CT THORAX DX C-: CPT

## 2023-05-17 PROCEDURE — 6370000000 HC RX 637 (ALT 250 FOR IP): Performed by: INTERNAL MEDICINE

## 2023-05-17 PROCEDURE — 6360000002 HC RX W HCPCS: Performed by: INTERNAL MEDICINE

## 2023-05-17 PROCEDURE — 1100000003 HC PRIVATE W/ TELEMETRY

## 2023-05-17 PROCEDURE — 81001 URINALYSIS AUTO W/SCOPE: CPT

## 2023-05-17 PROCEDURE — 82607 VITAMIN B-12: CPT

## 2023-05-17 PROCEDURE — 78580 LUNG PERFUSION IMAGING: CPT

## 2023-05-17 PROCEDURE — 99285 EMERGENCY DEPT VISIT HI MDM: CPT

## 2023-05-17 PROCEDURE — 2580000003 HC RX 258: Performed by: INTERNAL MEDICINE

## 2023-05-17 PROCEDURE — 2580000003 HC RX 258: Performed by: STUDENT IN AN ORGANIZED HEALTH CARE EDUCATION/TRAINING PROGRAM

## 2023-05-17 PROCEDURE — 3430000000 HC RX DIAGNOSTIC RADIOPHARMACEUTICAL: Performed by: INTERNAL MEDICINE

## 2023-05-17 PROCEDURE — A9540 TC99M MAA: HCPCS | Performed by: INTERNAL MEDICINE

## 2023-05-17 PROCEDURE — 2700000000 HC OXYGEN THERAPY PER DAY

## 2023-05-17 PROCEDURE — 93005 ELECTROCARDIOGRAM TRACING: CPT | Performed by: STUDENT IN AN ORGANIZED HEALTH CARE EDUCATION/TRAINING PROGRAM

## 2023-05-17 PROCEDURE — 70450 CT HEAD/BRAIN W/O DYE: CPT

## 2023-05-17 RX ORDER — SODIUM CHLORIDE 0.9 % (FLUSH) 0.9 %
5-40 SYRINGE (ML) INJECTION PRN
Status: DISCONTINUED | OUTPATIENT
Start: 2023-05-17 | End: 2023-05-24 | Stop reason: HOSPADM

## 2023-05-17 RX ORDER — LANOLIN ALCOHOL/MO/W.PET/CERES
3 CREAM (GRAM) TOPICAL NIGHTLY PRN
Status: DISCONTINUED | OUTPATIENT
Start: 2023-05-17 | End: 2023-05-24 | Stop reason: HOSPADM

## 2023-05-17 RX ORDER — ENOXAPARIN SODIUM 100 MG/ML
40 INJECTION SUBCUTANEOUS DAILY
Status: DISCONTINUED | OUTPATIENT
Start: 2023-05-17 | End: 2023-05-18

## 2023-05-17 RX ORDER — ONDANSETRON 2 MG/ML
4 INJECTION INTRAMUSCULAR; INTRAVENOUS EVERY 6 HOURS PRN
Status: DISCONTINUED | OUTPATIENT
Start: 2023-05-17 | End: 2023-05-24 | Stop reason: HOSPADM

## 2023-05-17 RX ORDER — ONDANSETRON 4 MG/1
4 TABLET, ORALLY DISINTEGRATING ORAL EVERY 8 HOURS PRN
Status: DISCONTINUED | OUTPATIENT
Start: 2023-05-17 | End: 2023-05-24 | Stop reason: HOSPADM

## 2023-05-17 RX ORDER — PRAVASTATIN SODIUM 40 MG
80 TABLET ORAL DAILY
Status: DISCONTINUED | OUTPATIENT
Start: 2023-05-17 | End: 2023-05-24 | Stop reason: HOSPADM

## 2023-05-17 RX ORDER — LEVOFLOXACIN 5 MG/ML
750 INJECTION, SOLUTION INTRAVENOUS EVERY 24 HOURS
Status: COMPLETED | OUTPATIENT
Start: 2023-05-17 | End: 2023-05-21

## 2023-05-17 RX ORDER — ACETAMINOPHEN 325 MG/1
650 TABLET ORAL EVERY 6 HOURS PRN
Status: DISCONTINUED | OUTPATIENT
Start: 2023-05-17 | End: 2023-05-24 | Stop reason: HOSPADM

## 2023-05-17 RX ORDER — 0.9 % SODIUM CHLORIDE 0.9 %
500 INTRAVENOUS SOLUTION INTRAVENOUS ONCE
Status: COMPLETED | OUTPATIENT
Start: 2023-05-17 | End: 2023-05-17

## 2023-05-17 RX ORDER — HYDRALAZINE HYDROCHLORIDE 20 MG/ML
5 INJECTION INTRAMUSCULAR; INTRAVENOUS EVERY 6 HOURS PRN
Status: DISCONTINUED | OUTPATIENT
Start: 2023-05-17 | End: 2023-05-24 | Stop reason: HOSPADM

## 2023-05-17 RX ORDER — VITAMIN B COMPLEX
1000 TABLET ORAL DAILY
Status: DISCONTINUED | OUTPATIENT
Start: 2023-05-17 | End: 2023-05-24 | Stop reason: HOSPADM

## 2023-05-17 RX ORDER — ACETAMINOPHEN 650 MG/1
650 SUPPOSITORY RECTAL EVERY 6 HOURS PRN
Status: DISCONTINUED | OUTPATIENT
Start: 2023-05-17 | End: 2023-05-24 | Stop reason: HOSPADM

## 2023-05-17 RX ORDER — SODIUM CHLORIDE 9 MG/ML
INJECTION, SOLUTION INTRAVENOUS PRN
Status: DISCONTINUED | OUTPATIENT
Start: 2023-05-17 | End: 2023-05-24 | Stop reason: HOSPADM

## 2023-05-17 RX ORDER — PANTOPRAZOLE SODIUM 40 MG/1
40 TABLET, DELAYED RELEASE ORAL
Status: DISCONTINUED | OUTPATIENT
Start: 2023-05-18 | End: 2023-05-24 | Stop reason: HOSPADM

## 2023-05-17 RX ORDER — SODIUM CHLORIDE 0.9 % (FLUSH) 0.9 %
5-40 SYRINGE (ML) INJECTION EVERY 12 HOURS SCHEDULED
Status: DISCONTINUED | OUTPATIENT
Start: 2023-05-17 | End: 2023-05-24 | Stop reason: HOSPADM

## 2023-05-17 RX ORDER — IPRATROPIUM BROMIDE AND ALBUTEROL SULFATE 2.5; .5 MG/3ML; MG/3ML
1 SOLUTION RESPIRATORY (INHALATION) EVERY 4 HOURS PRN
Status: DISCONTINUED | OUTPATIENT
Start: 2023-05-17 | End: 2023-05-24 | Stop reason: HOSPADM

## 2023-05-17 RX ORDER — SODIUM CHLORIDE 9 MG/ML
INJECTION, SOLUTION INTRAVENOUS CONTINUOUS
Status: ACTIVE | OUTPATIENT
Start: 2023-05-17 | End: 2023-05-18

## 2023-05-17 RX ORDER — POLYETHYLENE GLYCOL 3350 17 G/17G
17 POWDER, FOR SOLUTION ORAL DAILY PRN
Status: DISCONTINUED | OUTPATIENT
Start: 2023-05-17 | End: 2023-05-24 | Stop reason: HOSPADM

## 2023-05-17 RX ADMIN — LEVOFLOXACIN 750 MG: 5 INJECTION, SOLUTION INTRAVENOUS at 17:21

## 2023-05-17 RX ADMIN — PRAVASTATIN SODIUM 80 MG: 40 TABLET ORAL at 16:52

## 2023-05-17 RX ADMIN — SODIUM CHLORIDE: 9 INJECTION, SOLUTION INTRAVENOUS at 16:53

## 2023-05-17 RX ADMIN — KIT FOR THE PREPARATION OF TECHNETIUM TC 99M ALBUMIN AGGREGATED 4.2 MILLICURIE: 2.5 INJECTION, POWDER, FOR SOLUTION INTRAVENOUS at 12:50

## 2023-05-17 RX ADMIN — Medication 1000 UNITS: at 15:24

## 2023-05-17 RX ADMIN — SODIUM CHLORIDE 500 ML: 9 INJECTION, SOLUTION INTRAVENOUS at 08:56

## 2023-05-17 RX ADMIN — ENOXAPARIN SODIUM 40 MG: 100 INJECTION SUBCUTANEOUS at 15:24

## 2023-05-17 ASSESSMENT — PAIN - FUNCTIONAL ASSESSMENT: PAIN_FUNCTIONAL_ASSESSMENT: NONE - DENIES PAIN

## 2023-05-17 NOTE — TELEPHONE ENCOUNTER
Called patient on her listed number, reached her spouse who informed me that the patient is currently in the hospital. He was asked to have the patient call us if she needs a F/U for her fracture once back home. He agreed to give her the message and took down the number.

## 2023-05-18 ENCOUNTER — APPOINTMENT (OUTPATIENT)
Facility: HOSPITAL | Age: 83
DRG: 281 | End: 2023-05-18
Payer: MEDICARE

## 2023-05-18 PROBLEM — J96.01 ACUTE RESPIRATORY FAILURE WITH HYPOXIA (HCC): Status: ACTIVE | Noted: 2023-05-18

## 2023-05-18 PROBLEM — R41.82 AMS (ALTERED MENTAL STATUS): Status: ACTIVE | Noted: 2023-05-18

## 2023-05-18 PROBLEM — R29.898 LEFT LEG WEAKNESS: Status: ACTIVE | Noted: 2023-05-18

## 2023-05-18 PROBLEM — G93.40 ENCEPHALOPATHY ACUTE: Status: ACTIVE | Noted: 2023-05-18

## 2023-05-18 PROBLEM — R53.1 GENERAL WEAKNESS: Status: ACTIVE | Noted: 2023-05-18

## 2023-05-18 PROBLEM — I67.9 CEREBRAL VASCULAR DISEASE: Status: ACTIVE | Noted: 2023-05-18

## 2023-05-18 LAB
25(OH)D3 SERPL-MCNC: 34.6 NG/ML (ref 30–100)
ANION GAP SERPL CALC-SCNC: 6 MMOL/L (ref 5–15)
BASOPHILS # BLD: 0 K/UL (ref 0–0.1)
BASOPHILS NFR BLD: 0 % (ref 0–1)
BUN SERPL-MCNC: 11 MG/DL (ref 6–20)
BUN/CREAT SERPL: 23 (ref 12–20)
CALCIUM SERPL-MCNC: 8.7 MG/DL (ref 8.5–10.1)
CHLORIDE SERPL-SCNC: 111 MMOL/L (ref 97–108)
CO2 SERPL-SCNC: 22 MMOL/L (ref 21–32)
CREAT SERPL-MCNC: 0.48 MG/DL (ref 0.55–1.02)
DIFFERENTIAL METHOD BLD: ABNORMAL
EKG ATRIAL RATE: 68 BPM
EKG DIAGNOSIS: NORMAL
EKG Q-T INTERVAL: 284 MS
EKG QRS DURATION: 76 MS
EKG QTC CALCULATION (BAZETT): 466 MS
EKG R AXIS: 229 DEGREES
EKG T AXIS: -17 DEGREES
EKG VENTRICULAR RATE: 162 BPM
EOSINOPHIL # BLD: 0.1 K/UL (ref 0–0.4)
EOSINOPHIL NFR BLD: 1 % (ref 0–7)
ERYTHROCYTE [DISTWIDTH] IN BLOOD BY AUTOMATED COUNT: 13.2 % (ref 11.5–14.5)
GLUCOSE SERPL-MCNC: 125 MG/DL (ref 65–100)
HCT VFR BLD AUTO: 41.1 % (ref 35–47)
HGB BLD-MCNC: 13.6 G/DL (ref 11.5–16)
IMM GRANULOCYTES # BLD AUTO: 0.1 K/UL (ref 0–0.04)
IMM GRANULOCYTES NFR BLD AUTO: 1 % (ref 0–0.5)
LYMPHOCYTES # BLD: 1.5 K/UL (ref 0.8–3.5)
LYMPHOCYTES NFR BLD: 16 % (ref 12–49)
MAGNESIUM SERPL-MCNC: 1.9 MG/DL (ref 1.6–2.4)
MCH RBC QN AUTO: 29.8 PG (ref 26–34)
MCHC RBC AUTO-ENTMCNC: 33.1 G/DL (ref 30–36.5)
MCV RBC AUTO: 89.9 FL (ref 80–99)
MONOCYTES # BLD: 0.6 K/UL (ref 0–1)
MONOCYTES NFR BLD: 6 % (ref 5–13)
NEUTS SEG # BLD: 7.3 K/UL (ref 1.8–8)
NEUTS SEG NFR BLD: 76 % (ref 32–75)
NRBC # BLD: 0 K/UL (ref 0–0.01)
NRBC BLD-RTO: 0 PER 100 WBC
PLATELET # BLD AUTO: 130 K/UL (ref 150–400)
PMV BLD AUTO: 12.6 FL (ref 8.9–12.9)
POTASSIUM SERPL-SCNC: 3.2 MMOL/L (ref 3.5–5.1)
RBC # BLD AUTO: 4.57 M/UL (ref 3.8–5.2)
SODIUM SERPL-SCNC: 139 MMOL/L (ref 136–145)
TROPONIN I SERPL HS-MCNC: 2586 NG/L (ref 0–51)
WBC # BLD AUTO: 9.5 K/UL (ref 3.6–11)

## 2023-05-18 PROCEDURE — 2060000000 HC ICU INTERMEDIATE R&B

## 2023-05-18 PROCEDURE — 85025 COMPLETE CBC W/AUTO DIFF WBC: CPT

## 2023-05-18 PROCEDURE — 94760 N-INVAS EAR/PLS OXIMETRY 1: CPT

## 2023-05-18 PROCEDURE — 84484 ASSAY OF TROPONIN QUANT: CPT

## 2023-05-18 PROCEDURE — 2580000003 HC RX 258: Performed by: NURSE PRACTITIONER

## 2023-05-18 PROCEDURE — 82306 VITAMIN D 25 HYDROXY: CPT

## 2023-05-18 PROCEDURE — 6370000000 HC RX 637 (ALT 250 FOR IP): Performed by: INTERNAL MEDICINE

## 2023-05-18 PROCEDURE — 73700 CT LOWER EXTREMITY W/O DYE: CPT

## 2023-05-18 PROCEDURE — 6360000002 HC RX W HCPCS: Performed by: INTERNAL MEDICINE

## 2023-05-18 PROCEDURE — 2580000003 HC RX 258: Performed by: INTERNAL MEDICINE

## 2023-05-18 PROCEDURE — 6360000002 HC RX W HCPCS: Performed by: NURSE PRACTITIONER

## 2023-05-18 PROCEDURE — 97530 THERAPEUTIC ACTIVITIES: CPT

## 2023-05-18 PROCEDURE — 80048 BASIC METABOLIC PNL TOTAL CA: CPT

## 2023-05-18 PROCEDURE — 97161 PT EVAL LOW COMPLEX 20 MIN: CPT

## 2023-05-18 PROCEDURE — 6370000000 HC RX 637 (ALT 250 FOR IP): Performed by: NURSE PRACTITIONER

## 2023-05-18 PROCEDURE — 70551 MRI BRAIN STEM W/O DYE: CPT

## 2023-05-18 PROCEDURE — 2500000003 HC RX 250 WO HCPCS: Performed by: NURSE PRACTITIONER

## 2023-05-18 PROCEDURE — 2700000000 HC OXYGEN THERAPY PER DAY

## 2023-05-18 PROCEDURE — 36415 COLL VENOUS BLD VENIPUNCTURE: CPT

## 2023-05-18 PROCEDURE — 99223 1ST HOSP IP/OBS HIGH 75: CPT | Performed by: PSYCHIATRY & NEUROLOGY

## 2023-05-18 PROCEDURE — 83735 ASSAY OF MAGNESIUM: CPT

## 2023-05-18 RX ORDER — 0.9 % SODIUM CHLORIDE 0.9 %
500 INTRAVENOUS SOLUTION INTRAVENOUS ONCE
Status: COMPLETED | OUTPATIENT
Start: 2023-05-18 | End: 2023-05-18

## 2023-05-18 RX ORDER — POTASSIUM CHLORIDE 20 MEQ/1
40 TABLET, EXTENDED RELEASE ORAL 2 TIMES DAILY WITH MEALS
Status: DISCONTINUED | OUTPATIENT
Start: 2023-05-18 | End: 2023-05-18

## 2023-05-18 RX ORDER — CLOPIDOGREL BISULFATE 75 MG/1
75 TABLET ORAL DAILY
Status: DISCONTINUED | OUTPATIENT
Start: 2023-05-19 | End: 2023-05-18

## 2023-05-18 RX ORDER — SODIUM CHLORIDE 9 MG/ML
INJECTION, SOLUTION INTRAVENOUS CONTINUOUS
Status: DISCONTINUED | OUTPATIENT
Start: 2023-05-18 | End: 2023-05-20

## 2023-05-18 RX ORDER — POTASSIUM CHLORIDE 7.45 MG/ML
10 INJECTION INTRAVENOUS
Status: COMPLETED | OUTPATIENT
Start: 2023-05-18 | End: 2023-05-18

## 2023-05-18 RX ORDER — METOPROLOL TARTRATE 5 MG/5ML
5 INJECTION INTRAVENOUS ONCE
Status: COMPLETED | OUTPATIENT
Start: 2023-05-18 | End: 2023-05-18

## 2023-05-18 RX ORDER — ASPIRIN 81 MG/1
81 TABLET ORAL DAILY
Status: DISCONTINUED | OUTPATIENT
Start: 2023-05-18 | End: 2023-05-24 | Stop reason: HOSPADM

## 2023-05-18 RX ORDER — ENOXAPARIN SODIUM 100 MG/ML
1 INJECTION SUBCUTANEOUS 2 TIMES DAILY
Status: DISCONTINUED | OUTPATIENT
Start: 2023-05-18 | End: 2023-05-22

## 2023-05-18 RX ORDER — DILTIAZEM HYDROCHLORIDE 5 MG/ML
5 INJECTION INTRAVENOUS ONCE
Status: COMPLETED | OUTPATIENT
Start: 2023-05-18 | End: 2023-05-18

## 2023-05-18 RX ORDER — METOPROLOL TARTRATE 5 MG/5ML
INJECTION INTRAVENOUS
Status: DISPENSED
Start: 2023-05-18 | End: 2023-05-18

## 2023-05-18 RX ADMIN — SODIUM CHLORIDE, PRESERVATIVE FREE 10 ML: 5 INJECTION INTRAVENOUS at 14:20

## 2023-05-18 RX ADMIN — PANTOPRAZOLE SODIUM 40 MG: 40 TABLET, DELAYED RELEASE ORAL at 09:33

## 2023-05-18 RX ADMIN — PRAVASTATIN SODIUM 80 MG: 40 TABLET ORAL at 09:33

## 2023-05-18 RX ADMIN — SODIUM CHLORIDE 500 ML: 900 INJECTION, SOLUTION INTRAVENOUS at 12:15

## 2023-05-18 RX ADMIN — SODIUM CHLORIDE 5 MG/HR: 900 INJECTION, SOLUTION INTRAVENOUS at 12:05

## 2023-05-18 RX ADMIN — SODIUM CHLORIDE: 9 INJECTION, SOLUTION INTRAVENOUS at 15:00

## 2023-05-18 RX ADMIN — ENOXAPARIN SODIUM 70 MG: 100 INJECTION SUBCUTANEOUS at 20:49

## 2023-05-18 RX ADMIN — Medication 1000 UNITS: at 09:33

## 2023-05-18 RX ADMIN — METOPROLOL TARTRATE 5 MG: 5 INJECTION INTRAVENOUS at 11:30

## 2023-05-18 RX ADMIN — POTASSIUM CHLORIDE 10 MEQ: 7.46 INJECTION, SOLUTION INTRAVENOUS at 15:09

## 2023-05-18 RX ADMIN — DILTIAZEM HYDROCHLORIDE 5 MG: 5 INJECTION INTRAVENOUS at 12:05

## 2023-05-18 RX ADMIN — SODIUM CHLORIDE: 9 INJECTION, SOLUTION INTRAVENOUS at 12:16

## 2023-05-18 RX ADMIN — POTASSIUM CHLORIDE 10 MEQ: 7.46 INJECTION, SOLUTION INTRAVENOUS at 16:01

## 2023-05-18 RX ADMIN — ENOXAPARIN SODIUM 40 MG: 100 INJECTION SUBCUTANEOUS at 09:33

## 2023-05-18 RX ADMIN — POTASSIUM CHLORIDE 40 MEQ: 1500 TABLET, EXTENDED RELEASE ORAL at 09:33

## 2023-05-18 RX ADMIN — LEVOFLOXACIN 750 MG: 5 INJECTION, SOLUTION INTRAVENOUS at 15:19

## 2023-05-18 RX ADMIN — SODIUM CHLORIDE, PRESERVATIVE FREE 10 ML: 5 INJECTION INTRAVENOUS at 20:49

## 2023-05-18 ASSESSMENT — PAIN SCALES - GENERAL: PAINLEVEL_OUTOF10: 0

## 2023-05-19 ENCOUNTER — APPOINTMENT (OUTPATIENT)
Facility: HOSPITAL | Age: 83
DRG: 281 | End: 2023-05-19
Payer: MEDICARE

## 2023-05-19 LAB
ANION GAP SERPL CALC-SCNC: 5 MMOL/L (ref 5–15)
BASOPHILS # BLD: 0.1 K/UL (ref 0–0.1)
BASOPHILS NFR BLD: 1 % (ref 0–1)
BUN SERPL-MCNC: 9 MG/DL (ref 6–20)
BUN/CREAT SERPL: 16 (ref 12–20)
CALCIUM SERPL-MCNC: 8.2 MG/DL (ref 8.5–10.1)
CHLORIDE SERPL-SCNC: 115 MMOL/L (ref 97–108)
CHOLEST SERPL-MCNC: 178 MG/DL
CO2 SERPL-SCNC: 22 MMOL/L (ref 21–32)
CREAT SERPL-MCNC: 0.57 MG/DL (ref 0.55–1.02)
DIFFERENTIAL METHOD BLD: ABNORMAL
ECHO AO ROOT DIAM: 2.9 CM
ECHO AO ROOT INDEX: 1.63 CM/M2
ECHO AV AREA PEAK VELOCITY: 2.5 CM2
ECHO AV AREA/BSA PEAK VELOCITY: 1.4 CM2/M2
ECHO AV PEAK GRADIENT: 4 MMHG
ECHO AV PEAK VELOCITY: 1.1 M/S
ECHO AV VELOCITY RATIO: 0.73
ECHO BSA: 1.79 M2
ECHO EST RA PRESSURE: 10 MMHG
ECHO LA DIAMETER INDEX: 1.57 CM/M2
ECHO LA DIAMETER: 2.8 CM
ECHO LA TO AORTIC ROOT RATIO: 0.97
ECHO LA VOL 4C: 43 ML (ref 22–52)
ECHO LA VOL 4C: 45 ML (ref 22–52)
ECHO LV E' LATERAL VELOCITY: 5 CM/S
ECHO LV E' SEPTAL VELOCITY: 5 CM/S
ECHO LV FRACTIONAL SHORTENING: 31 % (ref 28–44)
ECHO LV INTERNAL DIMENSION DIASTOLE INDEX: 1.8 CM/M2
ECHO LV INTERNAL DIMENSION DIASTOLIC: 3.2 CM (ref 3.9–5.3)
ECHO LV INTERNAL DIMENSION SYSTOLIC INDEX: 1.24 CM/M2
ECHO LV INTERNAL DIMENSION SYSTOLIC: 2.2 CM
ECHO LV IVSD: 1.2 CM (ref 0.6–0.9)
ECHO LV MASS 2D: 119.4 G (ref 67–162)
ECHO LV MASS INDEX 2D: 67.1 G/M2 (ref 43–95)
ECHO LV POSTERIOR WALL DIASTOLIC: 1.2 CM (ref 0.6–0.9)
ECHO LV RELATIVE WALL THICKNESS RATIO: 0.75
ECHO LVOT AREA: 3.1 CM2
ECHO LVOT DIAM: 2 CM
ECHO LVOT PEAK GRADIENT: 3 MMHG
ECHO LVOT PEAK VELOCITY: 0.8 M/S
ECHO MV A VELOCITY: 1.03 M/S
ECHO MV E DECELERATION TIME (DT): 277 MS
ECHO MV E VELOCITY: 0.64 M/S
ECHO MV E/A RATIO: 0.62
ECHO MV E/E' LATERAL: 12.8
ECHO MV E/E' RATIO (AVERAGED): 12.8
ECHO MV E/E' SEPTAL: 12.8
ECHO RA VOLUME: 46 ML
ECHO RA VOLUME: 48 ML
ECHO RIGHT VENTRICULAR SYSTOLIC PRESSURE (RVSP): 37 MMHG
ECHO RV TAPSE: 0.9 CM (ref 1.7–?)
ECHO TV REGURGITANT MAX VELOCITY: 2.6 M/S
ECHO TV REGURGITANT PEAK GRADIENT: 27 MMHG
EOSINOPHIL # BLD: 0.3 K/UL (ref 0–0.4)
EOSINOPHIL NFR BLD: 3 % (ref 0–7)
ERYTHROCYTE [DISTWIDTH] IN BLOOD BY AUTOMATED COUNT: 13.1 % (ref 11.5–14.5)
GLUCOSE SERPL-MCNC: 108 MG/DL (ref 65–100)
HCT VFR BLD AUTO: 39 % (ref 35–47)
HDLC SERPL-MCNC: 47 MG/DL
HDLC SERPL: 3.8 (ref 0–5)
HGB BLD-MCNC: 12.6 G/DL (ref 11.5–16)
IMM GRANULOCYTES # BLD AUTO: 0.1 K/UL (ref 0–0.04)
IMM GRANULOCYTES NFR BLD AUTO: 1 % (ref 0–0.5)
LDLC SERPL CALC-MCNC: 103.4 MG/DL (ref 0–100)
LYMPHOCYTES # BLD: 2 K/UL (ref 0.8–3.5)
LYMPHOCYTES NFR BLD: 24 % (ref 12–49)
MCH RBC QN AUTO: 29.8 PG (ref 26–34)
MCHC RBC AUTO-ENTMCNC: 32.3 G/DL (ref 30–36.5)
MCV RBC AUTO: 92.2 FL (ref 80–99)
MONOCYTES # BLD: 0.7 K/UL (ref 0–1)
MONOCYTES NFR BLD: 9 % (ref 5–13)
NEUTS SEG # BLD: 5.3 K/UL (ref 1.8–8)
NEUTS SEG NFR BLD: 62 % (ref 32–75)
NRBC # BLD: 0 K/UL (ref 0–0.01)
NRBC BLD-RTO: 0 PER 100 WBC
PLATELET # BLD AUTO: 123 K/UL (ref 150–400)
PMV BLD AUTO: 12.4 FL (ref 8.9–12.9)
POTASSIUM SERPL-SCNC: 3.3 MMOL/L (ref 3.5–5.1)
RBC # BLD AUTO: 4.23 M/UL (ref 3.8–5.2)
SODIUM SERPL-SCNC: 142 MMOL/L (ref 136–145)
TRIGL SERPL-MCNC: 138 MG/DL
TROPONIN I SERPL HS-MCNC: 1292 NG/L (ref 0–51)
TSH SERPL DL<=0.05 MIU/L-ACNC: 2.56 UIU/ML (ref 0.36–3.74)
VLDLC SERPL CALC-MCNC: 27.6 MG/DL
WBC # BLD AUTO: 8.4 K/UL (ref 3.6–11)

## 2023-05-19 PROCEDURE — 95816 EEG AWAKE AND DROWSY: CPT

## 2023-05-19 PROCEDURE — 84443 ASSAY THYROID STIM HORMONE: CPT

## 2023-05-19 PROCEDURE — 2580000003 HC RX 258: Performed by: NURSE PRACTITIONER

## 2023-05-19 PROCEDURE — 6370000000 HC RX 637 (ALT 250 FOR IP): Performed by: NURSE PRACTITIONER

## 2023-05-19 PROCEDURE — 6360000004 HC RX CONTRAST MEDICATION: Performed by: INTERNAL MEDICINE

## 2023-05-19 PROCEDURE — 97535 SELF CARE MNGMENT TRAINING: CPT | Performed by: OCCUPATIONAL THERAPIST

## 2023-05-19 PROCEDURE — 2580000003 HC RX 258: Performed by: INTERNAL MEDICINE

## 2023-05-19 PROCEDURE — 6360000002 HC RX W HCPCS: Performed by: NURSE PRACTITIONER

## 2023-05-19 PROCEDURE — 2060000000 HC ICU INTERMEDIATE R&B

## 2023-05-19 PROCEDURE — 6360000002 HC RX W HCPCS: Performed by: INTERNAL MEDICINE

## 2023-05-19 PROCEDURE — 6370000000 HC RX 637 (ALT 250 FOR IP): Performed by: INTERNAL MEDICINE

## 2023-05-19 PROCEDURE — 95816 EEG AWAKE AND DROWSY: CPT | Performed by: PSYCHIATRY & NEUROLOGY

## 2023-05-19 PROCEDURE — 80048 BASIC METABOLIC PNL TOTAL CA: CPT

## 2023-05-19 PROCEDURE — 97530 THERAPEUTIC ACTIVITIES: CPT

## 2023-05-19 PROCEDURE — 97166 OT EVAL MOD COMPLEX 45 MIN: CPT | Performed by: OCCUPATIONAL THERAPIST

## 2023-05-19 PROCEDURE — 92610 EVALUATE SWALLOWING FUNCTION: CPT

## 2023-05-19 PROCEDURE — 80061 LIPID PANEL: CPT

## 2023-05-19 PROCEDURE — 84484 ASSAY OF TROPONIN QUANT: CPT

## 2023-05-19 PROCEDURE — 36415 COLL VENOUS BLD VENIPUNCTURE: CPT

## 2023-05-19 PROCEDURE — 85025 COMPLETE CBC W/AUTO DIFF WBC: CPT

## 2023-05-19 PROCEDURE — 99232 SBSQ HOSP IP/OBS MODERATE 35: CPT | Performed by: PSYCHIATRY & NEUROLOGY

## 2023-05-19 PROCEDURE — C8929 TTE W OR WO FOL WCON,DOPPLER: HCPCS

## 2023-05-19 RX ORDER — POTASSIUM CHLORIDE 750 MG/1
10 TABLET, FILM COATED, EXTENDED RELEASE ORAL ONCE
Status: COMPLETED | OUTPATIENT
Start: 2023-05-19 | End: 2023-05-19

## 2023-05-19 RX ADMIN — PRAVASTATIN SODIUM 80 MG: 40 TABLET ORAL at 08:47

## 2023-05-19 RX ADMIN — POTASSIUM CHLORIDE 10 MEQ: 750 TABLET, FILM COATED, EXTENDED RELEASE ORAL at 14:03

## 2023-05-19 RX ADMIN — ENOXAPARIN SODIUM 70 MG: 100 INJECTION SUBCUTANEOUS at 21:24

## 2023-05-19 RX ADMIN — Medication 1000 UNITS: at 08:47

## 2023-05-19 RX ADMIN — PERFLUTREN 1.5 ML: 6.52 INJECTION, SUSPENSION INTRAVENOUS at 11:24

## 2023-05-19 RX ADMIN — SODIUM CHLORIDE: 9 INJECTION, SOLUTION INTRAVENOUS at 08:52

## 2023-05-19 RX ADMIN — SODIUM CHLORIDE: 9 INJECTION, SOLUTION INTRAVENOUS at 19:54

## 2023-05-19 RX ADMIN — LEVOFLOXACIN 750 MG: 5 INJECTION, SOLUTION INTRAVENOUS at 16:15

## 2023-05-19 RX ADMIN — METOPROLOL TARTRATE 25 MG: 25 TABLET ORAL at 08:47

## 2023-05-19 RX ADMIN — SODIUM CHLORIDE, PRESERVATIVE FREE 10 ML: 5 INJECTION INTRAVENOUS at 08:48

## 2023-05-19 RX ADMIN — METOPROLOL TARTRATE 25 MG: 25 TABLET ORAL at 00:13

## 2023-05-19 RX ADMIN — ASPIRIN 81 MG: 81 TABLET, COATED ORAL at 08:47

## 2023-05-19 RX ADMIN — ENOXAPARIN SODIUM 70 MG: 100 INJECTION SUBCUTANEOUS at 08:47

## 2023-05-19 RX ADMIN — PANTOPRAZOLE SODIUM 40 MG: 40 TABLET, DELAYED RELEASE ORAL at 08:47

## 2023-05-19 RX ADMIN — METOPROLOL TARTRATE 25 MG: 25 TABLET ORAL at 21:25

## 2023-05-19 RX ADMIN — SODIUM CHLORIDE, PRESERVATIVE FREE 10 ML: 5 INJECTION INTRAVENOUS at 21:27

## 2023-05-19 RX ADMIN — ACETAMINOPHEN 650 MG: 325 TABLET ORAL at 21:25

## 2023-05-19 ASSESSMENT — PAIN SCALES - GENERAL
PAINLEVEL_OUTOF10: 0
PAINLEVEL_OUTOF10: 3
PAINLEVEL_OUTOF10: 0

## 2023-05-19 ASSESSMENT — PAIN DESCRIPTION - ORIENTATION: ORIENTATION: RIGHT

## 2023-05-19 ASSESSMENT — PAIN DESCRIPTION - DESCRIPTORS: DESCRIPTORS: ACHING

## 2023-05-19 ASSESSMENT — PAIN - FUNCTIONAL ASSESSMENT: PAIN_FUNCTIONAL_ASSESSMENT: ACTIVITIES ARE NOT PREVENTED

## 2023-05-19 ASSESSMENT — PAIN DESCRIPTION - LOCATION: LOCATION: FOOT;LEG

## 2023-05-20 LAB
ANION GAP SERPL CALC-SCNC: 4 MMOL/L (ref 5–15)
BASOPHILS # BLD: 0.1 K/UL (ref 0–0.1)
BASOPHILS NFR BLD: 1 % (ref 0–1)
BUN SERPL-MCNC: 10 MG/DL (ref 6–20)
BUN/CREAT SERPL: 21 (ref 12–20)
CALCIUM SERPL-MCNC: 8 MG/DL (ref 8.5–10.1)
CHLORIDE SERPL-SCNC: 117 MMOL/L (ref 97–108)
CO2 SERPL-SCNC: 21 MMOL/L (ref 21–32)
CREAT SERPL-MCNC: 0.47 MG/DL (ref 0.55–1.02)
DIFFERENTIAL METHOD BLD: ABNORMAL
EOSINOPHIL # BLD: 0.5 K/UL (ref 0–0.4)
EOSINOPHIL NFR BLD: 7 % (ref 0–7)
ERYTHROCYTE [DISTWIDTH] IN BLOOD BY AUTOMATED COUNT: 13.2 % (ref 11.5–14.5)
GLUCOSE SERPL-MCNC: 104 MG/DL (ref 65–100)
HCT VFR BLD AUTO: 41 % (ref 35–47)
HGB BLD-MCNC: 13 G/DL (ref 11.5–16)
IMM GRANULOCYTES # BLD AUTO: 0.1 K/UL (ref 0–0.04)
IMM GRANULOCYTES NFR BLD AUTO: 1 % (ref 0–0.5)
LYMPHOCYTES # BLD: 2.6 K/UL (ref 0.8–3.5)
LYMPHOCYTES NFR BLD: 32 % (ref 12–49)
MCH RBC QN AUTO: 29.1 PG (ref 26–34)
MCHC RBC AUTO-ENTMCNC: 31.7 G/DL (ref 30–36.5)
MCV RBC AUTO: 91.7 FL (ref 80–99)
MONOCYTES # BLD: 0.8 K/UL (ref 0–1)
MONOCYTES NFR BLD: 9 % (ref 5–13)
NEUTS SEG # BLD: 4 K/UL (ref 1.8–8)
NEUTS SEG NFR BLD: 50 % (ref 32–75)
NRBC # BLD: 0 K/UL (ref 0–0.01)
NRBC BLD-RTO: 0 PER 100 WBC
PLATELET # BLD AUTO: 140 K/UL (ref 150–400)
PMV BLD AUTO: 12.6 FL (ref 8.9–12.9)
POTASSIUM SERPL-SCNC: 3.7 MMOL/L (ref 3.5–5.1)
RBC # BLD AUTO: 4.47 M/UL (ref 3.8–5.2)
SODIUM SERPL-SCNC: 142 MMOL/L (ref 136–145)
WBC # BLD AUTO: 8 K/UL (ref 3.6–11)

## 2023-05-20 PROCEDURE — 6360000002 HC RX W HCPCS: Performed by: INTERNAL MEDICINE

## 2023-05-20 PROCEDURE — 2060000000 HC ICU INTERMEDIATE R&B

## 2023-05-20 PROCEDURE — 6370000000 HC RX 637 (ALT 250 FOR IP): Performed by: INTERNAL MEDICINE

## 2023-05-20 PROCEDURE — 6360000002 HC RX W HCPCS: Performed by: NURSE PRACTITIONER

## 2023-05-20 PROCEDURE — 6370000000 HC RX 637 (ALT 250 FOR IP): Performed by: NURSE PRACTITIONER

## 2023-05-20 PROCEDURE — 36415 COLL VENOUS BLD VENIPUNCTURE: CPT

## 2023-05-20 PROCEDURE — 2580000003 HC RX 258: Performed by: INTERNAL MEDICINE

## 2023-05-20 PROCEDURE — 80048 BASIC METABOLIC PNL TOTAL CA: CPT

## 2023-05-20 PROCEDURE — 85025 COMPLETE CBC W/AUTO DIFF WBC: CPT

## 2023-05-20 RX ADMIN — PANTOPRAZOLE SODIUM 40 MG: 40 TABLET, DELAYED RELEASE ORAL at 08:01

## 2023-05-20 RX ADMIN — ENOXAPARIN SODIUM 70 MG: 100 INJECTION SUBCUTANEOUS at 08:01

## 2023-05-20 RX ADMIN — METOPROLOL TARTRATE 25 MG: 25 TABLET ORAL at 08:01

## 2023-05-20 RX ADMIN — SODIUM CHLORIDE, PRESERVATIVE FREE 10 ML: 5 INJECTION INTRAVENOUS at 21:06

## 2023-05-20 RX ADMIN — ASPIRIN 81 MG: 81 TABLET, COATED ORAL at 08:01

## 2023-05-20 RX ADMIN — PRAVASTATIN SODIUM 80 MG: 40 TABLET ORAL at 08:01

## 2023-05-20 RX ADMIN — METOPROLOL TARTRATE 25 MG: 25 TABLET ORAL at 21:02

## 2023-05-20 RX ADMIN — Medication 1000 UNITS: at 08:01

## 2023-05-20 RX ADMIN — ENOXAPARIN SODIUM 70 MG: 100 INJECTION SUBCUTANEOUS at 21:02

## 2023-05-20 RX ADMIN — LEVOFLOXACIN 750 MG: 5 INJECTION, SOLUTION INTRAVENOUS at 17:01

## 2023-05-20 RX ADMIN — SODIUM CHLORIDE, PRESERVATIVE FREE 10 ML: 5 INJECTION INTRAVENOUS at 08:02

## 2023-05-21 LAB
ANION GAP SERPL CALC-SCNC: 3 MMOL/L (ref 5–15)
BASOPHILS # BLD: 0.1 K/UL (ref 0–0.1)
BASOPHILS NFR BLD: 1 % (ref 0–1)
BUN SERPL-MCNC: 12 MG/DL (ref 6–20)
BUN/CREAT SERPL: 23 (ref 12–20)
CALCIUM SERPL-MCNC: 8.5 MG/DL (ref 8.5–10.1)
CHLORIDE SERPL-SCNC: 113 MMOL/L (ref 97–108)
CO2 SERPL-SCNC: 25 MMOL/L (ref 21–32)
CREAT SERPL-MCNC: 0.53 MG/DL (ref 0.55–1.02)
DIFFERENTIAL METHOD BLD: ABNORMAL
EOSINOPHIL # BLD: 0.3 K/UL (ref 0–0.4)
EOSINOPHIL NFR BLD: 4 % (ref 0–7)
ERYTHROCYTE [DISTWIDTH] IN BLOOD BY AUTOMATED COUNT: 13.1 % (ref 11.5–14.5)
GLUCOSE SERPL-MCNC: 105 MG/DL (ref 65–100)
HCT VFR BLD AUTO: 43.1 % (ref 35–47)
HGB BLD-MCNC: 13.9 G/DL (ref 11.5–16)
IMM GRANULOCYTES # BLD AUTO: 0.1 K/UL (ref 0–0.04)
IMM GRANULOCYTES NFR BLD AUTO: 1 % (ref 0–0.5)
LYMPHOCYTES # BLD: 2.6 K/UL (ref 0.8–3.5)
LYMPHOCYTES NFR BLD: 32 % (ref 12–49)
MCH RBC QN AUTO: 29.3 PG (ref 26–34)
MCHC RBC AUTO-ENTMCNC: 32.3 G/DL (ref 30–36.5)
MCV RBC AUTO: 90.7 FL (ref 80–99)
MONOCYTES # BLD: 0.8 K/UL (ref 0–1)
MONOCYTES NFR BLD: 10 % (ref 5–13)
NEUTS SEG # BLD: 4.3 K/UL (ref 1.8–8)
NEUTS SEG NFR BLD: 52 % (ref 32–75)
NRBC # BLD: 0 K/UL (ref 0–0.01)
NRBC BLD-RTO: 0 PER 100 WBC
PLATELET # BLD AUTO: 147 K/UL (ref 150–400)
PMV BLD AUTO: 11.7 FL (ref 8.9–12.9)
POTASSIUM SERPL-SCNC: 3.2 MMOL/L (ref 3.5–5.1)
RBC # BLD AUTO: 4.75 M/UL (ref 3.8–5.2)
SODIUM SERPL-SCNC: 141 MMOL/L (ref 136–145)
WBC # BLD AUTO: 8.1 K/UL (ref 3.6–11)

## 2023-05-21 PROCEDURE — 99221 1ST HOSP IP/OBS SF/LOW 40: CPT | Performed by: ORTHOPAEDIC SURGERY

## 2023-05-21 PROCEDURE — 6360000002 HC RX W HCPCS: Performed by: NURSE PRACTITIONER

## 2023-05-21 PROCEDURE — 36415 COLL VENOUS BLD VENIPUNCTURE: CPT

## 2023-05-21 PROCEDURE — 28430 CLTX TALUS FRACTURE W/O MNPJ: CPT | Performed by: ORTHOPAEDIC SURGERY

## 2023-05-21 PROCEDURE — 6370000000 HC RX 637 (ALT 250 FOR IP): Performed by: INTERNAL MEDICINE

## 2023-05-21 PROCEDURE — 80048 BASIC METABOLIC PNL TOTAL CA: CPT

## 2023-05-21 PROCEDURE — 2060000000 HC ICU INTERMEDIATE R&B

## 2023-05-21 PROCEDURE — 6370000000 HC RX 637 (ALT 250 FOR IP): Performed by: NURSE PRACTITIONER

## 2023-05-21 PROCEDURE — 2580000003 HC RX 258: Performed by: INTERNAL MEDICINE

## 2023-05-21 PROCEDURE — 6360000002 HC RX W HCPCS: Performed by: INTERNAL MEDICINE

## 2023-05-21 PROCEDURE — 85025 COMPLETE CBC W/AUTO DIFF WBC: CPT

## 2023-05-21 RX ORDER — POTASSIUM CHLORIDE 750 MG/1
40 TABLET, FILM COATED, EXTENDED RELEASE ORAL ONCE
Status: COMPLETED | OUTPATIENT
Start: 2023-05-21 | End: 2023-05-21

## 2023-05-21 RX ADMIN — LEVOFLOXACIN 750 MG: 5 INJECTION, SOLUTION INTRAVENOUS at 16:33

## 2023-05-21 RX ADMIN — METOPROLOL TARTRATE 25 MG: 25 TABLET ORAL at 20:00

## 2023-05-21 RX ADMIN — METOPROLOL TARTRATE 25 MG: 25 TABLET ORAL at 08:01

## 2023-05-21 RX ADMIN — PANTOPRAZOLE SODIUM 40 MG: 40 TABLET, DELAYED RELEASE ORAL at 06:34

## 2023-05-21 RX ADMIN — ENOXAPARIN SODIUM 70 MG: 100 INJECTION SUBCUTANEOUS at 19:59

## 2023-05-21 RX ADMIN — SODIUM CHLORIDE, PRESERVATIVE FREE 10 ML: 5 INJECTION INTRAVENOUS at 08:03

## 2023-05-21 RX ADMIN — PRAVASTATIN SODIUM 80 MG: 40 TABLET ORAL at 08:01

## 2023-05-21 RX ADMIN — POTASSIUM CHLORIDE 40 MEQ: 750 TABLET, FILM COATED, EXTENDED RELEASE ORAL at 09:06

## 2023-05-21 RX ADMIN — SODIUM CHLORIDE, PRESERVATIVE FREE 10 ML: 5 INJECTION INTRAVENOUS at 19:59

## 2023-05-21 RX ADMIN — ENOXAPARIN SODIUM 70 MG: 100 INJECTION SUBCUTANEOUS at 08:02

## 2023-05-21 RX ADMIN — ASPIRIN 81 MG: 81 TABLET, COATED ORAL at 08:01

## 2023-05-21 RX ADMIN — Medication 1000 UNITS: at 08:02

## 2023-05-22 PROCEDURE — 6370000000 HC RX 637 (ALT 250 FOR IP): Performed by: INTERNAL MEDICINE

## 2023-05-22 PROCEDURE — 2580000003 HC RX 258: Performed by: INTERNAL MEDICINE

## 2023-05-22 PROCEDURE — 6370000000 HC RX 637 (ALT 250 FOR IP): Performed by: NURSE PRACTITIONER

## 2023-05-22 PROCEDURE — 2060000000 HC ICU INTERMEDIATE R&B

## 2023-05-22 PROCEDURE — 97535 SELF CARE MNGMENT TRAINING: CPT

## 2023-05-22 PROCEDURE — 6360000002 HC RX W HCPCS: Performed by: NURSE PRACTITIONER

## 2023-05-22 RX ORDER — CLOPIDOGREL BISULFATE 75 MG/1
75 TABLET ORAL DAILY
Status: DISCONTINUED | OUTPATIENT
Start: 2023-05-22 | End: 2023-05-24 | Stop reason: HOSPADM

## 2023-05-22 RX ADMIN — PANTOPRAZOLE SODIUM 40 MG: 40 TABLET, DELAYED RELEASE ORAL at 06:23

## 2023-05-22 RX ADMIN — CLOPIDOGREL BISULFATE 75 MG: 75 TABLET ORAL at 14:17

## 2023-05-22 RX ADMIN — ENOXAPARIN SODIUM 70 MG: 100 INJECTION SUBCUTANEOUS at 09:05

## 2023-05-22 RX ADMIN — PRAVASTATIN SODIUM 80 MG: 40 TABLET ORAL at 09:05

## 2023-05-22 RX ADMIN — METOPROLOL TARTRATE 25 MG: 25 TABLET ORAL at 20:04

## 2023-05-22 RX ADMIN — ASPIRIN 81 MG: 81 TABLET, COATED ORAL at 09:05

## 2023-05-22 RX ADMIN — Medication 1000 UNITS: at 09:05

## 2023-05-22 RX ADMIN — METOPROLOL TARTRATE 25 MG: 25 TABLET ORAL at 09:05

## 2023-05-22 RX ADMIN — SODIUM CHLORIDE, PRESERVATIVE FREE 10 ML: 5 INJECTION INTRAVENOUS at 20:06

## 2023-05-22 NOTE — CARE COORDINATION
Transition of Care Plan:     RUR: 14%  Prior Level of Functioning: Unknow  Disposition: TBD  If SNF or IPR: Date FOC offered: TBD  Date FOC received:   Accepting facility:   Date authorization started with reference number:   Date authorization received and expires: Follow up appointments: PCP and Specialist  DME needed: TBD  Transportation at discharge: To be arranged  IM/IMM Medicare/ letter given: to sign at DC  Is patient a  and connected with [place]? If yes, was [employer] transfer form completed and [facility] notified? Caregiver Contact: Alis Genao (Spouse)   [phone]   Discharge Caregiver contacted prior to discharge? Care Conference needed? Barriers to discharge: Medial Stability and placement  Update 3:20 referrals sent. Left message with Milan Esposito, talked with Bri Layne at [facility] and she will get back with me. Update 11:08 am CM met with  and gave him a [address] list to choose a SNF. I will wait to hear back from him at lunch time to get choices. CM Note: I tried to call patient's spouse and had to leave a voice mail on home phone.     Shaina Ross RN CM

## 2023-05-23 LAB
SARS-COV-2 RDRP RESP QL NAA+PROBE: NOT DETECTED
SOURCE: NORMAL

## 2023-05-23 PROCEDURE — 87635 SARS-COV-2 COVID-19 AMP PRB: CPT

## 2023-05-23 PROCEDURE — 6370000000 HC RX 637 (ALT 250 FOR IP): Performed by: INTERNAL MEDICINE

## 2023-05-23 PROCEDURE — 97530 THERAPEUTIC ACTIVITIES: CPT

## 2023-05-23 PROCEDURE — 2580000003 HC RX 258: Performed by: INTERNAL MEDICINE

## 2023-05-23 PROCEDURE — 2060000000 HC ICU INTERMEDIATE R&B

## 2023-05-23 PROCEDURE — 6370000000 HC RX 637 (ALT 250 FOR IP): Performed by: NURSE PRACTITIONER

## 2023-05-23 RX ADMIN — METOPROLOL TARTRATE 25 MG: 25 TABLET ORAL at 21:26

## 2023-05-23 RX ADMIN — CLOPIDOGREL BISULFATE 75 MG: 75 TABLET ORAL at 08:10

## 2023-05-23 RX ADMIN — PANTOPRAZOLE SODIUM 40 MG: 40 TABLET, DELAYED RELEASE ORAL at 06:02

## 2023-05-23 RX ADMIN — PRAVASTATIN SODIUM 80 MG: 40 TABLET ORAL at 08:10

## 2023-05-23 RX ADMIN — Medication 1000 UNITS: at 08:10

## 2023-05-23 RX ADMIN — METOPROLOL TARTRATE 25 MG: 25 TABLET ORAL at 08:10

## 2023-05-23 RX ADMIN — ASPIRIN 81 MG: 81 TABLET, COATED ORAL at 08:10

## 2023-05-23 RX ADMIN — SODIUM CHLORIDE, PRESERVATIVE FREE 5 ML: 5 INJECTION INTRAVENOUS at 21:26

## 2023-05-23 ASSESSMENT — PAIN SCALES - GENERAL: PAINLEVEL_OUTOF10: 0

## 2023-05-24 VITALS
TEMPERATURE: 97.2 F | SYSTOLIC BLOOD PRESSURE: 112 MMHG | BODY MASS INDEX: 23.39 KG/M2 | OXYGEN SATURATION: 94 % | WEIGHT: 149 LBS | RESPIRATION RATE: 20 BRPM | HEIGHT: 67 IN | DIASTOLIC BLOOD PRESSURE: 68 MMHG | HEART RATE: 80 BPM

## 2023-05-24 PROCEDURE — 6370000000 HC RX 637 (ALT 250 FOR IP): Performed by: INTERNAL MEDICINE

## 2023-05-24 PROCEDURE — 6370000000 HC RX 637 (ALT 250 FOR IP): Performed by: STUDENT IN AN ORGANIZED HEALTH CARE EDUCATION/TRAINING PROGRAM

## 2023-05-24 PROCEDURE — 97535 SELF CARE MNGMENT TRAINING: CPT

## 2023-05-24 PROCEDURE — 97530 THERAPEUTIC ACTIVITIES: CPT

## 2023-05-24 PROCEDURE — 6370000000 HC RX 637 (ALT 250 FOR IP): Performed by: NURSE PRACTITIONER

## 2023-05-24 RX ORDER — CLOPIDOGREL BISULFATE 75 MG/1
75 TABLET ORAL DAILY
Qty: 30 TABLET | Refills: 3 | Status: SHIPPED | OUTPATIENT
Start: 2023-05-25

## 2023-05-24 RX ORDER — CASTOR OIL AND BALSAM, PERU 788; 87 MG/G; MG/G
OINTMENT TOPICAL 2 TIMES DAILY
Status: DISCONTINUED | OUTPATIENT
Start: 2023-05-24 | End: 2023-05-24 | Stop reason: HOSPADM

## 2023-05-24 RX ADMIN — METOPROLOL TARTRATE 25 MG: 25 TABLET ORAL at 08:23

## 2023-05-24 RX ADMIN — Medication 1000 UNITS: at 08:23

## 2023-05-24 RX ADMIN — CLOPIDOGREL BISULFATE 75 MG: 75 TABLET ORAL at 08:23

## 2023-05-24 RX ADMIN — PRAVASTATIN SODIUM 80 MG: 40 TABLET ORAL at 08:23

## 2023-05-24 RX ADMIN — ASPIRIN 81 MG: 81 TABLET, COATED ORAL at 08:23

## 2023-05-24 RX ADMIN — CASTOR OIL AND BALSAM, PERU: 788; 87 OINTMENT TOPICAL at 10:37

## 2023-05-24 RX ADMIN — PANTOPRAZOLE SODIUM 40 MG: 40 TABLET, DELAYED RELEASE ORAL at 07:28

## 2023-05-24 NOTE — PLAN OF CARE
Problem: Occupational Therapy - Adult  Goal: By Discharge: Performs self-care activities at highest level of function for planned discharge setting. See evaluation for individualized goals. Description: FUNCTIONAL STATUS PRIOR TO ADMISSION:    Receives Help From: Family (pt states that she is independent in adls), ADL Assistance:  (pt reports that she was indep with bath and dressPTA),  ,  ,  ,  ,  , Homemaking Assistance:  (pt performed IADLs per pt report), Ambulation Assistance:  (was ambulating with rollator PTA per pt report), Transfer Assistance: Needs assistance (pt has needed increased assistance at home), Active : No     HOME SUPPORT: Patient lived her . .    Occupational Therapy Goals:  Initiated 5/19/2023  1. Patient will perform grooming with Supervision within 7 day(s). 2.  Patient will perform supine to sit EOB in preparation for adls  with Set-up within 7 day(s). 3.  Patient will perform bathing with Moderate Assist within 7 day(s). 4.  Patient will perform toilet transfers with Moderate Assist using best DME and technique within 7 day(s). 5.  Patient will perform all aspects of toileting with Moderate Assist within 7 day(s). 6.  Patient will participate in upper extremity therapeutic exercise/activities with Supervision for 5 minutes within 7 day(s). 7.  Patient will maintain RLE PWB precautions during adls with minimal assistance within 7 day(s).      .  5/22/2023 1034 by Ronaldo Willis OT  Outcome: Progressing  5/22/2023 1033 by Ronaldo Willis OT  Outcome: Progressing     OCCUPATIONAL THERAPY TREATMENT  Patient: Emerald Wilburn ([de-identified] y.o. female)  Date: 5/22/2023  Primary Diagnosis: Hypoxia [R09.02]  Pulmonary edema, acute ([facility] [address] [J81.0]  AMS (altered mental status) [R41.82]       Precautions: Weight Bearing, Fall Risk Right Lower Extremity Weight Bearing: Partial Weight Bearing              Chart, occupational therapy assessment, plan of care, and goals were
goals. Pt demonstrating improvement with functional transfers and ADLs, tolerating OOB activity to chair and commode with x2 assist. Pt continues to endorse L groin pain, exacerbated with bed mobility. She remains below her baseline and will benefit from SNF to maximize functional status prior to returning home. PLAN :  Patient continues to benefit from skilled intervention to address the above impairments. Continue treatment per established plan of care to address goals. Recommend with staff: OOB to chair, commode for toileting with x2 assist    Recommend next OT session: continue POC    Recommendation for discharge: (in order for the patient to meet his/her long term goals): Therapy up to 5 days/week in Skilled nursing facility    Other factors to consider for discharge: high risk for falls and not safe to be alone    IF patient discharges home will need the following DME:  TBD       SUBJECTIVE:   Patient stated that hurts.  re: L groin    OBJECTIVE DATA SUMMARY:   Cognitive/Behavioral Status:  Orientation  Overall Orientation Status: Within Functional Limits  Cognition  Overall Cognitive Status: WFL    Functional Mobility and Transfers for ADLs:  Bed Mobility:  Bed Mobility Training  Bed Mobility Training: Yes  Overall Level of Assistance: Minimum assistance; Moderate assistance;Assist X2  Interventions: Verbal cues; Tactile cues  Rolling: Maximum assistance  Supine to Sit: Minimum assistance; Moderate assistance;Assist X2  Sit to Supine: Minimum assistance; Moderate assistance;Assist X2  Scooting: Minimum assistance;Assist X1     Transfers:   Transfer Training  Transfer Training: Yes  Overall Level of Assistance: Minimum assistance; Moderate assistance;Assist X2  Interventions: Verbal cues; Tactile cues  Sit to Stand: Minimum assistance; Moderate assistance;Assist X2  Stand to Sit: Minimum assistance; Moderate assistance;Assist X2  Stand Pivot Transfers: Minimum assistance;Assist X2  Bed to Chair:
assistance;Assist X2  Stand to Sit: Minimum assistance; Moderate assistance;Assist X2  Stand Pivot Transfers: Minimum assistance;Assist X2  Sliding Board: Minimum assistance;Assist X2  Balance:  Balance  Sitting: Intact  Standing: Impaired  Standing - Static: Fair;Good;Constant support  Standing - Dynamic: Fair;Constant support    Pain Rating:   Only L groin when getting in/out of bed  Activity Tolerance:   Fair     After treatment:   Patient left sitting EOB finishing with OT      COMMUNICATION/EDUCATION:   The patient's plan of care was discussed with: occupational therapist and registered nurse    Patient Education  Education Given To: Patient  Education Provided: Transfer Training  Education Provided Comments: repeated cues for proper hand placement for transfers, education on weight-shifting and walker management during transfers  Education Method: Verbal  Barriers to Learning: Cognition  Education Outcome: Continued education needed      Bill Andrea, PT  Minutes: 28
within reach, Bed/ chair alarm activated, and Side rails x3      COMMUNICATION/EDUCATION:   The patient's plan of care was discussed with: registered nurse    Patient Education  Education Given To: Patient  Education Provided: Precautions  Education Method: Verbal  Barriers to Learning: Cognition  Education Outcome: Continued education needed      Vicky Borges, PT  Minutes: 15

## 2023-05-24 NOTE — DISCHARGE SUMMARY
calculi with probable bilateral renal cysts. - CT of cervical spine shows:  No acute abnormality  - CT of abdomen/pelvis shows: 1. No evidence for definite acute traumatic injury. Progression of T12 vertebral  compression deformity is age indeterminant but likely nonacute. 2. Atelectasis and minimal edema in the lungs. 3. Minimal aneurysmal dilatation of the ascending aorta. 4. Small partially calcified thyroid nodules. 5. Nonobstructing bilateral renal calculi with probable bilateral renal cysts. - EEG was done  and show no clear focal abnormality  - VQ scan shows low probability for pulmonary embolism  - S/p course levaquin IV        2. Status post fall - POA  - xray of right ankle and foot shows:  1. Diffuse osteopenia limits evaluation. 2.  Age-indeterminate avulsion fracture at the dorsal distal talus. Tibiotalar  joint effusion. 3.  No acute fracture nor dislocation of the foot. 4.  Diffuse soft tissue swelling.  - xray of left shoulder shows:  - bones are s  everely osteopenic. A displaced fracture is not demonstrated. - ortho consulted  -Was seen by Ortho  Right- talus avulsion fracture  CAM boot when out of bed  WBAT  PT OT at [facility]     3. Atrial fibrillation with RVR      NSTEMI  - etiology likely type II  - initial troponin 2,586 will trend  - new onset atrial fibrillation with RVR  - given metoprolol IV without response  - will order cardizem bolus and titrated drip  -Continue full dose Lovenox  - echocardiogram show EF 60-65%  - cardiology recommends no OAC, just ASA and plavix due to falls. Otherwise continue metoprolol 25 mg bid as added this hospitalization. 4. Hypokalemia  - resolved     5.  Dysphagia  - see above for CT results  MRI of brain shows:  - no acute intracranial abnormality  - generalized parenchymal volume loss and mild chronic microvascular ischemic  disease, which have slightly progressed since 2019.   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

## 2023-05-24 NOTE — PROGRESS NOTES
1000- SBAR report given to Lamine at [facility]. Pt due to be picked up at 1115.
EP/ ARRHYTHMIA Progress Note    Patient ID:  Patient: Nathan Lal  MRN: [id number]  Age: [de-identified] y.o.  : 1940    Date of  Admission: 2023  8:05 AM   PCP:  Manolo Zuleta MD    Assessment:   Tachycardia, suspicious for ectopic atrial tachycardia, cannot tell yet based on the tracings, cannot fully exclude atrial fibrillation. Prior stroke with chronic left-sided weakness. Metabolic encephalopathy. Falls risk. Former smoker. Iodine allergy. Full code. Plan:     After reviewing the data, I'd prefer dual antiplatelet therapy with baby ASA and clopidogrel 75 mg daily rather than full anticoagulation given the risk:benefit. She is currently getting enoxaparin, will defer to the primary team on whether DAPT or AC is chosen. Family is comfortable with AC at this time. Started metoprolol IR 25 mg po BID here. :  Rhythm stable on metoprolol, tolerating PO, stop fluids.  update:  No change to the cardiac plan. I spoke to her and her  this AM.      [x]       High complexity decision making was performed in this patient at high risk for decompensation with multiple organ involvement. Nathan Lal is a [de-identified] y.o. female with a history of possible atrial fibrillation. I was asked to see her based on this and recommend a possible stroke prevention plan. She denies syncope or dizziness. No TIA or stroke symptoms. NO chest pain. No orthopnea or PND, no worsening edema. She has had frequent falls. Here, can't get out of bed and walk. Assessment / Plan Per the Hospitalist on :  Acute encephalopathy        Frequent falls        Acute hypoxic respiratory failure  - etiology unclear  - vitamin B12 1324  - procalcitonin 0.58  - ammonia and vitamin D normal  - CT of head shows no acute evidence of acute infarct   - CT of chest shows:  1. No evidence for definite acute traumatic injury.  Progression of T12 vertebral  compression deformity is age
Hospitalist Progress Note    NAME:   Dwaine Dubin   : 1940   MRN: [id number]     Date/Time: 2023 3:19 PM  Patient PCP: Berry Chung MD    Estimated discharge date:   Barriers: Medical stable waiting for placement      Assessment / Plan:  Acute encephalopathy        Frequent falls        Acute hypoxic respiratory failure  - etiology unclear  - vitamin B12 1324  - procalcitonin 0.58  - ammonia and vitamin D normal  - CT of head shows no acute evidence of acute infarct   - CT of chest shows:  1. No evidence for definite acute traumatic injury. Progression of T12 vertebral  compression deformity is age indeterminant but likely nonacute. 2. Atelectasis and minimal edema in the lungs. 3. Minimal aneurysmal dilatation of the ascending aorta. 4. Small partially calcified thyroid nodules. 5. Nonobstructing bilateral renal calculi with probable bilateral renal cysts. - CT of cervical spine shows:  No acute abnormality  - CT of abdomen/pelvis shows: 1. No evidence for definite acute traumatic injury. Progression of T12 vertebral  compression deformity is age indeterminant but likely nonacute. 2. Atelectasis and minimal edema in the lungs. 3. Minimal aneurysmal dilatation of the ascending aorta. 4. Small partially calcified thyroid nodules. 5. Nonobstructing bilateral renal calculi with probable bilateral renal cysts. - EEG was done today and show no clear focal abnormality  - VQ scan shows low probability for pulmonary embolism  - completed course levaquin IV  - keep 02 sats greater than 92%  - nebulizer prn      2. Status post fall - POA  - xray of right ankle and foot shows:  1. Diffuse osteopenia limits evaluation. 2.  Age-indeterminate avulsion fracture at the dorsal distal talus. Tibiotalar  joint effusion. 3.  No acute fracture nor dislocation of the foot. 4.  Diffuse soft tissue swelling.  - xray of left shoulder shows:  - bones are s  everely osteopenic.  A displaced
Physician Progress Note      PATIENT:               Lilliam Camp  CSN #:                  [id number]  :                       1940  ADMIT DATE:       2023 8:05 AM  DISCH DATE:  RESPONDING  PROVIDER #:        Dariusz Dodd TEXT:    Patient admitted with falls, enceph. Documentation reflects \" ? Early PNA\"   (H&P). If possible, please document in the progress notes and discharge   summary if pna was: The medical record reflects the following:  Risk Factors: encephalopathy  Clinical Indicators: H&P- ? Early PNA, CT chest showed dependent atelectasis   and mild scattered groundglass opacities and minimal septal thickening which   may represent edema  Treatment: Marga  Thanks, Lissette Saavedra RN/CDI   Options provided:  -- pna confirmed after study  -- pna  treated and resolved  -- pna  ruled out after study  -- Other - I will add my own diagnosis  -- Disagree - Not applicable / Not valid  -- Disagree - Clinically unable to determine / Unknown  -- Refer to Clinical Documentation Reviewer    PROVIDER RESPONSE TEXT:    pna ruled out after study. Query created by: Faby Bey on 2023 10:48 AM      Electronically signed by:   Isaiah Vicente 2023 11:09 AM
Spiritual Care Assessment/Progress Note  [address]    Name: Emerald Wilburn MRN: [id number]    Age: [de-identified] y.o. Sex: female   Language: English     Date: 5/23/2023            Total Time Calculated: 8 min              Spiritual Assessment begun in [facility] CARDIOPULMONARY CARE  Service Provided For[de-identified] Patient and family together  Referral/Consult From[de-identified] Rounding  Encounter Overview/Reason : Initial Encounter    Spiritual beliefs:      [x] Involved in a maylin tradition/spiritual practice:      [] Supported by a [place]lin community:      [] Claims no spiritual orientation:      [] Seeking spiritual identity:           [] Adheres to an individual form of spirituality:      [] Not able to assess:                Identified resources for coping and support system:   Support System: Spouse, Children       [] Prayer                  [] Devotional reading               [] Music                  [] Guided Imagery     [] Pet visits                                        [] Other: (COMMENT)     Specific area/focus of visit   Encounter: Type: Initial Screen/Assessment  Crisis:    Spiritual/Emotional needs: Type: Spiritual Support  Ritual, Rites and Sacraments:    Grief, Loss, and Adjustments:    Ethics/Mediation:    Behavioral Health:    Palliative Care: Advance Care Planning:           Narrative:  reviewed the patient's chart prior to the visit. Ms. Rosanne Luke was in bed awake when the  entered her room. She shared she has for adult children and expressed her love for family. Her  entered as the room during our visit.  provided  presence, encouragement and empathetic listening.  services are available 24 hours a day as requested. Rev. JANA Huang.  [facility]   Paging Service [phone])
tablet 81 mg  81 mg Oral Daily    metoprolol tartrate (LOPRESSOR) tablet 25 mg  25 mg Oral BID    sodium chloride flush 0.9 % injection 5-40 mL  5-40 mL IntraVENous 2 times per day    sodium chloride flush 0.9 % injection 5-40 mL  5-40 mL IntraVENous PRN    0.9 % sodium chloride infusion   IntraVENous PRN    ondansetron (ZOFRAN-ODT) disintegrating tablet 4 mg  4 mg Oral Q8H PRN    Or    ondansetron (ZOFRAN) injection 4 mg  4 mg IntraVENous Q6H PRN    polyethylene glycol (GLYCOLAX) packet 17 g  17 g Oral Daily PRN    acetaminophen (TYLENOL) tablet 650 mg  650 mg Oral Q6H PRN    Or    acetaminophen (TYLENOL) suppository 650 mg  650 mg Rectal Q6H PRN    pravastatin (PRAVACHOL) tablet 80 mg  80 mg Oral Daily    vitamin D (CHOLECALCIFEROL) tablet 1,000 Units  1,000 Units Oral Daily    pantoprazole (PROTONIX) tablet 40 mg  40 mg Oral QAM AC    melatonin tablet 3 mg  3 mg Oral Nightly PRN    hydrALAZINE (APRESOLINE) injection 5 mg  5 mg IntraVENous Q6H PRN    ipratropium 0.5 mg-albuterol 2.5 mg (DUONEB) nebulizer solution 1 ampule  1 ampule Inhalation Q4H PRN       Review of Symptoms:  See above, otherwise noncontributory across 12 body systems. Objective:      Physical Exam:  Temp (24hrs), Av.8 °F (36.6 °C), Min:97.3 °F (36.3 °C), Max:98.3 °F (36.8 °C)    Patient Vitals for the past 8 hrs:   Pulse   23 1500 87   23 1045 83      Patient Vitals for the past 8 hrs:   Resp   23 1500 16   23 1045 18      Patient Vitals for the past 8 hrs:   BP   23 1500 109/68   23 1045 107/70          Intake/Output Summary (Last 24 hours) at 2023 1758  Last data filed at 2023 0000  Gross per 24 hour   Intake 110 ml   Output --   Net 110 ml         Nondiaphoretic, not in acute distress. No scleral icterus, mucous membranes moist, conjuctivae pink, no xanthelasma. Unlabored, clear to auscultation bilaterally anteriorly, symmetric air movement. Regular rate and rhythm.   Palpable radial
110 ml   Output --   Net 110 ml          I had a face to face encounter and independently examined this patient on 5/23/2023, as outlined below:  PHYSICAL EXAM:  General: Alert to person only, cooperative, no acute distress    EENT:  EOMI. Anicteric sclerae. MMM  Resp:  CTA bilaterally, no wheezing or rales. No accessory muscle use  CV:  irregular  rhythm,  No edema  GI:  Soft, Non distended, Non tender. +Bowel sounds  Neurologic:  Alert, oriented to person. Speech slightly slurred. Left facial droop noted. Bilateral  are equal. No drift noted. Psych:   Oriented to person only, flat affect. Skin:  No rashes. No jaundice. Ecchymosis noted to right foot. Reviewed most current lab test results and cultures  YES  Reviewed most current radiology test results   YES  Review and summation of old records today    NO  Reviewed patient's current orders and MAR    YES  PMH/ reviewed - no change compared to H&P  ________________________________________________________________________  Care Plan discussed with:    Comments   Patient x    Family      RN x    Care Manager     Consultant                        Multidiciplinary team rounds were held today with , nursing, pharmacist and clinical coordinator. Patient's plan of care was discussed; medications were reviewed and discharge planning was addressed. ________________________________________________________________________  Total NON critical care TIME:  55  Minutes    Total CRITICAL CARE TIME Spent:   Minutes non procedure based      Comments   >50% of visit spent in counseling and coordination of care     ________________________________________________________________________  George Singletary MD     Procedures: see electronic medical records for all procedures/Xrays and details which were not copied into this note but were reviewed prior to creation of Plan. LABS:  I reviewed today's most current labs and imaging studies.   Pertinent labs

## 2023-05-24 NOTE — CARE COORDINATION
Transition of Care Plan:     RUR: 15%  Prior Level of Functioning: needs assistance  Disposition: [address] and Rehab  If SNF or IPR: Date FOC offered:   Date FOC received:   Accepting facility:   Date authorization started with reference number:   Date authorization received and expires: Follow up appointments: PCP and Specialist to be determined by facility  DME needed: facility to provide  Transportation at discharge: [facility] to transport  IM/IMM Medicare/ letter given: signed  Is patient a [place] and connected with [facility]? If yes, was [employer] transfer form completed and [facility] notified? Caregiver Contact: Rom Shiv (Spouse)   [phone]   Discharge Caregiver contacted prior to discharge? Care Conference needed? Barriers to discharge: none  Update 11:14 am Discussed placement and transport with  in room and no further concerns re:disposition. Met with patient and spouse in room. CM Note: [facility] accepted. [facility] to provide transport. CM discussed placement with  yesterday and he was accepting of this facility. I left message with him today to let him know of planned transport and the room number.     Daniel Payne RN CM

## 2023-05-24 NOTE — CARE COORDINATION
Transition of Care Plan to SNF/Rehab    [facility], Room 666F   # for report [phone]    Communication to Patient/Family:  Met with patient and family and they are agreeable to the transition plan. The Plan for Transition of Care is related to the following treatment goals: Discharge to [address] [place] [address] Patient and/or patient representative was provided with a choice of provider and agrees  with the discharge plan. Yes [x] No []    A Freedom of choice list was provided with basic dialogue that supports the patient's individualized plan of care/goals and shares the quality data associated with the providers. Yes       SNF/Rehab Transition:  Patient has been accepted to [facility] SN[facility]hab and meets criteria for admission. Patient will transported by [facility] and expected to leave at 11:15 am.    Communication to SNF/Rehab:  Bedside RN, , has been notified to update the transition plan to the facility and call report (phone number). Discharge information has been updated on the AVS. And communicated to facility via [employer], or CC link. Discharge instructions to sent to facility     Nursing Please include all hard scripts for controlled substances, med rec and dc summary, and AVS in packet. Reviewed and confirmed with facility, [facility] rep, can manage the patient care needs for the following:     Bernadine Nathan with (X) only those applicable:  Medication:  [x]Medications are available at the facility  []IV Antibiotics    []Controlled Substance - hard copies available sent.   []Weekly Labs    Equipment:  []CPAP/BiPAP  []Wound Vacuum  []Page or Urinary Device  []PICC/Central Line  []Nebulizer  []Ventilator    Treatment:  []Isolation (for MRSA, VRE, etc.)  []Surgical Drain Management  []Tracheostomy Care  []Dressing Changes  []Dialysis with transportation  []PEG Care  []Oxygen  []Daily Weights for Heart Failure    Dietary:  []Any diet limitations  []Tube

## 2023-05-24 NOTE — DISCHARGE INSTRUCTIONS
All of your medications from before your hospitalization are the same EXCEPT:  Your new prescription for you to start is PLAVIX to use in combination with aspirin to try to reduce stroke risk. You are also now taking metoprolol for afib. Please take all of your medications as prescribed. If prescribed any medications, please read all pharmacy instructions and inserts. Inform your doctor and pharmacist about all other medications and alternative therapies. Please follow up with your PCP in 1-2 weeks to be reassessed after your hospital stay. If you start feeling any symptoms similar to what brought you into the hospital, please come back to the ED to be re-evaluated.

## 2023-06-28 ENCOUNTER — TELEPHONE (OUTPATIENT)
Age: 83
End: 2023-06-28

## 2023-06-29 ENCOUNTER — TELEPHONE (OUTPATIENT)
Age: 83
End: 2023-06-29

## 2023-07-04 ENCOUNTER — HOSPITAL ENCOUNTER (EMERGENCY)
Facility: HOSPITAL | Age: 83
Discharge: HOME | End: 2023-07-04
Attending: EMERGENCY MEDICINE
Payer: MEDICARE

## 2023-07-04 ENCOUNTER — APPOINTMENT (OUTPATIENT)
Facility: HOSPITAL | Age: 83
End: 2023-07-04
Payer: MEDICARE

## 2023-07-04 VITALS
TEMPERATURE: 98.1 F | HEART RATE: 104 BPM | HEIGHT: 66 IN | DIASTOLIC BLOOD PRESSURE: 91 MMHG | WEIGHT: 160 LBS | SYSTOLIC BLOOD PRESSURE: 165 MMHG | RESPIRATION RATE: 16 BRPM | OXYGEN SATURATION: 94 % | BODY MASS INDEX: 25.71 KG/M2

## 2023-07-04 DIAGNOSIS — K59.00 CONSTIPATION, UNSPECIFIED CONSTIPATION TYPE: Primary | ICD-10-CM

## 2023-07-04 DIAGNOSIS — K56.41 FECAL IMPACTION (HCC): ICD-10-CM

## 2023-07-04 LAB
ALBUMIN SERPL-MCNC: 3.7 G/DL (ref 3.5–5)
ALBUMIN/GLOB SERPL: 1 (ref 1.1–2.2)
ALP SERPL-CCNC: 117 U/L (ref 45–117)
ALT SERPL-CCNC: 26 U/L (ref 12–78)
ANION GAP SERPL CALC-SCNC: 5 MMOL/L (ref 5–15)
AST SERPL-CCNC: 16 U/L (ref 15–37)
BASOPHILS # BLD: 0 K/UL (ref 0–0.1)
BASOPHILS NFR BLD: 1 % (ref 0–1)
BILIRUB SERPL-MCNC: 0.5 MG/DL (ref 0.2–1)
BUN SERPL-MCNC: 10 MG/DL (ref 6–20)
BUN/CREAT SERPL: 14 (ref 12–20)
CALCIUM SERPL-MCNC: 9.5 MG/DL (ref 8.5–10.1)
CHLORIDE SERPL-SCNC: 108 MMOL/L (ref 97–108)
CO2 SERPL-SCNC: 28 MMOL/L (ref 21–32)
CREAT SERPL-MCNC: 0.73 MG/DL (ref 0.55–1.02)
DIFFERENTIAL METHOD BLD: NORMAL
EOSINOPHIL # BLD: 0.1 K/UL (ref 0–0.4)
EOSINOPHIL NFR BLD: 1 % (ref 0–7)
ERYTHROCYTE [DISTWIDTH] IN BLOOD BY AUTOMATED COUNT: 13.6 % (ref 11.5–14.5)
GLOBULIN SER CALC-MCNC: 3.8 G/DL (ref 2–4)
GLUCOSE SERPL-MCNC: 109 MG/DL (ref 65–100)
HCT VFR BLD AUTO: 45.9 % (ref 35–47)
HGB BLD-MCNC: 15 G/DL (ref 11.5–16)
IMM GRANULOCYTES # BLD AUTO: 0 K/UL (ref 0–0.04)
IMM GRANULOCYTES NFR BLD AUTO: 0 % (ref 0–0.5)
LACTATE BLD-SCNC: 0.97 MMOL/L (ref 0.4–2)
LIPASE SERPL-CCNC: 41 U/L (ref 73–393)
LYMPHOCYTES # BLD: 2.3 K/UL (ref 0.8–3.5)
LYMPHOCYTES NFR BLD: 31 % (ref 12–49)
MAGNESIUM SERPL-MCNC: 1.9 MG/DL (ref 1.6–2.4)
MCH RBC QN AUTO: 29.2 PG (ref 26–34)
MCHC RBC AUTO-ENTMCNC: 32.7 G/DL (ref 30–36.5)
MCV RBC AUTO: 89.3 FL (ref 80–99)
MONOCYTES # BLD: 0.6 K/UL (ref 0–1)
MONOCYTES NFR BLD: 8 % (ref 5–13)
NEUTS SEG # BLD: 4.4 K/UL (ref 1.8–8)
NEUTS SEG NFR BLD: 59 % (ref 32–75)
NRBC # BLD: 0 K/UL (ref 0–0.01)
NRBC BLD-RTO: 0 PER 100 WBC
PLATELET # BLD AUTO: 216 K/UL (ref 150–400)
PMV BLD AUTO: 12.2 FL (ref 8.9–12.9)
POTASSIUM SERPL-SCNC: 3.6 MMOL/L (ref 3.5–5.1)
PROT SERPL-MCNC: 7.5 G/DL (ref 6.4–8.2)
RBC # BLD AUTO: 5.14 M/UL (ref 3.8–5.2)
SODIUM SERPL-SCNC: 141 MMOL/L (ref 136–145)
WBC # BLD AUTO: 7.4 K/UL (ref 3.6–11)

## 2023-07-04 PROCEDURE — 83690 ASSAY OF LIPASE: CPT

## 2023-07-04 PROCEDURE — 83605 ASSAY OF LACTIC ACID: CPT

## 2023-07-04 PROCEDURE — 83735 ASSAY OF MAGNESIUM: CPT

## 2023-07-04 PROCEDURE — 74018 RADEX ABDOMEN 1 VIEW: CPT

## 2023-07-04 PROCEDURE — 36415 COLL VENOUS BLD VENIPUNCTURE: CPT

## 2023-07-04 PROCEDURE — 99284 EMERGENCY DEPT VISIT MOD MDM: CPT

## 2023-07-04 PROCEDURE — 80053 COMPREHEN METABOLIC PANEL: CPT

## 2023-07-04 PROCEDURE — 85025 COMPLETE CBC W/AUTO DIFF WBC: CPT

## 2023-07-04 PROCEDURE — 96361 HYDRATE IV INFUSION ADD-ON: CPT

## 2023-07-04 PROCEDURE — 96360 HYDRATION IV INFUSION INIT: CPT

## 2023-07-04 PROCEDURE — 2580000003 HC RX 258: Performed by: EMERGENCY MEDICINE

## 2023-07-04 RX ORDER — 0.9 % SODIUM CHLORIDE 0.9 %
500 INTRAVENOUS SOLUTION INTRAVENOUS ONCE
Status: COMPLETED | OUTPATIENT
Start: 2023-07-04 | End: 2023-07-04

## 2023-07-04 RX ORDER — LACTULOSE 10 G/15ML
30 SOLUTION ORAL
Status: DISCONTINUED | OUTPATIENT
Start: 2023-07-04 | End: 2023-07-04 | Stop reason: HOSPADM

## 2023-07-04 RX ORDER — POLYETHYLENE GLYCOL 3350 17 G/17G
17 POWDER, FOR SOLUTION ORAL DAILY
Qty: 255 G | Refills: 0 | Status: SHIPPED | OUTPATIENT
Start: 2023-07-04 | End: 2023-08-03

## 2023-07-04 RX ADMIN — SODIUM CHLORIDE 500 ML: 9 INJECTION, SOLUTION INTRAVENOUS at 14:01

## 2023-07-04 RX ADMIN — SODIUM CHLORIDE 500 ML: 9 INJECTION, SOLUTION INTRAVENOUS at 17:47

## 2023-07-04 NOTE — ED PROVIDER NOTES
NEXIUM     metoprolol tartrate 25 MG tablet  Commonly known as: LOPRESSOR  Take 1 tablet by mouth 2 times daily     ondansetron 4 MG disintegrating tablet  Commonly known as: ZOFRAN-ODT     pravastatin 80 MG tablet  Commonly known as: PRAVACHOL     vitamin D 25 MCG (1000 UT) Tabs tablet  Commonly known as: CHOLECALCIFEROL     vitamin E 45 MG (100 UNIT) capsule               Where to Get Your Medications        These medications were sent to [address] [place] [address],[address] [phone]  [address] [place] [address], [address] [place] [address]      Phone: [phone]   polyethylene glycol 17 GM/SCOOP powder           DISCONTINUED MEDICATIONS:  Current Discharge Medication List          I am the Primary Clinician of Record. Chloe Osorio DO (electronically signed)    (Please note that parts of this dictation were completed with voice recognition software. Quite often unanticipated grammatical, syntax, homophones, and other interpretive errors are inadvertently transcribed by the computer software. Please disregards these errors.  Please excuse any errors that have escaped final proofreading.)          Roxie Nielson DO  07/04/23 2105

## 2023-07-04 NOTE — ED NOTES
I have reviewed the provider's instructions with the patient, answering all questions to her satisfaction.  Wheeled to waiting room by family      Ratna Andino RN  07/04/23 5823

## 2023-07-04 NOTE — DISCHARGE INSTRUCTIONS
Tomorrow would recommend 3 scoops of Miralax in 32 ounces and drink over one hr if not having a BM by mid-afternoon. Would recommend one additional scoop. Make sure you are drinking plent oy water ~64ounce a day to stay well hydrated as dehydration can result in abdominal cramping and/or constipation issues.  Especially the next couple of days

## 2023-07-07 ENCOUNTER — TELEPHONE (OUTPATIENT)
Age: 83
End: 2023-07-07

## 2023-07-07 NOTE — TELEPHONE ENCOUNTER
Called patient. Two patient identifiers verified.   Spoke with patient family     They will see at her appointment

## 2023-07-07 NOTE — TELEPHONE ENCOUNTER
Sofia Vázquez from [facility] called in stating pt was released from rehab on 7/3 but was not provided plavix or metoprolol.   Sofia Vázquez wanted to notify  that patient was off meds since 7/3 but will begin taking meds today 7/7  Call back if needed for Sofia Vázquez #[phone]

## 2023-07-12 ENCOUNTER — OFFICE VISIT (OUTPATIENT)
Age: 83
End: 2023-07-12
Payer: MEDICARE

## 2023-07-12 VITALS
TEMPERATURE: 97.5 F | WEIGHT: 161 LBS | SYSTOLIC BLOOD PRESSURE: 119 MMHG | DIASTOLIC BLOOD PRESSURE: 81 MMHG | HEART RATE: 80 BPM | BODY MASS INDEX: 25.88 KG/M2 | RESPIRATION RATE: 14 BRPM | HEIGHT: 66 IN | OXYGEN SATURATION: 91 %

## 2023-07-12 DIAGNOSIS — K59.01 SLOW TRANSIT CONSTIPATION: Primary | ICD-10-CM

## 2023-07-12 DIAGNOSIS — I48.0 PAF (PAROXYSMAL ATRIAL FIBRILLATION) (HCC): ICD-10-CM

## 2023-07-12 DIAGNOSIS — E78.00 PURE HYPERCHOLESTEROLEMIA: ICD-10-CM

## 2023-07-12 DIAGNOSIS — K21.9 GASTROESOPHAGEAL REFLUX DISEASE WITHOUT ESOPHAGITIS: ICD-10-CM

## 2023-07-12 DIAGNOSIS — I67.9 CEREBRAL VASCULAR DISEASE: ICD-10-CM

## 2023-07-12 DIAGNOSIS — Z91.81 AT HIGH RISK FOR FALLS: ICD-10-CM

## 2023-07-12 DIAGNOSIS — I21.4 NSTEMI (NON-ST ELEVATED MYOCARDIAL INFARCTION) (HCC): ICD-10-CM

## 2023-07-12 PROCEDURE — G8427 DOCREV CUR MEDS BY ELIG CLIN: HCPCS | Performed by: FAMILY MEDICINE

## 2023-07-12 PROCEDURE — 99214 OFFICE O/P EST MOD 30 MIN: CPT | Performed by: FAMILY MEDICINE

## 2023-07-12 PROCEDURE — 1090F PRES/ABSN URINE INCON ASSESS: CPT | Performed by: FAMILY MEDICINE

## 2023-07-12 PROCEDURE — 1036F TOBACCO NON-USER: CPT | Performed by: FAMILY MEDICINE

## 2023-07-12 PROCEDURE — G8399 PT W/DXA RESULTS DOCUMENT: HCPCS | Performed by: FAMILY MEDICINE

## 2023-07-12 PROCEDURE — 1123F ACP DISCUSS/DSCN MKR DOCD: CPT | Performed by: FAMILY MEDICINE

## 2023-07-12 PROCEDURE — G8419 CALC BMI OUT NRM PARAM NOF/U: HCPCS | Performed by: FAMILY MEDICINE

## 2023-07-12 SDOH — ECONOMIC STABILITY: INCOME INSECURITY: HOW HARD IS IT FOR YOU TO PAY FOR THE VERY BASICS LIKE FOOD, HOUSING, MEDICAL CARE, AND HEATING?: NOT HARD AT ALL

## 2023-07-12 SDOH — ECONOMIC STABILITY: FOOD INSECURITY: WITHIN THE PAST 12 MONTHS, YOU WORRIED THAT YOUR FOOD WOULD RUN OUT BEFORE YOU GOT MONEY TO BUY MORE.: NEVER TRUE

## 2023-07-12 SDOH — ECONOMIC STABILITY: HOUSING INSECURITY
IN THE LAST 12 MONTHS, WAS THERE A TIME WHEN YOU DID NOT HAVE A STEADY PLACE TO SLEEP OR SLEPT IN A SHELTER (INCLUDING NOW)?: NO

## 2023-07-12 SDOH — ECONOMIC STABILITY: FOOD INSECURITY: WITHIN THE PAST 12 MONTHS, THE FOOD YOU BOUGHT JUST DIDN'T LAST AND YOU DIDN'T HAVE MONEY TO GET MORE.: NEVER TRUE

## 2023-07-12 ASSESSMENT — PATIENT HEALTH QUESTIONNAIRE - PHQ9
2. FEELING DOWN, DEPRESSED OR HOPELESS: 0
SUM OF ALL RESPONSES TO PHQ QUESTIONS 1-9: 0
1. LITTLE INTEREST OR PLEASURE IN DOING THINGS: 0
SUM OF ALL RESPONSES TO PHQ QUESTIONS 1-9: 0
SUM OF ALL RESPONSES TO PHQ QUESTIONS 1-9: 0
SUM OF ALL RESPONSES TO PHQ9 QUESTIONS 1 & 2: 0
SUM OF ALL RESPONSES TO PHQ QUESTIONS 1-9: 0

## 2023-07-12 NOTE — PATIENT INSTRUCTIONS
REDUCE MIRALAX TO 1/2 SCOOP IN LIQUID DAILY. STOP FISH OIL AND VITAMIN E. Follow up with [facility] cardiology specialists)  seen by Dr Isabell Patton and Dr Lizzie De La Cruz. For atrial fibrillation and heart attack. (Non-STEMI). [facility]  [email or web address]  [address] [place] [address]   [phone]

## 2023-07-12 NOTE — PROGRESS NOTES
1. \"Have you been to the ER, urgent care clinic since your last visit? Hospitalized since your last visit? \" Yes Fall     2. \"Have you seen or consulted any other health care providers outside of the [address] since your last visit? \" [address]     3. For patients aged 43-73: Has the patient had a colonoscopy / FIT/ Cologuard? No      If the patient is female:    4. For patients aged 43-66: Has the patient had a mammogram within the past 2 years? No       5. For patients aged 21-65: Has the patient had a pap smear?  No
disease        5. NSTEMI (non-ST elevated myocardial infarction) ([address])        6. PAF (paroxysmal atrial fibrillation) ([address])        7. Gastroesophageal reflux disease without esophagitis          Schedule follow up with cardiology. Reduce miralax to 1/2 cap daily. Rosendo Sweeney MD    On the basis of positive falls risk screening, assessment and plan is as follows: home safety tips provided.

## 2023-08-08 RX ORDER — PRAVASTATIN SODIUM 80 MG/1
TABLET ORAL
Qty: 90 TABLET | Refills: 0 | Status: SHIPPED | OUTPATIENT
Start: 2023-08-08

## 2023-08-29 NOTE — BRIEF OP NOTE
Brief Postoperative Note    Patient: Machelle Coelho  YOB: 1940  MRN: [id number]    Date of Procedure: 3/29/2022     Pre-Op Diagnosis: LEFT HIP FRACTURE    Post-Op Diagnosis: Same as preoperative diagnosis.       Procedure(s):  LEFT HIP PERCUTANEOUS PINNING CANNULATED SCREWS    Surgeon(s):  Casie Pabon DO    Surgical Assistant: None    Anesthesia: General     Estimated Blood Loss (mL): less than 50     Complications: None    Specimens: * No specimens in log *     Implants: * No implants in log *6.5mm cannulated screw x 3    Drains: * No LDAs found *    Findings: impacted femoral neck fx    Electronically Signed by Sherice Dailey DO on 3/29/2022 at 2:04 PM Products Recommended: Cerave General Sunscreen Counseling: I recommended a broad spectrum sunscreen with a SPF of 30 or higher.  I explained that SPF 30 sunscreens block approximately 97 percent of the sun's harmful rays.  Sunscreens should be applied at least 15 minutes prior to expected sun exposure and then every 2 hours after that as long as sun exposure continues. If swimming or exercising sunscreen should be reapplied every 45 minutes to an hour after getting wet or sweating.  One ounce, or the equivalent of a shot glass full of sunscreen, is adequate to protect the skin not covered by a bathing suit. I also recommended a lip balm with a sunscreen as well. Sun protective clothing can be used in lieu of sunscreen but must be worn the entire time you are exposed to the sun's rays. Detail Level: Detailed

## 2023-10-12 NOTE — PROGRESS NOTES
Karla Mccarthy is a 82 y.o. female who presents for follow up. In with . Prior CVA, left LE weakness. On daily aspirin and plavix. using wheelchair today. per , she is exercising daily. Is now able to get up from seated and use walker without assistance. Using pedal bike 15-20 minutes. Using depends right now. Has ongoing urinary incontinence. no dysuria. Prior fecal impaction 2023. Now, normal daily BM. History of AF and NSTEMI. On beta blocker, statin, aspirin and plavix. No bleeding. Not following with cardiology now.             Past Medical History:   Diagnosis Date    Acid reflux     Endocrine disease     thyroid nodules    Stroke [facility])     Thyroid disease     thyroid nodules       Family History   Problem Relation Age of Onset    Heart Disease Maternal Grandfather     Heart Disease Sister     Cancer Mother         lymphoma    Diabetes Paternal Grandmother     Heart Disease Son     No Known Problems Son     No Known Problems Daughter     No Known Problems Daughter         Social History     Socioeconomic History    Marital status:      Spouse name: Not on file    Number of children: Not on file    Years of education: Not on file    Highest education level: Not on file   Occupational History    Not on file   Tobacco Use    Smoking status: Former     Packs/day: 1     Types: Cigarettes     Quit date: 1986     Years since quittin.8    Smokeless tobacco: Never   Vaping Use    Vaping Use: Never used   Substance and Sexual Activity    Alcohol use: No    Drug use: No    Sexual activity: Not on file   Other Topics Concern    Not on file   Social History Narrative    Not on file     Social Determinants of Health     Financial Resource Strain: Low Risk  (2023)    Overall Financial Resource Strain (CARDIA)     Difficulty of Paying Living Expenses: Not hard at all   Food Insecurity: No Food Insecurity (2023)    Hunger Vital Sign     Worried About

## 2023-10-13 ENCOUNTER — OFFICE VISIT (OUTPATIENT)
Age: 83
End: 2023-10-13
Payer: MEDICARE

## 2023-10-13 VITALS
WEIGHT: 160 LBS | BODY MASS INDEX: 25.71 KG/M2 | RESPIRATION RATE: 17 BRPM | HEART RATE: 82 BPM | HEIGHT: 66 IN | DIASTOLIC BLOOD PRESSURE: 77 MMHG | SYSTOLIC BLOOD PRESSURE: 114 MMHG | TEMPERATURE: 97.3 F | OXYGEN SATURATION: 92 %

## 2023-10-13 DIAGNOSIS — G31.84 MCI (MILD COGNITIVE IMPAIRMENT): ICD-10-CM

## 2023-10-13 DIAGNOSIS — K21.9 GASTROESOPHAGEAL REFLUX DISEASE WITHOUT ESOPHAGITIS: ICD-10-CM

## 2023-10-13 DIAGNOSIS — I48.0 PAF (PAROXYSMAL ATRIAL FIBRILLATION) (HCC): ICD-10-CM

## 2023-10-13 DIAGNOSIS — E78.00 PURE HYPERCHOLESTEROLEMIA: ICD-10-CM

## 2023-10-13 DIAGNOSIS — I67.9 CEREBRAL VASCULAR DISEASE: Primary | ICD-10-CM

## 2023-10-13 DIAGNOSIS — Z91.81 AT HIGH RISK FOR FALLS: ICD-10-CM

## 2023-10-13 DIAGNOSIS — Z00.00 MEDICARE ANNUAL WELLNESS VISIT, SUBSEQUENT: ICD-10-CM

## 2023-10-13 DIAGNOSIS — K59.01 SLOW TRANSIT CONSTIPATION: ICD-10-CM

## 2023-10-13 PROCEDURE — 90694 VACC AIIV4 NO PRSRV 0.5ML IM: CPT | Performed by: FAMILY MEDICINE

## 2023-10-13 PROCEDURE — 99214 OFFICE O/P EST MOD 30 MIN: CPT | Performed by: FAMILY MEDICINE

## 2023-10-13 ASSESSMENT — PATIENT HEALTH QUESTIONNAIRE - PHQ9
SUM OF ALL RESPONSES TO PHQ QUESTIONS 1-9: 0
1. LITTLE INTEREST OR PLEASURE IN DOING THINGS: 0
SUM OF ALL RESPONSES TO PHQ QUESTIONS 1-9: 0
2. FEELING DOWN, DEPRESSED OR HOPELESS: 0
SUM OF ALL RESPONSES TO PHQ9 QUESTIONS 1 & 2: 0

## 2023-10-31 RX ORDER — PRAVASTATIN SODIUM 80 MG/1
TABLET ORAL
Qty: 90 TABLET | Refills: 0 | Status: SHIPPED | OUTPATIENT
Start: 2023-10-31 | End: 2023-11-01

## 2023-11-01 RX ORDER — PRAVASTATIN SODIUM 80 MG/1
TABLET ORAL
Qty: 90 TABLET | Refills: 0 | Status: SHIPPED | OUTPATIENT
Start: 2023-11-01

## 2023-11-02 RX ORDER — CLOPIDOGREL BISULFATE 75 MG/1
75 TABLET ORAL DAILY
Qty: 30 TABLET | Refills: 3 | Status: SHIPPED | OUTPATIENT
Start: 2023-11-02

## 2023-11-02 NOTE — TELEPHONE ENCOUNTER
metoprolol tartrate (LOPRESSOR) 25 MG tablet    clopidogrel (PLAVIX) 75 MG tablet    Originally written by cardiologist.  Pt needs new scripts to go to  [facility] #[phone]    If there is a problem, please call pt. Pt is very low on medication.

## 2024-01-31 ENCOUNTER — APPOINTMENT (OUTPATIENT)
Facility: HOSPITAL | Age: 84
DRG: 273 | End: 2024-01-31
Payer: MEDICARE

## 2024-01-31 ENCOUNTER — HOSPITAL ENCOUNTER (INPATIENT)
Facility: HOSPITAL | Age: 84
LOS: 3 days | Discharge: HOME HEALTH | DRG: 273 | End: 2024-02-03
Attending: STUDENT IN AN ORGANIZED HEALTH CARE EDUCATION/TRAINING PROGRAM | Admitting: STUDENT IN AN ORGANIZED HEALTH CARE EDUCATION/TRAINING PROGRAM
Payer: MEDICARE

## 2024-01-31 DIAGNOSIS — J96.01 ACUTE RESPIRATORY FAILURE WITH HYPOXIA (HCC): ICD-10-CM

## 2024-01-31 DIAGNOSIS — I48.92 ATRIAL FLUTTER WITH RAPID VENTRICULAR RESPONSE (HCC): ICD-10-CM

## 2024-01-31 DIAGNOSIS — I48.3 TYPICAL ATRIAL FLUTTER (HCC): ICD-10-CM

## 2024-01-31 DIAGNOSIS — R79.89 D-DIMER, ELEVATED: Primary | ICD-10-CM

## 2024-01-31 DIAGNOSIS — I48.92 ATRIAL FLUTTER (HCC): ICD-10-CM

## 2024-01-31 LAB
ALBUMIN SERPL-MCNC: 3.7 G/DL (ref 3.5–5)
ALBUMIN/GLOB SERPL: 1 (ref 1.1–2.2)
ALP SERPL-CCNC: 86 U/L (ref 45–117)
ALT SERPL-CCNC: 25 U/L (ref 12–78)
ANION GAP SERPL CALC-SCNC: 4 MMOL/L (ref 5–15)
APTT PPP: 26.7 SEC (ref 22.1–31)
AST SERPL-CCNC: 18 U/L (ref 15–37)
BASOPHILS # BLD: 0.1 K/UL (ref 0–0.1)
BASOPHILS NFR BLD: 0 % (ref 0–1)
BILIRUB SERPL-MCNC: 0.5 MG/DL (ref 0.2–1)
BUN SERPL-MCNC: 17 MG/DL (ref 6–20)
BUN/CREAT SERPL: 22 (ref 12–20)
CALCIUM SERPL-MCNC: 8.9 MG/DL (ref 8.5–10.1)
CHLORIDE SERPL-SCNC: 110 MMOL/L (ref 97–108)
CO2 SERPL-SCNC: 28 MMOL/L (ref 21–32)
CREAT SERPL-MCNC: 0.78 MG/DL (ref 0.55–1.02)
D DIMER PPP FEU-MCNC: 32.93 MG/L FEU (ref 0–0.65)
DIFFERENTIAL METHOD BLD: ABNORMAL
ECHO BSA: 1.84 M2
EOSINOPHIL # BLD: 0 K/UL (ref 0–0.4)
EOSINOPHIL NFR BLD: 0 % (ref 0–7)
ERYTHROCYTE [DISTWIDTH] IN BLOOD BY AUTOMATED COUNT: 13.7 % (ref 11.5–14.5)
GLOBULIN SER CALC-MCNC: 3.6 G/DL (ref 2–4)
GLUCOSE SERPL-MCNC: 125 MG/DL (ref 65–100)
HCT VFR BLD AUTO: 46.6 % (ref 35–47)
HGB BLD-MCNC: 15.1 G/DL (ref 11.5–16)
IMM GRANULOCYTES # BLD AUTO: 0.1 K/UL (ref 0–0.04)
IMM GRANULOCYTES NFR BLD AUTO: 1 % (ref 0–0.5)
INR PPP: 1 (ref 0.9–1.1)
LYMPHOCYTES # BLD: 1.1 K/UL (ref 0.8–3.5)
LYMPHOCYTES NFR BLD: 7 % (ref 12–49)
MCH RBC QN AUTO: 29.5 PG (ref 26–34)
MCHC RBC AUTO-ENTMCNC: 32.4 G/DL (ref 30–36.5)
MCV RBC AUTO: 91.2 FL (ref 80–99)
MONOCYTES # BLD: 0.9 K/UL (ref 0–1)
MONOCYTES NFR BLD: 6 % (ref 5–13)
NEUTS SEG # BLD: 13.7 K/UL (ref 1.8–8)
NEUTS SEG NFR BLD: 86 % (ref 32–75)
NRBC # BLD: 0 K/UL (ref 0–0.01)
NRBC BLD-RTO: 0 PER 100 WBC
NT PRO BNP: 176 PG/ML
PLATELET # BLD AUTO: 173 K/UL (ref 150–400)
PMV BLD AUTO: 11.8 FL (ref 8.9–12.9)
POTASSIUM SERPL-SCNC: 3.6 MMOL/L (ref 3.5–5.1)
PROT SERPL-MCNC: 7.3 G/DL (ref 6.4–8.2)
PROTHROMBIN TIME: 10.3 SEC (ref 9–11.1)
RBC # BLD AUTO: 5.11 M/UL (ref 3.8–5.2)
SODIUM SERPL-SCNC: 142 MMOL/L (ref 136–145)
THERAPEUTIC RANGE: NORMAL SECS (ref 58–77)
TROPONIN I SERPL HS-MCNC: 10 NG/L (ref 0–51)
UFH PPP CHRO-ACNC: <0.1 IU/ML
WBC # BLD AUTO: 15.9 K/UL (ref 3.6–11)

## 2024-01-31 PROCEDURE — 85025 COMPLETE CBC W/AUTO DIFF WBC: CPT

## 2024-01-31 PROCEDURE — 2060000000 HC ICU INTERMEDIATE R&B

## 2024-01-31 PROCEDURE — 85520 HEPARIN ASSAY: CPT

## 2024-01-31 PROCEDURE — 85730 THROMBOPLASTIN TIME PARTIAL: CPT

## 2024-01-31 PROCEDURE — 6360000002 HC RX W HCPCS: Performed by: STUDENT IN AN ORGANIZED HEALTH CARE EDUCATION/TRAINING PROGRAM

## 2024-01-31 PROCEDURE — 93970 EXTREMITY STUDY: CPT

## 2024-01-31 PROCEDURE — 71045 X-RAY EXAM CHEST 1 VIEW: CPT

## 2024-01-31 PROCEDURE — 85610 PROTHROMBIN TIME: CPT

## 2024-01-31 PROCEDURE — 93005 ELECTROCARDIOGRAM TRACING: CPT | Performed by: STUDENT IN AN ORGANIZED HEALTH CARE EDUCATION/TRAINING PROGRAM

## 2024-01-31 PROCEDURE — 2580000003 HC RX 258: Performed by: STUDENT IN AN ORGANIZED HEALTH CARE EDUCATION/TRAINING PROGRAM

## 2024-01-31 PROCEDURE — 71250 CT THORAX DX C-: CPT

## 2024-01-31 PROCEDURE — 83880 ASSAY OF NATRIURETIC PEPTIDE: CPT

## 2024-01-31 PROCEDURE — 99285 EMERGENCY DEPT VISIT HI MDM: CPT

## 2024-01-31 PROCEDURE — 36415 COLL VENOUS BLD VENIPUNCTURE: CPT

## 2024-01-31 PROCEDURE — 6370000000 HC RX 637 (ALT 250 FOR IP): Performed by: STUDENT IN AN ORGANIZED HEALTH CARE EDUCATION/TRAINING PROGRAM

## 2024-01-31 PROCEDURE — 72100 X-RAY EXAM L-S SPINE 2/3 VWS: CPT

## 2024-01-31 PROCEDURE — 84484 ASSAY OF TROPONIN QUANT: CPT

## 2024-01-31 PROCEDURE — 80053 COMPREHEN METABOLIC PANEL: CPT

## 2024-01-31 PROCEDURE — 85379 FIBRIN DEGRADATION QUANT: CPT

## 2024-01-31 RX ORDER — ACETAMINOPHEN 650 MG/1
650 SUPPOSITORY RECTAL EVERY 6 HOURS PRN
Status: DISCONTINUED | OUTPATIENT
Start: 2024-01-31 | End: 2024-02-03 | Stop reason: HOSPADM

## 2024-01-31 RX ORDER — ONDANSETRON 4 MG/1
4 TABLET, ORALLY DISINTEGRATING ORAL EVERY 8 HOURS PRN
Status: DISCONTINUED | OUTPATIENT
Start: 2024-01-31 | End: 2024-02-03 | Stop reason: HOSPADM

## 2024-01-31 RX ORDER — ONDANSETRON 2 MG/ML
4 INJECTION INTRAMUSCULAR; INTRAVENOUS EVERY 6 HOURS PRN
Status: DISCONTINUED | OUTPATIENT
Start: 2024-01-31 | End: 2024-02-03 | Stop reason: HOSPADM

## 2024-01-31 RX ORDER — HEPARIN SODIUM 1000 [USP'U]/ML
40 INJECTION, SOLUTION INTRAVENOUS; SUBCUTANEOUS PRN
Status: DISCONTINUED | OUTPATIENT
Start: 2024-01-31 | End: 2024-02-01

## 2024-01-31 RX ORDER — HEPARIN SODIUM 1000 [USP'U]/ML
80 INJECTION, SOLUTION INTRAVENOUS; SUBCUTANEOUS ONCE
Status: COMPLETED | OUTPATIENT
Start: 2024-01-31 | End: 2024-01-31

## 2024-01-31 RX ORDER — ASPIRIN 81 MG/1
81 TABLET, CHEWABLE ORAL DAILY
Status: DISCONTINUED | OUTPATIENT
Start: 2024-01-31 | End: 2024-02-01

## 2024-01-31 RX ORDER — PANTOPRAZOLE SODIUM 40 MG/1
40 TABLET, DELAYED RELEASE ORAL
Status: DISCONTINUED | OUTPATIENT
Start: 2024-02-01 | End: 2024-02-03 | Stop reason: HOSPADM

## 2024-01-31 RX ORDER — HEPARIN SODIUM 10000 [USP'U]/100ML
5-30 INJECTION, SOLUTION INTRAVENOUS CONTINUOUS
Status: DISCONTINUED | OUTPATIENT
Start: 2024-01-31 | End: 2024-02-01

## 2024-01-31 RX ORDER — KETOROLAC TROMETHAMINE 30 MG/ML
15 INJECTION, SOLUTION INTRAMUSCULAR; INTRAVENOUS EVERY 6 HOURS PRN
Status: DISCONTINUED | OUTPATIENT
Start: 2024-01-31 | End: 2024-02-03 | Stop reason: HOSPADM

## 2024-01-31 RX ORDER — ACETAMINOPHEN 325 MG/1
650 TABLET ORAL EVERY 6 HOURS PRN
Status: DISCONTINUED | OUTPATIENT
Start: 2024-01-31 | End: 2024-02-03 | Stop reason: SDUPTHER

## 2024-01-31 RX ORDER — POLYETHYLENE GLYCOL 3350 17 G/17G
17 POWDER, FOR SOLUTION ORAL DAILY PRN
Status: DISCONTINUED | OUTPATIENT
Start: 2024-01-31 | End: 2024-02-03 | Stop reason: HOSPADM

## 2024-01-31 RX ORDER — SODIUM CHLORIDE 9 MG/ML
INJECTION, SOLUTION INTRAVENOUS PRN
Status: DISCONTINUED | OUTPATIENT
Start: 2024-01-31 | End: 2024-02-03 | Stop reason: HOSPADM

## 2024-01-31 RX ORDER — LANOLIN ALCOHOL/MO/W.PET/CERES
3 CREAM (GRAM) TOPICAL NIGHTLY PRN
Status: DISCONTINUED | OUTPATIENT
Start: 2024-01-31 | End: 2024-02-03 | Stop reason: HOSPADM

## 2024-01-31 RX ORDER — SODIUM CHLORIDE 0.9 % (FLUSH) 0.9 %
5-40 SYRINGE (ML) INJECTION PRN
Status: DISCONTINUED | OUTPATIENT
Start: 2024-01-31 | End: 2024-02-03 | Stop reason: SDUPTHER

## 2024-01-31 RX ORDER — CLOPIDOGREL BISULFATE 75 MG/1
75 TABLET ORAL DAILY
Status: DISCONTINUED | OUTPATIENT
Start: 2024-02-01 | End: 2024-02-01

## 2024-01-31 RX ORDER — HEPARIN SODIUM 1000 [USP'U]/ML
80 INJECTION, SOLUTION INTRAVENOUS; SUBCUTANEOUS PRN
Status: DISCONTINUED | OUTPATIENT
Start: 2024-01-31 | End: 2024-02-01

## 2024-01-31 RX ORDER — PRAVASTATIN SODIUM 80 MG/1
TABLET ORAL
Qty: 90 TABLET | Refills: 0 | Status: SHIPPED | OUTPATIENT
Start: 2024-01-31

## 2024-01-31 RX ORDER — SODIUM CHLORIDE 0.9 % (FLUSH) 0.9 %
5-40 SYRINGE (ML) INJECTION EVERY 12 HOURS SCHEDULED
Status: DISCONTINUED | OUTPATIENT
Start: 2024-01-31 | End: 2024-02-03 | Stop reason: SDUPTHER

## 2024-01-31 RX ORDER — PRAVASTATIN SODIUM 40 MG
80 TABLET ORAL DAILY
Status: DISCONTINUED | OUTPATIENT
Start: 2024-02-01 | End: 2024-02-03 | Stop reason: HOSPADM

## 2024-01-31 RX ADMIN — METOPROLOL TARTRATE 25 MG: 25 TABLET, FILM COATED ORAL at 21:05

## 2024-01-31 RX ADMIN — ASPIRIN 81 MG: 81 TABLET, CHEWABLE ORAL at 21:08

## 2024-01-31 RX ADMIN — HEPARIN SODIUM AND DEXTROSE 18 UNITS/KG/HR: 10000; 5 INJECTION INTRAVENOUS at 21:05

## 2024-01-31 RX ADMIN — HEPARIN SODIUM 5840 UNITS: 1000 INJECTION INTRAVENOUS; SUBCUTANEOUS at 21:02

## 2024-01-31 RX ADMIN — SODIUM CHLORIDE, PRESERVATIVE FREE 10 ML: 5 INJECTION INTRAVENOUS at 21:08

## 2024-01-31 ASSESSMENT — PAIN - FUNCTIONAL ASSESSMENT: PAIN_FUNCTIONAL_ASSESSMENT: 0-10

## 2024-01-31 ASSESSMENT — PAIN SCALES - GENERAL: PAINLEVEL_OUTOF10: 7

## 2024-01-31 ASSESSMENT — PAIN DESCRIPTION - LOCATION: LOCATION: BACK

## 2024-01-31 ASSESSMENT — PAIN DESCRIPTION - ORIENTATION: ORIENTATION: UPPER;LOWER

## 2024-01-31 ASSESSMENT — PAIN DESCRIPTION - DESCRIPTORS: DESCRIPTORS: ACHING

## 2024-01-31 NOTE — ED NOTES
Assumed care of pt at this time. Pt arrives with reports of GLF after the dentist. Pt was holding onto a railing in the garage and it came lose, she fell and hit her back. Pt complaining of L back pain. Pt denies LOC. Pt on monitor x3 with call bell in reach. Pt family at bedside.     1705: Pt resting in ED stretcher and on monitor x3 with call bell in reach. Pt family remains at bedside.     1805: Pt resting in ER stretcher with no complaints. Pt remains on monitor x3 with call bell in reach.

## 2024-01-31 NOTE — ED TRIAGE NOTES
Pt's spo2 88-89% with good pleth. Does not wear oxygen. Pt encouraged to take a deep breath. Pt may be taking shallow breaths due to her back pain. RN auscultated upper and lower lungs bilaterally with good air movement.

## 2024-01-31 NOTE — ED PROVIDER NOTES
[facility] EMERGENCY DEPT  EMERGENCY DEPARTMENT ENCOUNTER       Pt Name: Audrey Almanzar  MRN: [id number]  Birthdate 1940  Date of evaluation: 1/31/2024  Provider: Ventura Newton MD   PCP: Soumya Dye MD  Note Started: 6:44 PM EST 1/31/24     CHIEF COMPLAINT       Chief Complaint   Patient presents with    Fall     This afternoon pt was returing from dentist. Was holding onto the railing in the garage, one of the railings came loose and pt fell onto her back. Denies LOC.         HISTORY OF PRESENT ILLNESS: 1 or more elements      History From: patient, History limited by: none     Audrey Almanzar is a 83 y.o. female presenting after fall.  She notes the fall was mechanical and she fell backwards after the railing broke down her stairs.  She fell backwards.  Notes low back pain.  Denies hitting her head or losing consciousness.  Has been ambulatory.  Notes she has been short of breath prior to arrival.  No chest pain.       Please See MDM for Additional Details of the HPI/PMH  Nursing Notes were all reviewed and agreed with or any disagreements were addressed in the HPI.     REVIEW OF SYSTEMS        Positives and Pertinent negatives as per HPI.    PAST HISTORY     Past Medical History:  Past Medical History:   Diagnosis Date    Acid reflux     Endocrine disease     thyroid nodules    Stroke (HCC)     Thyroid disease     thyroid nodules       Past Surgical History:  Past Surgical History:   Procedure Laterality Date    GYN  1996    hysterectomy ? BSO    ORTHOPEDIC SURGERY      back    TOTAL HIP ARTHROPLASTY Left 03/27/2022       Family History:  Family History   Problem Relation Age of Onset    Heart Disease Maternal Grandfather     Heart Disease Sister     Cancer Mother         lymphoma    Diabetes Paternal Grandmother     Heart Disease Son     No Known Problems Son     No Known Problems Daughter     No Known Problems Daughter        Social History:  Social History     Tobacco Use    Smoking status:

## 2024-02-01 ENCOUNTER — APPOINTMENT (OUTPATIENT)
Facility: HOSPITAL | Age: 84
DRG: 273 | End: 2024-02-01
Payer: MEDICARE

## 2024-02-01 LAB
ANION GAP SERPL CALC-SCNC: 3 MMOL/L (ref 5–15)
APPEARANCE UR: CLEAR
BACTERIA URNS QL MICRO: NEGATIVE /HPF
BASOPHILS # BLD: 0 K/UL (ref 0–0.1)
BASOPHILS NFR BLD: 0 % (ref 0–1)
BILIRUB UR QL: NEGATIVE
BUN SERPL-MCNC: 14 MG/DL (ref 6–20)
BUN/CREAT SERPL: 23 (ref 12–20)
CALCIUM SERPL-MCNC: 9 MG/DL (ref 8.5–10.1)
CHLORIDE SERPL-SCNC: 113 MMOL/L (ref 97–108)
CO2 SERPL-SCNC: 27 MMOL/L (ref 21–32)
COLOR UR: ABNORMAL
CREAT SERPL-MCNC: 0.62 MG/DL (ref 0.55–1.02)
DIFFERENTIAL METHOD BLD: ABNORMAL
EKG ATRIAL RATE: 302 BPM
EKG DIAGNOSIS: NORMAL
EKG P AXIS: 260 DEGREES
EKG Q-T INTERVAL: 336 MS
EKG QRS DURATION: 70 MS
EKG QTC CALCULATION (BAZETT): 532 MS
EKG R AXIS: -43 DEGREES
EKG T AXIS: 269 DEGREES
EKG VENTRICULAR RATE: 151 BPM
EOSINOPHIL # BLD: 0.1 K/UL (ref 0–0.4)
EOSINOPHIL NFR BLD: 1 % (ref 0–7)
EPITH CASTS URNS QL MICRO: ABNORMAL /LPF
ERYTHROCYTE [DISTWIDTH] IN BLOOD BY AUTOMATED COUNT: 13.7 % (ref 11.5–14.5)
GLUCOSE SERPL-MCNC: 121 MG/DL (ref 65–100)
GLUCOSE UR STRIP.AUTO-MCNC: NEGATIVE MG/DL
HCT VFR BLD AUTO: 43.6 % (ref 35–47)
HGB BLD-MCNC: 14.1 G/DL (ref 11.5–16)
HGB UR QL STRIP: NEGATIVE
HYALINE CASTS URNS QL MICRO: ABNORMAL /LPF (ref 0–2)
IMM GRANULOCYTES # BLD AUTO: 0.1 K/UL (ref 0–0.04)
IMM GRANULOCYTES NFR BLD AUTO: 1 % (ref 0–0.5)
KETONES UR QL STRIP.AUTO: NEGATIVE MG/DL
LEUKOCYTE ESTERASE UR QL STRIP.AUTO: NEGATIVE
LYMPHOCYTES # BLD: 1.7 K/UL (ref 0.8–3.5)
LYMPHOCYTES NFR BLD: 13 % (ref 12–49)
MCH RBC QN AUTO: 29.3 PG (ref 26–34)
MCHC RBC AUTO-ENTMCNC: 32.3 G/DL (ref 30–36.5)
MCV RBC AUTO: 90.5 FL (ref 80–99)
MONOCYTES # BLD: 0.7 K/UL (ref 0–1)
MONOCYTES NFR BLD: 5 % (ref 5–13)
NEUTS SEG # BLD: 10.2 K/UL (ref 1.8–8)
NEUTS SEG NFR BLD: 80 % (ref 32–75)
NITRITE UR QL STRIP.AUTO: NEGATIVE
NRBC # BLD: 0 K/UL (ref 0–0.01)
NRBC BLD-RTO: 0 PER 100 WBC
PH UR STRIP: 5.5 (ref 5–8)
PLATELET # BLD AUTO: 163 K/UL (ref 150–400)
PMV BLD AUTO: 12.3 FL (ref 8.9–12.9)
POTASSIUM SERPL-SCNC: 3.7 MMOL/L (ref 3.5–5.1)
PROT UR STRIP-MCNC: NEGATIVE MG/DL
RBC # BLD AUTO: 4.82 M/UL (ref 3.8–5.2)
RBC #/AREA URNS HPF: ABNORMAL /HPF (ref 0–5)
SODIUM SERPL-SCNC: 143 MMOL/L (ref 136–145)
SP GR UR REFRACTOMETRY: 1.02
UFH PPP CHRO-ACNC: >1.5 IU/ML
URINE CULTURE IF INDICATED: ABNORMAL
UROBILINOGEN UR QL STRIP.AUTO: 0.2 EU/DL (ref 0.2–1)
WBC # BLD AUTO: 12.8 K/UL (ref 3.6–11)
WBC URNS QL MICRO: ABNORMAL /HPF (ref 0–4)

## 2024-02-01 PROCEDURE — 6360000002 HC RX W HCPCS: Performed by: STUDENT IN AN ORGANIZED HEALTH CARE EDUCATION/TRAINING PROGRAM

## 2024-02-01 PROCEDURE — 2580000003 HC RX 258: Performed by: STUDENT IN AN ORGANIZED HEALTH CARE EDUCATION/TRAINING PROGRAM

## 2024-02-01 PROCEDURE — 81001 URINALYSIS AUTO W/SCOPE: CPT

## 2024-02-01 PROCEDURE — 6370000000 HC RX 637 (ALT 250 FOR IP): Performed by: STUDENT IN AN ORGANIZED HEALTH CARE EDUCATION/TRAINING PROGRAM

## 2024-02-01 PROCEDURE — 78580 LUNG PERFUSION IMAGING: CPT

## 2024-02-01 PROCEDURE — 3430000000 HC RX DIAGNOSTIC RADIOPHARMACEUTICAL: Performed by: STUDENT IN AN ORGANIZED HEALTH CARE EDUCATION/TRAINING PROGRAM

## 2024-02-01 PROCEDURE — 85025 COMPLETE CBC W/AUTO DIFF WBC: CPT

## 2024-02-01 PROCEDURE — 2700000000 HC OXYGEN THERAPY PER DAY

## 2024-02-01 PROCEDURE — 80048 BASIC METABOLIC PNL TOTAL CA: CPT

## 2024-02-01 PROCEDURE — 85520 HEPARIN ASSAY: CPT

## 2024-02-01 PROCEDURE — 36415 COLL VENOUS BLD VENIPUNCTURE: CPT

## 2024-02-01 PROCEDURE — 2060000000 HC ICU INTERMEDIATE R&B

## 2024-02-01 PROCEDURE — A9540 TC99M MAA: HCPCS | Performed by: STUDENT IN AN ORGANIZED HEALTH CARE EDUCATION/TRAINING PROGRAM

## 2024-02-01 RX ORDER — ENOXAPARIN SODIUM 100 MG/ML
40 INJECTION SUBCUTANEOUS DAILY
Status: DISCONTINUED | OUTPATIENT
Start: 2024-02-02 | End: 2024-02-02

## 2024-02-01 RX ADMIN — SODIUM CHLORIDE, PRESERVATIVE FREE 10 ML: 5 INJECTION INTRAVENOUS at 20:32

## 2024-02-01 RX ADMIN — ASPIRIN 81 MG: 81 TABLET, CHEWABLE ORAL at 10:18

## 2024-02-01 RX ADMIN — KETOROLAC TROMETHAMINE 15 MG: 30 INJECTION, SOLUTION INTRAMUSCULAR; INTRAVENOUS at 20:37

## 2024-02-01 RX ADMIN — KIT FOR THE PREPARATION OF TECHNETIUM TC 99M ALBUMIN AGGREGATED 4.3 MILLICURIE: 2.5 INJECTION, POWDER, FOR SOLUTION INTRAVENOUS at 09:25

## 2024-02-01 RX ADMIN — METOPROLOL TARTRATE 25 MG: 25 TABLET, FILM COATED ORAL at 20:32

## 2024-02-01 RX ADMIN — METOPROLOL TARTRATE 25 MG: 25 TABLET, FILM COATED ORAL at 10:18

## 2024-02-01 RX ADMIN — HEPARIN SODIUM AND DEXTROSE 15 UNITS/KG/HR: 10000; 5 INJECTION INTRAVENOUS at 07:36

## 2024-02-01 RX ADMIN — PRAVASTATIN SODIUM 80 MG: 40 TABLET ORAL at 10:18

## 2024-02-01 RX ADMIN — PANTOPRAZOLE SODIUM 40 MG: 40 TABLET, DELAYED RELEASE ORAL at 07:36

## 2024-02-01 RX ADMIN — CLOPIDOGREL BISULFATE 75 MG: 75 TABLET ORAL at 10:18

## 2024-02-01 ASSESSMENT — PAIN SCALES - GENERAL: PAINLEVEL_OUTOF10: 4

## 2024-02-01 ASSESSMENT — PAIN DESCRIPTION - LOCATION: LOCATION: BACK

## 2024-02-01 ASSESSMENT — PAIN DESCRIPTION - DESCRIPTORS: DESCRIPTORS: ACHING

## 2024-02-01 NOTE — PLAN OF CARE
Predictive Model Details          59 (Warning)  Factor Value    Calculated 2/1/2024 01:32 22% Age 83 years old    Deterioration Index Model 20% [place] coma scale 13     19% Supplemental oxygen Nasal cannula     16% Neurological exam X     9% Respiratory rate 20     6% WBC count abnormal (15.9 K/uL)     3% Systolic 146     2% Pulse 96     1% Sodium 142 mmol/L     1% Potassium 3.6 mmol/L     0% Hematocrit 46.6 %     0% Temperature 97.5 °F (36.4 °C)     0% Pulse oximetry 96 %    2230: verbal report from bal   from ED.       0556: called lab due to Anti-Xa not processed yet.  Placed on hold.  Informed that Ptt and Xa critically high  greater than 1.5     Called Ishmael in Pharmacy and place heparin on hold for 1 hour and restart at 0707 at 3units/kg/hr lower        Problem: Discharge Planning  Goal: Discharge to home or other facility with appropriate resources  Outcome: Progressing     Problem: Pain  Goal: Verbalizes/displays adequate comfort level or baseline comfort level  Outcome: Progressing     Problem: Skin/Tissue Integrity  Goal: Absence of new skin breakdown  Description: 1.  Monitor for areas of redness and/or skin breakdown  2.  Assess vascular access sites hourly  3.  Every 4-6 hours minimum:  Change oxygen saturation probe site  4.  Every 4-6 hours:  If on nasal continuous positive airway pressure, respiratory therapy assess nares and determine need for appliance change or resting period.  Outcome: Progressing     Problem: Safety - Adult  Goal: Free from fall injury  Outcome: Progressing     Problem: ABCDS Injury Assessment  Goal: Absence of physical injury  Outcome: Progressing

## 2024-02-01 NOTE — CONSULTS
[facility] EP/ ARRHYTHMIA/ CARDIOLOGY CONSULT      EP Cardiology consult requested by Madelaine Chapa MD for atrial flutter  PCP:  Soumya Dye MD    Primary cardiologist: Dr Ambrose     Electrophysiology Service  2/1/2024     Assessment:   Atrial flutter with rapid ventricular response   Currently in sinus rhythm  On heparin drip.  History of atrial tachycardia  On aspirin and plavix  Hyperlipidemia  On pravastatin  4.   Hypertension   Plan:   We discussed at length treatment for atrial flutter which is with CTI ablation  I offered to bring patient back to do this as outpatient but  would really feel more comfortable if we did this during this admission. He is concerned about her falls  I will see if I can arrange that for tomorrow  Risks benefits and alternatives to cavotricuspid isthmus ablation discussed with patient at length  5.   Will need OAC indefinitely CHADS-VASC is 4     []    High complexity decision making was performed  []    Patient is at high-risk of decompensation with multiple organ involvement       HPI:  Audrey Almanzar is a 83 y.o. female with history of atrial tachycardia not previously anticoagulated who presented tot [facility] after falling in the garage after going to dentist office. She was brought in the ED and was found to be hypoxemic and in atrial flutter with rapid ventricular response. She has since converted to sinus rhythm. She does have a history of palpitations in the past and was apparently diagnosed to have atrial tachycardia and was placed on aspirin and plavix rather than OAC. She was in typical flutter with 2:1 conduction here.       Allergies   Allergen Reactions    Iodine Anaphylaxis    Amoxicillin Other (See Comments)     \"It makes my heart race\"    Oxycodone Nausea Only    Potassium Iodide     Sodium Iodide     Lidocaine Rash     Get a rash from the patches    Nitrofurantoin Rash     Past Medical History:   Diagnosis Date    Acid reflux   -- -- -- --   01/31/24 2100 (!) 161/89 -- -- (!) 106 19 96 % -- --   01/31/24 2000 (!) 177/92 -- -- (!) 109 23 91 % -- --   01/31/24 1930 (!) 151/87 -- -- (!) 111 23 91 % -- --   01/31/24 1900 133/79 -- -- (!) 102 22 95 % -- --   01/31/24 1845 135/80 -- -- (!) 104 23 94 % -- --   01/31/24 1800 (!) 174/105 -- -- (!) 150 22 (!) 88 % -- --   01/31/24 1715 (!) 166/99 -- -- (!) 106 15 98 % -- --   01/31/24 1700 (!) 160/96 -- -- (!) 102 20 98 % -- --   01/31/24 1609 (!) 171/95 -- -- (!) 109 -- 95 % -- --   01/31/24 1510 -- -- -- -- -- 90 % -- --   01/31/24 1509 (!) 151/92 97.9 °F (36.6 °C) Oral (!) 110 18 (!) 89 % 1.676 m (5' 6\") 73 kg (161 lb)     Wt Readings from Last 4 Encounters:   01/31/24 73 kg (161 lb)   10/13/23 72.6 kg (160 lb)   07/12/23 73 kg (161 lb)   07/04/23 72.6 kg (160 lb)          Studies:     Labs:  Recent Labs     01/31/24  1630 02/01/24  0353   BUN 17 14   CREATININE 0.78 0.62   CALCIUM 8.9 9.0   GLUCOSE 125* 121*   INR 1.0  --      Recent Labs     01/31/24  1630 02/01/24  0353   WBC 15.9* 12.8*   RBC 5.11 4.82   HGB 15.1 14.1   HCT 46.6 43.6   MCV 91.2 90.5   MCH 29.5 29.3   MCHC 32.4 32.3    163   MPV 11.8 12.3     INR   Date/Time Value Ref Range Status   01/31/2024 04:30 PM 1.0 0.9 - 1.1   Final     Comment:     A single therapeutic range for Vit K antagonists may not be optimal for all indications - see June, 2008 issue of Chest, American College [employer] Evidence-Based Clinical Practice Guidelines, 8th Edition.   03/29/2022 09:30 AM 1.0 0.9 - 1.1   Final     Comment:     A single therapeutic range for Vit K antagonists may not be optimal for all indications - see June, 2008 issue of Chest, American [employer] Evidence-Based Clinical Practice Guidelines, 8th Edition.   07/25/2019 08:27 AM 1.0 0.9 - 1.1   Final     Comment:     A single therapeutic range for Vit K antagonists may not be optimal for all indications - see June, 2008 issue of Chest, [employer]

## 2024-02-01 NOTE — PROGRESS NOTES
0900: Cardiology consult called to [facility]    1100: Dr Addison at bedside discussing options with patient and family.    1230: Patient's cognition questionable. Although she is able to answer orientation questions, she requires extra time and prompts. Will not consent patient to cardioversion at this time as she is unable to explain procedure.      1700: Patient's  at bedside is agreeable to cardiovertion. Signed consent on chart.

## 2024-02-01 NOTE — PROGRESS NOTES
Hospitalist Progress Note    NAME:   Audrey Almanzar   : 1940   MRN: [id number]     Date/Time: 2024 3:18 PM  Patient PCP: Soumya Dye MD    Estimated discharge date: 2/3  Barriers: Clinical improvement      Assessment / Plan:  Atrial flutter with rapid ventricular response  Suspected pulmonary embolism  Elevated D-dimer    VQ scan showed low probability for PE  Doppler negative  Stop heparin drip, started prophylactic Lovenox starting tomorrow  Trial PTA metoprolol-if she remains persistently in RVR can initiate diltiazem drip  -Currently in sinus tach with rates around 100-110  Cardiology following, plan for AV dyan ablation  -Note patient has been seen by Dr. 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 although A-fib/flutter was not fully excluded subsequently on DAPT rather than anticoagulation     Leukocytosis-suspect reactive  No evidence of infection on initial workup and patient without complaints of infectious symptoms  Trend CBC     Acute hypoxemic respiratory failure-resolved  Initially requiring 2 L to maintain saturations in the 90s-has improved following pain control, suspect secondary to hypoventilatory effort with transient atelectasis, will continue to monitor     Mechanical fall  Musculoskeletal back pain  Tylenol and Toradol available as needed  Recommend nursing ambulate patient prior to discharge-order placed, if concerns voiced will consult PT/OT although this sounds to have been purely mechanical in nature  lumbar spine radiographs: Shows no acute pathology     Essential hypertension  Hyperlipidemia  Continue PTA aspirin, Plavix, pravastatin, metoprolol     GERD  Formulary substitution Protonix     Medical Decision Making:   I personally reviewed labs: Yes, as listed below  I personally reviewed imaging: CT chest without contrast, CXR  Toxic drug monitoring: Heparin  Discussed case with: RN,         Code Status: Full  DVT Prophylaxis:

## 2024-02-01 NOTE — ED NOTES
Report given to LEFTY Odell. Nurse was informed of reason for arrival, vitals, labs, medications, orders, procedures, results, anything left pending and current plan of action. Questions were asked and received prior to departure from the patient.

## 2024-02-01 NOTE — H&P
Hospitalist Admission Note    NAME:  Audrey Almanzar   :  1940   MRN:  [id number]     Date/Time:  2024 7:41 PM    Patient PCP: Soumya Dye MD    ______________________________________________________________________  Given the patient's current clinical presentation, I have a high level of concern for decompensation if discharged from the emergency department.  Complex decision making was performed, which includes reviewing the patient's available past medical records, laboratory results, and x-ray films.       My assessment of this patient's clinical condition and my plan of care is as follows.    Assessment / Plan:    Active Problems:  Atrial flutter with rapid ventricular response  Concern for pulmonary embolism  Elevated D-dimer  Leukocytosis-suspect reactive  Acute hypoxemic respiratory failure-resolved  Mechanical fall  Musculoskeletal back pain  Essential hypertension  Hyperlipidemia  GERD    Plan:  Atrial flutter with rapid ventricular response  Concern for pulmonary embolism  Elevated D-dimer  Admit to stepdown unit  Start DVT/PE heparin drip  VQ scan in the morning  Obtain bilateral lower extremity venous duplex to further risk stratify  Trial PTA metoprolol-if she remains persistently in RVR can initiate diltiazem drip  -Currently in sinus tach with rates around 100-110  Consult cardiology  -Note patient has been seen by Dr. 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 Authorizing Provider   pravastatin (PRAVACHOL) 80 MG tablet Take 1 tablet by mouth once daily 24   Soumya Dye MD   clopidogrel (PLAVIX) 75 MG tablet Take 1 tablet by mouth daily 23   Soumya Dye MD   metoprolol tartrate (LOPRESSOR) 25 MG tablet Take 1 tablet by mouth 2 times daily 23   Soumya Dye MD   aspirin 81 MG chewable tablet Take 1 tablet by mouth daily 19   Automatic Reconciliation, Ar   vitamin D (CHOLECALCIFEROL) 25 MCG (1000 UT) TABS tablet Take by mouth daily    Automatic Reconciliation, Ar   esomeprazole (NEXIUM) 40 MG delayed release capsule Take 1 capsule by mouth daily    Automatic Reconciliation, Ar   vitamin E 45 MG (100 UNIT) capsule Take 1 capsule by mouth daily    Automatic Reconciliation, Ar         Objective:   VITALS:    Patient Vitals for the past 24 hrs:   BP Temp Temp src Pulse Resp SpO2 Height Weight   24 1930 (!) 151/87 -- -- (!) 111 23 91 % -- --   24 1900 133/79 -- -- (!) 102 22 95 % -- --   24 1845 135/80 -- -- (!) 104 23 94 % -- --   24 1800 (!) 174/105 -- -- (!) 150 22 (!) 88 % -- --   24 1715 (!) 166/99 -- -- (!) 106 15 98 % -- --   24 1700 (!) 160/96 -- -- (!) 102 20 98 % -- --   24 1609 (!) 171/95 -- -- (!) 109 -- 95 % -- --   24 1510 -- -- -- -- -- 90 % -- --   24 1509 (!) 151/92 97.9 °F (36.6 °C) Oral (!) 110 18 (!) 89 % 1.676 m (5' 6\") 73 kg (161 lb)       Temp (24hrs), Av.9 °F (36.6 °C), Min:97.9 °F (36.6 °C), Max:97.9 °F (36.6 °C)           Wt Readings from Last 12 Encounters:   24 73 kg (161 lb)   10/13/23 72.6 kg (160 lb)   23 73 kg (161 lb)   23 72.6 kg (160 lb)   23 72.6 kg (160 lb)   23 67.6 kg (149 lb)   05/15/23 68 kg (150 lb)   22 71.7 kg (158 lb)   22 71.7 kg (158 lb)   22 71.8 kg (158 lb 6.4 oz)         PHYSICAL EXAM:  General:

## 2024-02-02 ENCOUNTER — ANESTHESIA EVENT (OUTPATIENT)
Facility: HOSPITAL | Age: 84
DRG: 273 | End: 2024-02-02
Payer: MEDICARE

## 2024-02-02 ENCOUNTER — ANESTHESIA (OUTPATIENT)
Facility: HOSPITAL | Age: 84
DRG: 273 | End: 2024-02-02
Payer: MEDICARE

## 2024-02-02 LAB
ECHO BSA: 1.84 M2
EKG ATRIAL RATE: 107 BPM
EKG DIAGNOSIS: NORMAL
EKG P AXIS: 39 DEGREES
EKG P-R INTERVAL: 184 MS
EKG Q-T INTERVAL: 328 MS
EKG QRS DURATION: 78 MS
EKG QTC CALCULATION (BAZETT): 437 MS
EKG R AXIS: -34 DEGREES
EKG T AXIS: 59 DEGREES
EKG VENTRICULAR RATE: 107 BPM

## 2024-02-02 PROCEDURE — 99153 MOD SED SAME PHYS/QHP EA: CPT | Performed by: INTERNAL MEDICINE

## 2024-02-02 PROCEDURE — 2580000003 HC RX 258: Performed by: INTERNAL MEDICINE

## 2024-02-02 PROCEDURE — C1766 INTRO/SHEATH,STRBLE,NON-PEEL: HCPCS | Performed by: INTERNAL MEDICINE

## 2024-02-02 PROCEDURE — C1730 CATH, EP, 19 OR FEW ELECT: HCPCS | Performed by: INTERNAL MEDICINE

## 2024-02-02 PROCEDURE — 6370000000 HC RX 637 (ALT 250 FOR IP): Performed by: STUDENT IN AN ORGANIZED HEALTH CARE EDUCATION/TRAINING PROGRAM

## 2024-02-02 PROCEDURE — 2720000010 HC SURG SUPPLY STERILE: Performed by: INTERNAL MEDICINE

## 2024-02-02 PROCEDURE — 2709999900 HC NON-CHARGEABLE SUPPLY: Performed by: INTERNAL MEDICINE

## 2024-02-02 PROCEDURE — 6360000002 HC RX W HCPCS: Performed by: INTERNAL MEDICINE

## 2024-02-02 PROCEDURE — C1894 INTRO/SHEATH, NON-LASER: HCPCS | Performed by: INTERNAL MEDICINE

## 2024-02-02 PROCEDURE — 6370000000 HC RX 637 (ALT 250 FOR IP): Performed by: INTERNAL MEDICINE

## 2024-02-02 PROCEDURE — 4A023FZ MEASUREMENT OF CARDIAC RHYTHM, PERCUTANEOUS APPROACH: ICD-10-PCS | Performed by: INTERNAL MEDICINE

## 2024-02-02 PROCEDURE — C1732 CATH, EP, DIAG/ABL, 3D/VECT: HCPCS | Performed by: INTERNAL MEDICINE

## 2024-02-02 PROCEDURE — 76937 US GUIDE VASCULAR ACCESS: CPT | Performed by: INTERNAL MEDICINE

## 2024-02-02 PROCEDURE — C1759 CATH, INTRA ECHOCARDIOGRAPHY: HCPCS | Performed by: INTERNAL MEDICINE

## 2024-02-02 PROCEDURE — 99152 MOD SED SAME PHYS/QHP 5/>YRS: CPT | Performed by: INTERNAL MEDICINE

## 2024-02-02 PROCEDURE — 02583ZZ DESTRUCTION OF CONDUCTION MECHANISM, PERCUTANEOUS APPROACH: ICD-10-PCS | Performed by: INTERNAL MEDICINE

## 2024-02-02 PROCEDURE — 02K83ZZ MAP CONDUCTION MECHANISM, PERCUTANEOUS APPROACH: ICD-10-PCS | Performed by: INTERNAL MEDICINE

## 2024-02-02 PROCEDURE — 2060000000 HC ICU INTERMEDIATE R&B

## 2024-02-02 PROCEDURE — 93653 COMPRE EP EVAL TX SVT: CPT | Performed by: INTERNAL MEDICINE

## 2024-02-02 PROCEDURE — 2500000003 HC RX 250 WO HCPCS: Performed by: INTERNAL MEDICINE

## 2024-02-02 RX ORDER — FENTANYL CITRATE 50 UG/ML
INJECTION, SOLUTION INTRAMUSCULAR; INTRAVENOUS PRN
Status: DISCONTINUED | OUTPATIENT
Start: 2024-02-02 | End: 2024-02-02 | Stop reason: HOSPADM

## 2024-02-02 RX ORDER — ACETAMINOPHEN 325 MG/1
650 TABLET ORAL EVERY 4 HOURS PRN
Status: DISCONTINUED | OUTPATIENT
Start: 2024-02-02 | End: 2024-02-03 | Stop reason: HOSPADM

## 2024-02-02 RX ORDER — BUPIVACAINE HYDROCHLORIDE 5 MG/ML
INJECTION, SOLUTION EPIDURAL; INTRACAUDAL PRN
Status: DISCONTINUED | OUTPATIENT
Start: 2024-02-02 | End: 2024-02-02 | Stop reason: HOSPADM

## 2024-02-02 RX ORDER — SODIUM CHLORIDE 9 MG/ML
INJECTION, SOLUTION INTRAVENOUS PRN
Status: DISCONTINUED | OUTPATIENT
Start: 2024-02-02 | End: 2024-02-03 | Stop reason: HOSPADM

## 2024-02-02 RX ORDER — MIDAZOLAM HYDROCHLORIDE 1 MG/ML
INJECTION INTRAMUSCULAR; INTRAVENOUS PRN
Status: DISCONTINUED | OUTPATIENT
Start: 2024-02-02 | End: 2024-02-02 | Stop reason: HOSPADM

## 2024-02-02 RX ORDER — SODIUM CHLORIDE 0.9 % (FLUSH) 0.9 %
5-40 SYRINGE (ML) INJECTION EVERY 12 HOURS SCHEDULED
Status: DISCONTINUED | OUTPATIENT
Start: 2024-02-02 | End: 2024-02-03 | Stop reason: HOSPADM

## 2024-02-02 RX ORDER — HEPARIN SODIUM 10000 [USP'U]/ML
INJECTION, SOLUTION INTRAVENOUS; SUBCUTANEOUS PRN
Status: DISCONTINUED | OUTPATIENT
Start: 2024-02-02 | End: 2024-02-02 | Stop reason: HOSPADM

## 2024-02-02 RX ORDER — 0.9 % SODIUM CHLORIDE 0.9 %
INTRAVENOUS SOLUTION INTRAVENOUS CONTINUOUS PRN
Status: COMPLETED | OUTPATIENT
Start: 2024-02-02 | End: 2024-02-02

## 2024-02-02 RX ORDER — SODIUM CHLORIDE 0.9 % (FLUSH) 0.9 %
5-40 SYRINGE (ML) INJECTION PRN
Status: DISCONTINUED | OUTPATIENT
Start: 2024-02-02 | End: 2024-02-03 | Stop reason: HOSPADM

## 2024-02-02 RX ADMIN — METOPROLOL TARTRATE 25 MG: 25 TABLET, FILM COATED ORAL at 09:02

## 2024-02-02 RX ADMIN — METOPROLOL TARTRATE 25 MG: 25 TABLET, FILM COATED ORAL at 21:32

## 2024-02-02 RX ADMIN — SODIUM CHLORIDE, PRESERVATIVE FREE 10 ML: 5 INJECTION INTRAVENOUS at 21:34

## 2024-02-02 RX ADMIN — PRAVASTATIN SODIUM 80 MG: 40 TABLET ORAL at 09:02

## 2024-02-02 RX ADMIN — APIXABAN 5 MG: 2.5 TABLET, FILM COATED ORAL at 21:31

## 2024-02-02 NOTE — ANESTHESIA PRE PROCEDURE
Department of Anesthesiology  Preprocedure Note       Name:  Audrey Almanzar   Age:  83 y.o.  :  1940                                          MRN:  [id number]         Date:  2024      Surgeon: Surgeon(s):  Daniel Addison MD    Procedure: Procedure(s):  Ablation A-flutter    Medications prior to admission:   Prior to Admission medications    Medication Sig Start Date End Date Taking? Authorizing Provider   pravastatin (PRAVACHOL) 80 MG tablet Take 1 tablet by mouth once daily 24   Suomya Dye MD   clopidogrel (PLAVIX) 75 MG tablet Take 1 tablet by mouth daily 23   Soumya Dye MD   metoprolol tartrate (LOPRESSOR) 25 MG tablet Take 1 tablet by mouth 2 times daily 23   Soumya Dye MD   aspirin 81 MG chewable tablet Take 1 tablet by mouth daily 19   Automatic Reconciliation, Ar   vitamin D (CHOLECALCIFEROL) 25 MCG (1000 UT) TABS tablet Take by mouth daily    Automatic Reconciliation, Ar   esomeprazole (NEXIUM) 40 MG delayed release capsule Take 1 capsule by mouth daily    Automatic Reconciliation, Ar   vitamin E 45 MG (100 UNIT) capsule Take 1 capsule by mouth daily    Automatic Reconciliation, Ar       Current medications:    Current Facility-Administered Medications   Medication Dose Route Frequency Provider Last Rate Last Admin   • enoxaparin (LOVENOX) injection 40 mg  40 mg SubCUTAneous Daily Madelaine Chapa MD       • pantoprazole (PROTONIX) tablet 40 mg  [facility]   40 mg at 24 0736   • metoprolol tartrate (LOPRESSOR) tablet 25 mg  25 mg Oral BID Jakub Agarwal, DO   25 mg at 24   • pravastatin (PRAVACHOL) tablet 80 mg  80 mg Oral Daily Jakub Agarwal, DO   80 mg at 24   • sodium chloride flush 0.9 % injection 5-40 mL  5-40 mL IntraVENous 2 times per day Jakub Agarwal, DO   10 mL at 24   • sodium chloride flush 0.9 % injection 5-40 mL  5-40 mL IntraVENous PRN

## 2024-02-02 NOTE — PROGRESS NOTES
Hospitalist Progress Note    NAME:   Audrey Almanzar   : 1940   MRN: [id number]     Date/Time: 2024 2:49 PM  Patient PCP: Soumya Dye MD    Estimated discharge date: 2/3  Barriers: Clinical improvement      Assessment / Plan:  Atrial flutter with rapid ventricular response  Pulmonary embolism ruled out  Elevated D-dimer    VQ scan showed low probability for PE  Doppler negative  Heparin drip stopped as PE ruled out  Trial PTA metoprolol-if she remains persistently in RVR can initiate diltiazem drip  Currently in sinus tach with rates around 100-110  Cardiology following, plan for AV dyan ablation today  Note patient has been seen by Dr. 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 although A-fib/flutter was not fully excluded subsequently on DAPT rather than anticoagulation     Leukocytosis-suspect reactive  No evidence of infection on initial workup and patient without complaints of infectious symptoms  Trend CBC     Acute hypoxemic respiratory failure-resolved  On room air today     Mechanical fall  Musculoskeletal back pain  Tylenol and Toradol available as needed  Recommend nursing ambulate patient prior to discharge-order placed, if concerns voiced will consult PT/OT although this sounds to have been purely mechanical in nature  lumbar spine radiographs: Shows no acute pathology     Essential hypertension  Hyperlipidemia  Continue PTA aspirin, Plavix, pravastatin, metoprolol     GERD  Formulary substitution Protonix     Medical Decision Making:   I personally reviewed labs: Yes, as listed below  I personally reviewed imaging: CT chest without contrast, CXR  Toxic drug monitoring: Heparin  Discussed case with: RN,         Code Status: Full  DVT Prophylaxis: Heparin  GI Prophylaxis: Protonix  Baseline: Independent      Subjective:     Chief Complaint / Reason for Physician Visit  Seen and evaluated at the bedside for A-fib  On room air today      Objective:  01/31/24  1630 02/01/24  0353    143   K 3.6 3.7   * 113*   CO2 28 27   GLUCOSE 125* 121*   BUN 17 14   CREATININE 0.78 0.62   CALCIUM 8.9 9.0   LABALBU 3.7  --    BILITOT 0.5  --    AST 18  --    ALT 25  --    INR 1.0  --        Signed: Madelaine Chapa MD

## 2024-02-02 NOTE — PROGRESS NOTES
[facility] EP/ ARRHYTHMIA/ CARDIOLOGY CONSULT      EP Cardiology consult requested by Madelaine Chapa MD for atrial flutter  PCP:  Soumya Dye MD    Primary cardiologist: Dr Ambrose     Electrophysiology Service  2/2/2024     Assessment:   Atrial flutter with rapid ventricular response   Currently in sinus rhythm  On heparin drip.  History of atrial tachycardia  On aspirin and plavix  Hyperlipidemia  On pravastatin  4.   Hypertension   Plan:   We discussed at length treatment for atrial flutter which is with CTI ablation  I offered to bring patient back to do this as outpatient but  would really feel more comfortable if we did this during this admission. He is concerned about her falls  I will see if I can arrange that for tomorrow  Risks benefits and alternatives to cavotricuspid isthmus ablation discussed with patient at length  5.   Will need OAC indefinitely CHADS-VASC is 4     2/2/24 Plan for CTI ablation today. Keep NPO    []    High complexity decision making was performed  []    Patient is at high-risk of decompensation with multiple organ involvement       HPI:  Audrey Almanzar is a 83 y.o. female with history of atrial tachycardia not previously anticoagulated who presented tot [facility] after falling in the garage after going to dentist office. She was brought in the ED and was found to be hypoxemic and in atrial flutter with rapid ventricular response. She has since converted to sinus rhythm. She does have a history of palpitations in the past and was apparently diagnosed to have atrial tachycardia and was placed on aspirin and plavix rather than OAC. She was in typical flutter with 2:1 conduction here.       Allergies   Allergen Reactions    Iodine Anaphylaxis    Amoxicillin Other (See Comments)     \"It makes my heart race\"    Oxycodone Nausea Only    Potassium Iodide     Sodium Iodide     Lidocaine Rash     Get a rash from the patches    Nitrofurantoin Rash     Past

## 2024-02-03 VITALS
BODY MASS INDEX: 26.36 KG/M2 | SYSTOLIC BLOOD PRESSURE: 132 MMHG | WEIGHT: 164.02 LBS | DIASTOLIC BLOOD PRESSURE: 79 MMHG | TEMPERATURE: 97.6 F | HEIGHT: 66 IN | HEART RATE: 84 BPM | RESPIRATION RATE: 22 BRPM | OXYGEN SATURATION: 93 %

## 2024-02-03 LAB
EKG ATRIAL RATE: 99 BPM
EKG DIAGNOSIS: NORMAL
EKG P AXIS: 15 DEGREES
EKG P-R INTERVAL: 124 MS
EKG Q-T INTERVAL: 344 MS
EKG QRS DURATION: 72 MS
EKG QTC CALCULATION (BAZETT): 441 MS
EKG R AXIS: -39 DEGREES
EKG T AXIS: -47 DEGREES
EKG VENTRICULAR RATE: 99 BPM
HCT VFR BLD AUTO: 40.2 % (ref 35–47)
HGB BLD-MCNC: 13 G/DL (ref 11.5–16)
UFH PPP CHRO-ACNC: 0.81 IU/ML

## 2024-02-03 PROCEDURE — 85018 HEMOGLOBIN: CPT

## 2024-02-03 PROCEDURE — 97530 THERAPEUTIC ACTIVITIES: CPT

## 2024-02-03 PROCEDURE — 85014 HEMATOCRIT: CPT

## 2024-02-03 PROCEDURE — 2580000003 HC RX 258: Performed by: INTERNAL MEDICINE

## 2024-02-03 PROCEDURE — 85520 HEPARIN ASSAY: CPT

## 2024-02-03 PROCEDURE — 97165 OT EVAL LOW COMPLEX 30 MIN: CPT

## 2024-02-03 PROCEDURE — 97161 PT EVAL LOW COMPLEX 20 MIN: CPT | Performed by: PHYSICAL THERAPIST

## 2024-02-03 PROCEDURE — 6360000002 HC RX W HCPCS: Performed by: STUDENT IN AN ORGANIZED HEALTH CARE EDUCATION/TRAINING PROGRAM

## 2024-02-03 PROCEDURE — 6370000000 HC RX 637 (ALT 250 FOR IP): Performed by: INTERNAL MEDICINE

## 2024-02-03 PROCEDURE — 97116 GAIT TRAINING THERAPY: CPT | Performed by: PHYSICAL THERAPIST

## 2024-02-03 PROCEDURE — 36415 COLL VENOUS BLD VENIPUNCTURE: CPT

## 2024-02-03 PROCEDURE — 2580000003 HC RX 258: Performed by: STUDENT IN AN ORGANIZED HEALTH CARE EDUCATION/TRAINING PROGRAM

## 2024-02-03 PROCEDURE — 6370000000 HC RX 637 (ALT 250 FOR IP): Performed by: STUDENT IN AN ORGANIZED HEALTH CARE EDUCATION/TRAINING PROGRAM

## 2024-02-03 PROCEDURE — 97530 THERAPEUTIC ACTIVITIES: CPT | Performed by: PHYSICAL THERAPIST

## 2024-02-03 PROCEDURE — 97535 SELF CARE MNGMENT TRAINING: CPT

## 2024-02-03 RX ADMIN — SODIUM CHLORIDE, PRESERVATIVE FREE 10 ML: 5 INJECTION INTRAVENOUS at 08:53

## 2024-02-03 RX ADMIN — KETOROLAC TROMETHAMINE 15 MG: 30 INJECTION, SOLUTION INTRAMUSCULAR; INTRAVENOUS at 14:17

## 2024-02-03 RX ADMIN — PRAVASTATIN SODIUM 80 MG: 40 TABLET ORAL at 08:52

## 2024-02-03 RX ADMIN — APIXABAN 5 MG: 2.5 TABLET, FILM COATED ORAL at 08:52

## 2024-02-03 RX ADMIN — SODIUM CHLORIDE, PRESERVATIVE FREE 10 ML: 5 INJECTION INTRAVENOUS at 08:51

## 2024-02-03 RX ADMIN — METOPROLOL TARTRATE 25 MG: 25 TABLET, FILM COATED ORAL at 08:52

## 2024-02-03 RX ADMIN — KETOROLAC TROMETHAMINE 15 MG: 30 INJECTION, SOLUTION INTRAMUSCULAR; INTRAVENOUS at 03:33

## 2024-02-03 RX ADMIN — PANTOPRAZOLE SODIUM 40 MG: 40 TABLET, DELAYED RELEASE ORAL at 06:31

## 2024-02-03 RX ADMIN — SODIUM CHLORIDE, PRESERVATIVE FREE 10 ML: 5 INJECTION INTRAVENOUS at 08:52

## 2024-02-03 ASSESSMENT — PAIN DESCRIPTION - DESCRIPTORS: DESCRIPTORS: ACHING;DISCOMFORT;THROBBING;SHARP

## 2024-02-03 ASSESSMENT — PAIN SCALES - GENERAL
PAINLEVEL_OUTOF10: 6
PAINLEVEL_OUTOF10: 2
PAINLEVEL_OUTOF10: 4
PAINLEVEL_OUTOF10: 0

## 2024-02-03 ASSESSMENT — PAIN DESCRIPTION - LOCATION: LOCATION: BACK

## 2024-02-03 ASSESSMENT — PAIN DESCRIPTION - ORIENTATION: ORIENTATION: LOWER;RIGHT

## 2024-02-03 NOTE — PLAN OF CARE
Problem: Pain  Goal: Verbalizes/displays adequate comfort level or baseline comfort level  Outcome: Progressing  Note: Patient is reporting no pain at this time.  Pain will continue to be assessed and treated as needed.

## 2024-02-03 NOTE — PLAN OF CARE
Problem: Discharge Planning  Goal: Discharge to home or other facility with appropriate resources  Outcome: Adequate for Discharge  Flowsheets (Taken 2/3/2024 0730)  Discharge to home or other facility with appropriate resources:   Identify barriers to discharge with patient and caregiver   Arrange for needed discharge resources and transportation as appropriate   Identify discharge learning needs (meds, wound care, etc)     Problem: Pain  Goal: Verbalizes/displays adequate comfort level or baseline comfort level  Outcome: Adequate for Discharge  Flowsheets (Taken 2/3/2024 0730)  Verbalizes/displays adequate comfort level or baseline comfort level:   Encourage patient to monitor pain and request assistance   Assess pain using appropriate pain scale   Administer analgesics based on type and severity of pain and evaluate response   Implement non-pharmacological measures as appropriate and evaluate response     Problem: Skin/Tissue Integrity  Goal: Absence of new skin breakdown  Description: 1.  Monitor for areas of redness and/or skin breakdown  2.  Assess vascular access sites hourly  3.  Every 4-6 hours minimum:  Change oxygen saturation probe site  4.  Every 4-6 hours:  If on nasal continuous positive airway pressure, respiratory therapy assess nares and determine need for appliance change or resting period.  Outcome: Adequate for Discharge     Problem: Safety - Adult  Goal: Free from fall injury  Outcome: Adequate for Discharge  Flowsheets (Taken 2/3/2024 0730)  Free From Fall Injury: Instruct family/caregiver on patient safety     Problem: ABCDS Injury Assessment  Goal: Absence of physical injury  Outcome: Adequate for Discharge     Problem: Chronic Conditions and Co-morbidities  Goal: Patient's chronic conditions and co-morbidity symptoms are monitored and maintained or improved  Outcome: Adequate for Discharge  Flowsheets (Taken 2/3/2024 0730)  Care Plan - Patient's Chronic Conditions and Co-Morbidity  Symptoms are Monitored and Maintained or Improved:   Monitor and assess patient's chronic conditions and comorbid symptoms for stability, deterioration, or improvement   Collaborate with multidisciplinary team to address chronic and comorbid conditions and prevent exacerbation or deterioration   Update acute care plan with appropriate goals if chronic or comorbid symptoms are exacerbated and prevent overall improvement and discharge

## 2024-02-03 NOTE — PROGRESS NOTES
OCCUPATIONAL THERAPY EVALUATION/DISCHARGE  Patient: Audrey Almanzar (83 y.o. female)  Date: 2/3/2024  Primary Diagnosis: Atrial flutter with rapid ventricular response (HCC) [I48.92]  Acute respiratory failure with hypoxia (HCC) [J96.01]  D-dimer, elevated [R79.89]  Procedure(s) (LRB):  Ablation A-flutter (N/A)  Ultrasound guided vascular access (N/A) 1 Day Post-Op     Precautions: Fall Risk                  ASSESSMENT :  Based on the objective data below, the patient was supine in bed, soiled with urine, family in room, and stating that she did not have pain when laying in bed.  She was mod of 1 for supine to sit and sitting balance was fair to good.  She was CGA to min for scooting, and needed more assist for scooting left hip due to old CVA.  She was min of 2 for standing, with max VC to stand upright .  Pt was able to stand and clean kishore area and buttocks with min of 1 for standing. Noted that pt has a large purple bruise on right buttock cheek and showed this to .  He stated this is probably what she hurt when she fell.  She was mod for donning pull over shirt, max for donning pants over feet, and pulling pants up.  She  was min to mod of 1 for transfer to chair, needed mod for wt shifting to take a few steps to get to chair.  Per  pt has a caregiver that comes to home Monday, and Thursday for 2 hours to give pt a shower and get her dressed.   reports that he helps pt bathe and dress when caregiver is not there.   stated that he has steps to get in the home, and plan to use the wheelchair and bump pt up the steps to get inside. Recommend that pt have HHOT and 24/7 assist from family     Functional Outcome Measure:  The patient scored 35/100 on the Barthel outcome measure which is indicative of max for ADLs .      Further skilled acute occupational therapy is not indicated at this time.     PLAN :  Recommend with staff: OOB for meals    Recommendation for discharge: (in order for  and appears to have decreased motor planning)  Education Outcome: Continued education needed    Thank you for this referral.  Alka Reyes OT  Minutes: 41    Occupational Therapy Evaluation Charge Determination   History Examination Decision-Making   MEDIUM Complexity : Expanded review of history including physical, cognitive and psychocial history  MEDIUM Complexity: 3-5 Performance deficits relating to physical, cognitive, or psychosocial skills that result in activity limitations and/or participation restrictions MEDIUM Complexity: Patient may present with comorbidities that affect occupational performance. Minimal to moderate modifications of tasks or assist (eg. physical or verbal) with assist is necessary to enable pt to complete eval   Based on the above components, the patient evaluation is determined to be of the following complexity level: Medium

## 2024-02-03 NOTE — DISCHARGE SUMMARY
Discharge Summary    Name: Audrey Almanzar  [id number]  YOB: 1940 (Age: 83 y.o.)   Date of Admission: 1/31/2024  Date of Discharge: 2/3/2024  Attending Physician: Madelaine Chapa MD    Discharge Diagnosis:   Atrial flutter with rapid ventricular response  Elevated D-dimer  Leukocytosis-suspect reactive  Acute hypoxemic respiratory failure-resolved  Mechanical fall  Musculoskeletal back pain  Essential hypertension  Hyperlipidemia  GERD    Consultations:  IP CONSULT TO CARDIOLOGY  IP CONSULT TO CASE MANAGEMENT      Brief Admission History/Reason for Admission Per Jakub Agarwal, DO:   Audrey Almanzar is a 83 y.o.  female with PMHx as listed below presenting to the emergency department after mechanical fall that resulted when walking to the garage from her dentist one of the railings came loose and she subsequently fell striking her back.  Denies striking her head, loss of consciousness, or other associated injury.  Does continue to complain of diffuse back pain extending from lumbar spine to mid back.  While in the ED, found to be hypoxemic requiring 2 L to maintain saturations in low 80s which has improved with pain control-initially presented with tachypnea and hypoventilatory effort secondary to pain.  Additionally, patient found to be tachycardic with heart rates to 150s (ECG concerning for atrial flutter with variable block) that she has intermittently, in and out of alternating with sinus tachycardia.  On chart review, has been evaluated by Dr. Vila in the past felt to have atrial tachycardia versus A-fib/flutter ultimately erring towards atrial tachycardia at that time and electing for DAPT rather than full anticoagulation.  ROS otherwise negative.  Denies tobacco, alcohol, illicit drugs.     In the ED, patient afebrile tachycardic to 150s-now predominately 100s-110s (sinus tach on telemetry), normotensive, saturating mid 90s on room air.  ECG

## 2024-02-03 NOTE — CARE COORDINATION
Care Management Initial Assessment     RUR: 13% - Low  Readmission? No  1st IM letter given? Yes   1st  letter given: No    82 YO White Female admitted on 1/31/24 for Atrial Flutter with rapid ventricular response. Lives in 1-story house ([address]) w/ spouse in [place], [place]. Currently has Private Duty Aide on Tuesdays & Thursdays for 2 hrs/each to help with bathing (not from agency, paid independently). Ambulates with RW, but recently needing more support with IADL's, bathing, dressing. Hx of  ([facility]), SNF ([place]). DME includes: w/c, RW, bed-rail, grab bars. Has [employer] [facility] and [facility] supplement. Preferred Rx is [facility] ([place])    D/c orders placed. PT/OT at bedside evaluating - SNF rehab would be appropriate, but they are requesting d/c home with HH PT/OT/SN and support from Aide & family. Education provided re: Medicare vs. Medicaid benefits including payor sources for in-home personal care. No LTC insurance. Discussed reaching out to current Aide to request additional help (paying privately). CM also provided list of other personal care agencies in case she is not available to increase hours. Referral sent to [facility]. Accepted, but they report delay in SOC for SN until 2/9/24. They will plan to speak directly with pt/spouse on if they want to proceed with just PT/OT or okay with delay in SOC until 2/9/24. Family transported home this afternoon, requesting to leave quickly. Patient verbalized understanding and gave permission for possible discharge within 4 hours of receiving IMM. All info entered into AVS.     Pt has no additional CM needs at this time.      02/03/24 0884   Service Assessment   Patient Orientation Alert and Oriented   Cognition Alert   History Provided By Patient;Spouse   Primary Caregiver Spouse   Support Systems Spouse/Significant Other;Children;Home Care Staff  (Spouse, Son, HHA Tuesdays & Thursdays for 2 hrs/each)   Patient's Healthcare  (see comment)  (spouse)   Confirm Follow Up Transport Family   Condition of Participation: Discharge Planning   The Plan for Transition of Care is related to the following treatment goals: [facility] PT/OT/SN, Private Duty Aide 2 days/wk (plans to request increase), Outpatient Follow-up Care Appointments   The Patient and/or Patient Representative was provided with a Choice of Provider? Patient   The Patient and/Or Patient Representative agree with the Discharge Plan? Yes   Freedom of Choice list was provided with basic dialogue that supports the patient's individualized plan of care/goals, treatment preferences, and shares the quality data associated with the providers?  Yes     Jackelyn Galvin LCSW  [facility] Care Manager  [phone]

## 2024-02-03 NOTE — DISCHARGE INSTRUCTIONS
Please stop plavix and continue aspirin. You also need to start taking eliquis, which was started during this admission

## 2024-02-03 NOTE — PROGRESS NOTES
[facility] EP/ ARRHYTHMIA/ CARDIOLOGY PROGRESS NOTE      EP Cardiology consult requested by Madelaine Chapa MD for atrial flutter  PCP:  Soumya Dye MD    Primary cardiologist: Dr Ambrose     Electrophysiology Service  2/3/2024     Assessment:   Atrial flutter with rapid ventricular response   Currently in sinus rhythm  On heparin drip.  History of atrial tachycardia  On aspirin and plavix  Hyperlipidemia  On pravastatin  4.   Hypertension   Plan:   We discussed at length treatment for atrial flutter which is with CTI ablation  I offered to bring patient back to do this as outpatient but  would really feel more comfortable if we did this during this admission. He is concerned about her falls  I will see if I can arrange that for tomorrow  Risks benefits and alternatives to cavotricuspid isthmus ablation discussed with patient at length  5.   Will need OAC indefinitely CHADS-VASC is 4     2/2/24 Plan for CTI ablation today. Keep NPO    2/3/24:  Status post CTI ablation  On Eliquis  Can go home today to follow up with me in 1 month    []    High complexity decision making was performed  []    Patient is at high-risk of decompensation with multiple organ involvement       HPI:  Audrey Almanzar is a 83 y.o. female with history of atrial tachycardia not previously anticoagulated who presented tot  hospital after falling in the garage after going to dentist office. She was brought in the ED and was found to be hypoxemic and in atrial flutter with rapid ventricular response. She has since converted to sinus rhythm. She does have a history of palpitations in the past and was apparently diagnosed to have atrial tachycardia and was placed on aspirin and plavix rather than OAC. She was in typical flutter with 2:1 conduction here.       Allergies   Allergen Reactions    Iodine Anaphylaxis    Amoxicillin Other (See Comments)     \"It makes my heart race\"    Oxycodone Nausea Only    Potassium  therapeutic range for Vit K antagonists may not be optimal for all indications - see June, 2008 issue of Chest, American College of Chest Physicians Evidence-Based Clinical Practice Guidelines, 8th Edition.   07/25/2019 08:27 AM 1.0 0.9 - 1.1   Final     Comment:     A single therapeutic range for Vit K antagonists may not be optimal for all indications - see June, 2008 issue of Chest, American College of Chest Physicians Evidence-Based Clinical Practice Guidelines, 8th Edition.       Telemetry:   Flutter with 2:1    Echocardiogram:  Left Ventricle Normal left ventricular systolic function with a visually estimated EF of 60 - 65%. Left ventricle size is normal. Normal wall thickness. Normal wall motion. Indeterminate diastolic function.   Left Atrium Left atrium size is normal.   Right Ventricle Right ventricle is dilated. Reduced systolic function.   Right Atrium Right atrium is dilated.   Aortic Valve Valve structure is normal. No regurgitation. No stenosis.   Mitral Valve Valve structure is normal. No regurgitation. No stenosis noted.   Tricuspid Valve Valve structure is normal. No regurgitation. No stenosis noted. The estimated RVSP is 37 mmHg.   Pulmonic Valve The pulmonic valve was not well visualized. Valve structure is normal. No regurgitation. No stenosis noted.   Aorta Normal sized aortic root.   Pericardium No pericardial effusion.                  Current Medications:  Current Facility-Administered Medications   Medication Dose Route Frequency Provider Last Rate Last Admin    apixaban (ELIQUIS) tablet 5 mg  5 mg Oral BID Daniel Addison MD   5 mg at 02/03/24 0852    sodium chloride flush 0.9 % injection 5-40 mL  5-40 mL IntraVENous 2 times per day Daniel Addison MD   10 mL at 02/03/24 0853    sodium chloride flush 0.9 % injection 5-40 mL  5-40 mL IntraVENous PRN Daniel Addison MD   10 mL at 02/03/24 0851    0.9 % sodium chloride infusion   IntraVENous PRN Daniel Addison MD        acetaminophen (TYLENOL)

## 2024-02-03 NOTE — PROGRESS NOTES
PHYSICAL THERAPY EVALUATION/DISCHARGE    Patient: Audrey Almanzar (83 y.o. female)  Date: 2/3/2024  Primary Diagnosis: Atrial flutter with rapid ventricular response (HCC) [I48.92]  Acute respiratory failure with hypoxia (HCC) [J96.01]  D-dimer, elevated [R79.89]  Procedure(s) (LRB):  Ablation A-flutter (N/A)  Ultrasound guided vascular access (N/A) 1 Day Post-Op   Precautions: Fall Risk                      ASSESSMENT AND RECOMMENDATIONS:  Based on the objective data below, the patient has increased back pain as a result of fall 1/27, as well as decreased strength, balance, activity tolerance, and functional independence.  has been taking care of patient in the home following a CVA 4 years ago, and he is experienced with helping her with all ADLs, transfers, and gait in the home. They have a paid caregiver 2 days/week for 4 hours. Patient would usually be assigned a PT Care Plan and followed while acute care, but  is adamant on taking patient home today. SNF Rehab would also be appropriate for patient, but  wants Home Health PT/OT and  is aware.  is present throughout the evaluation, and son is present at start/end: they plan to carry patient up the stairs to the house in her wheelchair.    Functional Outcome Measure:  The patient scored 14/24 on the AM-PAC outcome measure which is indicative of need for follow-up PT services post-discharge.          Further skilled acute physical therapy is not indicated at this time due to patient returning to her home setting today.       PLAN :  Recommendation for discharge: (in order for the patient to meet his/her long term goals): Therapy 2 days/week in the home and also see \"other factors to consider\" below for additional discharge concerns/needs    Other factors to consider for discharge: high risk for falls, not safe to be alone, and concern for safely navigating or managing the home environment    IF patient discharges home will

## 2024-02-05 ENCOUNTER — TELEPHONE (OUTPATIENT)
Age: 84
End: 2024-02-05

## 2024-02-05 NOTE — TELEPHONE ENCOUNTER
Rebecca called in from [facility] asking to confirm that  will sign off on home health PT & OT orders. Please call to discuss with Rebecca # [phone]

## 2024-02-08 ENCOUNTER — APPOINTMENT (OUTPATIENT)
Facility: HOSPITAL | Age: 84
End: 2024-02-08
Payer: MEDICARE

## 2024-02-08 ENCOUNTER — HOSPITAL ENCOUNTER (EMERGENCY)
Facility: HOSPITAL | Age: 84
Discharge: HOME | End: 2024-02-08
Attending: EMERGENCY MEDICINE
Payer: MEDICARE

## 2024-02-08 VITALS
DIASTOLIC BLOOD PRESSURE: 76 MMHG | HEART RATE: 100 BPM | TEMPERATURE: 97.7 F | OXYGEN SATURATION: 94 % | SYSTOLIC BLOOD PRESSURE: 124 MMHG | RESPIRATION RATE: 18 BRPM

## 2024-02-08 DIAGNOSIS — S32.020G CLOSED COMPRESSION FRACTURE OF L2 LUMBAR VERTEBRA WITH DELAYED HEALING, SUBSEQUENT ENCOUNTER: ICD-10-CM

## 2024-02-08 DIAGNOSIS — K59.00 CONSTIPATION, UNSPECIFIED CONSTIPATION TYPE: Primary | ICD-10-CM

## 2024-02-08 PROCEDURE — 6370000000 HC RX 637 (ALT 250 FOR IP): Performed by: EMERGENCY MEDICINE

## 2024-02-08 PROCEDURE — 97162 PT EVAL MOD COMPLEX 30 MIN: CPT

## 2024-02-08 PROCEDURE — 97116 GAIT TRAINING THERAPY: CPT

## 2024-02-08 PROCEDURE — 99284 EMERGENCY DEPT VISIT MOD MDM: CPT

## 2024-02-08 PROCEDURE — 97530 THERAPEUTIC ACTIVITIES: CPT

## 2024-02-08 PROCEDURE — 97166 OT EVAL MOD COMPLEX 45 MIN: CPT | Performed by: OCCUPATIONAL THERAPIST

## 2024-02-08 PROCEDURE — 72131 CT LUMBAR SPINE W/O DYE: CPT

## 2024-02-08 PROCEDURE — 97530 THERAPEUTIC ACTIVITIES: CPT | Performed by: OCCUPATIONAL THERAPIST

## 2024-02-08 PROCEDURE — 74018 RADEX ABDOMEN 1 VIEW: CPT

## 2024-02-08 RX ORDER — LACTULOSE 10 G/15ML
30 SOLUTION ORAL 3 TIMES DAILY PRN
Qty: 237 ML | Refills: 0 | Status: SHIPPED | OUTPATIENT
Start: 2024-02-08 | End: 2024-02-13

## 2024-02-08 RX ORDER — HYDROCODONE BITARTRATE AND ACETAMINOPHEN 5; 325 MG/1; MG/1
1 TABLET ORAL EVERY 6 HOURS PRN
Qty: 15 TABLET | Refills: 0 | Status: SHIPPED | OUTPATIENT
Start: 2024-02-08 | End: 2024-02-13

## 2024-02-08 RX ADMIN — MAGNESIUM CITRATE 296 ML: 1.75 LIQUID ORAL at 11:00

## 2024-02-08 ASSESSMENT — ENCOUNTER SYMPTOMS
ABDOMINAL DISTENTION: 1
CONSTIPATION: 1
ABDOMINAL PAIN: 0

## 2024-02-08 ASSESSMENT — PAIN SCALES - GENERAL: PAINLEVEL_OUTOF10: 5

## 2024-02-08 NOTE — DISCHARGE INSTRUCTIONS
It was a pleasure taking care of you at [facility] Emergency Department today.  We know that when you come to [facility], you are entrusting us with your health, comfort, and safety.  Our physicians and nurses honor that trust, and we truly appreciate the opportunity to care for you and your loved ones.      We also value your feedback.  If you receive a survey about your Emergency Department experience today, please fill it out.  We care about our patients' feedback, and we listen to what you have to say.  Thank you!

## 2024-02-08 NOTE — ED NOTES
Dr. Madrid reviewed discharge instructions with the patient.  The patient verbalized understanding.  All questions and concerns were addressed.  The patient is discharged via wheelchair in the care of family members with instructions and prescriptions in hand.  Pt is alert and oriented x 4.  Respirations are clear and unlabored.

## 2024-02-08 NOTE — CARE COORDINATION
MICHAEL met with pt, pt's spouse, and pt's son, Brendan, at bedside. Discussed therapy evaluations with family and discussed their wishes. MD has ordered CT Scan, which pt was transported off the floor to complete during discussion. If CT shows no changes, pt will be stable for d/c per MD. In discussion with family, they report gaining additional knowledge from PT/OT evaluations this AM, and are leaning towards home with continued home health services through [facility], which have just started. Discussed alternative option of SNF, which family does not appear to want to explore at this time due to concerns of care at facilities. Pt/family preferred home health services over SNF at previous d/c on 2/3/24 as well.     Pt has very supportive family, son has been helping pt's spouse with providing pt's care at home. Pt does have some hours each week of private duty caregiver support. Will place [employer] on AVS as a resource if the family needs guidance in securing additional care at home. MD updated.      HIRAL Ray.  Care Manager, [facility]  [phone]/Available on Perfect Serve

## 2024-02-08 NOTE — ED PROVIDER NOTES
solution 296 mL (296 mLs Oral Given 2/8/24 1100)     Medical Decision Making // ED Course // Reassessment:  Audrey Almanzar, 83 y.o. female With history of atrial fibrillation, on Eliquis, brought to the emergency department by her  and son for concerns for constipation for the past week, as well as difficulty getting around the house and needing 2 person assistance.  She had a fall 1 week ago and was briefly hospitalized at this hospital.  No significant injuries at that time but she did have some hypoxemic respiratory failure which resolved.  She was seen by PT and OT at that time and the family requested home nursing assistance rather than short-term rehab, but they now feel that that may have been a mistake and the patient requires short-term rehabilitation.  She has been using a walker for the past 2 years.  She has no vomiting.    On examination she appears frail.  Normal vital signs.  She is alert, seems to have some mild memory/cognitive difficulty suggestive of early dementia.  She has scattered bruising over the limbs.  No apparent head injuries.  Abdomen is mildly distended, soft but feels full of stool.    KUB was obtained, no significant abnormality or signs of SBO.  Significant fecal stasis.  She was administered magnesium citrate and a soapsuds enema and had a bowel movement in the ED, felt improved.      CT scan of the lumbar spine was obtained in the setting of her recent trauma CT demonstrates age-indeterminate L2 fracture..    Patient was seen by PT, OT, case management.  She felt significantly improved after narcotic pain medicine and can be safely discharged home, family would prefer to take her home rather than short-term rehab for now.      External Records reviewed: Old Medical Records    I reviewed the patient's discharge summary from 5 days ago February 3, when she was discharged after presenting with a mechanical fall which was the initial cause of her back pain.  There was

## 2024-02-08 NOTE — PLAN OF CARE
Asking the patient, friends/relatives and nurses are the usual sources, but direct observation and common sense are also important. However direct testing is not needed.  5. Usually the patient's performance over the preceding 24-48 hours is important, but occasionally longer periods will be relevant.  6. Middle categories imply that the patient supplies over 50 per cent of the effort.  7. Use of aids to be independent is allowed.    Piedad Ceballos., Barthel, D.W. (1965). Functional evaluation: the Barthel Index. Md State Med J (14)2.  FAWN Patton, ALEJO Stark., MICHAEL Dodd., Angelo, ADINA. (1999). Measuring the change indisability after inpatient rehabilitation; comparison of the responsiveness of the Barthel Index and Functional [place] Measure. Journal of Neurology, Neurosurgery, and Psychiatry, 66(4), 480-484.  JANET Quesada, ABRAM Avitia, & Kevin MLARRY. (2004.) Assessment of post-stroke quality of life in cost-effectiveness studies: The usefulness of the Barthel Index and the EuroQoL-5D. Quality of Life Research, 13, 687-10       Pain Rating:  10/10  back pain with mobility attempts, informed ED physician of this and imaging will be ordered for thoroughness.  No back pain reported at rest     Pain Intervention(s):   ice, repositioning, and physician aware    Activity Tolerance:   Fair , requires rest breaks, and related to pain    After treatment:   Patient left in no apparent distress in bed, Caregiver / family present, and assisted back to stretcher with bilateral rails up as prior to session    COMMUNICATION/EDUCATION:   The patient's plan of care was discussed with: physical therapist, registered nurse, physician, , and patient, her son and her     Patient Education  Education Given To: Patient;Family;Caregiver  Education Provided: Role of Therapy;Plan of Care;ADL Adaptive Strategies;Fall Prevention Strategies;Transfer Training  Education Method: 
mobility, gait belt  Education Method: Demonstration;Verbal;Teach Back  Barriers to Learning: Other (Comment);Cognition (family with good understanding)  Education Outcome: Verbalized understanding;Continued education needed    Thank you for this referral.  Geneva Rangel, PT  Minutes: 34      Physical Therapy Evaluation Charge Determination   History Examination Presentation Decision-Making   HIGH Complexity :3+ comorbidities / personal factors will impact the outcome/ POC  HIGH Complexity : 4+ Standardized tests and measures addressing body structure, function, activity limitation and / or participation in recreation  MEDIUM Complexity : Evolving with changing characteristics  AM-PAC  MEDIUM   Based on the above components, the patient evaluation is determined to be of the following complexity level: Medium   
22-Apr-2022 10:41

## 2024-02-08 NOTE — ED NOTES
Dr. Madrid reviewed discharge instructions with the patient.  The family verbalized understanding.  All questions and concerns were addressed.  The patient is discharged via wheelchair in the care of family members with instructions and prescriptions in hand. Respirations are clear and unlabored.

## 2024-02-12 ENCOUNTER — TELEPHONE (OUTPATIENT)
Age: 84
End: 2024-02-12

## 2024-02-12 NOTE — TELEPHONE ENCOUNTER
Rebecca with [facility] states pt went to ED on Thurs and they did a CT.  Pt has pelvis fracture.  On new medication and it is affecting her cognition.    Please call to advise.

## 2024-02-12 NOTE — TELEPHONE ENCOUNTER
Called and spoke to   Rebecca with [facility]   She states that patient went to the ER for Constipation . It was found that patient had a   Final Diagnosis:   1. Constipation, unspecified constipation type    2. Closed compression fracture of L2 lumbar vertebra with delayed healing, subsequent encounter      They gave the patient Hydrocodone and lactulose. Patient is having a hard time with taking the pain pills. Rebecca feels like the patient should not take them as it is messing with her cognition. Rebecca states the patient does not have pain unless she is siting still. Do you want to move the patients appt up or just have her come in on 2/19? Rebecca thinks we should do a cognitive testing

## 2024-02-19 ENCOUNTER — OFFICE VISIT (OUTPATIENT)
Age: 84
End: 2024-02-19
Payer: MEDICARE

## 2024-02-19 VITALS
TEMPERATURE: 98 F | RESPIRATION RATE: 14 BRPM | HEIGHT: 67 IN | DIASTOLIC BLOOD PRESSURE: 75 MMHG | WEIGHT: 161 LBS | OXYGEN SATURATION: 94 % | BODY MASS INDEX: 25.27 KG/M2 | HEART RATE: 77 BPM | SYSTOLIC BLOOD PRESSURE: 137 MMHG

## 2024-02-19 DIAGNOSIS — Z91.81 AT HIGH RISK FOR FALLS: ICD-10-CM

## 2024-02-19 DIAGNOSIS — I67.9 CEREBRAL VASCULAR DISEASE: ICD-10-CM

## 2024-02-19 DIAGNOSIS — K59.01 SLOW TRANSIT CONSTIPATION: Primary | ICD-10-CM

## 2024-02-19 DIAGNOSIS — S32.020D COMPRESSION FRACTURE OF L2 VERTEBRA WITH ROUTINE HEALING, SUBSEQUENT ENCOUNTER: ICD-10-CM

## 2024-02-19 DIAGNOSIS — I48.0 PAF (PAROXYSMAL ATRIAL FIBRILLATION) (HCC): ICD-10-CM

## 2024-02-19 PROCEDURE — G8484 FLU IMMUNIZE NO ADMIN: HCPCS | Performed by: FAMILY MEDICINE

## 2024-02-19 PROCEDURE — 1036F TOBACCO NON-USER: CPT | Performed by: FAMILY MEDICINE

## 2024-02-19 PROCEDURE — 1123F ACP DISCUSS/DSCN MKR DOCD: CPT | Performed by: FAMILY MEDICINE

## 2024-02-19 PROCEDURE — G8399 PT W/DXA RESULTS DOCUMENT: HCPCS | Performed by: FAMILY MEDICINE

## 2024-02-19 PROCEDURE — 1090F PRES/ABSN URINE INCON ASSESS: CPT | Performed by: FAMILY MEDICINE

## 2024-02-19 PROCEDURE — G8427 DOCREV CUR MEDS BY ELIG CLIN: HCPCS | Performed by: FAMILY MEDICINE

## 2024-02-19 PROCEDURE — 99214 OFFICE O/P EST MOD 30 MIN: CPT | Performed by: FAMILY MEDICINE

## 2024-02-19 PROCEDURE — 1111F DSCHRG MED/CURRENT MED MERGE: CPT | Performed by: FAMILY MEDICINE

## 2024-02-19 PROCEDURE — G8419 CALC BMI OUT NRM PARAM NOF/U: HCPCS | Performed by: FAMILY MEDICINE

## 2024-02-19 RX ORDER — ZOSTER VACCINE RECOMBINANT, ADJUVANTED 50 MCG/0.5
0.5 KIT INTRAMUSCULAR SEE ADMIN INSTRUCTIONS
Qty: 0.5 ML | Refills: 1 | Status: SHIPPED | OUTPATIENT
Start: 2024-02-19 | End: 2024-08-17

## 2024-02-19 RX ORDER — LACTULOSE 10 G/15ML
20 SOLUTION ORAL 3 TIMES DAILY
COMMUNITY

## 2024-02-19 RX ORDER — TRAMADOL HYDROCHLORIDE 50 MG/1
25-50 TABLET ORAL EVERY 8 HOURS PRN
Qty: 30 TABLET | Refills: 0 | Status: SHIPPED | OUTPATIENT
Start: 2024-02-19 | End: 2024-03-20

## 2024-02-19 SDOH — ECONOMIC STABILITY: FOOD INSECURITY: WITHIN THE PAST 12 MONTHS, THE FOOD YOU BOUGHT JUST DIDN'T LAST AND YOU DIDN'T HAVE MONEY TO GET MORE.: NEVER TRUE

## 2024-02-19 SDOH — ECONOMIC STABILITY: FOOD INSECURITY: WITHIN THE PAST 12 MONTHS, YOU WORRIED THAT YOUR FOOD WOULD RUN OUT BEFORE YOU GOT MONEY TO BUY MORE.: NEVER TRUE

## 2024-02-19 SDOH — ECONOMIC STABILITY: INCOME INSECURITY: HOW HARD IS IT FOR YOU TO PAY FOR THE VERY BASICS LIKE FOOD, HOUSING, MEDICAL CARE, AND HEATING?: NOT HARD AT ALL

## 2024-02-19 ASSESSMENT — PATIENT HEALTH QUESTIONNAIRE - PHQ9
SUM OF ALL RESPONSES TO PHQ QUESTIONS 1-9: 2
SUM OF ALL RESPONSES TO PHQ9 QUESTIONS 1 & 2: 2
SUM OF ALL RESPONSES TO PHQ QUESTIONS 1-9: 2
SUM OF ALL RESPONSES TO PHQ9 QUESTIONS 1 & 2: 2
SUM OF ALL RESPONSES TO PHQ QUESTIONS 1-9: 2
2. FEELING DOWN, DEPRESSED OR HOPELESS: 1
SUM OF ALL RESPONSES TO PHQ QUESTIONS 1-9: 2
1. LITTLE INTEREST OR PLEASURE IN DOING THINGS: 1
SUM OF ALL RESPONSES TO PHQ QUESTIONS 1-9: 2
1. LITTLE INTEREST OR PLEASURE IN DOING THINGS: 1
SUM OF ALL RESPONSES TO PHQ QUESTIONS 1-9: 2
2. FEELING DOWN, DEPRESSED OR HOPELESS: 1

## 2024-02-19 NOTE — PROGRESS NOTES
Audrey Almanzar is a 83 y.o. female who presents for follow up after ED visit on  with constipation.  In with .      Seen in ED  for constipation, had 2 enemas.  Given lactulose 45ml TID prn. Had BM yesterday.  Is on miralax in am, will hold if having good BM. Drinking water 24 ounces.  Eating at least 2 meals a day. Using protein drink in evening.      Had L2 compression fracture, after fall, given hydrocodone by ED on .      Prior CVA, left LE weakness.  On daily aspirin. Using wheelchair today. has [employer], [id number], OT/PT.  Has an aide that helps 2 days a week.       Using depends right now.  Has ongoing urinary incontinence.  no dysuria.      History of AF and NSTEMI. S/p ablation.  On beta blocker, statin, aspirin 81mg and eliquis. No bleeding.          Past Medical History:   Diagnosis Date    Acid reflux     Endocrine disease     thyroid nodules    Stroke (HCC)     Thyroid disease     thyroid nodules       Family History   Problem Relation Age of Onset    Heart Disease Maternal Grandfather     Heart Disease Sister     Cancer Mother         lymphoma    Diabetes Paternal Grandmother     Heart Disease Son     No Known Problems Son     No Known Problems Daughter     No Known Problems Daughter         Social History     Socioeconomic History    Marital status:      Spouse name: Not on file    Number of children: Not on file    Years of education: Not on file    Highest education level: Not on file   Occupational History    Not on file   Tobacco Use    Smoking status: Former     Current packs/day: 0.00     Types: Cigarettes     Quit date: 1986     Years since quittin.1    Smokeless tobacco: Never   Vaping Use    Vaping Use: Never used   Substance and Sexual Activity    Alcohol use: No    Drug use: No    Sexual activity: Not on file   Other Topics Concern    Not on file   Social History Narrative    Not on file     Social Determinants of Health     Financial Resource

## 2024-03-19 ENCOUNTER — HOSPITAL ENCOUNTER (OUTPATIENT)
Facility: HOSPITAL | Age: 84
Discharge: HOME | End: 2024-03-22
Payer: MEDICARE

## 2024-03-19 VITALS
OXYGEN SATURATION: 96 % | RESPIRATION RATE: 20 BRPM | TEMPERATURE: 98.2 F | HEART RATE: 81 BPM | WEIGHT: 168.43 LBS | SYSTOLIC BLOOD PRESSURE: 117 MMHG | BODY MASS INDEX: 28.06 KG/M2 | HEIGHT: 65 IN | DIASTOLIC BLOOD PRESSURE: 59 MMHG

## 2024-03-19 LAB
ALBUMIN SERPL-MCNC: 3.2 G/DL (ref 3.5–5)
ALBUMIN/GLOB SERPL: 1 (ref 1.1–2.2)
ALP SERPL-CCNC: 98 U/L (ref 45–117)
ALT SERPL-CCNC: 20 U/L (ref 12–78)
ANION GAP SERPL CALC-SCNC: 2 MMOL/L (ref 5–15)
APPEARANCE UR: ABNORMAL
AST SERPL-CCNC: 12 U/L (ref 15–37)
BACTERIA URNS QL MICRO: NEGATIVE /HPF
BILIRUB SERPL-MCNC: 0.4 MG/DL (ref 0.2–1)
BILIRUB UR QL: NEGATIVE
BUN SERPL-MCNC: 21 MG/DL (ref 6–20)
BUN/CREAT SERPL: 26 (ref 12–20)
CALCIUM SERPL-MCNC: 9 MG/DL (ref 8.5–10.1)
CHLORIDE SERPL-SCNC: 115 MMOL/L (ref 97–108)
CO2 SERPL-SCNC: 26 MMOL/L (ref 21–32)
COLOR UR: ABNORMAL
CREAT SERPL-MCNC: 0.82 MG/DL (ref 0.55–1.02)
EPITH CASTS URNS QL MICRO: ABNORMAL /LPF
ERYTHROCYTE [DISTWIDTH] IN BLOOD BY AUTOMATED COUNT: 13.3 % (ref 11.5–14.5)
GLOBULIN SER CALC-MCNC: 3.2 G/DL (ref 2–4)
GLUCOSE SERPL-MCNC: 110 MG/DL (ref 65–100)
GLUCOSE UR STRIP.AUTO-MCNC: NEGATIVE MG/DL
HCT VFR BLD AUTO: 44.3 % (ref 35–47)
HGB BLD-MCNC: 14 G/DL (ref 11.5–16)
HGB UR QL STRIP: NEGATIVE
KETONES UR QL STRIP.AUTO: NEGATIVE MG/DL
LEUKOCYTE ESTERASE UR QL STRIP.AUTO: NEGATIVE
MAGNESIUM SERPL-MCNC: 2 MG/DL (ref 1.6–2.4)
MCH RBC QN AUTO: 29.9 PG (ref 26–34)
MCHC RBC AUTO-ENTMCNC: 31.6 G/DL (ref 30–36.5)
MCV RBC AUTO: 94.7 FL (ref 80–99)
NITRITE UR QL STRIP.AUTO: NEGATIVE
NRBC # BLD: 0 K/UL (ref 0–0.01)
NRBC BLD-RTO: 0 PER 100 WBC
PH UR STRIP: 5.5 (ref 5–8)
PLATELET # BLD AUTO: 166 K/UL (ref 150–400)
PMV BLD AUTO: 12.3 FL (ref 8.9–12.9)
POTASSIUM SERPL-SCNC: 4.3 MMOL/L (ref 3.5–5.1)
PROT SERPL-MCNC: 6.4 G/DL (ref 6.4–8.2)
PROT UR STRIP-MCNC: NEGATIVE MG/DL
RBC # BLD AUTO: 4.68 M/UL (ref 3.8–5.2)
RBC #/AREA URNS HPF: ABNORMAL /HPF (ref 0–5)
SODIUM SERPL-SCNC: 143 MMOL/L (ref 136–145)
SP GR UR REFRACTOMETRY: 1.02
URINE CULTURE IF INDICATED: ABNORMAL
UROBILINOGEN UR QL STRIP.AUTO: 1 EU/DL (ref 0.2–1)
WBC # BLD AUTO: 7 K/UL (ref 3.6–11)
WBC URNS QL MICRO: ABNORMAL /HPF (ref 0–4)

## 2024-03-19 PROCEDURE — 81001 URINALYSIS AUTO W/SCOPE: CPT

## 2024-03-19 PROCEDURE — 83735 ASSAY OF MAGNESIUM: CPT

## 2024-03-19 PROCEDURE — 36415 COLL VENOUS BLD VENIPUNCTURE: CPT

## 2024-03-19 PROCEDURE — 71046 X-RAY EXAM CHEST 2 VIEWS: CPT

## 2024-03-19 PROCEDURE — 80053 COMPREHEN METABOLIC PANEL: CPT

## 2024-03-19 PROCEDURE — 85027 COMPLETE CBC AUTOMATED: CPT

## 2024-03-19 ASSESSMENT — PAIN SCALES - GENERAL: PAINLEVEL_OUTOF10: 0

## 2024-03-25 ENCOUNTER — ANESTHESIA EVENT (OUTPATIENT)
Facility: HOSPITAL | Age: 84
DRG: 310 | End: 2024-03-25
Payer: MEDICARE

## 2024-03-25 ENCOUNTER — HOSPITAL ENCOUNTER (INPATIENT)
Facility: HOSPITAL | Age: 84
LOS: 1 days | Discharge: HOME | DRG: 310 | End: 2024-03-25
Attending: INTERNAL MEDICINE | Admitting: INTERNAL MEDICINE
Payer: MEDICARE

## 2024-03-25 ENCOUNTER — ANESTHESIA (OUTPATIENT)
Facility: HOSPITAL | Age: 84
DRG: 310 | End: 2024-03-25
Payer: MEDICARE

## 2024-03-25 VITALS
SYSTOLIC BLOOD PRESSURE: 117 MMHG | HEIGHT: 65 IN | DIASTOLIC BLOOD PRESSURE: 83 MMHG | BODY MASS INDEX: 27.99 KG/M2 | HEART RATE: 121 BPM | OXYGEN SATURATION: 92 % | TEMPERATURE: 97.3 F | RESPIRATION RATE: 23 BRPM | WEIGHT: 168 LBS

## 2024-03-25 DIAGNOSIS — I48.91 A-FIB (HCC): ICD-10-CM

## 2024-03-25 PROBLEM — I48.0 PAF (PAROXYSMAL ATRIAL FIBRILLATION) (HCC): Status: ACTIVE | Noted: 2024-03-25

## 2024-03-25 LAB — ECHO BSA: 1.86 M2

## 2024-03-25 PROCEDURE — 2709999900 HC NON-CHARGEABLE SUPPLY: Performed by: INTERNAL MEDICINE

## 2024-03-25 PROCEDURE — C1894 INTRO/SHEATH, NON-LASER: HCPCS | Performed by: INTERNAL MEDICINE

## 2024-03-25 PROCEDURE — 2580000003 HC RX 258

## 2024-03-25 PROCEDURE — 2500000003 HC RX 250 WO HCPCS

## 2024-03-25 PROCEDURE — B24BZZ4 ULTRASONOGRAPHY OF HEART WITH AORTA, TRANSESOPHAGEAL: ICD-10-PCS | Performed by: INTERNAL MEDICINE

## 2024-03-25 PROCEDURE — 33340 PERQ CLSR TCAT L ATR APNDGE: CPT | Performed by: INTERNAL MEDICINE

## 2024-03-25 PROCEDURE — 6360000002 HC RX W HCPCS

## 2024-03-25 PROCEDURE — 7100000001 HC PACU RECOVERY - ADDTL 15 MIN: Performed by: INTERNAL MEDICINE

## 2024-03-25 PROCEDURE — 3700000001 HC ADD 15 MINUTES (ANESTHESIA): Performed by: INTERNAL MEDICINE

## 2024-03-25 PROCEDURE — 3700000000 HC ANESTHESIA ATTENDED CARE: Performed by: INTERNAL MEDICINE

## 2024-03-25 PROCEDURE — C1769 GUIDE WIRE: HCPCS | Performed by: INTERNAL MEDICINE

## 2024-03-25 PROCEDURE — C1759 CATH, INTRA ECHOCARDIOGRAPHY: HCPCS | Performed by: INTERNAL MEDICINE

## 2024-03-25 PROCEDURE — 2060000000 HC ICU INTERMEDIATE R&B

## 2024-03-25 PROCEDURE — 7100000000 HC PACU RECOVERY - FIRST 15 MIN: Performed by: INTERNAL MEDICINE

## 2024-03-25 RX ORDER — DEXMEDETOMIDINE HYDROCHLORIDE 100 UG/ML
INJECTION, SOLUTION INTRAVENOUS PRN
Status: DISCONTINUED | OUTPATIENT
Start: 2024-03-25 | End: 2024-03-25 | Stop reason: SDUPTHER

## 2024-03-25 RX ORDER — SODIUM CHLORIDE 0.9 % (FLUSH) 0.9 %
5-40 SYRINGE (ML) INJECTION EVERY 12 HOURS SCHEDULED
Status: DISCONTINUED | OUTPATIENT
Start: 2024-03-25 | End: 2024-03-25 | Stop reason: HOSPADM

## 2024-03-25 RX ORDER — ONDANSETRON 2 MG/ML
INJECTION INTRAMUSCULAR; INTRAVENOUS PRN
Status: DISCONTINUED | OUTPATIENT
Start: 2024-03-25 | End: 2024-03-25 | Stop reason: SDUPTHER

## 2024-03-25 RX ORDER — HEPARIN SODIUM 10000 [USP'U]/ML
INJECTION, SOLUTION INTRAVENOUS; SUBCUTANEOUS PRN
Status: DISCONTINUED | OUTPATIENT
Start: 2024-03-25 | End: 2024-03-25 | Stop reason: HOSPADM

## 2024-03-25 RX ORDER — ACETAMINOPHEN 325 MG/1
650 TABLET ORAL EVERY 4 HOURS PRN
Status: DISCONTINUED | OUTPATIENT
Start: 2024-03-25 | End: 2024-03-25 | Stop reason: HOSPADM

## 2024-03-25 RX ORDER — POLYETHYLENE GLYCOL 3350 17 G/17G
17 POWDER, FOR SOLUTION ORAL DAILY PRN
Status: DISCONTINUED | OUTPATIENT
Start: 2024-03-25 | End: 2024-03-25 | Stop reason: HOSPADM

## 2024-03-25 RX ORDER — POTASSIUM CHLORIDE 7.45 MG/ML
10 INJECTION INTRAVENOUS PRN
Status: DISCONTINUED | OUTPATIENT
Start: 2024-03-25 | End: 2024-03-25 | Stop reason: HOSPADM

## 2024-03-25 RX ORDER — PHENYLEPHRINE HCL IN 0.9% NACL 0.4MG/10ML
SYRINGE (ML) INTRAVENOUS PRN
Status: DISCONTINUED | OUTPATIENT
Start: 2024-03-25 | End: 2024-03-25 | Stop reason: SDUPTHER

## 2024-03-25 RX ORDER — POTASSIUM CHLORIDE 750 MG/1
40 TABLET, FILM COATED, EXTENDED RELEASE ORAL PRN
Status: DISCONTINUED | OUTPATIENT
Start: 2024-03-25 | End: 2024-03-25 | Stop reason: HOSPADM

## 2024-03-25 RX ORDER — ACETAMINOPHEN 325 MG/1
650 TABLET ORAL EVERY 6 HOURS PRN
Status: DISCONTINUED | OUTPATIENT
Start: 2024-03-25 | End: 2024-03-25 | Stop reason: HOSPADM

## 2024-03-25 RX ORDER — DIPHENHYDRAMINE HYDROCHLORIDE 50 MG/ML
INJECTION INTRAMUSCULAR; INTRAVENOUS PRN
Status: DISCONTINUED | OUTPATIENT
Start: 2024-03-25 | End: 2024-03-25 | Stop reason: SDUPTHER

## 2024-03-25 RX ORDER — FENTANYL CITRATE 50 UG/ML
INJECTION, SOLUTION INTRAMUSCULAR; INTRAVENOUS PRN
Status: DISCONTINUED | OUTPATIENT
Start: 2024-03-25 | End: 2024-03-25 | Stop reason: SDUPTHER

## 2024-03-25 RX ORDER — ONDANSETRON 4 MG/1
4 TABLET, ORALLY DISINTEGRATING ORAL EVERY 8 HOURS PRN
Status: DISCONTINUED | OUTPATIENT
Start: 2024-03-25 | End: 2024-03-25 | Stop reason: HOSPADM

## 2024-03-25 RX ORDER — ONDANSETRON 2 MG/ML
4 INJECTION INTRAMUSCULAR; INTRAVENOUS EVERY 6 HOURS PRN
Status: DISCONTINUED | OUTPATIENT
Start: 2024-03-25 | End: 2024-03-25 | Stop reason: HOSPADM

## 2024-03-25 RX ORDER — PANTOPRAZOLE SODIUM 40 MG/1
40 TABLET, DELAYED RELEASE ORAL
Status: DISCONTINUED | OUTPATIENT
Start: 2024-03-26 | End: 2024-03-25 | Stop reason: HOSPADM

## 2024-03-25 RX ORDER — SODIUM CHLORIDE, SODIUM LACTATE, POTASSIUM CHLORIDE, CALCIUM CHLORIDE 600; 310; 30; 20 MG/100ML; MG/100ML; MG/100ML; MG/100ML
INJECTION, SOLUTION INTRAVENOUS CONTINUOUS PRN
Status: DISCONTINUED | OUTPATIENT
Start: 2024-03-25 | End: 2024-03-25 | Stop reason: SDUPTHER

## 2024-03-25 RX ORDER — ENOXAPARIN SODIUM 100 MG/ML
40 INJECTION SUBCUTANEOUS DAILY
Status: DISCONTINUED | OUTPATIENT
Start: 2024-03-25 | End: 2024-03-25

## 2024-03-25 RX ORDER — SODIUM CHLORIDE 9 MG/ML
INJECTION, SOLUTION INTRAVENOUS PRN
Status: DISCONTINUED | OUTPATIENT
Start: 2024-03-25 | End: 2024-03-25 | Stop reason: HOSPADM

## 2024-03-25 RX ORDER — SUCCINYLCHOLINE CHLORIDE 20 MG/ML
INJECTION INTRAMUSCULAR; INTRAVENOUS PRN
Status: DISCONTINUED | OUTPATIENT
Start: 2024-03-25 | End: 2024-03-25 | Stop reason: SDUPTHER

## 2024-03-25 RX ORDER — LIDOCAINE HYDROCHLORIDE 20 MG/ML
INJECTION, SOLUTION EPIDURAL; INFILTRATION; INTRACAUDAL; PERINEURAL PRN
Status: DISCONTINUED | OUTPATIENT
Start: 2024-03-25 | End: 2024-03-25 | Stop reason: SDUPTHER

## 2024-03-25 RX ORDER — SODIUM CHLORIDE 0.9 % (FLUSH) 0.9 %
5-40 SYRINGE (ML) INJECTION PRN
Status: DISCONTINUED | OUTPATIENT
Start: 2024-03-25 | End: 2024-03-25 | Stop reason: HOSPADM

## 2024-03-25 RX ORDER — ACETAMINOPHEN 650 MG/1
650 SUPPOSITORY RECTAL EVERY 6 HOURS PRN
Status: DISCONTINUED | OUTPATIENT
Start: 2024-03-25 | End: 2024-03-25 | Stop reason: HOSPADM

## 2024-03-25 RX ORDER — ASPIRIN 81 MG/1
81 TABLET, CHEWABLE ORAL DAILY
Status: DISCONTINUED | OUTPATIENT
Start: 2024-03-25 | End: 2024-03-25 | Stop reason: HOSPADM

## 2024-03-25 RX ORDER — HEPARIN SODIUM 1000 [USP'U]/ML
INJECTION, SOLUTION INTRAVENOUS; SUBCUTANEOUS PRN
Status: DISCONTINUED | OUTPATIENT
Start: 2024-03-25 | End: 2024-03-25 | Stop reason: SDUPTHER

## 2024-03-25 RX ORDER — MAGNESIUM SULFATE IN WATER 40 MG/ML
2000 INJECTION, SOLUTION INTRAVENOUS PRN
Status: DISCONTINUED | OUTPATIENT
Start: 2024-03-25 | End: 2024-03-25 | Stop reason: HOSPADM

## 2024-03-25 RX ADMIN — SUCCINYLCHOLINE CHLORIDE 160 MG: 20 INJECTION, SOLUTION INTRAMUSCULAR; INTRAVENOUS at 08:37

## 2024-03-25 RX ADMIN — SODIUM CHLORIDE, POTASSIUM CHLORIDE, SODIUM LACTATE AND CALCIUM CHLORIDE: 600; 310; 30; 20 INJECTION, SOLUTION INTRAVENOUS at 08:31

## 2024-03-25 RX ADMIN — FENTANYL CITRATE 25 MCG: 50 INJECTION, SOLUTION INTRAMUSCULAR; INTRAVENOUS at 08:49

## 2024-03-25 RX ADMIN — DIPHENHYDRAMINE HYDROCHLORIDE 50 MG: 50 INJECTION, SOLUTION INTRAMUSCULAR; INTRAVENOUS at 08:48

## 2024-03-25 RX ADMIN — PROPOFOL 140 MG: 10 INJECTION, EMULSION INTRAVENOUS at 08:36

## 2024-03-25 RX ADMIN — ONDANSETRON HYDROCHLORIDE 4 MG: 2 INJECTION, SOLUTION INTRAMUSCULAR; INTRAVENOUS at 08:31

## 2024-03-25 RX ADMIN — HYDROCORTISONE SODIUM SUCCINATE 50 MG: 100 INJECTION, POWDER, FOR SOLUTION INTRAMUSCULAR; INTRAVENOUS at 08:48

## 2024-03-25 RX ADMIN — HEPARIN SODIUM 10000 UNITS: 1000 INJECTION, SOLUTION INTRAVENOUS; SUBCUTANEOUS at 09:14

## 2024-03-25 RX ADMIN — LIDOCAINE HYDROCHLORIDE 40 MG: 20 INJECTION, SOLUTION EPIDURAL; INFILTRATION; INTRACAUDAL; PERINEURAL at 08:36

## 2024-03-25 RX ADMIN — FENTANYL CITRATE 25 MCG: 50 INJECTION, SOLUTION INTRAMUSCULAR; INTRAVENOUS at 08:31

## 2024-03-25 RX ADMIN — PHENYLEPHRINE HYDROCHLORIDE 40 MCG/MIN: 10 INJECTION INTRAVENOUS at 08:44

## 2024-03-25 RX ADMIN — DEXMEDETOMIDINE HYDROCHLORIDE 4 MCG: 100 INJECTION, SOLUTION, CONCENTRATE INTRAVENOUS at 08:31

## 2024-03-25 RX ADMIN — HEPARIN SODIUM 5000 UNITS: 1000 INJECTION, SOLUTION INTRAVENOUS; SUBCUTANEOUS at 09:17

## 2024-03-25 RX ADMIN — Medication 80 MCG: at 08:37

## 2024-03-25 ASSESSMENT — PAIN SCALES - GENERAL: PAINLEVEL_OUTOF10: 1

## 2024-03-25 NOTE — PLAN OF CARE
Problem: Chronic Conditions and Co-morbidities  Goal: Patient's chronic conditions and co-morbidity symptoms are monitored and maintained or improved  Outcome: Adequate for Discharge     Problem: Safety - Adult  Goal: Free from fall injury  Outcome: Adequate for Discharge     Problem: Discharge Planning  Goal: Discharge to home or other facility with appropriate resources  Outcome: Adequate for Discharge

## 2024-03-25 NOTE — PROGRESS NOTES
1350 Figure 8 stitch removed from left side, no complications.  Dressing clean, dry, and intact.  Figure 8 stitch removed from right side, patient began to ooze.  Pressure held to site with quickclot for 10 mins.      1400  Bilateral groin sites clean,dry, and intact with quickclot and tegaderm.  Patient to lay flat an additional two hours.      1415 Bilateral groin sites clean, dry, and intact    1430 Bilateral groin sites clean, dry, and intact    1437 Patient began to ooze from right site again.  Pressure held until 1504 with Quickclot. Provider informed.     1515  Provider is okay with patient still going home today and spoke with patient at bedside.\    1800 Patient discharged home.  Bilateral groin sites clean, dry, and intact.  Patient driven home by spouse and with son and daughter in law present. Discharge instructions reviewed and patient at baseline.  Patient ambulated to chair with walker.  Patient transported to car via wheelchair.

## 2024-03-25 NOTE — DISCHARGE SUMMARY
Discharge Summary    Date: 3/25/2024  Patient Name: Audrey Almanzar    YOB: 1940     Age: 83 y.o.    Admit Date: 3/25/2024  Discharge Date:  Discharge Condition:    Admission Diagnosis  A-fib (HCC) [I48.91];PAF (paroxysmal atrial fibrillation) (HCC) [I48.0]      Discharge Diagnosis  Principal Problem:    PAF (paroxysmal atrial fibrillation) (HCC)  Resolved Problems:    * No resolved hospital problems. *      Hospital Stay  Narrative of Hospital Course:  Patient was admitted for elective watchman implant. Procedure was aborted because we noticed a clot attached to the coumadin ridge after getting transseptal access.     Consultants:  None    Surgeries/procedures Performed:      Treatments:            Discharge Plan/Disposition:  Home    Hospital/Incidental Findings Requiring Follow Up:    Patient Instructions:    Diet:    Activity:  For number of days (if applicable):      Other Instructions:    Provider Follow-Up:   No follow-ups on file.     Significant Diagnostic Studies:    Recent Labs:  No visits with results within 1 Day(s) from this visit.  Latest known visit with results is:  Hospital Outpatient Visit on 03/19/2024  WBC                                           Date: 03/19/2024  Value: 7.0         Ref range: 3.6 - 11.0 K/uL    Status: Final  RBC                                           Date: 03/19/2024  Value: 4.68        Ref range: 3.80 - 5.20 M/uL   Status: Final  Hemoglobin                                    Date: 03/19/2024  Value: 14.0        Ref range: 11.5 - 16.0 g/dL   Status: Final  Hematocrit                                    Date: 03/19/2024  Value: 44.3        Ref range: 35.0 - 47.0 %      Status: Final  MCV                                           Date: 03/19/2024  Value: 94.7        Ref range: 80.0 - 99.0 FL     Status: Final  MCH                                           Date: 03/19/2024  Value: 29.9        Ref range: 26.0 - 34.0 PG     Status: Final  MCHC                     spent in patient examination, evaluation, counseling as well as medication reconciliation, prescriptions for required medications, discharge plan, and follow up.    Electronically signed by Daniel Addison MD on 3/25/24 at 9:32 AM EDT

## 2024-03-25 NOTE — ANESTHESIA PRE PROCEDURE
Department of Anesthesiology  Preprocedure Note       Name:  Audrey Almanzar   Age:  83 y.o.  :  1940                                          MRN:  [id number]         Date:  3/25/2024      Surgeon: Surgeon(s):  Daniel Addison MD    Procedure: Procedure(s):  Watchman jennifer closure device    Medications prior to admission:   Prior to Admission medications    Medication Sig Start Date End Date Taking? Authorizing Provider   metoprolol tartrate (LOPRESSOR) 25 MG tablet Take 1 tablet by mouth twice daily 3/4/24   Soumya Dye MD   lactulose (CHRONULAC) 10 GM/15ML solution Take 30 mLs by mouth 3 times daily    Provider, MD Alhaji   zoster recombinant adjuvanted vaccine (SHINGRIX) 50 MCG/0.5ML SUSR injection Inject 0.5 mLs into the muscle See Admin Instructions 1 dose now and repeat in 2-6 months 24  Soumya Dye MD   apixaban (ELIQUIS) 5 MG TABS tablet Take 1 tablet by mouth 2 times daily 2/3/24   Madelaine Chapa MD   pravastatin (PRAVACHOL) 80 MG tablet Take 1 tablet by mouth once daily 24   Soumya Dye MD   aspirin 81 MG chewable tablet Take 1 tablet by mouth daily 19   Automatic Reconciliation, Ar   vitamin D (CHOLECALCIFEROL) 25 MCG (1000 UT) TABS tablet Take by mouth daily    Automatic Reconciliation, Ar   esomeprazole (NEXIUM) 40 MG delayed release capsule Take 1 capsule by mouth daily    Automatic Reconciliation, Ar   vitamin E 45 MG (100 UNIT) capsule Take 1 capsule by mouth daily    Automatic Reconciliation, Ar       Current medications:    No current facility-administered medications for this encounter.       Allergies:    Allergies   Allergen Reactions   • Iodine Anaphylaxis   • Amoxicillin Other (See Comments)     \"It makes my heart race\"   • Oxycodone Nausea Only   • Potassium Iodide    • Sodium Iodide    • Lidocaine Rash     Get a rash from the patches   • Nitrofurantoin Rash       Problem List:    Patient Active Problem List   Diagnosis Code   •

## 2024-03-25 NOTE — CARE COORDINATION
JADEN:    RUR: Maru     CM met with pt and pt's spouse in room to complete initial case management assessment. Pt's spouse to transport upon discharge.    Pharmacy: [facility] [address] [place] [address] [id number] - P [phone] - F [phone] [[id number]]     Pt lives with her spouse at the address listed on her facesheet. Pt requires assistance with ADLs. Pt's spouse assists her with ADLs, and a private caregiver comes into the home to assist two times per week. Pt's spouse feels that their current services are adequate at this time. Pt has a walker and a cane at home.     Pt has a hx of home health services with [facility] (currently enrolled in services) and has a hx of a stay at [facility]. Pt did not have a good experience at [facility] and does not want to return there. Pt does not have a hx of home O2. Pt has a hx of an IPR placement at [facility].    Pt's spouse does not have any questions or concerns at this time regarding pt's discharge plan.     03/25/24 9033   Service Assessment   Patient Orientation Alert and Oriented   Cognition Alert   History Provided By Patient;Spouse   Primary Caregiver Spouse   Accompanied By/Relationship Spouse, Martin Almanzar, [phone]   Support Systems Spouse/Significant Other;Children   Patient's Healthcare Decision Maker is: Legal Next of Kin  (Spouse, Martin Almanzar, [phone])   PCP Verified by CM Yes  (Soumya Dye MD)   Last Visit to PCP Within last 3 months   Prior Functional Level Assistance with the following:;Bathing;Dressing;Cooking;Housework;Shopping;Mobility   Current Functional Level Assistance with the following:;Bathing;Dressing;Cooking;Housework;Shopping;Mobility   Can patient return to prior living arrangement Yes   Ability to make needs known: Good   Family able to assist with home care needs: Yes   Would you like for me to discuss the discharge plan with any other family

## 2024-03-25 NOTE — PROGRESS NOTES
Dual Skin preformed with LEFTY Izquierdo    Cardiac Cath Lab Recovery Arrival Note:      Audrey Almanzar arrived to Cardiac Cath Lab, Recovery Area. Staff introduced to patient. Patient identifiers verified with NAME and DATE OF BIRTH. Procedure verified with patient. Consent forms reviewed and signed by patient or authorized representative and verified. Allergies verified.     Patient and family oriented to department. Patient and family informed of procedure and plan of care.     Questions answered with review. Patient prepped for procedure, per orders from physician, prior to arrival.    Patient on cardiac monitor, non-invasive blood pressure, SPO2 monitor. On RA. Patient is A&Ox 4. Patient reports no pain.     Patient in stretcher, in low position, with side rails up, call bell within reach, patient instructed to call if assistance as needed.    Patient prep in: [facility], [address].     Family in: Martin.   Prep by: Felecia

## 2024-03-26 NOTE — ANESTHESIA POSTPROCEDURE EVALUATION
Department of Anesthesiology  Postprocedure Note    Patient: Audrey Almanzar  MRN: [id number]  YOB: 1940  Date of evaluation: 3/26/2024    Procedure Summary       Date: 03/25/24 Room / Location: [facility] EP LAB 2 / [facility] CARDIAC CATH LAB    Anesthesia Start: 0831 Anesthesia Stop: 1000    Procedure: Watchman jennifer closure device Diagnosis: A-fib ([facility])    Providers: Daniel Addison MD Responsible Provider: Hal Goldberg MD    Anesthesia Type: General ASA Status: 3            Anesthesia Type: General    William Phase I: William Score: 10    William Phase II:      Anesthesia Post Evaluation    Patient location during evaluation: PACU  Patient participation: complete - patient participated  Level of consciousness: responsive to verbal stimuli and sleepy but conscious  Pain score: 2  Airway patency: patent  Cardiovascular status: blood pressure returned to baseline  Respiratory status: acceptable  Hydration status: stable  Comments: +Post-Anesthesia Evaluation and Assessment    Patient: Audrey Almanzar MRN: [id number]  SSN: [id number]   YOB: 1940  Age: 83 y.o.  Sex: female          Cardiovascular Function/Vital Signs    /83   Pulse (!) 121   Temp 97.3 °F (36.3 °C)   Resp 23   Ht 1.638 m (5' 4.5\")   Wt 76.2 kg (168 lb)   SpO2 92%   BMI 28.39 kg/m²     Patient is status post Procedure(s):  Watchman jennifer closure device.    Nausea/Vomiting: Controlled.    Postoperative hydration reviewed and adequate.    Pain:      Managed.    Neurological Status:       At baseline.    Mental Status and Level of Consciousness: Arousable.    Pulmonary Status:       Adequate oxygenation and airway patent.    Complications related to anesthesia: None    Post-anesthesia assessment completed. No concerns.    I have evaluated the patient and the patient is stable and ready to be discharged from PACU .    Signed By: Hal Goldberg MD    3/26/2024    Multimodal analgesia pain management approach  Pain management:

## 2024-04-08 NOTE — TELEPHONE ENCOUNTER
PCP: Soumya Dye MD    Last appt:   2/19/2024    Future Appointments   Date Time Provider Department Center   4/15/2024 11:00 AM Soumya Dye MD [facility]   5/13/2024  7:45 AM [facility] EP LAB 2 [facility]       Requested Prescriptions     Pending Prescriptions Disp Refills    apixaban (ELIQUIS) 5 MG TABS tablet 60 tablet 1     Sig: Take 1 tablet by mouth 2 times daily

## 2024-04-08 NOTE — TELEPHONE ENCOUNTER
Patient's , Martin states patient is out of medication & refill request was not received from Pharmacy last week, [facility] for patient medication, Apixaban (ELIQUIS) 5 MG TABS tablet. Please call to advise if this needs to be done thru Cardiologist or If Dr. Dye can take over refills. Thank you      Patient out of Med.

## 2024-04-14 NOTE — PROGRESS NOTES
Audrey Almanzar is a 83 y.o. female who presents for follow up. In with      To have Watchman's device procedure on May 13. Was deferred due to clot in atrial appendage, has been on eliquis and aspirin.  History of AF and NSTEMI. S/p ablation.      Prior CVA, left LE weakness.  On daily aspirin. Using wheelchair today, using walker at home.  Prior home health with [facility], OT/PT.  Has a private aide that helps 2 days a week, shower.       Has ongoing urinary incontinence, depends. no rashes.  Episodic constipation. Is on miralax in am, will hold if having good BM. Drinking water.  Eating at least 2 meals a day. Using protein drink in evening.       Past Medical History:   Diagnosis Date    Acid reflux     Constipation     Endocrine disease     thyroid nodules    Falls     Hypertension     Incontinence     wears depends    Stroke (HCC)     left side weakness    Thyroid disease     thyroid nodules       Family History   Problem Relation Age of Onset    Heart Disease Maternal Grandfather     Heart Disease Sister     Cancer Mother         lymphoma    Diabetes Paternal Grandmother     Heart Disease Son     No Known Problems Son     No Known Problems Daughter     No Known Problems Daughter         Social History     Socioeconomic History    Marital status:      Spouse name: Not on file    Number of children: Not on file    Years of education: Not on file    Highest education level: Not on file   Occupational History    Not on file   Tobacco Use    Smoking status: Former     Current packs/day: 0.00     Types: Cigarettes     Quit date: 1986     Years since quittin.3    Smokeless tobacco: Never   Vaping Use    Vaping Use: Never used   Substance and Sexual Activity    Alcohol use: No    Drug use: No    Sexual activity: Not on file   Other Topics Concern    Not on file   Social History Narrative    Not on file     Social Determinants of Health     Financial Resource Strain: Low Risk  (2024)

## 2024-04-15 ENCOUNTER — OFFICE VISIT (OUTPATIENT)
Age: 84
End: 2024-04-15
Payer: MEDICARE

## 2024-04-15 VITALS
HEIGHT: 64 IN | HEART RATE: 73 BPM | BODY MASS INDEX: 27.49 KG/M2 | WEIGHT: 161 LBS | DIASTOLIC BLOOD PRESSURE: 74 MMHG | RESPIRATION RATE: 16 BRPM | OXYGEN SATURATION: 94 % | SYSTOLIC BLOOD PRESSURE: 116 MMHG | TEMPERATURE: 97.5 F

## 2024-04-15 DIAGNOSIS — Z91.81 AT HIGH RISK FOR FALLS: ICD-10-CM

## 2024-04-15 DIAGNOSIS — Z23 ENCOUNTER FOR IMMUNIZATION: ICD-10-CM

## 2024-04-15 DIAGNOSIS — E78.00 PURE HYPERCHOLESTEROLEMIA: ICD-10-CM

## 2024-04-15 DIAGNOSIS — I67.9 CEREBRAL VASCULAR DISEASE: Primary | ICD-10-CM

## 2024-04-15 DIAGNOSIS — E55.9 VITAMIN D DEFICIENCY: ICD-10-CM

## 2024-04-15 DIAGNOSIS — I48.0 PAF (PAROXYSMAL ATRIAL FIBRILLATION) (HCC): ICD-10-CM

## 2024-04-15 PROCEDURE — 99214 OFFICE O/P EST MOD 30 MIN: CPT | Performed by: FAMILY MEDICINE

## 2024-04-15 PROCEDURE — 1111F DSCHRG MED/CURRENT MED MERGE: CPT | Performed by: FAMILY MEDICINE

## 2024-04-15 PROCEDURE — G8419 CALC BMI OUT NRM PARAM NOF/U: HCPCS | Performed by: FAMILY MEDICINE

## 2024-04-15 PROCEDURE — G8399 PT W/DXA RESULTS DOCUMENT: HCPCS | Performed by: FAMILY MEDICINE

## 2024-04-15 PROCEDURE — 1123F ACP DISCUSS/DSCN MKR DOCD: CPT | Performed by: FAMILY MEDICINE

## 2024-04-15 PROCEDURE — 1090F PRES/ABSN URINE INCON ASSESS: CPT | Performed by: FAMILY MEDICINE

## 2024-04-15 PROCEDURE — G8427 DOCREV CUR MEDS BY ELIG CLIN: HCPCS | Performed by: FAMILY MEDICINE

## 2024-04-15 PROCEDURE — 1036F TOBACCO NON-USER: CPT | Performed by: FAMILY MEDICINE

## 2024-04-15 RX ORDER — POLYETHYLENE GLYCOL 3350 17 G/17G
17 POWDER, FOR SOLUTION ORAL DAILY
COMMUNITY

## 2024-04-15 NOTE — PROGRESS NOTES
\"Have you been to the ER, urgent care clinic since your last visit?  Hospitalized since your last visit?\"    NO    “Have you seen or consulted any other health care providers outside of [facility] since your last visit?”    NO            Click Here for Release of Records Request

## 2024-04-16 LAB
25(OH)D3 SERPL-MCNC: 58.8 NG/ML (ref 30–100)
ALBUMIN SERPL-MCNC: 3.4 G/DL (ref 3.5–5)
ALBUMIN/GLOB SERPL: 1.1 (ref 1.1–2.2)
ALP SERPL-CCNC: 97 U/L (ref 45–117)
ALT SERPL-CCNC: 19 U/L (ref 12–78)
ANION GAP SERPL CALC-SCNC: 6 MMOL/L (ref 5–15)
AST SERPL-CCNC: 11 U/L (ref 15–37)
BILIRUB SERPL-MCNC: 0.4 MG/DL (ref 0.2–1)
BUN SERPL-MCNC: 21 MG/DL (ref 6–20)
BUN/CREAT SERPL: 28 (ref 12–20)
CALCIUM SERPL-MCNC: 9.8 MG/DL (ref 8.5–10.1)
CHLORIDE SERPL-SCNC: 111 MMOL/L (ref 97–108)
CHOLEST SERPL-MCNC: 230 MG/DL
CO2 SERPL-SCNC: 27 MMOL/L (ref 21–32)
CREAT SERPL-MCNC: 0.74 MG/DL (ref 0.55–1.02)
GLOBULIN SER CALC-MCNC: 3.1 G/DL (ref 2–4)
GLUCOSE SERPL-MCNC: 101 MG/DL (ref 65–100)
HDLC SERPL-MCNC: 60 MG/DL
HDLC SERPL: 3.8 (ref 0–5)
LDLC SERPL CALC-MCNC: 116 MG/DL (ref 0–100)
POTASSIUM SERPL-SCNC: 4.2 MMOL/L (ref 3.5–5.1)
PROT SERPL-MCNC: 6.5 G/DL (ref 6.4–8.2)
SODIUM SERPL-SCNC: 144 MMOL/L (ref 136–145)
TRIGL SERPL-MCNC: 270 MG/DL
VLDLC SERPL CALC-MCNC: 54 MG/DL

## 2024-04-21 ENCOUNTER — TELEPHONE (OUTPATIENT)
Age: 84
End: 2024-04-21

## 2024-04-21 NOTE — TELEPHONE ENCOUNTER
Please notify patient's  directly and set up follow up     labs show LDL cholesterol 116, goal is less than 100.  Triglycerides were elevated at 270.  This is not surprising since patient was not fully fasting.  Kidney tests are normal.  She does need to increase water intake to 4-6 glasses of water daily.  Liver test normal.  Vitamin D is excellent.  Blood sugar slightly elevated.  Continue present medications.  Follow-up in office in 6 months

## 2024-05-06 ENCOUNTER — HOSPITAL ENCOUNTER (OUTPATIENT)
Facility: HOSPITAL | Age: 84
Discharge: HOME | End: 2024-05-09
Payer: MEDICARE

## 2024-05-06 VITALS
RESPIRATION RATE: 18 BRPM | BODY MASS INDEX: 26.02 KG/M2 | SYSTOLIC BLOOD PRESSURE: 136 MMHG | DIASTOLIC BLOOD PRESSURE: 88 MMHG | HEIGHT: 67 IN | HEART RATE: 79 BPM | TEMPERATURE: 98.1 F | WEIGHT: 165.79 LBS

## 2024-05-06 LAB
ANION GAP SERPL CALC-SCNC: 5 MMOL/L (ref 5–15)
BUN SERPL-MCNC: 24 MG/DL (ref 6–20)
BUN/CREAT SERPL: 34 (ref 12–20)
CALCIUM SERPL-MCNC: 9.4 MG/DL (ref 8.5–10.1)
CHLORIDE SERPL-SCNC: 110 MMOL/L (ref 97–108)
CO2 SERPL-SCNC: 25 MMOL/L (ref 21–32)
CREAT SERPL-MCNC: 0.71 MG/DL (ref 0.55–1.02)
ERYTHROCYTE [DISTWIDTH] IN BLOOD BY AUTOMATED COUNT: 12.4 % (ref 11.5–14.5)
GLUCOSE SERPL-MCNC: 94 MG/DL (ref 65–100)
HCT VFR BLD AUTO: 42.7 % (ref 35–47)
HGB BLD-MCNC: 13.7 G/DL (ref 11.5–16)
MAGNESIUM SERPL-MCNC: 1.9 MG/DL (ref 1.6–2.4)
MCH RBC QN AUTO: 29 PG (ref 26–34)
MCHC RBC AUTO-ENTMCNC: 32.1 G/DL (ref 30–36.5)
MCV RBC AUTO: 90.3 FL (ref 80–99)
NRBC # BLD: 0 K/UL (ref 0–0.01)
NRBC BLD-RTO: 0 PER 100 WBC
PLATELET # BLD AUTO: 174 K/UL (ref 150–400)
POTASSIUM SERPL-SCNC: 3.9 MMOL/L (ref 3.5–5.1)
RBC # BLD AUTO: 4.73 M/UL (ref 3.8–5.2)
SODIUM SERPL-SCNC: 140 MMOL/L (ref 136–145)
WBC # BLD AUTO: 8.9 K/UL (ref 3.6–11)

## 2024-05-06 PROCEDURE — 36415 COLL VENOUS BLD VENIPUNCTURE: CPT

## 2024-05-06 PROCEDURE — 85027 COMPLETE CBC AUTOMATED: CPT

## 2024-05-06 PROCEDURE — 80048 BASIC METABOLIC PNL TOTAL CA: CPT

## 2024-05-06 PROCEDURE — 71046 X-RAY EXAM CHEST 2 VIEWS: CPT

## 2024-05-06 PROCEDURE — 83735 ASSAY OF MAGNESIUM: CPT

## 2024-05-06 NOTE — PERIOP NOTE
Despite 4 cups of water and a pepsi, patient was unable to void. Set her home and told her to come back in the morning. Pt verbalizes understanding.

## 2024-05-07 ENCOUNTER — HOSPITAL ENCOUNTER (OUTPATIENT)
Facility: HOSPITAL | Age: 84
Discharge: HOME | End: 2024-05-10
Payer: MEDICARE

## 2024-05-07 LAB
APPEARANCE UR: CLEAR
BACTERIA URNS QL MICRO: ABNORMAL /HPF
BILIRUB UR QL: NEGATIVE
COLOR UR: ABNORMAL
EKG ATRIAL RATE: 77 BPM
EKG DIAGNOSIS: NORMAL
EKG P AXIS: -1 DEGREES
EKG P-R INTERVAL: 130 MS
EKG Q-T INTERVAL: 412 MS
EKG QRS DURATION: 80 MS
EKG QTC CALCULATION (BAZETT): 466 MS
EKG R AXIS: -28 DEGREES
EKG T AXIS: 43 DEGREES
EKG VENTRICULAR RATE: 77 BPM
EPITH CASTS URNS QL MICRO: ABNORMAL /LPF
GLUCOSE UR STRIP.AUTO-MCNC: NEGATIVE MG/DL
HGB UR QL STRIP: NEGATIVE
HYALINE CASTS URNS QL MICRO: ABNORMAL /LPF (ref 0–2)
KETONES UR QL STRIP.AUTO: NEGATIVE MG/DL
LEUKOCYTE ESTERASE UR QL STRIP.AUTO: NEGATIVE
NITRITE UR QL STRIP.AUTO: NEGATIVE
PH UR STRIP: 6 (ref 5–8)
PROT UR STRIP-MCNC: NEGATIVE MG/DL
RBC #/AREA URNS HPF: ABNORMAL /HPF (ref 0–5)
SP GR UR REFRACTOMETRY: 1.01
URINE CULTURE IF INDICATED: ABNORMAL
UROBILINOGEN UR QL STRIP.AUTO: 0.2 EU/DL (ref 0.2–1)
WBC URNS QL MICRO: ABNORMAL /HPF (ref 0–4)

## 2024-05-07 PROCEDURE — 81001 URINALYSIS AUTO W/SCOPE: CPT

## 2024-05-08 NOTE — PROGRESS NOTES
Faxed EKG, CXR, and labs to Dr Addison for review. Fax confirmed.     Called Dr Addison's office to follow up on labs, EKG, and CXR to review. Spoke to Leda and she has sent a message for nurse to call back once reviewed

## 2024-05-13 ENCOUNTER — ANESTHESIA EVENT (OUTPATIENT)
Facility: HOSPITAL | Age: 84
DRG: 274 | End: 2024-05-13
Payer: MEDICARE

## 2024-05-13 ENCOUNTER — ANESTHESIA (OUTPATIENT)
Facility: HOSPITAL | Age: 84
DRG: 274 | End: 2024-05-13
Payer: MEDICARE

## 2024-05-13 ENCOUNTER — HOSPITAL ENCOUNTER (INPATIENT)
Facility: HOSPITAL | Age: 84
LOS: 1 days | Discharge: HOME | DRG: 274 | End: 2024-05-14
Attending: INTERNAL MEDICINE | Admitting: INTERNAL MEDICINE
Payer: MEDICARE

## 2024-05-13 DIAGNOSIS — I48.0 PAROXYSMAL ATRIAL FIBRILLATION (HCC): Primary | ICD-10-CM

## 2024-05-13 DIAGNOSIS — I48.91 A-FIB (HCC): ICD-10-CM

## 2024-05-13 LAB — ECHO BSA: 1.86 M2

## 2024-05-13 PROCEDURE — 76937 US GUIDE VASCULAR ACCESS: CPT | Performed by: INTERNAL MEDICINE

## 2024-05-13 PROCEDURE — 3700000001 HC ADD 15 MINUTES (ANESTHESIA): Performed by: INTERNAL MEDICINE

## 2024-05-13 PROCEDURE — 7100000001 HC PACU RECOVERY - ADDTL 15 MIN: Performed by: INTERNAL MEDICINE

## 2024-05-13 PROCEDURE — C1894 INTRO/SHEATH, NON-LASER: HCPCS | Performed by: INTERNAL MEDICINE

## 2024-05-13 PROCEDURE — B24BZZ4 ULTRASONOGRAPHY OF HEART WITH AORTA, TRANSESOPHAGEAL: ICD-10-PCS | Performed by: INTERNAL MEDICINE

## 2024-05-13 PROCEDURE — 2580000003 HC RX 258: Performed by: ANESTHESIOLOGY

## 2024-05-13 PROCEDURE — C1889 IMPLANT/INSERT DEVICE, NOC: HCPCS | Performed by: INTERNAL MEDICINE

## 2024-05-13 PROCEDURE — 3700000000 HC ANESTHESIA ATTENDED CARE: Performed by: INTERNAL MEDICINE

## 2024-05-13 PROCEDURE — 85347 COAGULATION TIME ACTIVATED: CPT

## 2024-05-13 PROCEDURE — 6360000002 HC RX W HCPCS: Performed by: NURSE ANESTHETIST, CERTIFIED REGISTERED

## 2024-05-13 PROCEDURE — 02L73DK OCCLUSION OF LEFT ATRIAL APPENDAGE WITH INTRALUMINAL DEVICE, PERCUTANEOUS APPROACH: ICD-10-PCS | Performed by: INTERNAL MEDICINE

## 2024-05-13 PROCEDURE — C1892 INTRO/SHEATH,FIXED,PEEL-AWAY: HCPCS | Performed by: INTERNAL MEDICINE

## 2024-05-13 PROCEDURE — 33340 PERQ CLSR TCAT L ATR APNDGE: CPT | Performed by: INTERNAL MEDICINE

## 2024-05-13 PROCEDURE — 6370000000 HC RX 637 (ALT 250 FOR IP): Performed by: INTERNAL MEDICINE

## 2024-05-13 PROCEDURE — 1100000003 HC PRIVATE W/ TELEMETRY

## 2024-05-13 PROCEDURE — 2709999900 HC NON-CHARGEABLE SUPPLY: Performed by: INTERNAL MEDICINE

## 2024-05-13 PROCEDURE — 6360000004 HC RX CONTRAST MEDICATION: Performed by: INTERNAL MEDICINE

## 2024-05-13 PROCEDURE — 2580000003 HC RX 258: Performed by: INTERNAL MEDICINE

## 2024-05-13 PROCEDURE — C1769 GUIDE WIRE: HCPCS | Performed by: INTERNAL MEDICINE

## 2024-05-13 PROCEDURE — 7100000000 HC PACU RECOVERY - FIRST 15 MIN: Performed by: INTERNAL MEDICINE

## 2024-05-13 PROCEDURE — 2500000003 HC RX 250 WO HCPCS: Performed by: NURSE ANESTHETIST, CERTIFIED REGISTERED

## 2024-05-13 DEVICE — LEFT ATRIAL APPENDAGE CLOSURE DEVICE WITH DELIVERY SYSTEM
Type: IMPLANTABLE DEVICE | Status: FUNCTIONAL
Brand: WATCHMAN FLX™ PRO

## 2024-05-13 RX ORDER — SODIUM CHLORIDE 0.9 % (FLUSH) 0.9 %
5-40 SYRINGE (ML) INJECTION EVERY 12 HOURS SCHEDULED
Status: DISCONTINUED | OUTPATIENT
Start: 2024-05-13 | End: 2024-05-13 | Stop reason: HOSPADM

## 2024-05-13 RX ORDER — FENTANYL CITRATE 50 UG/ML
INJECTION, SOLUTION INTRAMUSCULAR; INTRAVENOUS PRN
Status: DISCONTINUED | OUTPATIENT
Start: 2024-05-13 | End: 2024-05-13 | Stop reason: SDUPTHER

## 2024-05-13 RX ORDER — PROCHLORPERAZINE EDISYLATE 5 MG/ML
5 INJECTION INTRAMUSCULAR; INTRAVENOUS
Status: DISCONTINUED | OUTPATIENT
Start: 2024-05-13 | End: 2024-05-13 | Stop reason: HOSPADM

## 2024-05-13 RX ORDER — SODIUM CHLORIDE, SODIUM LACTATE, POTASSIUM CHLORIDE, CALCIUM CHLORIDE 600; 310; 30; 20 MG/100ML; MG/100ML; MG/100ML; MG/100ML
INJECTION, SOLUTION INTRAVENOUS CONTINUOUS
Status: DISCONTINUED | OUTPATIENT
Start: 2024-05-13 | End: 2024-05-13 | Stop reason: HOSPADM

## 2024-05-13 RX ORDER — SODIUM CHLORIDE 0.9 % (FLUSH) 0.9 %
5-40 SYRINGE (ML) INJECTION PRN
Status: DISCONTINUED | OUTPATIENT
Start: 2024-05-13 | End: 2024-05-14 | Stop reason: HOSPADM

## 2024-05-13 RX ORDER — ONDANSETRON 2 MG/ML
4 INJECTION INTRAMUSCULAR; INTRAVENOUS
Status: DISCONTINUED | OUTPATIENT
Start: 2024-05-13 | End: 2024-05-13 | Stop reason: HOSPADM

## 2024-05-13 RX ORDER — HEPARIN SODIUM 1000 [USP'U]/ML
INJECTION, SOLUTION INTRAVENOUS; SUBCUTANEOUS PRN
Status: DISCONTINUED | OUTPATIENT
Start: 2024-05-13 | End: 2024-05-13 | Stop reason: SDUPTHER

## 2024-05-13 RX ORDER — DEXAMETHASONE SODIUM PHOSPHATE 4 MG/ML
INJECTION, SOLUTION INTRA-ARTICULAR; INTRALESIONAL; INTRAMUSCULAR; INTRAVENOUS; SOFT TISSUE PRN
Status: DISCONTINUED | OUTPATIENT
Start: 2024-05-13 | End: 2024-05-13 | Stop reason: SDUPTHER

## 2024-05-13 RX ORDER — PROTAMINE SULFATE 10 MG/ML
INJECTION, SOLUTION INTRAVENOUS PRN
Status: DISCONTINUED | OUTPATIENT
Start: 2024-05-13 | End: 2024-05-13 | Stop reason: SDUPTHER

## 2024-05-13 RX ORDER — CLOPIDOGREL BISULFATE 75 MG/1
75 TABLET ORAL DAILY
Qty: 30 TABLET | Refills: 5 | Status: SHIPPED | OUTPATIENT
Start: 2024-05-13

## 2024-05-13 RX ORDER — MIDAZOLAM HYDROCHLORIDE 5 MG/5ML
2 INJECTION, SOLUTION INTRAMUSCULAR; INTRAVENOUS
Status: DISCONTINUED | OUTPATIENT
Start: 2024-05-13 | End: 2024-05-13 | Stop reason: HOSPADM

## 2024-05-13 RX ORDER — POLYETHYLENE GLYCOL 3350 17 G/17G
17 POWDER, FOR SOLUTION ORAL DAILY
Status: DISCONTINUED | OUTPATIENT
Start: 2024-05-14 | End: 2024-05-14 | Stop reason: HOSPADM

## 2024-05-13 RX ORDER — ROCURONIUM BROMIDE 10 MG/ML
INJECTION, SOLUTION INTRAVENOUS PRN
Status: DISCONTINUED | OUTPATIENT
Start: 2024-05-13 | End: 2024-05-13 | Stop reason: SDUPTHER

## 2024-05-13 RX ORDER — SODIUM CHLORIDE 0.9 % (FLUSH) 0.9 %
5-40 SYRINGE (ML) INJECTION PRN
Status: DISCONTINUED | OUTPATIENT
Start: 2024-05-13 | End: 2024-05-13 | Stop reason: HOSPADM

## 2024-05-13 RX ORDER — POLYETHYLENE GLYCOL 3350 17 G/17G
17 POWDER, FOR SOLUTION ORAL DAILY
Status: DISCONTINUED | OUTPATIENT
Start: 2024-05-13 | End: 2024-05-13

## 2024-05-13 RX ORDER — PANTOPRAZOLE SODIUM 40 MG/1
40 TABLET, DELAYED RELEASE ORAL
Status: DISCONTINUED | OUTPATIENT
Start: 2024-05-14 | End: 2024-05-14 | Stop reason: HOSPADM

## 2024-05-13 RX ORDER — DIPHENHYDRAMINE HYDROCHLORIDE 50 MG/ML
12.5 INJECTION INTRAMUSCULAR; INTRAVENOUS
Status: DISCONTINUED | OUTPATIENT
Start: 2024-05-13 | End: 2024-05-13 | Stop reason: HOSPADM

## 2024-05-13 RX ORDER — HYDRALAZINE HYDROCHLORIDE 20 MG/ML
10 INJECTION INTRAMUSCULAR; INTRAVENOUS
Status: DISCONTINUED | OUTPATIENT
Start: 2024-05-13 | End: 2024-05-13 | Stop reason: HOSPADM

## 2024-05-13 RX ORDER — SODIUM CHLORIDE 9 MG/ML
INJECTION, SOLUTION INTRAVENOUS PRN
Status: DISCONTINUED | OUTPATIENT
Start: 2024-05-13 | End: 2024-05-14 | Stop reason: HOSPADM

## 2024-05-13 RX ORDER — ACETAMINOPHEN 325 MG/1
650 TABLET ORAL EVERY 4 HOURS PRN
Status: DISCONTINUED | OUTPATIENT
Start: 2024-05-13 | End: 2024-05-14 | Stop reason: HOSPADM

## 2024-05-13 RX ORDER — ASPIRIN 81 MG/1
81 TABLET, CHEWABLE ORAL DAILY
Status: DISCONTINUED | OUTPATIENT
Start: 2024-05-13 | End: 2024-05-14 | Stop reason: HOSPADM

## 2024-05-13 RX ORDER — HYDROMORPHONE HYDROCHLORIDE 1 MG/ML
0.5 INJECTION, SOLUTION INTRAMUSCULAR; INTRAVENOUS; SUBCUTANEOUS EVERY 5 MIN PRN
Status: DISCONTINUED | OUTPATIENT
Start: 2024-05-13 | End: 2024-05-13 | Stop reason: HOSPADM

## 2024-05-13 RX ORDER — NALOXONE HYDROCHLORIDE 0.4 MG/ML
INJECTION, SOLUTION INTRAMUSCULAR; INTRAVENOUS; SUBCUTANEOUS PRN
Status: DISCONTINUED | OUTPATIENT
Start: 2024-05-13 | End: 2024-05-13 | Stop reason: HOSPADM

## 2024-05-13 RX ORDER — MEPERIDINE HYDROCHLORIDE 25 MG/ML
12.5 INJECTION INTRAMUSCULAR; INTRAVENOUS; SUBCUTANEOUS EVERY 5 MIN PRN
Status: DISCONTINUED | OUTPATIENT
Start: 2024-05-13 | End: 2024-05-13 | Stop reason: HOSPADM

## 2024-05-13 RX ORDER — SODIUM CHLORIDE 0.9 % (FLUSH) 0.9 %
5-40 SYRINGE (ML) INJECTION EVERY 12 HOURS SCHEDULED
Status: DISCONTINUED | OUTPATIENT
Start: 2024-05-13 | End: 2024-05-14 | Stop reason: HOSPADM

## 2024-05-13 RX ORDER — SUCCINYLCHOLINE CHLORIDE 20 MG/ML
INJECTION INTRAMUSCULAR; INTRAVENOUS PRN
Status: DISCONTINUED | OUTPATIENT
Start: 2024-05-13 | End: 2024-05-13 | Stop reason: SDUPTHER

## 2024-05-13 RX ORDER — DIPHENHYDRAMINE HYDROCHLORIDE 50 MG/ML
INJECTION INTRAMUSCULAR; INTRAVENOUS PRN
Status: DISCONTINUED | OUTPATIENT
Start: 2024-05-13 | End: 2024-05-13 | Stop reason: SDUPTHER

## 2024-05-13 RX ORDER — PHENYLEPHRINE HCL IN 0.9% NACL 0.4MG/10ML
SYRINGE (ML) INTRAVENOUS PRN
Status: DISCONTINUED | OUTPATIENT
Start: 2024-05-13 | End: 2024-05-13 | Stop reason: SDUPTHER

## 2024-05-13 RX ORDER — PRAVASTATIN SODIUM 40 MG
80 TABLET ORAL DAILY
Status: DISCONTINUED | OUTPATIENT
Start: 2024-05-13 | End: 2024-05-14 | Stop reason: HOSPADM

## 2024-05-13 RX ORDER — FENTANYL CITRATE 50 UG/ML
50 INJECTION, SOLUTION INTRAMUSCULAR; INTRAVENOUS EVERY 5 MIN PRN
Status: DISCONTINUED | OUTPATIENT
Start: 2024-05-13 | End: 2024-05-13 | Stop reason: HOSPADM

## 2024-05-13 RX ORDER — SODIUM CHLORIDE 9 MG/ML
INJECTION, SOLUTION INTRAVENOUS PRN
Status: DISCONTINUED | OUTPATIENT
Start: 2024-05-13 | End: 2024-05-13 | Stop reason: HOSPADM

## 2024-05-13 RX ORDER — HEPARIN SODIUM 10000 [USP'U]/ML
INJECTION, SOLUTION INTRAVENOUS; SUBCUTANEOUS PRN
Status: DISCONTINUED | OUTPATIENT
Start: 2024-05-13 | End: 2024-05-13 | Stop reason: HOSPADM

## 2024-05-13 RX ORDER — ONDANSETRON 2 MG/ML
INJECTION INTRAMUSCULAR; INTRAVENOUS PRN
Status: DISCONTINUED | OUTPATIENT
Start: 2024-05-13 | End: 2024-05-13 | Stop reason: SDUPTHER

## 2024-05-13 RX ORDER — CLOPIDOGREL BISULFATE 75 MG/1
75 TABLET ORAL DAILY
Status: DISCONTINUED | OUTPATIENT
Start: 2024-05-13 | End: 2024-05-14 | Stop reason: HOSPADM

## 2024-05-13 RX ADMIN — POLYETHYLENE GLYCOL 3350 17 G: 17 POWDER, FOR SOLUTION ORAL at 13:11

## 2024-05-13 RX ADMIN — PROTAMINE SULFATE 80 MG: 10 INJECTION, SOLUTION INTRAVENOUS at 09:16

## 2024-05-13 RX ADMIN — METOPROLOL TARTRATE 25 MG: 25 TABLET, FILM COATED ORAL at 13:13

## 2024-05-13 RX ADMIN — FENTANYL CITRATE 25 MCG: 50 INJECTION, SOLUTION INTRAMUSCULAR; INTRAVENOUS at 08:15

## 2024-05-13 RX ADMIN — ONDANSETRON HYDROCHLORIDE 4 MG: 2 INJECTION, SOLUTION INTRAMUSCULAR; INTRAVENOUS at 08:32

## 2024-05-13 RX ADMIN — FENTANYL CITRATE 25 MCG: 50 INJECTION, SOLUTION INTRAMUSCULAR; INTRAVENOUS at 08:38

## 2024-05-13 RX ADMIN — Medication 100 MCG: at 08:40

## 2024-05-13 RX ADMIN — ROCURONIUM BROMIDE 10 MG: 10 INJECTION INTRAVENOUS at 09:03

## 2024-05-13 RX ADMIN — DEXAMETHASONE SODIUM PHOSPHATE 4 MG: 4 INJECTION, SOLUTION INTRAMUSCULAR; INTRAVENOUS at 08:25

## 2024-05-13 RX ADMIN — DIPHENHYDRAMINE HYDROCHLORIDE 50 MG: 50 INJECTION, SOLUTION INTRAMUSCULAR; INTRAVENOUS at 08:24

## 2024-05-13 RX ADMIN — FENTANYL CITRATE 50 MCG: 50 INJECTION, SOLUTION INTRAMUSCULAR; INTRAVENOUS at 09:05

## 2024-05-13 RX ADMIN — ROCURONIUM BROMIDE 30 MG: 10 INJECTION INTRAVENOUS at 08:27

## 2024-05-13 RX ADMIN — HYDROCORTISONE SODIUM SUCCINATE 100 MG: 100 INJECTION, POWDER, FOR SOLUTION INTRAMUSCULAR; INTRAVENOUS at 08:25

## 2024-05-13 RX ADMIN — METOPROLOL TARTRATE 25 MG: 25 TABLET, FILM COATED ORAL at 20:59

## 2024-05-13 RX ADMIN — HEPARIN SODIUM 12000 UNITS: 1000 INJECTION, SOLUTION INTRAVENOUS; SUBCUTANEOUS at 08:45

## 2024-05-13 RX ADMIN — Medication 100 MCG: at 08:15

## 2024-05-13 RX ADMIN — ASPIRIN 81 MG CHEWABLE TABLET 81 MG: 81 TABLET CHEWABLE at 13:14

## 2024-05-13 RX ADMIN — CLOPIDOGREL BISULFATE 75 MG: 75 TABLET ORAL at 13:14

## 2024-05-13 RX ADMIN — SUCCINYLCHOLINE CHLORIDE 100 MG: 20 INJECTION, SOLUTION INTRAMUSCULAR; INTRAVENOUS at 08:16

## 2024-05-13 RX ADMIN — SODIUM CHLORIDE, PRESERVATIVE FREE 10 ML: 5 INJECTION INTRAVENOUS at 11:30

## 2024-05-13 RX ADMIN — SODIUM CHLORIDE, PRESERVATIVE FREE 10 ML: 5 INJECTION INTRAVENOUS at 20:59

## 2024-05-13 RX ADMIN — SUGAMMADEX 200 MG: 100 INJECTION, SOLUTION INTRAVENOUS at 09:11

## 2024-05-13 RX ADMIN — PROPOFOL 100 MG: 10 INJECTION, EMULSION INTRAVENOUS at 08:15

## 2024-05-13 RX ADMIN — HEPARIN SODIUM 3000 UNITS: 1000 INJECTION, SOLUTION INTRAVENOUS; SUBCUTANEOUS at 08:53

## 2024-05-13 RX ADMIN — Medication 100 MCG: at 09:10

## 2024-05-13 RX ADMIN — PRAVASTATIN SODIUM 80 MG: 40 TABLET ORAL at 13:13

## 2024-05-13 RX ADMIN — SODIUM CHLORIDE: 9 INJECTION, SOLUTION INTRAVENOUS at 08:08

## 2024-05-13 NOTE — ANESTHESIA PRE PROCEDURE
lb)    Height: 1.702 m (5' 7\")                                               BP Readings from Last 3 Encounters:   05/13/24 (!) 175/86   05/06/24 136/88   04/15/24 116/74       NPO Status:                                                   Date of last liquid consumption: 05/12/24                        Date of last solid food consumption: 05/12/24    BMI:   Wt Readings from Last 3 Encounters:   05/13/24 73 kg (161 lb)   05/06/24 75.2 kg (165 lb 12.6 oz)   04/15/24 73 kg (161 lb)     Body mass index is 25.22 kg/m².    CBC:   Lab Results   Component Value Date/Time    WBC 8.9 05/06/2024 03:33 PM    RBC 4.73 05/06/2024 03:33 PM    HGB 13.7 05/06/2024 03:33 PM    HCT 42.7 05/06/2024 03:33 PM    MCV 90.3 05/06/2024 03:33 PM    RDW 12.4 05/06/2024 03:33 PM     05/06/2024 03:33 PM       CMP:   Lab Results   Component Value Date/Time     05/06/2024 03:33 PM    K 3.9 05/06/2024 03:33 PM     05/06/2024 03:33 PM    CO2 25 05/06/2024 03:33 PM    BUN 24 05/06/2024 03:33 PM    CREATININE 0.71 05/06/2024 03:33 PM    GFRAA >60 03/30/2022 02:08 AM    AGRATIO 1.3 04/25/2022 11:27 AM    LABGLOM 85 05/06/2024 03:33 PM    LABGLOM 81 04/15/2024 12:05 PM    GLUCOSE 94 05/06/2024 03:33 PM    CALCIUM 9.4 05/06/2024 03:33 PM    BILITOT 0.4 04/15/2024 12:05 PM    ALKPHOS 97 04/15/2024 12:05 PM    ALKPHOS 106 04/25/2022 11:27 AM    AST 11 04/15/2024 12:05 PM    ALT 19 04/15/2024 12:05 PM       POC Tests: No results for input(s): \"POCGLU\", \"POCNA\", \"POCK\", \"POCCL\", \"POCBUN\", \"POCHEMO\", \"POCHCT\" in the last 72 hours.    Coags:   Lab Results   Component Value Date/Time    PROTIME 10.3 01/31/2024 04:30 PM    INR 1.0 01/31/2024 04:30 PM    APTT 26.7 01/31/2024 04:30 PM    APTT 27.6 07/25/2019 08:27 AM       HCG (If Applicable): No results found for: \"PREGTESTUR\", \"PREGSERUM\", \"HCG\", \"HCGQUANT\"     ABGs: No results found for: \"PHART\", \"PO2ART\", \"YCM3QLG\", \"VEN9WEY\", \"BEART\", \"S5PZAVJT\"     Type & Screen (If Applicable):  No

## 2024-05-13 NOTE — DISCHARGE SUMMARY
Discharge Summary    Date: 5/13/2024  Patient Name: Audrey Almanzar    YOB: 1940     Age: 83 y.o.    Admit Date: 5/13/2024  Discharge Date:  Discharge Condition:    Admission Diagnosis  A-fib (HCC) [I48.91];PAF (paroxysmal atrial fibrillation) (HCC) [I48.0]      Discharge Diagnosis  Principal Problem:    PAF (paroxysmal atrial fibrillation) (HCC)  Resolved Problems:    * No resolved hospital problems. *      Hospital Stay  Narrative of Hospital Course:  Patient was admitted for elective Watchman and was discharged same day without any issues. No complications.     Consultants:  None    Surgeries/procedures Performed:      Treatments:            Discharge Plan/Disposition:  Home    Hospital/Incidental Findings Requiring Follow Up:    Patient Instructions:    Diet:    Activity:  For number of days (if applicable):      Other Instructions:    Provider Follow-Up:   No follow-ups on file.     Significant Diagnostic Studies:    Recent Labs:  No visits with results within 1 Day(s) from this visit.  Latest known visit with results is:  Hospital Outpatient Visit on 05/07/2024  Color, UA                                     Date: 05/07/2024  Value: YELLOW/STRAW                     Ref range:                    Status: Final                Comment: Color Reference Range: Straw, Yellow or Dark Yellow  Appearance                                    Date: 05/07/2024  Value: CLEAR       Ref range: CLEAR              Status: Final  Specific [place], UA                          Date: 05/07/2024  Value: 1.007       Ref range:                    Status: Final  pH, Urine                                     Date: 05/07/2024  Value: 6.0         Ref range: 5.0 - 8.0          Status: Final  Protein, UA                                   Date: 05/07/2024  Value: Negative    Ref range: NEG mg/dL          Status: Final  Glucose, Ur                                   Date: 05/07/2024  Value: Negative    Ref range: NEG mg/dL

## 2024-05-13 NOTE — PROGRESS NOTES
Cardiac Cath Lab Recovery Arrival Note:    DUAL SKIN PERFORMED WITH LEFTY Almanzar arrived to Cardiac Cath Lab, Recovery Area. Staff introduced to patient. Patient identifiers verified with NAME and DATE OF BIRTH. Procedure verified with patient. Consent forms reviewed and signed by patient or authorized representative and verified. Allergies verified.     Patient and family oriented to department. Patient and family informed of procedure and plan of care.     Questions answered with review. Patient prepped for procedure, per orders from physician, prior to arrival.    Patient on cardiac monitor, non-invasive blood pressure, SPO2 monitor. On RA. Patient is A&Ox 4. Patient reports NO PAIN.     Patient in stretcher, in low position, with side rails up, call bell within reach, patient instructed to call if assistance as needed.    Patient prep in: [facility] Recovery Area, [place] [address].   Patient family has pager # N/A  Family in: MARYJO.   Prep by: HAO

## 2024-05-13 NOTE — ANESTHESIA PROCEDURE NOTES
Procedure Performed: LANCE       Start Time:        End Time:      Preanesthesia Checklist:  Patient identified, IV assessed, risks and benefits discussed, monitors and equipment assessed, procedure being performed at surgeon's request and anesthesia consent obtained.    General Procedure Information  Diagnostic Indications for Echo:  assessment of surgical repair, hemodynamic monitoring, suspected pericardial effusion and assessment of valve function  Location performed:  Cath lab  Intubated  Bite block not placed  Heart visualized  Probe Insertion:  Easy  Probe Type:  Mulitplane  Modalities:  2D and color flow mapping    Echocardiographic and Doppler Measurements    Ventricles    Right Ventricle:  Hypertrophy not present.  Thrombus not present.  Global function normal.    Left Ventricle:  Hypertrophy not present.  Thrombus not present.  Global Function normal.      Ventricular Regional Function:  1- Basal Anteroseptal:  normal  2- Basal Anterior:  normal  3- Basal Anterolateral:  normal  4- Basal Inferolateral:  normal  5- Basal Inferior:  normal  6- Basal Inferoseptal:  normal  7- Mid Anteroseptal:  normal  8- Mid Anterior:  normal  9- Mid Anterolateral:  normal  10- Mid Inferolateral:  normal  11- Mid Inferior:  normal  12- Mid Inferoseptal:  normal  13- Apical Anterior:  normal  14- Apical Lateral:  normal  15- Apical Inferior:  normal  16- Apical Septal:  normal  17- [place]:  normal      Valves    Aortic Valve:  Annulus normal.  Stenosis not present.  Regurgitation none.  Leaflet motions normal.      Mitral Valve:  Annulus normal.  Stenosis not present.  Regurgitation mild.  Leaflet motions normal.      Tricuspid Valve:  Annulus normal.  Stenosis not present.  Leaflet motions normal.    Pulmonic Valve:  Annulus normal.  Stenosis not present.  Regurgitation none.    Other Valve Findings:       Trace TR    Aorta    Ascending Aorta:  Size normal.  Diameter 3.4 cm.  Dissection not present.    Aortic Arch:  Size

## 2024-05-13 NOTE — ANESTHESIA POSTPROCEDURE EVALUATION
Department of Anesthesiology  Postprocedure Note    Patient: Audrey Almanzar  MRN: [id number]  YOB: 1940  Date of evaluation: 5/13/2024    Procedure Summary       Date: 05/13/24 Room / Location: [facility] EP LAB 2 / [facility] CARDIAC CATH LAB    Anesthesia Start: 0808 Anesthesia Stop: 0942    Procedures:       Watchman jennifer closure device      Ultrasound guided vascular access Diagnosis: Paroxysmal atrial fibrillation (HCC)    Providers: Daniel Addison MD Responsible Provider: Elio Patel DO    Anesthesia Type: General ASA Status: 3            Anesthesia Type: General    William Phase I: William Score: 8    William Phase II:      Anesthesia Post Evaluation    Patient location during evaluation: PACU  Level of consciousness: awake and alert (baseline)  Pain score: 0  Airway patency: patent  Nausea & Vomiting: no nausea  Cardiovascular status: hemodynamically stable  Respiratory status: acceptable  Hydration status: euvolemic  Comments: Seen, no complaints   Pain management: adequate    There were no known notable events for this encounter.   Lab  notified

## 2024-05-13 NOTE — H&P
REASON FOR VISIT:  This 83 year old patient presents for CVA and hyperlipidemia.    HISTORY OF PRESENT ILLNESS:   Patient is here for post hospital follow up. She presented to [facility] w atrial flutter with rapid ventricular response. She underwent successful atrial flutter ablation. She is here today for follow up. She is now on Eliquis. Her CHADS-VASC is 5 (agex2, gender h/o CVA x2). She has had recurrent falls. She is falling at least once or twice a year. Last year broke her hip after a fall. She is here today with her .       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  She is having frequent falls and recently broke her hip after a fall.  She has had a prior ablation procedure.  No other complaints or other new problems.  She understands the procedure and wants to go ahead    CARDIAC HISTORY  RISK FACTORS:  1 Dyslipidemia   2 Family History of CAD [Less than 60 years of age]   3 Former Tobacco Abuse: cigarettes       CARDIOVASCULAR PROCEDURES  Procedure Date Results   EKG 2024    EKG 2023 Sinus Rhythm,    Ablations 2024 1.  Successful ablation of typical right atrial flutter, bidirectional block was demonstrated.   Echo 2023 EF 60-65%       ACTIVE ALLERGIES:  Ingredient Reaction (Severity) Medication Name Comment   AMOXICILLIN TRIHYDRATE  Amoxicillin    IODINE  Iodine    LIDOCAINE      NITROFURANTOIN      OXYCODONE      POTASSIUM IODIDE  Iodine    SODIUM IODIDE  Iodine        PROBLEM LIST:  Problem List reviewed.   Problem List (Not Yet Mapped To Snomed-CT®)  Problem Description Onset Date Notes   Syncope and collapse 2008    Mitral valve disorders 2008    Palpitations 2008    Dizziness and giddiness 2008

## 2024-05-13 NOTE — PROGRESS NOTES
Figure 8 sutures removed, no bleeding/hematoma. Quick clot /tegaderm over incision site. VSS, NSR, 2L NC,  Afebrile Pt reporting no pain. Bedrest over at 1330.

## 2024-05-14 VITALS
TEMPERATURE: 97.3 F | DIASTOLIC BLOOD PRESSURE: 69 MMHG | HEIGHT: 67 IN | HEART RATE: 70 BPM | BODY MASS INDEX: 25.27 KG/M2 | OXYGEN SATURATION: 93 % | WEIGHT: 161 LBS | SYSTOLIC BLOOD PRESSURE: 116 MMHG | RESPIRATION RATE: 20 BRPM

## 2024-05-14 LAB
ACT BLD: 325 SECS (ref 79–138)
HCT VFR BLD AUTO: 39 % (ref 35–47)
HGB BLD-MCNC: 12.4 G/DL (ref 11.5–16)

## 2024-05-14 PROCEDURE — 6370000000 HC RX 637 (ALT 250 FOR IP): Performed by: INTERNAL MEDICINE

## 2024-05-14 PROCEDURE — 85018 HEMOGLOBIN: CPT

## 2024-05-14 PROCEDURE — 36415 COLL VENOUS BLD VENIPUNCTURE: CPT

## 2024-05-14 PROCEDURE — 85014 HEMATOCRIT: CPT

## 2024-05-14 RX ADMIN — POLYETHYLENE GLYCOL 3350 17 G: 17 POWDER, FOR SOLUTION ORAL at 08:53

## 2024-05-14 RX ADMIN — METOPROLOL TARTRATE 25 MG: 25 TABLET, FILM COATED ORAL at 08:53

## 2024-05-14 RX ADMIN — ASPIRIN 81 MG CHEWABLE TABLET 81 MG: 81 TABLET CHEWABLE at 08:53

## 2024-05-14 RX ADMIN — PANTOPRAZOLE SODIUM 40 MG: 40 TABLET, DELAYED RELEASE ORAL at 06:08

## 2024-05-14 RX ADMIN — PRAVASTATIN SODIUM 80 MG: 40 TABLET ORAL at 08:53

## 2024-05-14 RX ADMIN — CLOPIDOGREL BISULFATE 75 MG: 75 TABLET ORAL at 08:53

## 2024-05-14 NOTE — PLAN OF CARE
Problem: Chronic Conditions and Co-morbidities  Goal: Patient's chronic conditions and co-morbidity symptoms are monitored and maintained or improved  Outcome: Adequate for Discharge     Problem: Skin/Tissue Integrity  Goal: Absence of new skin breakdown  Description: 1.  Monitor for areas of redness and/or skin breakdown  2.  Assess vascular access sites hourly  3.  Every 4-6 hours minimum:  Change oxygen saturation probe site  4.  Every 4-6 hours:  If on nasal continuous positive airway pressure, respiratory therapy assess nares and determine need for appliance change or resting period.  Outcome: Adequate for Discharge     Problem: Discharge Planning  Goal: Discharge to home or other facility with appropriate resources  Outcome: Adequate for Discharge     Problem: Safety - Adult  Goal: Free from fall injury  Outcome: Adequate for Discharge     Problem: ABCDS Injury Assessment  Goal: Absence of physical injury  Outcome: Adequate for Discharge

## 2024-05-14 NOTE — CARE COORDINATION
Care Management Initial Assessment       RUR: 14%  Readmission? No  1st IM letter given? Yes - 2nd letter given on 5/14/24  1st  letter given: No    Chart reviewed. Patient discussed in rounds and may be discharged today.    CM met with patient and  to discuss discharge planning. Her  will transport her home, when discharged. States they will be able to  medications. No dc needs for CM identified.    Okay to dc from CM standpoint.      05/14/24 1103   Service Assessment   Patient Orientation Alert and Oriented   Cognition Alert   History Provided By Patient;Spouse  (Martin Almanzar)   Primary Caregiver Spouse   Support Systems Spouse/Significant Other   Patient's Healthcare Decision Maker is: Patient Declined (Legal Next of Kin Remains as Decision Maker)   PCP Verified by CM Yes   Prior Functional Level Assistance with the following:;Bathing;Dressing;Toileting;Cooking;Housework;Shopping   Current Functional Level Assistance with the following:;Bathing;Dressing;Toileting;Cooking;Housework;Shopping;Mobility  (uses a walker at home)   Can patient return to prior living arrangement Yes   Ability to make needs known: Good   Family able to assist with home care needs: Yes   Would you like for me to discuss the discharge plan with any other family members/significant others, and if so, who? Yes  ( in the room)   Financial Resources Medicare   Community Resources None   Social/Functional History   Lives With Spouse   Type of Home House   Home Equipment Wheelchair - Manual;Walker - Standard   Active  No   Patient's  Info  transports   Discharge Planning   Type of Residence House   Living Arrangements Spouse/Significant Other   Type of Home Care Services None   Patient expects to be discharged to: House   Services At/After Discharge   Transition of Care Consult (CM Consult) Discharge Planning   Services At/After Discharge None    Resource Information Provided? No

## 2024-05-14 NOTE — PROGRESS NOTES
1225: I have reviewed discharge instructions with the spouse. The spouse verbalized understanding. Discharge medications reviewed with the spouse and appropriate educational materials and side effects teaching were provided. Follow-up appointments reviewed. Opportunity for questions and clarification was provided. Patient is eating lunch now, the spouse will let this nurse know when they are ready.    1255: Patient was taken to discharge area by LEFTY Day and LEFTY Lopez. Patient left with the spouse.

## 2024-05-14 NOTE — PROGRESS NOTES
1930 Pt in bed, VSS, pt A&O x 3, unable to name the president and the year. Pt right groin site CDI, no hematoma.       Predictive Model Details          47 (Caution)  Factor Value    Calculated 5/13/2024 22:55 38% Respiratory rate 30    Deterioration Index Model 27% Age 83 years old     20% Neurological exam X     14% Narendra coma scale 14     1% Systolic 118     1% Pulse 85     0% Pulse oximetry 96 %     0% Temperature 98.1 °F (36.7 °C)

## 2024-07-01 ENCOUNTER — HOSPITAL ENCOUNTER (OUTPATIENT)
Facility: HOSPITAL | Age: 84
Discharge: HOME | End: 2024-07-03
Attending: INTERNAL MEDICINE
Payer: MEDICARE

## 2024-07-01 VITALS
WEIGHT: 160 LBS | SYSTOLIC BLOOD PRESSURE: 129 MMHG | DIASTOLIC BLOOD PRESSURE: 83 MMHG | HEART RATE: 88 BPM | OXYGEN SATURATION: 91 % | BODY MASS INDEX: 25.06 KG/M2 | RESPIRATION RATE: 17 BRPM

## 2024-07-01 DIAGNOSIS — Z95.818 PRESENCE OF WATCHMAN LEFT ATRIAL APPENDAGE CLOSURE DEVICE: ICD-10-CM

## 2024-07-01 PROCEDURE — 6370000000 HC RX 637 (ALT 250 FOR IP): Performed by: INTERNAL MEDICINE

## 2024-07-01 PROCEDURE — 99152 MOD SED SAME PHYS/QHP 5/>YRS: CPT

## 2024-07-01 PROCEDURE — 6360000002 HC RX W HCPCS: Performed by: INTERNAL MEDICINE

## 2024-07-01 RX ORDER — FENTANYL CITRATE 50 UG/ML
INJECTION, SOLUTION INTRAMUSCULAR; INTRAVENOUS PRN
Status: COMPLETED | OUTPATIENT
Start: 2024-07-01 | End: 2024-07-01

## 2024-07-01 RX ORDER — LIDOCAINE HYDROCHLORIDE 20 MG/ML
SOLUTION OROPHARYNGEAL PRN
Status: COMPLETED | OUTPATIENT
Start: 2024-07-01 | End: 2024-07-01

## 2024-07-01 RX ORDER — MIDAZOLAM HYDROCHLORIDE 1 MG/ML
INJECTION INTRAMUSCULAR; INTRAVENOUS PRN
Status: COMPLETED | OUTPATIENT
Start: 2024-07-01 | End: 2024-07-01

## 2024-07-01 RX ADMIN — MIDAZOLAM HYDROCHLORIDE 1 MG: 1 INJECTION, SOLUTION INTRAMUSCULAR; INTRAVENOUS at 11:45

## 2024-07-01 RX ADMIN — MIDAZOLAM HYDROCHLORIDE 1 MG: 1 INJECTION, SOLUTION INTRAMUSCULAR; INTRAVENOUS at 11:43

## 2024-07-01 RX ADMIN — LIDOCAINE HYDROCHLORIDE 5 ML: 20 SOLUTION ORAL at 11:37

## 2024-07-01 RX ADMIN — MIDAZOLAM HYDROCHLORIDE 1 MG: 1 INJECTION, SOLUTION INTRAMUSCULAR; INTRAVENOUS at 11:48

## 2024-07-01 RX ADMIN — FENTANYL CITRATE 50 MCG: 50 INJECTION, SOLUTION INTRAMUSCULAR; INTRAVENOUS at 11:43

## 2024-07-01 RX ADMIN — BENZOCAINE, BUTAMBEN, AND TETRACAINE HYDROCHLORIDE 1 SPRAY: .028; .004; .004 AEROSOL, SPRAY TOPICAL at 11:37

## 2024-07-01 NOTE — PROGRESS NOTES
Patient arrived to Non-Invasive Cardiology Lab for Out Patient LANCE Procedure. Staff introduced to patient. Patient identifiers verified with Name and Date of Birth. Procedure verified with patient. Consent forms reviewed and signed by patient or authorized representative and verified. Allergies verified. Patient informed of procedure and plan of care. Questions answered with review.     Patient on cardiac monitor, non-invasive blood pressure, SPO2 monitor. On room air. Patient is A&Ox3. Patient reports no complaints.    Patient on stretcher, in low position, with side rails up. Patient instructed to call for assistance as needed.    Family in waiting room.

## 2024-07-01 NOTE — PROGRESS NOTES
Patient returned to her baseline activity level.      Patient wheeled to hospital entrance where her  was waiting to drive home.

## 2024-07-01 NOTE — PROGRESS NOTES
Pt sedated with 3mg Versed and 50mcg Fentanyl for LANCE (monitored sedation from 1142 to 1153)      Provider unable to pass probe. Case canceled by provider.  Post watchman survey will be done via CT as per provider.

## 2024-07-05 ENCOUNTER — APPOINTMENT (OUTPATIENT)
Facility: HOSPITAL | Age: 84
DRG: 552 | End: 2024-07-05
Payer: MEDICARE

## 2024-07-05 ENCOUNTER — HOSPITAL ENCOUNTER (INPATIENT)
Facility: HOSPITAL | Age: 84
LOS: 2 days | Discharge: HOME HEALTH | DRG: 552 | End: 2024-07-09
Attending: STUDENT IN AN ORGANIZED HEALTH CARE EDUCATION/TRAINING PROGRAM | Admitting: INTERNAL MEDICINE
Payer: MEDICARE

## 2024-07-05 DIAGNOSIS — M54.50 ACUTE MIDLINE LOW BACK PAIN WITHOUT SCIATICA: Primary | ICD-10-CM

## 2024-07-05 DIAGNOSIS — S32.000A CLOSED WEDGE FRACTURE OF LUMBAR VERTEBRA, UNSPECIFIED LUMBAR VERTEBRAL LEVEL, INITIAL ENCOUNTER (HCC): ICD-10-CM

## 2024-07-05 DIAGNOSIS — R29.6 FREQUENT FALLS: ICD-10-CM
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
## 2024-07-05 LAB
ALBUMIN SERPL-MCNC: 3 G/DL (ref 3.5–5)
ALBUMIN/GLOB SERPL: 1 (ref 1.1–2.2)
ALP SERPL-CCNC: 90 U/L (ref 45–117)
ALT SERPL-CCNC: 19 U/L (ref 12–78)
ANION GAP SERPL CALC-SCNC: 5 MMOL/L (ref 5–15)
APPEARANCE UR: CLEAR
AST SERPL-CCNC: 11 U/L (ref 15–37)
BACTERIA URNS QL MICRO: NEGATIVE /HPF
BASOPHILS # BLD: 0 K/UL (ref 0–0.1)
BASOPHILS NFR BLD: 1 % (ref 0–1)
BILIRUB SERPL-MCNC: 0.7 MG/DL (ref 0.2–1)
BILIRUB UR QL: NEGATIVE
BUN SERPL-MCNC: 17 MG/DL (ref 6–20)
BUN/CREAT SERPL: 25 (ref 12–20)
CALCIUM SERPL-MCNC: 8.8 MG/DL (ref 8.5–10.1)
CHLORIDE SERPL-SCNC: 113 MMOL/L (ref 97–108)
CO2 SERPL-SCNC: 26 MMOL/L (ref 21–32)
COLOR UR: NORMAL
CREAT SERPL-MCNC: 0.69 MG/DL (ref 0.55–1.02)
DIFFERENTIAL METHOD BLD: NORMAL
EOSINOPHIL # BLD: 0.1 K/UL (ref 0–0.4)
EOSINOPHIL NFR BLD: 2 % (ref 0–7)
EPITH CASTS URNS QL MICRO: NORMAL /LPF
ERYTHROCYTE [DISTWIDTH] IN BLOOD BY AUTOMATED COUNT: 13.3 % (ref 11.5–14.5)
GLOBULIN SER CALC-MCNC: 3.1 G/DL (ref 2–4)
GLUCOSE SERPL-MCNC: 95 MG/DL (ref 65–100)
GLUCOSE UR STRIP.AUTO-MCNC: NEGATIVE MG/DL
HCT VFR BLD AUTO: 41 % (ref 35–47)
HGB BLD-MCNC: 12.9 G/DL (ref 11.5–16)
HGB UR QL STRIP: NEGATIVE
HYALINE CASTS URNS QL MICRO: NORMAL /LPF (ref 0–2)
IMM GRANULOCYTES # BLD AUTO: 0 K/UL (ref 0–0.04)
IMM GRANULOCYTES NFR BLD AUTO: 0 % (ref 0–0.5)
KETONES UR QL STRIP.AUTO: NEGATIVE MG/DL
LEUKOCYTE ESTERASE UR QL STRIP.AUTO: NEGATIVE
LYMPHOCYTES # BLD: 1.7 K/UL (ref 0.8–3.5)
LYMPHOCYTES NFR BLD: 27 % (ref 12–49)
MCH RBC QN AUTO: 28.2 PG (ref 26–34)
MCHC RBC AUTO-ENTMCNC: 31.5 G/DL (ref 30–36.5)
MCV RBC AUTO: 89.7 FL (ref 80–99)
MONOCYTES # BLD: 0.6 K/UL (ref 0–1)
MONOCYTES NFR BLD: 10 % (ref 5–13)
NEUTS SEG # BLD: 3.7 K/UL (ref 1.8–8)
NEUTS SEG NFR BLD: 60 % (ref 32–75)
NITRITE UR QL STRIP.AUTO: NEGATIVE
NRBC # BLD: 0 K/UL (ref 0–0.01)
NRBC BLD-RTO: 0 PER 100 WBC
PH UR STRIP: 7 (ref 5–8)
PLATELET # BLD AUTO: 181 K/UL (ref 150–400)
PMV BLD AUTO: 12.5 FL (ref 8.9–12.9)
POTASSIUM SERPL-SCNC: 3.5 MMOL/L (ref 3.5–5.1)
PROT SERPL-MCNC: 6.1 G/DL (ref 6.4–8.2)
PROT UR STRIP-MCNC: NEGATIVE MG/DL
RBC # BLD AUTO: 4.57 M/UL (ref 3.8–5.2)
RBC #/AREA URNS HPF: NORMAL /HPF (ref 0–5)
SODIUM SERPL-SCNC: 144 MMOL/L (ref 136–145)
SP GR UR REFRACTOMETRY: 1.02
URINE CULTURE IF INDICATED: NORMAL
UROBILINOGEN UR QL STRIP.AUTO: 1 EU/DL (ref 0.2–1)
WBC # BLD AUTO: 6.1 K/UL (ref 3.6–11)
WBC URNS QL MICRO: NORMAL /HPF (ref 0–4)

## 2024-07-05 PROCEDURE — 80053 COMPREHEN METABOLIC PANEL: CPT

## 2024-07-05 PROCEDURE — 99285 EMERGENCY DEPT VISIT HI MDM: CPT

## 2024-07-05 PROCEDURE — 70551 MRI BRAIN STEM W/O DYE: CPT

## 2024-07-05 PROCEDURE — 72100 X-RAY EXAM L-S SPINE 2/3 VWS: CPT

## 2024-07-05 PROCEDURE — 6370000000 HC RX 637 (ALT 250 FOR IP): Performed by: NURSE PRACTITIONER

## 2024-07-05 PROCEDURE — 81001 URINALYSIS AUTO W/SCOPE: CPT

## 2024-07-05 PROCEDURE — G0378 HOSPITAL OBSERVATION PER HR: HCPCS

## 2024-07-05 PROCEDURE — 70450 CT HEAD/BRAIN W/O DYE: CPT

## 2024-07-05 PROCEDURE — 6370000000 HC RX 637 (ALT 250 FOR IP): Performed by: STUDENT IN AN ORGANIZED HEALTH CARE EDUCATION/TRAINING PROGRAM

## 2024-07-05 PROCEDURE — 36415 COLL VENOUS BLD VENIPUNCTURE: CPT

## 2024-07-05 PROCEDURE — 85025 COMPLETE CBC W/AUTO DIFF WBC: CPT

## 2024-07-05 PROCEDURE — 73502 X-RAY EXAM HIP UNI 2-3 VIEWS: CPT

## 2024-07-05 RX ORDER — ACETAMINOPHEN 500 MG
1000 TABLET ORAL
Status: COMPLETED | OUTPATIENT
Start: 2024-07-05 | End: 2024-07-05

## 2024-07-05 RX ORDER — VITAMIN B COMPLEX
1000 TABLET ORAL DAILY
Status: DISCONTINUED | OUTPATIENT
Start: 2024-07-05 | End: 2024-07-09 | Stop reason: HOSPADM

## 2024-07-05 RX ORDER — POLYETHYLENE GLYCOL 3350 17 G/17G
17 POWDER, FOR SOLUTION ORAL DAILY
Status: DISCONTINUED | OUTPATIENT
Start: 2024-07-05 | End: 2024-07-09 | Stop reason: HOSPADM

## 2024-07-05 RX ORDER — PRAVASTATIN SODIUM 40 MG
80 TABLET ORAL DAILY
Status: DISCONTINUED | OUTPATIENT
Start: 2024-07-05 | End: 2024-07-09 | Stop reason: HOSPADM

## 2024-07-05 RX ORDER — ASPIRIN 81 MG/1
81 TABLET, CHEWABLE ORAL DAILY
Status: DISCONTINUED | OUTPATIENT
Start: 2024-07-05 | End: 2024-07-09 | Stop reason: HOSPADM

## 2024-07-05 RX ORDER — HEPARIN SODIUM 5000 [USP'U]/ML
5000 INJECTION, SOLUTION INTRAVENOUS; SUBCUTANEOUS EVERY 8 HOURS SCHEDULED
Status: DISCONTINUED | OUTPATIENT
Start: 2024-07-06 | End: 2024-07-09 | Stop reason: HOSPADM

## 2024-07-05 RX ORDER — ONDANSETRON 2 MG/ML
4 INJECTION INTRAMUSCULAR; INTRAVENOUS EVERY 4 HOURS PRN
Status: DISCONTINUED | OUTPATIENT
Start: 2024-07-05 | End: 2024-07-09 | Stop reason: HOSPADM

## 2024-07-05 RX ORDER — PANTOPRAZOLE SODIUM 40 MG/1
40 TABLET, DELAYED RELEASE ORAL
Status: DISCONTINUED | OUTPATIENT
Start: 2024-07-06 | End: 2024-07-09 | Stop reason: HOSPADM

## 2024-07-05 RX ORDER — ACETAMINOPHEN 325 MG/1
650 TABLET ORAL EVERY 4 HOURS PRN
Status: DISCONTINUED | OUTPATIENT
Start: 2024-07-05 | End: 2024-07-09 | Stop reason: HOSPADM

## 2024-07-05 RX ORDER — HYDRALAZINE HYDROCHLORIDE 25 MG/1
25 TABLET, FILM COATED ORAL EVERY 8 HOURS PRN
Status: DISCONTINUED | OUTPATIENT
Start: 2024-07-05 | End: 2024-07-09 | Stop reason: HOSPADM

## 2024-07-05 RX ORDER — CLOPIDOGREL BISULFATE 75 MG/1
75 TABLET ORAL DAILY
Status: DISCONTINUED | OUTPATIENT
Start: 2024-07-05 | End: 2024-07-09 | Stop reason: HOSPADM

## 2024-07-05 RX ORDER — PANTOPRAZOLE SODIUM 40 MG/1
40 TABLET, DELAYED RELEASE ORAL
Status: DISCONTINUED | OUTPATIENT
Start: 2024-07-06 | End: 2024-07-05

## 2024-07-05 RX ADMIN — POLYETHYLENE GLYCOL 3350 17 G: 17 POWDER, FOR SOLUTION ORAL at 14:38

## 2024-07-05 RX ADMIN — Medication 1000 UNITS: at 14:38

## 2024-07-05 RX ADMIN — POTASSIUM BICARBONATE 40 MEQ: 782 TABLET, EFFERVESCENT ORAL at 21:00

## 2024-07-05 RX ADMIN — POTASSIUM BICARBONATE 40 MEQ: 782 TABLET, EFFERVESCENT ORAL at 14:38

## 2024-07-05 RX ADMIN — ASPIRIN 81 MG: 81 TABLET, CHEWABLE ORAL at 14:38

## 2024-07-05 RX ADMIN — METOPROLOL TARTRATE 25 MG: 25 TABLET, FILM COATED ORAL at 14:38

## 2024-07-05 RX ADMIN — ACETAMINOPHEN 1000 MG: 500 TABLET ORAL at 10:04

## 2024-07-05 RX ADMIN — PRAVASTATIN SODIUM 80 MG: 40 TABLET ORAL at 14:38

## 2024-07-05 RX ADMIN — METOPROLOL TARTRATE 25 MG: 25 TABLET, FILM COATED ORAL at 21:00

## 2024-07-05 ASSESSMENT — PAIN SCALES - GENERAL
PAINLEVEL_OUTOF10: 2
PAINLEVEL_OUTOF10: 2
PAINLEVEL_OUTOF10: 5

## 2024-07-05 ASSESSMENT — PAIN DESCRIPTION - LOCATION: LOCATION: BACK

## 2024-07-05 NOTE — ED PROVIDER NOTES
[facility] EMERGENCY DEPT  EMERGENCY DEPARTMENT ENCOUNTER       Pt Name: Audrey Almanzar  MRN: [id number]  Birthdate 1940  Date of evaluation: 7/5/2024  Provider: Ventura Newton MD   PCP: Soumya Dye MD  Note Started: 9:25 AM EDT 7/5/24     CHIEF COMPLAINT       Chief Complaint   Patient presents with    Fall     Pt had mechanical GLF at 1700 last night and back pain afterward that she took tylenol for. Pt couldn't get out of bed this morning due to bp and called 911.     Back Pain        HISTORY OF PRESENT ILLNESS: 1 or more elements      History From: patient, History limited by: chronic dementia     Audrey Almanzar is a 83 y.o. female presenting after ground-level fall.  This happened approximately 5 PM last night.  She complains of low back pain.  She fell a few days ago as well.  She did remember the fall.  She did not syncopized.  Denies any headache, dizziness.  No chest pain or shortness of breath.  Otherwise feels okay.  Tylenol last night       Please See MDM for Additional Details of the HPI/PMH  Nursing Notes were all reviewed and agreed with or any disagreements were addressed in the HPI.     REVIEW OF SYSTEMS        Positives and Pertinent negatives as per HPI.    PAST HISTORY     Past Medical History:  Past Medical History:   Diagnosis Date    Acid reflux     Constipation     Falls     Hip fracture requiring operative repair ([facility]) 2022    left    Hypertension     Incontinence     wears depends    Stroke ([facility]) 2019    left side weakness    Thyroid disease     thyroid nodules       Past Surgical History:  Past Surgical History:   Procedure Laterality Date    BREAST SURGERY Right     biopsy, benign    COLONOSCOPY      EP DEVICE PROCEDURE N/A 02/02/2024    Ablation A-flutter performed by Daniel Addison MD at [facility] CARDIAC CATH LAB    EP DEVICE PROCEDURE N/A 03/25/2024    Watchman jennifer closure device performed by Daniel Addison MD at [facility] CARDIAC CATH LAB    EP DEVICE PROCEDURE N/A 5/13/2024

## 2024-07-05 NOTE — CONSULTS
EP/ ARRHYTHMIA/ CARDIOLOGY CONSULT requested secondary to atrial fibrillation    Patient ID:  Patient: Audrey Almanzar  MRN: [id number]  Age: 83 y.o.  : 1940    Date of  Admission: 2024  9:10 AM   PCP:  Soumya Dye MD    Assessment:   Right atrial flutter with ablation by Dr. Addison in .  Recent Watchman by Dr. Addison in 2024 after anticoagulation for JOVAN thrombus in 3/2024.  Presumptive paroxysmal atrial fibrillation.  Old strokes seen on MRI (left thalamus and right mikki new since 2019), microvascular disease.  Residual L-sided weakness.  Frequent falls with ground level fall x2 PTA.  Hypertension.  Hyperlipidemia.  Full code.    Plan:     Continue DAPT.  I do not see evidence of intracranial bleed.  Routine post-Watchman follow-up with Dr. Addison.   confesses that he feels he cannot care for her safely at home.    I answered questions for her and her .      [x]       High complexity decision making was performed in this patient at high risk for decompensation with multiple organ involvement.    Audrey Almanzar is a 83 y.o. female with a history of frequent falls.  She is here after having two within a 48 hour span prior to admission.      Per the hospitalist H&P:  \"Audrey Almanzar is a 83 y.o.  female with PMHx significant for falls, she fell 2 days ago noted GLF, and again today. It is not noted if the fall was witnessed. She denies hitting her head today although she is noted with a old bruising to her left temporal area. She denies headaches, lightheadedness or dizziness. Other history includes  GERD, HTN, thyroid disease, CVA, constipation, and incontinence. Of note, pt was to be discharged, however , the patients  noted he could not take care of her at home because she could not walk. RN staff assisted patient to ambulate.  was not convinced declaring he can not take care of her at home.\"    Past Medical History:   Diagnosis

## 2024-07-05 NOTE — H&P
Hospitalist Admission Note    NAME:   Audrey Almanzar   : 1940   MRN: [id number]     Date/Time: 2024 1:19 PM    Patient PCP: Soumya Dye MD    ______________________________________________________________________  Given the patient's current clinical presentation, I have a high level of concern for decompensation if discharged from the emergency department.  Complex decision making was performed, which includes reviewing the patient's available past medical records, laboratory results, and x-ray films.       My assessment of this patient's clinical condition and my plan of care is as follows.    Assessment / Plan:  Primary:   GLF   Xray lumbar spine   Xray hip/pelvis   Obtain Head CT   Consult neurology   PT OT SLP eval   Fall precautions   CM for discharge planning   Low back pain- avoid narcotic   Consult cardiology - hx afib /flutter   Telemetry   UA   EKG     Secondary:     Afib/flutter   Metoprolol   Telemetry     CVA/TIA  Per history    HLD  Continue ASA   Continue Plavix - hold until head CT completed   Pravastatin   Lipid panel       PE   No AC - 2023    Encephalopathy  Avoid benzo  Avoid narcotics     AMS  Sundowners precaution     GERD  Nexium ---> protonix     Supplements   Vit D     Thyroid disease  Ck TSH       Medical Decision Making:   I personally reviewed labs: Yes, cbc, cmp   I personally reviewed imaging: lumbar spine and hip   I personally reviewed EKG:y  Toxic drug monitoring: none  Discussed case with: ED provider. After discussion I am in agreement that acuity of patient's medical condition necessitates hospital stay.      Code Status: full code   DVT Prophylaxis: protonix   Baseline: lives with       Subjective:   CHIEF COMPLAINT: \" I fell a couple of days ago and again today\". Denies headache, change in vision, chest pain, SOB or dyspnea.     HISTORY OF PRESENT ILLNESS:     Audrey Almanzar is a 83 y.o.  female with PMHx significant for falls, she fell 2

## 2024-07-05 NOTE — ED NOTES
Able to ambulate pt independently with walker and  socks after  expressed concerns that he stated \"she can't walk I can't take care of her at home\". Pt  and MD at bedside while pt ambulated with walker independently.  still states \"You're going to have to keep her this has happened before. I can't take care of her at home\".

## 2024-07-05 NOTE — CONSULTS
CONSULT - Neurology      Name:  Audrey Almanzar       MRN: [id number]  Location: 3409/01    Date: 7/5/2024  Time:  2:42 PM        Chief Complaint:   Chief Complaint   Patient presents with    Fall     Pt had mechanical GLF at 1700 last night and back pain afterward that she took tylenol for. Pt couldn't get out of bed this morning due to bp and called 911.     Back Pain       HPI:  It is a great pleasure to see Audrey Almanzar, a 83 y.o. female today in the hospital . Briefly these are the events happened as per the chart and taken from the chart.  The patient was doing fine and the symptoms started with  after mechanical ground-level fall.   She fell a few days ago as well.  She did remember the fall.  She did not syncopized. The symptoms fluctuated in the beginning. There were no aggravating and relieving factors. The patient was brought to the emergency room for further evaluation.     PAST MEDICAL HISTORY:  Past Medical History:   Diagnosis Date    Acid reflux     Constipation     Falls     Hip fracture requiring operative repair ([facility]) 2022    left    Hypertension     Incontinence     wears depends    Stroke ([facility]) 2019    left side weakness    Thyroid disease     thyroid nodules     PAST SURGICAL HISTORY:    Past Surgical History:   Procedure Laterality Date    BREAST SURGERY Right     biopsy, benign    COLONOSCOPY      EP DEVICE PROCEDURE N/A 02/02/2024    Ablation A-flutter performed by Daniel Addison MD at [facility] CARDIAC CATH LAB    EP DEVICE PROCEDURE N/A 03/25/2024    Watchman jennifer closure device performed by Daniel Addison MD at [facility] CARDIAC CATH LAB    EP DEVICE PROCEDURE N/A 5/13/2024    Watchman jennifer closure device performed by Daniel Addison MD at [facility] CARDIAC CATH LAB    GYN  1996    hysterectomy ? BSO    INVASIVE VASCULAR N/A 02/02/2024    Ultrasound guided vascular access performed by Daniel Addison MD at [facility] CARDIAC CATH LAB    INVASIVE VASCULAR N/A 5/13/2024    Ultrasound guided vascular access

## 2024-07-06 ENCOUNTER — APPOINTMENT (OUTPATIENT)
Facility: HOSPITAL | Age: 84
DRG: 552 | End: 2024-07-06
Payer: MEDICARE

## 2024-07-06 PROBLEM — R29.6 FREQUENT FALLS: Status: ACTIVE | Noted: 2024-07-06

## 2024-07-06 PROBLEM — M48.02 DEGENERATIVE CERVICAL SPINAL STENOSIS: Status: ACTIVE | Noted: 2024-07-06

## 2024-07-06 PROBLEM — R55 CONVULSIVE SYNCOPE: Status: ACTIVE | Noted: 2024-07-06

## 2024-07-06 PROBLEM — M48.061 DEGENERATIVE LUMBAR SPINAL STENOSIS: Status: ACTIVE | Noted: 2024-07-06

## 2024-07-06 PROBLEM — I67.89 CEREBRAL MICROVASCULAR DISEASE: Status: ACTIVE | Noted: 2024-07-06

## 2024-07-06 PROBLEM — I65.23 BILATERAL CAROTID ARTERY STENOSIS: Status: ACTIVE | Noted: 2024-07-06

## 2024-07-06 PROBLEM — R41.82 ACUTE ALTERATION IN MENTAL STATUS: Status: ACTIVE | Noted: 2024-07-06

## 2024-07-06 PROBLEM — M48.04 DEGENERATIVE THORACIC SPINAL STENOSIS: Status: ACTIVE | Noted: 2024-07-06

## 2024-07-06 LAB
ALBUMIN SERPL-MCNC: 3.1 G/DL (ref 3.5–5)
ALBUMIN/GLOB SERPL: 1 (ref 1.1–2.2)
ALP SERPL-CCNC: 86 U/L (ref 45–117)
ALT SERPL-CCNC: 24 U/L (ref 12–78)
ANION GAP SERPL CALC-SCNC: 5 MMOL/L (ref 5–15)
AST SERPL-CCNC: 18 U/L (ref 15–37)
BASOPHILS # BLD: 0.1 K/UL (ref 0–0.1)
BASOPHILS NFR BLD: 1 % (ref 0–1)
BILIRUB SERPL-MCNC: 0.8 MG/DL (ref 0.2–1)
BUN SERPL-MCNC: 14 MG/DL (ref 6–20)
BUN/CREAT SERPL: 26 (ref 12–20)
CALCIUM SERPL-MCNC: 8.6 MG/DL (ref 8.5–10.1)
CHLORIDE SERPL-SCNC: 113 MMOL/L (ref 97–108)
CHOLEST SERPL-MCNC: 183 MG/DL
CO2 SERPL-SCNC: 25 MMOL/L (ref 21–32)
CREAT SERPL-MCNC: 0.54 MG/DL (ref 0.55–1.02)
DIFFERENTIAL METHOD BLD: NORMAL
EOSINOPHIL # BLD: 0.3 K/UL (ref 0–0.4)
EOSINOPHIL NFR BLD: 4 % (ref 0–7)
ERYTHROCYTE [DISTWIDTH] IN BLOOD BY AUTOMATED COUNT: 13.5 % (ref 11.5–14.5)
GLOBULIN SER CALC-MCNC: 3.1 G/DL (ref 2–4)
GLUCOSE SERPL-MCNC: 90 MG/DL (ref 65–100)
HCT VFR BLD AUTO: 42.1 % (ref 35–47)
HDLC SERPL-MCNC: 51 MG/DL
HDLC SERPL: 3.6 (ref 0–5)
HGB BLD-MCNC: 13.4 G/DL (ref 11.5–16)
IMM GRANULOCYTES # BLD AUTO: 0 K/UL (ref 0–0.04)
IMM GRANULOCYTES NFR BLD AUTO: 0 % (ref 0–0.5)
LDLC SERPL CALC-MCNC: 109.4 MG/DL (ref 0–100)
LYMPHOCYTES # BLD: 2.2 K/UL (ref 0.8–3.5)
LYMPHOCYTES NFR BLD: 35 % (ref 12–49)
MAGNESIUM SERPL-MCNC: 2 MG/DL (ref 1.6–2.4)
MCH RBC QN AUTO: 28.7 PG (ref 26–34)
MCHC RBC AUTO-ENTMCNC: 31.8 G/DL (ref 30–36.5)
MCV RBC AUTO: 90.1 FL (ref 80–99)
MONOCYTES # BLD: 0.7 K/UL (ref 0–1)
MONOCYTES NFR BLD: 11 % (ref 5–13)
NEUTS SEG # BLD: 3.1 K/UL (ref 1.8–8)
NEUTS SEG NFR BLD: 49 % (ref 32–75)
NRBC # BLD: 0 K/UL (ref 0–0.01)
NRBC BLD-RTO: 0 PER 100 WBC
PHOSPHATE SERPL-MCNC: 2.6 MG/DL (ref 2.6–4.7)
PLATELET # BLD AUTO: 173 K/UL (ref 150–400)
PMV BLD AUTO: 12.6 FL (ref 8.9–12.9)
POTASSIUM SERPL-SCNC: 3.8 MMOL/L (ref 3.5–5.1)
PROT SERPL-MCNC: 6.2 G/DL (ref 6.4–8.2)
RBC # BLD AUTO: 4.67 M/UL (ref 3.8–5.2)
SODIUM SERPL-SCNC: 143 MMOL/L (ref 136–145)
T4 FREE SERPL-MCNC: 1.2 NG/DL (ref 0.8–1.5)
TRIGL SERPL-MCNC: 113 MG/DL
TSH SERPL DL<=0.05 MIU/L-ACNC: 3.41 UIU/ML (ref 0.36–3.74)
VLDLC SERPL CALC-MCNC: 22.6 MG/DL
WBC # BLD AUTO: 6.3 K/UL (ref 3.6–11)

## 2024-07-06 PROCEDURE — 84100 ASSAY OF PHOSPHORUS: CPT

## 2024-07-06 PROCEDURE — 85025 COMPLETE CBC W/AUTO DIFF WBC: CPT

## 2024-07-06 PROCEDURE — 72146 MRI CHEST SPINE W/O DYE: CPT

## 2024-07-06 PROCEDURE — 97116 GAIT TRAINING THERAPY: CPT | Performed by: PHYSICAL THERAPIST

## 2024-07-06 PROCEDURE — 84443 ASSAY THYROID STIM HORMONE: CPT

## 2024-07-06 PROCEDURE — 6370000000 HC RX 637 (ALT 250 FOR IP): Performed by: NURSE PRACTITIONER

## 2024-07-06 PROCEDURE — G0378 HOSPITAL OBSERVATION PER HR: HCPCS

## 2024-07-06 PROCEDURE — 36415 COLL VENOUS BLD VENIPUNCTURE: CPT

## 2024-07-06 PROCEDURE — 6360000002 HC RX W HCPCS: Performed by: NURSE PRACTITIONER

## 2024-07-06 PROCEDURE — 80061 LIPID PANEL: CPT

## 2024-07-06 PROCEDURE — 72141 MRI NECK SPINE W/O DYE: CPT

## 2024-07-06 PROCEDURE — 97166 OT EVAL MOD COMPLEX 45 MIN: CPT

## 2024-07-06 PROCEDURE — 83735 ASSAY OF MAGNESIUM: CPT

## 2024-07-06 PROCEDURE — 72148 MRI LUMBAR SPINE W/O DYE: CPT

## 2024-07-06 PROCEDURE — 97535 SELF CARE MNGMENT TRAINING: CPT

## 2024-07-06 PROCEDURE — 80053 COMPREHEN METABOLIC PANEL: CPT

## 2024-07-06 PROCEDURE — 84439 ASSAY OF FREE THYROXINE: CPT

## 2024-07-06 PROCEDURE — 97530 THERAPEUTIC ACTIVITIES: CPT | Performed by: PHYSICAL THERAPIST

## 2024-07-06 PROCEDURE — 99233 SBSQ HOSP IP/OBS HIGH 50: CPT | Performed by: PSYCHIATRY & NEUROLOGY

## 2024-07-06 PROCEDURE — 97161 PT EVAL LOW COMPLEX 20 MIN: CPT | Performed by: PHYSICAL THERAPIST

## 2024-07-06 RX ADMIN — METOPROLOL TARTRATE 25 MG: 25 TABLET, FILM COATED ORAL at 21:13

## 2024-07-06 RX ADMIN — METOPROLOL TARTRATE 25 MG: 25 TABLET, FILM COATED ORAL at 09:48

## 2024-07-06 RX ADMIN — PANTOPRAZOLE SODIUM 40 MG: 40 TABLET, DELAYED RELEASE ORAL at 06:15

## 2024-07-06 RX ADMIN — HEPARIN SODIUM 5000 UNITS: 5000 INJECTION INTRAVENOUS; SUBCUTANEOUS at 14:53

## 2024-07-06 RX ADMIN — HEPARIN SODIUM 5000 UNITS: 5000 INJECTION INTRAVENOUS; SUBCUTANEOUS at 21:13

## 2024-07-06 RX ADMIN — POLYETHYLENE GLYCOL 3350 17 G: 17 POWDER, FOR SOLUTION ORAL at 09:49

## 2024-07-06 RX ADMIN — Medication 1000 UNITS: at 09:48

## 2024-07-06 RX ADMIN — CLOPIDOGREL BISULFATE 75 MG: 75 TABLET ORAL at 12:03

## 2024-07-06 RX ADMIN — ASPIRIN 81 MG: 81 TABLET, CHEWABLE ORAL at 09:48

## 2024-07-06 RX ADMIN — HEPARIN SODIUM 5000 UNITS: 5000 INJECTION INTRAVENOUS; SUBCUTANEOUS at 06:15

## 2024-07-06 RX ADMIN — PRAVASTATIN SODIUM 80 MG: 40 TABLET ORAL at 09:48

## 2024-07-06 ASSESSMENT — PAIN SCALES - GENERAL: PAINLEVEL_OUTOF10: 0

## 2024-07-07 PROBLEM — R26.2 AMBULATORY DYSFUNCTION: Status: ACTIVE | Noted: 2024-07-07

## 2024-07-07 LAB
ALBUMIN SERPL-MCNC: 3 G/DL (ref 3.5–5)
ALBUMIN/GLOB SERPL: 0.9 (ref 1.1–2.2)
ALP SERPL-CCNC: 92 U/L (ref 45–117)
ALT SERPL-CCNC: 22 U/L (ref 12–78)
ANION GAP SERPL CALC-SCNC: 6 MMOL/L (ref 5–15)
AST SERPL-CCNC: 14 U/L (ref 15–37)
BASOPHILS # BLD: 0 K/UL (ref 0–0.1)
BASOPHILS NFR BLD: 1 % (ref 0–1)
BILIRUB SERPL-MCNC: 0.4 MG/DL (ref 0.2–1)
BUN SERPL-MCNC: 19 MG/DL (ref 6–20)
BUN/CREAT SERPL: 27 (ref 12–20)
CALCIUM SERPL-MCNC: 8.9 MG/DL (ref 8.5–10.1)
CHLORIDE SERPL-SCNC: 114 MMOL/L (ref 97–108)
CO2 SERPL-SCNC: 24 MMOL/L (ref 21–32)
CREAT SERPL-MCNC: 0.71 MG/DL (ref 0.55–1.02)
DIFFERENTIAL METHOD BLD: NORMAL
EOSINOPHIL # BLD: 0.3 K/UL (ref 0–0.4)
EOSINOPHIL NFR BLD: 4 % (ref 0–7)
ERYTHROCYTE [DISTWIDTH] IN BLOOD BY AUTOMATED COUNT: 13.4 % (ref 11.5–14.5)
GLOBULIN SER CALC-MCNC: 3.2 G/DL (ref 2–4)
GLUCOSE SERPL-MCNC: 124 MG/DL (ref 65–100)
HCT VFR BLD AUTO: 42 % (ref 35–47)
HGB BLD-MCNC: 13.5 G/DL (ref 11.5–16)
IMM GRANULOCYTES # BLD AUTO: 0 K/UL (ref 0–0.04)
IMM GRANULOCYTES NFR BLD AUTO: 0 % (ref 0–0.5)
LYMPHOCYTES # BLD: 2 K/UL (ref 0.8–3.5)
LYMPHOCYTES NFR BLD: 29 % (ref 12–49)
MCH RBC QN AUTO: 28.8 PG (ref 26–34)
MCHC RBC AUTO-ENTMCNC: 32.1 G/DL (ref 30–36.5)
MCV RBC AUTO: 89.7 FL (ref 80–99)
MONOCYTES # BLD: 0.6 K/UL (ref 0–1)
MONOCYTES NFR BLD: 9 % (ref 5–13)
NEUTS SEG # BLD: 4 K/UL (ref 1.8–8)
NEUTS SEG NFR BLD: 57 % (ref 32–75)
NRBC # BLD: 0 K/UL (ref 0–0.01)
NRBC BLD-RTO: 0 PER 100 WBC
PLATELET # BLD AUTO: 186 K/UL (ref 150–400)
PMV BLD AUTO: 12.9 FL (ref 8.9–12.9)
POTASSIUM SERPL-SCNC: 3.6 MMOL/L (ref 3.5–5.1)
PROT SERPL-MCNC: 6.2 G/DL (ref 6.4–8.2)
RBC # BLD AUTO: 4.68 M/UL (ref 3.8–5.2)
SODIUM SERPL-SCNC: 144 MMOL/L (ref 136–145)
T4 FREE SERPL-MCNC: 1.3 NG/DL (ref 0.8–1.5)
TSH SERPL DL<=0.05 MIU/L-ACNC: 2.07 UIU/ML (ref 0.36–3.74)
WBC # BLD AUTO: 7 K/UL (ref 3.6–11)

## 2024-07-07 PROCEDURE — 80053 COMPREHEN METABOLIC PANEL: CPT

## 2024-07-07 PROCEDURE — 1100000000 HC RM PRIVATE

## 2024-07-07 PROCEDURE — 85025 COMPLETE CBC W/AUTO DIFF WBC: CPT

## 2024-07-07 PROCEDURE — 36415 COLL VENOUS BLD VENIPUNCTURE: CPT

## 2024-07-07 PROCEDURE — 99232 SBSQ HOSP IP/OBS MODERATE 35: CPT | Performed by: PSYCHIATRY & NEUROLOGY

## 2024-07-07 PROCEDURE — 84443 ASSAY THYROID STIM HORMONE: CPT

## 2024-07-07 PROCEDURE — 6360000002 HC RX W HCPCS: Performed by: NURSE PRACTITIONER

## 2024-07-07 PROCEDURE — G0378 HOSPITAL OBSERVATION PER HR: HCPCS

## 2024-07-07 PROCEDURE — 84439 ASSAY OF FREE THYROXINE: CPT

## 2024-07-07 PROCEDURE — 6370000000 HC RX 637 (ALT 250 FOR IP): Performed by: NURSE PRACTITIONER

## 2024-07-07 RX ADMIN — Medication 1000 UNITS: at 08:47

## 2024-07-07 RX ADMIN — HEPARIN SODIUM 5000 UNITS: 5000 INJECTION INTRAVENOUS; SUBCUTANEOUS at 06:41

## 2024-07-07 RX ADMIN — ASPIRIN 81 MG: 81 TABLET, CHEWABLE ORAL at 08:47

## 2024-07-07 RX ADMIN — METOPROLOL TARTRATE 25 MG: 25 TABLET, FILM COATED ORAL at 21:27

## 2024-07-07 RX ADMIN — HEPARIN SODIUM 5000 UNITS: 5000 INJECTION INTRAVENOUS; SUBCUTANEOUS at 14:49

## 2024-07-07 RX ADMIN — PANTOPRAZOLE SODIUM 40 MG: 40 TABLET, DELAYED RELEASE ORAL at 06:41

## 2024-07-07 RX ADMIN — PRAVASTATIN SODIUM 80 MG: 40 TABLET ORAL at 08:47

## 2024-07-07 RX ADMIN — HEPARIN SODIUM 5000 UNITS: 5000 INJECTION INTRAVENOUS; SUBCUTANEOUS at 21:26

## 2024-07-07 RX ADMIN — POLYETHYLENE GLYCOL 3350 17 G: 17 POWDER, FOR SOLUTION ORAL at 08:47

## 2024-07-07 RX ADMIN — CLOPIDOGREL BISULFATE 75 MG: 75 TABLET ORAL at 08:47

## 2024-07-07 ASSESSMENT — PAIN SCALES - GENERAL
PAINLEVEL_OUTOF10: 0
PAINLEVEL_OUTOF10: 0

## 2024-07-08 LAB
ALBUMIN SERPL-MCNC: 2.9 G/DL (ref 3.5–5)
ALBUMIN/GLOB SERPL: 1 (ref 1.1–2.2)
ALP SERPL-CCNC: 92 U/L (ref 45–117)
ALT SERPL-CCNC: 21 U/L (ref 12–78)
ANION GAP SERPL CALC-SCNC: 5 MMOL/L (ref 5–15)
AST SERPL-CCNC: 11 U/L (ref 15–37)
BASOPHILS # BLD: 0 K/UL (ref 0–0.1)
BASOPHILS NFR BLD: 1 % (ref 0–1)
BILIRUB SERPL-MCNC: 0.5 MG/DL (ref 0.2–1)
BUN SERPL-MCNC: 21 MG/DL (ref 6–20)
BUN/CREAT SERPL: 33 (ref 12–20)
CALCIUM SERPL-MCNC: 9.2 MG/DL (ref 8.5–10.1)
CHLORIDE SERPL-SCNC: 114 MMOL/L (ref 97–108)
CO2 SERPL-SCNC: 24 MMOL/L (ref 21–32)
CREAT SERPL-MCNC: 0.63 MG/DL (ref 0.55–1.02)
DIFFERENTIAL METHOD BLD: NORMAL
EOSINOPHIL # BLD: 0.3 K/UL (ref 0–0.4)
EOSINOPHIL NFR BLD: 4 % (ref 0–7)
ERYTHROCYTE [DISTWIDTH] IN BLOOD BY AUTOMATED COUNT: 13.5 % (ref 11.5–14.5)
GLOBULIN SER CALC-MCNC: 3 G/DL (ref 2–4)
GLUCOSE SERPL-MCNC: 109 MG/DL (ref 65–100)
HCT VFR BLD AUTO: 41.4 % (ref 35–47)
HGB BLD-MCNC: 13.1 G/DL (ref 11.5–16)
IMM GRANULOCYTES # BLD AUTO: 0 K/UL (ref 0–0.04)
IMM GRANULOCYTES NFR BLD AUTO: 0 % (ref 0–0.5)
LYMPHOCYTES # BLD: 2.2 K/UL (ref 0.8–3.5)
LYMPHOCYTES NFR BLD: 35 % (ref 12–49)
MCH RBC QN AUTO: 28.5 PG (ref 26–34)
MCHC RBC AUTO-ENTMCNC: 31.6 G/DL (ref 30–36.5)
MCV RBC AUTO: 90.2 FL (ref 80–99)
MONOCYTES # BLD: 0.6 K/UL (ref 0–1)
MONOCYTES NFR BLD: 10 % (ref 5–13)
NEUTS SEG # BLD: 3.2 K/UL (ref 1.8–8)
NEUTS SEG NFR BLD: 50 % (ref 32–75)
NRBC # BLD: 0 K/UL (ref 0–0.01)
NRBC BLD-RTO: 0 PER 100 WBC
PLATELET # BLD AUTO: 167 K/UL (ref 150–400)
PMV BLD AUTO: 12.1 FL (ref 8.9–12.9)
POTASSIUM SERPL-SCNC: 3.7 MMOL/L (ref 3.5–5.1)
PROT SERPL-MCNC: 5.9 G/DL (ref 6.4–8.2)
RBC # BLD AUTO: 4.59 M/UL (ref 3.8–5.2)
SODIUM SERPL-SCNC: 143 MMOL/L (ref 136–145)
VIT B12 SERPL-MCNC: 1852 PG/ML (ref 193–986)
WBC # BLD AUTO: 6.3 K/UL (ref 3.6–11)

## 2024-07-08 PROCEDURE — 6360000002 HC RX W HCPCS: Performed by: NURSE PRACTITIONER

## 2024-07-08 PROCEDURE — 97535 SELF CARE MNGMENT TRAINING: CPT

## 2024-07-08 PROCEDURE — 1100000000 HC RM PRIVATE

## 2024-07-08 PROCEDURE — 80053 COMPREHEN METABOLIC PANEL: CPT

## 2024-07-08 PROCEDURE — 82607 VITAMIN B-12: CPT

## 2024-07-08 PROCEDURE — 97116 GAIT TRAINING THERAPY: CPT

## 2024-07-08 PROCEDURE — 95819 EEG AWAKE AND ASLEEP: CPT

## 2024-07-08 PROCEDURE — 97530 THERAPEUTIC ACTIVITIES: CPT

## 2024-07-08 PROCEDURE — 99232 SBSQ HOSP IP/OBS MODERATE 35: CPT | Performed by: PSYCHIATRY & NEUROLOGY

## 2024-07-08 PROCEDURE — 6370000000 HC RX 637 (ALT 250 FOR IP): Performed by: NURSE PRACTITIONER

## 2024-07-08 PROCEDURE — 85025 COMPLETE CBC W/AUTO DIFF WBC: CPT

## 2024-07-08 PROCEDURE — 36415 COLL VENOUS BLD VENIPUNCTURE: CPT

## 2024-07-08 PROCEDURE — 95819 EEG AWAKE AND ASLEEP: CPT | Performed by: PSYCHIATRY & NEUROLOGY

## 2024-07-08 RX ORDER — ACETAMINOPHEN 325 MG/1
650 TABLET ORAL EVERY 6 HOURS PRN
Qty: 120 TABLET | Refills: 0 | Status: SHIPPED | OUTPATIENT
Start: 2024-07-08

## 2024-07-08 RX ADMIN — POLYETHYLENE GLYCOL 3350 17 G: 17 POWDER, FOR SOLUTION ORAL at 08:20

## 2024-07-08 RX ADMIN — CLOPIDOGREL BISULFATE 75 MG: 75 TABLET ORAL at 08:18

## 2024-07-08 RX ADMIN — METOPROLOL TARTRATE 25 MG: 25 TABLET, FILM COATED ORAL at 08:18

## 2024-07-08 RX ADMIN — HEPARIN SODIUM 5000 UNITS: 5000 INJECTION INTRAVENOUS; SUBCUTANEOUS at 14:07

## 2024-07-08 RX ADMIN — HEPARIN SODIUM 5000 UNITS: 5000 INJECTION INTRAVENOUS; SUBCUTANEOUS at 06:07

## 2024-07-08 RX ADMIN — PANTOPRAZOLE SODIUM 40 MG: 40 TABLET, DELAYED RELEASE ORAL at 06:08

## 2024-07-08 RX ADMIN — HEPARIN SODIUM 5000 UNITS: 5000 INJECTION INTRAVENOUS; SUBCUTANEOUS at 20:48

## 2024-07-08 RX ADMIN — PRAVASTATIN SODIUM 80 MG: 40 TABLET ORAL at 08:18

## 2024-07-08 RX ADMIN — METOPROLOL TARTRATE 25 MG: 25 TABLET, FILM COATED ORAL at 20:47

## 2024-07-08 RX ADMIN — Medication 1000 UNITS: at 08:18

## 2024-07-08 RX ADMIN — ASPIRIN 81 MG: 81 TABLET, CHEWABLE ORAL at 08:18

## 2024-07-08 ASSESSMENT — PAIN SCALES - GENERAL
PAINLEVEL_OUTOF10: 0
PAINLEVEL_OUTOF10: 0

## 2024-07-09 ENCOUNTER — APPOINTMENT (OUTPATIENT)
Age: 84
DRG: 552 | End: 2024-07-09
Payer: MEDICARE

## 2024-07-09 ENCOUNTER — APPOINTMENT (OUTPATIENT)
Facility: HOSPITAL | Age: 84
DRG: 552 | End: 2024-07-09
Attending: INTERNAL MEDICINE
Payer: MEDICARE

## 2024-07-09 VITALS
SYSTOLIC BLOOD PRESSURE: 123 MMHG | WEIGHT: 155 LBS | HEART RATE: 75 BPM | OXYGEN SATURATION: 96 % | RESPIRATION RATE: 18 BRPM | BODY MASS INDEX: 26.46 KG/M2 | DIASTOLIC BLOOD PRESSURE: 66 MMHG | TEMPERATURE: 97.5 F | HEIGHT: 64 IN

## 2024-07-09 LAB
ECHO BSA: 1.78 M2
VAS LEFT CCA DIST EDV: 11.9 CM/S
VAS LEFT CCA DIST PSV: 67.6 CM/S
VAS LEFT CCA PROX EDV: 10 CM/S
VAS LEFT CCA PROX PSV: 53.8 CM/S
VAS LEFT ECA EDV: 0 CM/S
VAS LEFT ECA PSV: 51.4 CM/S
VAS LEFT ICA DIST EDV: 9.4 CM/S
VAS LEFT ICA DIST PSV: 42 CM/S
VAS LEFT ICA MID EDV: 18.5 CM/S
VAS LEFT ICA MID PSV: 66.5 CM/S
VAS LEFT ICA PROX EDV: 20.4 CM/S
VAS LEFT ICA PROX PSV: 54.4 CM/S
VAS LEFT ICA/CCA PSV: 1 NO UNITS
VAS LEFT SUBCLAVIAN PROX EDV: 0 CM/S
VAS LEFT SUBCLAVIAN PROX PSV: 125.8 CM/S
VAS LEFT VERTEBRAL EDV: 9.5 CM/S
VAS LEFT VERTEBRAL PSV: 43.5 CM/S
VAS RIGHT CCA DIST EDV: 14.7 CM/S
VAS RIGHT CCA DIST PSV: 55.7 CM/S
VAS RIGHT CCA PROX EDV: 5.6 CM/S
VAS RIGHT CCA PROX PSV: 76.4 CM/S
VAS RIGHT ECA EDV: 0 CM/S
VAS RIGHT ECA PSV: 65.8 CM/S
VAS RIGHT ICA DIST EDV: 9.8 CM/S
VAS RIGHT ICA DIST PSV: 36.1 CM/S
VAS RIGHT ICA MID EDV: 15.4 CM/S
VAS RIGHT ICA MID PSV: 59.3 CM/S
VAS RIGHT ICA PROX EDV: 12.2 CM/S
VAS RIGHT ICA PROX PSV: 58.5 CM/S
VAS RIGHT ICA/CCA PSV: 1.1 NO UNITS
VAS RIGHT SUBCLAVIAN PROX EDV: 0 CM/S
VAS RIGHT SUBCLAVIAN PROX PSV: 122.2 CM/S
VAS RIGHT VERTEBRAL EDV: 15.4 CM/S
VAS RIGHT VERTEBRAL PSV: 45.9 CM/S

## 2024-07-09 PROCEDURE — 6360000002 HC RX W HCPCS: Performed by: NURSE PRACTITIONER

## 2024-07-09 PROCEDURE — 97530 THERAPEUTIC ACTIVITIES: CPT

## 2024-07-09 PROCEDURE — 93880 EXTRACRANIAL BILAT STUDY: CPT

## 2024-07-09 PROCEDURE — 97535 SELF CARE MNGMENT TRAINING: CPT

## 2024-07-09 PROCEDURE — 6370000000 HC RX 637 (ALT 250 FOR IP): Performed by: NURSE PRACTITIONER

## 2024-07-09 PROCEDURE — 93880 EXTRACRANIAL BILAT STUDY: CPT | Performed by: PSYCHIATRY & NEUROLOGY

## 2024-07-09 RX ADMIN — CLOPIDOGREL BISULFATE 75 MG: 75 TABLET ORAL at 09:03

## 2024-07-09 RX ADMIN — METOPROLOL TARTRATE 25 MG: 25 TABLET, FILM COATED ORAL at 09:03

## 2024-07-09 RX ADMIN — HEPARIN SODIUM 5000 UNITS: 5000 INJECTION INTRAVENOUS; SUBCUTANEOUS at 14:16

## 2024-07-09 RX ADMIN — PRAVASTATIN SODIUM 80 MG: 40 TABLET ORAL at 09:03

## 2024-07-09 RX ADMIN — HEPARIN SODIUM 5000 UNITS: 5000 INJECTION INTRAVENOUS; SUBCUTANEOUS at 05:40

## 2024-07-09 RX ADMIN — Medication 1000 UNITS: at 09:03

## 2024-07-09 RX ADMIN — ASPIRIN 81 MG: 81 TABLET, CHEWABLE ORAL at 09:03

## 2024-07-09 RX ADMIN — PANTOPRAZOLE SODIUM 40 MG: 40 TABLET, DELAYED RELEASE ORAL at 05:40

## 2024-07-09 NOTE — DISCHARGE SUMMARY
known as: NEXIUM     metoprolol tartrate 25 MG tablet  Commonly known as: LOPRESSOR  Take 1 tablet by mouth twice daily     polyethylene glycol 17 GM/SCOOP powder  Commonly known as: GLYCOLAX     pravastatin 80 MG tablet  Commonly known as: PRAVACHOL  Take 1 tablet by mouth once daily     vitamin D 25 MCG (1000 UT) Tabs tablet  Commonly known as: CHOLECALCIFEROL            STOP taking these medications      Shingrix 50 MCG/0.5ML Susr injection  Generic drug: zoster recombinant adjuvanted vaccine               Where to Get Your Medications        These medications were sent to [facility] [address] [place], [place] - [address] - P [phone] - F [phone] [address] [facility] [address]      Phone: [phone]   acetaminophen 325 MG tablet             DISPOSITION:    Home with Family:       Home with HH/PT/OT/RN: x   SNF/LTC:    SNEHAL:    OTHER:            Code status: full  Recommended diet: cardiac diet  Recommended activity: activity as tolerated  Wound care: None      Follow up with:   PCP : Soumya Dye MD McKenzie, Judith J, MD  [address] [facility] [address]  [facility] [address] [phone]    Go on 7/9/2024  At 11:00AM for VIRTUAL PCP hospital follow up.    [facility] EMERGENCY DEPT  [address] [facility] [address]  [phone]    If symptoms worsen    [facility]  [phone]  Follow up  HOME HEALTH          Total time in minutes spent coordinating this discharge (includes going over instructions, follow-up, prescriptions, and preparing report for sign off to her PCP) :  35 minutes

## 2024-07-09 NOTE — PLAN OF CARE
Problem: Occupational Therapy - Adult  Goal: By Discharge: Performs self-care activities at highest level of function for planned discharge setting.  See evaluation for individualized goals.  Description: FUNCTIONAL STATUS PRIOR TO ADMISSION:  Patient was ambulatory using FWW and assistance from .   Receives Help From: Family, ADL Assistance: Needs assistance, Bath: Minimal assistance (2x/week from care aide), Ambulation Assistance: Independent ( supervises more often than not),  ,       HOME SUPPORT: Patient lived  whom assisted in pt's care of ADL and functional mobility.    Occupational Therapy Goals:  Initiated 7/6/2024  1.  Patient will perform grooming with Set-up and Minimal Assist within 7 day(s).  2.  Patient will perform upper body dressing with Maximal Assist within 7 day(s).  3.  Patient will perform bathing with Set-up and Minimal Assist within 7 day(s).  4.  Patient will perform shower transfers with Minimal Assist  within 7 day(s).  5.  Patient will participate in upper extremity therapeutic exercise/activities with Set-up and Minimal Assist for 10 minutes within 7 day(s).    6.  Patient will utilize energy conservation techniques during functional activities with verbal cues within 7 day(s).   Outcome: Progressing   OCCUPATIONAL THERAPY TREATMENT  Patient: Audrey Almanzar (83 y.o. female)  Date: 7/9/2024  Primary Diagnosis: Low back pain [M54.50]  Acute midline low back pain without sciatica [M54.50]  Closed wedge fracture of lumbar vertebra, unspecified lumbar vertebral level, initial encounter ([facility]) [S32.000A]  Ambulatory dysfunction [R26.2]       Precautions: Fall Risk                Chart, occupational therapy assessment, plan of care, and goals were reviewed.    ASSESSMENT  Patient continues to benefit from skilled OT services and is slowly progressing towards goals. Patient is received resting in bed, amenable to session and endorsing need for toileting. Patient stands 
  Problem: Pain  Goal: Verbalizes/displays adequate comfort level or baseline comfort level  Outcome: Progressing  Flowsheets (Taken 7/6/2024 0823)  Verbalizes/displays adequate comfort level or baseline comfort level: Encourage patient to monitor pain and request assistance     Problem: Skin/Tissue Integrity  Goal: Absence of new skin breakdown  Description: 1.  Monitor for areas of redness and/or skin breakdown  2.  Assess vascular access sites hourly  3.  Every 4-6 hours minimum:  Change oxygen saturation probe site  4.  Every 4-6 hours:  If on nasal continuous positive airway pressure, respiratory therapy assess nares and determine need for appliance change or resting period.  Outcome: Progressing     Problem: Safety - Adult  Goal: Free from fall injury  Outcome: Progressing  Flowsheets (Taken 7/6/2024 0823)  Free From Fall Injury: Instruct family/caregiver on patient safety     Problem: Physical Therapy - Adult  Goal: By Discharge: Performs mobility at highest level of function for planned discharge setting.  See evaluation for individualized goals.  Description: FUNCTIONAL STATUS PRIOR TO ADMISSION: Patient required moderate assistance for basic and instrumental ADLs. She ambulates with a rollator in the home with 's supervision/contact guard assist.    HOME SUPPORT PRIOR TO ADMISSION: The patient lived with  and required supervision/set-up for ambulation/transfers and moderate assist with bathing/dressing. She has had several falls in the past few months.    Physical Therapy Goals  Initiated 7/6/2024  1.  Patient will move from supine to sit and sit to supine  in bed with minimal assistance/contact guard assist within 7 day(s).    2.  Patient will transfer from bed to chair and chair to bed with minimal assistance/contact guard assist using the least restrictive device within 7 day(s).  3.  Patient will perform sit to stand with minimal assistance/contact guard assist within 7 day(s).  4.  
  Problem: Physical Therapy - Adult  Goal: By Discharge: Performs mobility at highest level of function for planned discharge setting.  See evaluation for individualized goals.  Description: FUNCTIONAL STATUS PRIOR TO ADMISSION: Patient required moderate assistance for basic and instrumental ADLs. She ambulates with a rollator in the home with 's supervision/contact guard assist.    HOME SUPPORT PRIOR TO ADMISSION: The patient lived with  and required supervision/set-up for ambulation/transfers and moderate assist with bathing/dressing. She has had several falls in the past few months.    Physical Therapy Goals  Initiated 7/6/2024  1.  Patient will move from supine to sit and sit to supine  in bed with minimal assistance/contact guard assist within 7 day(s).    2.  Patient will transfer from bed to chair and chair to bed with minimal assistance/contact guard assist using the least restrictive device within 7 day(s).  3.  Patient will perform sit to stand with minimal assistance/contact guard assist within 7 day(s).  4.  Patient will ambulate with minimal assistance/contact guard assist for 50 feet with the least restrictive device within 7 day(s).     Outcome: Not Progressing   PHYSICAL THERAPY EVALUATION    Patient: Audrey Almanzar (83 y.o. female)  Date: 7/6/2024  Primary Diagnosis: Low back pain [M54.50]  Acute midline low back pain without sciatica [M54.50]  Closed wedge fracture of lumbar vertebra, unspecified lumbar vertebral level, initial encounter ([facility]) [S32.000A]       Precautions: Restrictions/Precautions: Fall Risk                      ASSESSMENT :   DEFICITS/IMPAIRMENTS:   The patient is limited by decreased functional mobility, independence in ADLs, ROM, strength, activity tolerance, endurance, cognition, balance, and increased pain levels. Supportive  is present and provides information throughout evaluation. He has had difficulty managing her care at home recently, and patient 
She tolerated functional mobility into bathroom with RW for ADLs, required increased assistance due to limited endurance, functional reach, and dynamic standing balance deficits. Patient with worsening forward flexed posture & UE reliance on RW/sink for support as she fatigued with prolonged stand, ultimately instructed to return to chair for remainder of ADLs in interest of patient safety. She demonstrated poor safety awareness when returning to chair, as patient abruptly sat prior to being fully positioned in front of chair due to fatigue and back pain despite max education/cues, remains high fall risk and appropriate for SNF.        PLAN :  Patient continues to benefit from skilled intervention to address the above impairments.  Continue treatment per established plan of care to address goals.    Recommend with staff: OOB to chair for meals, toileting in bathroom (vs BSC pending energy level/fatigue) with assist x2 for safety and RW    Recommend next OT session: standing ADL in bathroom    Recommendation for discharge: (in order for the patient to meet his/her long term goals): Therapy up to 5 days/week in Skilled nursing facility. If patient/family does not want SNF, then recommend HH + constant supervision and assistance for all mobility, ADL, IADL.  reports he feels comfortable with providing this level of assistance/supervision and is her caregiver at baseline.    Other factors to consider for discharge: poor safety awareness, impaired cognition, high risk for falls, not safe to be alone, and concern for safely navigating or managing the home environment    IF patient discharges home will need the following DME: bedside commode, gait belt, hospital bed, shower chair, rolling walker, and continuing to assess with progress       SUBJECTIVE:   Patient stated “Ow, my back.”    OBJECTIVE DATA SUMMARY:   Cognitive/Behavioral Status:  Orientation  Overall Orientation Status: Within Functional 
of the restroom with assistance. As patients back fatigues with increased time upright, she demonstrates increasing trunk flexion requiring verbal and tactile cues for safety. Patient ultimately requires Ax2 to return for restroom with use of RW. She demonstrates increased trunk flexion, decreased step length and gait speed and requires Max A to complete transfer to bedside chair. Spouse is present during session.          PLAN:  Patient continues to benefit from skilled intervention to address the above impairments.  Continue treatment per established plan of care.        Recommendation for discharge: (in order for the patient to meet his/her long term goals): Therapy 3 hours/day 5-7 days/week patient and family requests HH with 24/7    Other factors to consider for discharge: poor safety awareness, impaired cognition, high risk for falls, not safe to be alone, and concern for safely navigating or managing the home environment    IF patient discharges home will need the following DME: patient owns DME required for discharge       SUBJECTIVE:   Patient stated, \".\"    OBJECTIVE DATA SUMMARY:   Critical Behavior:          Functional Mobility Training:  Bed Mobility:  Bed Mobility Training  Supine to Sit: Moderate assistance;Additional time  Scooting: Minimum assistance  Transfers:  Transfer Training  Interventions: Verbal cues;Tactile cues;Safety awareness training;Manual cues  Sit to Stand: Minimum assistance;Moderate assistance  Stand to Sit: Minimum assistance;Moderate assistance  Bed to Chair: Moderate assistance;Assist X2;Additional time;Adaptive equipment  Balance:  Balance  Sitting: Intact  Standing: Impaired  Standing - Static: Constant support;Fair  Standing - Dynamic: Poor;Fair   Ambulation/Gait Training:     Gait  Gait Training: Yes  Overall Level of Assistance: Contact-guard assistance;Adaptive equipment;Additional time  Distance (ft): 12 Feet  Assistive Device: Gait belt;Walker, rolling  Interventions: 
restroom. Required mod A and increased time to reach EOB toward L, increased time and mod A to scoot to EOB. Pt transferred bed<->[facility] stand pivot with RW and mod A x 2 for lift/ balance/ walker mgmt, cues for sequence. Required min/ mod A to maintain static stand with RW for facilitation of pericare. Pt fatigued quickly in standing, but able to pivot back to bed without seated rest break (declined transfer to chair).     Pt remains appropriate for SNF rehab at d/c, but family prefers pt return home with 24/7 assist and HH PT. Recommend 2 person assist for SPT to [facility] with RW.         PLAN:  Patient continues to benefit from skilled intervention to address the above impairments.  Continue treatment per established plan of care.        Recommendation for discharge: (in order for the patient to meet his/her long term goals): Therapy up to 5 days/week in Skilled nursing facility; note family preference/ plan for pt return home with 24/7 assist and HH PT    Other factors to consider for discharge: poor safety awareness, impaired cognition, high risk for falls, concern for safely navigating or managing the home environment, and 2 person assist with transfers at this time    IF patient discharges home will need the following DME: bedside commode, hospital bed, rolling walker, and wheelchair       SUBJECTIVE:   Patient stated, \"I need to use the bathroom.\"    OBJECTIVE DATA SUMMARY:   Critical Behavior:  Orientation  Overall Orientation Status: Impaired  Orientation Level: Oriented to person;Oriented to place;Disoriented to time  Cognition  Overall Cognitive Status: Exceptions  Arousal/Alertness: Delayed responses to stimuli  Following Commands: Follows one step commands with increased time;Follows one step commands with repetition  Attention Span: Attends with cues to redirect  Safety Judgement: Decreased awareness of need for safety;Decreased awareness of need for assistance  Problem Solving: Decreased awareness of 
Index Scale  Feeding: Needs help, i.e. for cutting  Bathing: Cannot perform activity  Grooming: Cannot perform activity  Dressing: Cannot perform activity  Bowel Control: Cannot perform activity  Bladder Control: Cannot perform activity  Toilet Transfers: Needs help for balance, handling clothes or toilet paper  Chair/Bed Trannsfers: Minimum assistance or supervision required  Ambulation: With help for 50 yards  Stairs: Cannot perform activity  Total Barthel Index Score: 30       The Barthel ADL Index: Guidelines  1. The index should be used as a record of what a patient does, not as a record of what a patient could do.  2. The main aim is to establish degree of independence from any help, physical or verbal, however minor and for whatever reason.  3. The need for supervision renders the patient not independent.  4. A patient's performance should be established using the best available evidence. Asking the patient, friends/relatives and nurses are the usual sources, but direct observation and common sense are also important. However direct testing is not needed.  5. Usually the patient's performance over the preceding 24-48 hours is important, but occasionally longer periods will be relevant.  6. Middle categories imply that the patient supplies over 50 per cent of the effort.  7. Use of aids to be independent is allowed.    Score Interpretation (from Danny 1997)    Independent   60-79 Minimally independent   40-59 Partially dependent   20-39 Very dependent   <20 Totally dependent     -Piedad Ceballos., Barthel, D.W. (1965). Functional evaluation: the Barthel Index. Md State Med J 142.  -MOOSE Delgado, NASIM Child (1997). The Barthel activities of daily living index: self-reporting versus actual performance in the old (> or = 75 years). Journal of American Geriatric Society 45(7), 832-836.   -POLO PattonF, ALEJO Stark., Yousif, JKatherineA., Angelo, A.QUAN. (1999). Measuring the change in disability after

## 2024-07-09 NOTE — PROCEDURES
[facility]              [address] [place] [address]                                   EEG      PATIENT NAME: BHUPINDER MURPHY            : 1940  MED REC NO: [id number]                       ROOM: 3409  ACCOUNT NO: [id number]                       ADMIT DATE: 2024  PROVIDER: Abdulkadir Mcdermott MD    DATE OF SERVICE:  2024    INDICATION:  The patient is an 83-year-old female with a history of possible seizure, the patient with passing out spell, the patient with confusion and for EEG to rule out encephalopathy, rule out cortical abnormality, and rule out seizures.    EEG CLASSIFICATION:  Dysrhythmia grade 2, generalized.    DESCRIPTION OF THE RECORD:  The background of this recording contains a posteriorly located occipital alpha rhythm of 8-9 hertz that does attenuate some with eye opening.  Throughout the recording, there was a moderate increase in generalized 2-6 hertz slow activity seen.  There were no clear areas of focal slowing and no clear spike or spike-and-wave discharges seen and no recorded electrographic or dysrhythmic spells of any type seen.  The patient did enter brief stages of sleep with K complexes and sleep spindles seen in central head regions.  Hyperventilation was not performed.  Photic stimulation produces little posterior head region driving response.    INTERPRETATION:  This is a mildly abnormal electroencephalogram due to the generalized slowing seen most consistent with diffuse encephalopathy of toxic, metabolic, or degenerative type.  No clear focal process or epileptiform activity is seen.  Clinical correlation recommended.        ABDULKADIR MCDERMOTT MD      TAS/AQS  D:  2024 21:22:13  T:  2024 22:26:57  JOB #:  [id number]

## 2024-07-09 NOTE — CARE COORDINATION
Transition of Care Plan:    RUR: 18%  Prior Level of Functioning: Needs assistance with ADLs   Disposition: Home with family support and [facility]: [facility]   Follow up appointments: Follow up with PCP and/or Specialist   DME needed: pts spouse wants brandon lift, so chair, and hospital bed   Transportation at discharge: BLS transport   IM/IMM Medicare/ letter given: 2nd IM Medicare Letter   Is patient a [employer] and connected with VA? N/A   If yes, was  transfer form completed and VA notified? N/A  Caregiver Contact: Martin Almanzar (spouse) [phone]  Discharge Caregiver contacted prior to discharge? Family to be contacted   Care Conference needed? Not at this time   Barriers to discharge: Medical Clearance    CM reviewed therapy clinicals and recs for so chair and brandon lift aren't being recommended at this time.  CM will attempt to get hospital bed (recommended).  CM informed pts spouse of the following.    CM sent OT note to [employer] for supporting documentation for request of hospital bed.    CM sent [facility] to [facility] to resume services.    CM will provide 2nd IM Medicare Letter.    GISSEL Warren CM  [phone]    
Transition of Care Plan:    RUR: 18%  Prior Level of Functioning: Needs assistance with ADLs   Disposition: Home with family support and [facility]   Follow up appointments: Follow up with PCP and/or Specialist   DME needed: pts spouse wants brandon lift, so chair, and hospital bed   Transportation at discharge: BLS transport   IM/IMM Medicare/ letter given: 2nd IM Medicare Letter   Is patient a [employer] and connected with VA? N/A   If yes, was  transfer form completed and VA notified? N/A  Caregiver Contact: Martin Almanzar (spouse) [phone]  Discharge Caregiver contacted prior to discharge? Family to be contacted   Care Conference needed? Not at this time   Barriers to discharge: Medical Clearance      CM aware that pts spouse is requesting DME for pt: brandon lift, so chair and hospital bed.  CM informed pts spouse that CM could attempt to assist with DME need, but there is no guarantee that equipment will be approved.    CM sent referral to DME agency: [employer] to initiate process.    [facility]  CM sent referral to resume .    Pts spouse is requesting DME at the time of d/c.  CM will arrange.    GISSEL Warren CM  [phone]      
  Lives With Spouse   Type of Home House   Home Layout One level   Home Access Stairs to enter with rails   Entrance Stairs - Number of Steps 2 VANESSA   Home Equipment Walker - Standard;Wheelchair - Manual   Receives Help From Family   ADL Assistance Needs assistance   Toileting Needs assistance   Homemaking Assistance Needs assistance   Ambulation Assistance Needs assistance   Transfer Assistance Needs assistance   Active  No   Patient's  Info Martin Almanzar (spouse)   Mode of Transportation Car   Occupation Retired   Discharge Planning   Type of Residence House   Living Arrangements Spouse/Significant Other   Current Services Prior To Admission None   Potential Assistance Needed Durable Medical Equipment;Home Care   Potential DME Needed Hospital Bed;Other (Comment)  (Pt spouse inquiring about brandon lift, hospital bed, and so chair)   DME Ordered? No   Potential Assistance Purchasing Medications No   Type of Home Care Services PT;Nursing Services   Patient expects to be discharged to: House     Advance Care Planning     General Advance Care Planning (ACP) Conversation    Date of Conversation: 7/7/2024  Conducted with: Healthcare Decision Maker  Other persons present: None    Healthcare Decision Maker:   Primary Decision Maker: Martin Almanzar - Spouse - [phone]  Click here to complete Healthcare Decision Makers including selection of the Healthcare Decision Maker Relationship (ie \"Primary\").   Today we documented Decision Maker(s) consistent with Legal Next of Kin hierarchy.    Content/Action Overview:  Has NO ACP documents-Information provided  Reviewed DNR/DNI and patient elects Full Code (Attempt Resuscitation)    Length of Voluntary ACP Conversation in minutes:  <16 minutes (Non-Billable)    JADA WILLIS   [phone]

## 2024-07-09 NOTE — PROGRESS NOTES
Hospitalist Progress Note    NAME:   Audrey Almanzar   : 1940   MRN: [id number]     Date/Time: 2024 9:17 AM  Patient PCP: Soumya Dye MD    Estimated discharge date:   Barriers: placement ,       Assessment / Plan:    Primary:   GLF   Xray lumbar spine   Xray hip/pelvis   Obtain Head CT   Consult neurology   PT OT SLP eval   Fall precautions   CM for discharge planning   Low back pain- avoid narcotic   Consult cardiology - hx afib /flutter   Telemetry   UA   EKG     Seen by cardiology - expertise greatly appreciated, ok to resume plavix, MRI no evidence of bleed,   S/p Watchman by Dr. Addison      Seen by neurology -  MRI Brain : IMPRESSION:  No acute or subacute ischemia or other acute intracranial abnormality. Stable  microvascular ischemic, old ischemic and other age-related change.    CT head w/o contrast : No evidence of acute intracranial abnormality.   Unchanged generalized parenchymal volume loss and moderate chronic  microvascular ischemic disease. Small chronic infarcts in the left thalamus and right mikki.    Neuro checks       Secondary:      Afib/flutter   Metoprolol   Telemetry      CVA/TIA  Per history     HLD  Continue ASA   Continue Plavix - ok to resume per cardiology    Pravastatin   Lipid panel         PE   No AC - 2023     Encephalopathy  Avoid benzo  Avoid narcotics      AMS   precaution      GERD  Nexium ---> protonix      Supplements   Vit D      Thyroid disease  Ck TSH           Medical Decision Making:   I personally reviewed labs:y  I personally reviewed imaging:y  I personally reviewed EKG:y  Toxic drug monitoring: none   Discussed case with: clinical staff         Code Status: full code   DVT Prophylaxis: heparin   GI Prophylaxis: protonix     Subjective:     Chief Complaint / Reason for Physician Visit  \"I feel fine today, my  should be here this morning\".  Discussed with RN events overnight.       Objective:     VITALS:   Last 24hrs VS 
      Hospitalist Progress Note    NAME: Audrey Almanzar   : 1940   MRN: [id number]     Date/Time: 2024 2:51 PM  Patient PCP: Soumya Dye MD    Assessment / Plan:     Frequent falls  Gait disturbance  Deconditioning  Multilevel spondylitic changes and stenoses throughout the entire spine   Acute/subacute T11 and L2 vertebral body fx  -MRI of cervical, thoracic and lumbar spine: Acute-subacute T11 and L2 vertebral body fractures as described above. Chronic T12 compression fracture.  Overall mild lumbar spinal stenosis.  At C5-C6 there is moderate spinal stenosis and severe left and moderate right foraminal stenosis.    -PT OT eval and treat.  Recommend SNF rehab. Patient's  want to take patient home.  Will order carotid duplex as per neurology recommendations    Encephalopathy/AMS  Cognitive impairment  -MRI brain no acute stroke  -TSH 3.41  -will order EEG as per neurology recommendations    Paroxysmal atrial fibrillation  Right atrial flutter with ablation by Dr. Addison in .  S/P Watchman 2024 after anticoagulation for JOVAN thrombus in 3/2024  Essential Hypertension  Hyperlipidemia  -Continue aspirin and Plavix  -Continue metoprolol  -Continue pravastatin  -Appreciate cardiology consult    Prior strokes  History of PE  GERD  -Continue Protonix    Medical Decision Making:   I personally reviewed labs:  CBC, BMP  I personally reviewed imaging: CT of head, MRI of C spine  Toxic drug monitoring:   Discussed case with: MICHAEL RN and patient's     Central Line:      Code status: Full  Prophylaxis: Hep SQ    Subjective:     Chief Complaint / Reason for Physician Visit  Follow up of deconditioning, frequent falls, atrial fibrillation, history of CVA  Patient was sitting up on the chair.   by the bedside. States that she doesn't want her to go to SNF. He has ordered [employer] lift and hospital bed and would like patient to go home with him.      Objective:     VITALS:   Most recent 
      Hospitalist Progress Note    NAME: Audrey Almanzar   : 1940   MRN: [id number]     Date/Time: 2024 9:10 AM  Patient PCP: Soumya Dye MD    Assessment / Plan:     Frequent falls  Gait disturbance  Deconditioning  Multilevel spondylitic changes and stenoses throughout the entire spine   Acute/subacute T11 and L2 vertebral body fx  -MRI of cervical, thoracic and lumbar spine: Acute-subacute T11 and L2 vertebral body fractures as described above. Chronic T12 compression fracture.  Overall mild lumbar spinal stenosis.  At C5-C6 there is moderate spinal stenosis and severe left and moderate right foraminal stenosis.    -PT OT eval and treat.  Recommend SNF rehab    Encephalopathy/AMS  Cognitive impairment  -MRI brain no acute stroke  -TSH 3.41  -Check B12 level  -Neurology consult/input appreciated    Paroxysmal atrial fibrillation  Right atrial flutter with ablation by Dr. Addison in .  S/P Watchman 2024 after anticoagulation for JOVAN thrombus in 3/2024  Essential Hypertension  Hyperlipidemia  -Continue aspirin and Plavix  -Continue metoprolol  -Continue pravastatin  -Appreciate cardiology consult    Prior strokes  History of PE  GERD  -Continue Protonix    Medical Decision Making:   I personally reviewed labs:  CBC, BMP  I personally reviewed imaging: CT of head, MRI of C spine  Toxic drug monitoring:   Discussed case with: , lead RN during today's interdisciplinary rounds.    Central Line:      Code status: Full  Prophylaxis: Hep SQ    Subjective:     Chief Complaint / Reason for Physician Visit  Follow up of deconditioning, frequent falls, atrial fibrillation, history of CVA  Discussed with RN events overnight.       Objective:     VITALS:   Most recent are:  Visit Vitals  /61   Pulse 72   Temp 97.3 °F (36.3 °C) (Oral)   Resp 16   Ht 1.626 m (5' 4\")   Wt 70.3 kg (155 lb)   SpO2 94%   BMI 26.61 kg/m²       I had a face to face encounter and independently examined this 
..I have reviewed discharge instructions with the patient. The patient verbalized understanding. Discharge medications reviewed with patient and appropriate educational materials and side effects teaching were provided. Follow-up appointments reviewed. Opportunity for questions and clarification was provided.  Venous access removed without difficulty.  Patient's belongings gathered and sent with patient. Patient is ready for discharge.     Ozzie Melchor RN    
1608- Hospital follow-up PCP virtual  transitional care appointment has been scheduled with Dr. Soumya Dye on 7/9/24 at 1100. This is the first available appt due to limited provider availability. PCP office does not offer alternate provider option for hospital follow up. [facility] placed [facility] information AVS for patient resource. Pending patient discharge.  Damaris Iniguez Care Management Assistant     Attempted to schedule PCP hospital follow up. Sent PCP office a message, awaiting return call from PCP office with appt information. [facility] placed [facility] information AVS for patient resource.  Pending patient discharge. Damaris Iniguez Care Management  .  
Attempted to schedule PCP hospital follow up. Sent PCP office a message, awaiting return call from PCP office with appt information. [facility] placed [facility] information AVS for patient resource.  Pending patient discharge. Damaris Iniguez, Care Management Assistant   
Consult  REFERRED BY:  Soumya Dye MD    CHIEF COMPLAINT: Patient with frequent falls for neurologic evaluation      Subjective:     Audrey Almanzar is a 83 y.o. right-handed  female we are seeing as a new patient, at the request of the hospitalist, for evaluation of patient having frequent falls for the last several months, more difficulty walking over the last 2 years, and complaining of severe back pain.  When she falls her legs just seem to give way and she has weakness in the leg.  She has tried physical therapy with some improvement but has not been exercising at home recently.  She had a normal MRI of the brain today for microvascular disease.  She has known moderate to severe cervical spinal stenosis, and degenerative disease in the lumbar spine but had no recent MRI scans done of them.  Some of her pain also seems to be in the thoracic region.  She has had compression fractures in the past also.  She normally ambulates with a walker or rollator.  She denies any weakness or numbness in her arms, just says that her legs feel weak, but denies any new numbness in them.  Her bowel and bladder function currently is unremarkable.  We are asked to evaluate.  Patient has dementia and is slow mentally    Past Medical History:   Diagnosis Date    Acid reflux     Constipation     Falls     Hip fracture requiring operative repair ([facility]) 2022    left    Hypertension     Incontinence     wears depends    Stroke ([facility]) 2019    left side weakness    Thyroid disease     thyroid nodules      Past Surgical History:   Procedure Laterality Date    BREAST SURGERY Right     biopsy, benign    COLONOSCOPY      EP DEVICE PROCEDURE N/A 02/02/2024    Ablation A-flutter performed by Daniel Addison MD at [facility] CARDIAC CATH LAB    EP DEVICE PROCEDURE N/A 03/25/2024    Watchman jennifer closure device performed by Daniel Addison MD at [facility] CARDIAC CATH LAB    EP DEVICE PROCEDURE N/A 5/13/2024    Watchman jennifer closure device 
End of Shift Note    Bedside shift change report given to LEFTY Arnold (oncoming nurse) by Nel Ballard RN (offgoing nurse).  Report included the following information SBAR, Kardex, MAR, and Recent Results    Shift worked: 1690-5422     Shift summary and any significant changes:    Pt remained stable throughout shift. Ordered labs drawn and sent. Scheduled meds given. Caring rounds completed.     Nel Ballard RN    
End of Shift Note    Bedside shift change report given to LEFTY Arnold (oncoming nurse) by Nel Ballard RN (offgoing nurse).  Report included the following information SBAR, Kardex, MAR, and Recent Results    Shift worked: 8477-2867     Shift summary and any significant changes:    Pt remained stable throughout shift. Scheduled meds given. Ordered labs drawn and sent. Caring rounds completed.     Nel Ballard RN    
End of Shift Note    Bedside shift change report given to LEFTY Neumann (oncoming nurse) by ARTEMIO THOMAS RN (offgoing nurse).  Report included the following information SBAR, Kardex, and MAR    Shift worked:  1:15pm-7pm     Shift summary and any significant changes:     Pt arrived to this unit from the ED; report received from LEFTY Yoo. Pt tolerated care fairly well. Medications were given and education was provided. Hourly rounding completed. Pt made no complaints of pain during this shift. [place] [facility] [place]. Incontinence care completed. EKG completed. Neurology and cardio consult completed. CT scan completed. Family present at bedside. MRI screening sheet completed via phone consent with pt ; Martin Almanzar. Urine labs completed.          ARTEMIO THOMAS RN                            
End of Shift Note    Bedside shift change report given to LEFTY Neumann (oncoming nurse) by ARTEMIO THOMAS RN (offgoing nurse).  Report included the following information SBAR, Kardex, and MAR    Shift worked:  7am-7pm     Shift summary and any significant changes:      Pt tolerated care fairly well. Medications were given and education was provided. Hourly rounding completed. Pt made no complaints of pain during this shift. Pt up x2 to the [facility] with the walker. Incontinence care completed. Family present at bedside.          ARTEMIO THOMAS RN                            
End of Shift Note    Bedside shift change report given to LEFTY Neumann (oncoming nurse) by ARTEMIO THOMAS RN (offgoing nurse).  Report included the following information SBAR, Kardex, and MAR    Shift worked:  7am-7pm     Shift summary and any significant changes:      Pt tolerated care fairly well. Medications were given and education was provided. Hourly rounding completed. Pt made no complaints of pain during this shift. Pt up x2 to the [facility] with the walker. Pt worked with PT/OT. Incontinence care completed. MRI completed. Family present at bedside.          ARTEMIO THOMAS RN                            
End of Shift Note    Bedside shift change report given to LEFTY Plata (oncoming nurse) by Nel Ballard RN (offgoing nurse).  Report included the following information SBAR, Kardex, MAR, and Recent Results    Shift worked: 5755-7512     Shift summary and any significant changes:    Pt remained stable throughout shift. Scheduled meds given. Ordered labs drawn and sent. Caring rounds completed.       Nel Ballard RN    
End of Shift Note    Bedside shift change report given to Ozzie OLEA (oncoming nurse) by Som Boateng RN (offgoing nurse).  Report included the following information SBAR, Kardex, Intake/Output, and MAR    Shift worked:  6516-5277     Shift summary and any significant changes:     Patient receives scheduled medication per MAR. Patient had no complaints of pain, n/v.  Bed bath provided on this shift. Caring round completed.     Concerns for physician to address:       Zone phone for oncoming shift:          Activity:  Level of Assistance: Moderate assist, patient does 50-74%    Cardiac:   Cardiac Monitoring:  yes     Access:  Current line(s):     Genitourinary:   Urinary Status: Voiding, Incontinent briefs    Respiratory:   O2 Device: None (Room air)    GI:  Current diet: ADULT DIET; Regular    Pain Management:   Patient states pain is manageable on current regimen: YES    Skin:  Sumit Scale Score: 18  Interventions: Wound Offloading (Prevention Methods): Bed, pressure reduction mattress  Pressure injury: no    Patient Safety:  Fall Score: Barker Total Score: 65  Fall Risk Interventions  Nursing Judgement-Fall Risk High(Add Comments): Yes  Toilet Every 2 Hours-In Advance of Need: Yes  Hourly Visual Checks: In bed, Quiet  Fall Visual Posted: Armband, Socks  Room Door Open: Yes  Alarm On: Bed  Patient Moved Closer to Nursing Station: No    Active Consults:  IP CONSULT TO NEUROLOGY  IP CONSULT TO CARDIOLOGY  IP CONSULT TO CASE MANAGEMENT  IP CONSULT HOME HEALTH    Length of Stay:  Expected LOS: 4  Actual LOS: 2      Som Boateng, RN   
End of Shift Note    Bedside shift change report given to SAPNA Kearns (oncoming nurse) by Ivon Canada RN (offgoing nurse).  Report included the following information SBAR, Kardex, and MAR    Shift worked: 7a-7p     Shift summary and any significant changes:    Medications given per MAR and education has been provided. No complaints of pain made throughout shift. Discharge cancelled, awaiting further testing.          Ivon Canada, RN                           
Occupational Therapy    Patient chart reviewed up to date. Discussed with RN who reports patient currently unavailable 2/2 going off the floor for vascular procedure. Will re-attempt as able and appropriate.    Amisha Ambriz OTR/L  
Physical Therapy    Pt unavailable for PT treatment due to leaving floor for procedure. Will con't to follow.    Geneva Bashir, PT, MPT  
Please complete screening and sign electronically or fax to [phone].     Telemetry order must be changed to allow the patient to leave the unit without telemetry.     Telemetry box cannot come to MRI with the patient.     Please call MRI at [phone] when this has been done.     If this patient needs monitored while off the unit, please call MRI at [phone] to coordinate a time for an RN to accompany the patient to MRI.  
Routine EEG completed.  
Speech Pathology Note    SLP orders received. Chart reviewed and discussed with RN. Attempted SLP visit, however patient currently headed JANNA for CT with transport. Will defer and continue to follow.    Pollo Phillips M.S., CCC-SLP  
Speech pathology note  Reviewed chart and note patient admitted with fall with concern for CVA. Note CT showed No evidence of acute intracranial abnormality and MRI showed No acute or subacute ischemia or other acute intracranial abnormality, Stable microvascular ischemic, old ischemic and other age-related change. Note patient passed the nursing dysphagia screen and a regular diet was ordered. NIHSS not completed. Discussed case with RN who reported no SLP-related concerns. Formal SLP evaluation not clinically indicated at this time. Will sign off. Please re-consult if further needs arise. Thank you.    Deonna Mcconnell, SLP  
Spiritual Care Assessment/Progress Note  [facility]    Name: Audrey Almanzar MRN: [id number]    Age: 83 y.o.     Sex: female   Language: English     Date: 7/8/2024            Total Time Calculated: 10 min              Spiritual Assessment begun in [facility] MEDICAL ONCOLOGY     Referral/Consult From: Rounding  Encounter Overview/Reason: Initial Encounter    Spiritual beliefs:      [] Involved in a maylin tradition/spiritual practice:      [] Supported by a [place] community:      [] Claims no spiritual orientation:      [] Seeking spiritual identity:           [] Adheres to an individual form of spirituality:      [x] Not able to assess:                Identified resources for coping and support system:           [] Prayer                  [] Devotional reading               [] Music                  [] Guided Imagery     [] Pet visits                                        [] Other: (COMMENT)     Specific area/focus of visit   Encounter:    Crisis:    Spiritual/Emotional needs:    Ritual, Rites and Sacraments:    Grief, Loss, and Adjustments:    Ethics/Mediation:    Behavioral Health:    Palliative Care:    Advance Care Planning:      Plan/Referrals: Other (Comment) (Please contact for further spiritual health referrals)    Narrative:  went to patient room for a visit but excused herself as, patient was receiving medical care.     Olive Stewart  Intern Edilberto Crystal  Spiritual Health Services  Paging Service: [phone] (RAFAT)          
recommend either Ortho or kyphoplasty if her pain persist.    Her EEG just showed moderate generalized slowing, no seizure activity no focal abnormalities.  That was read today and reviewed in detail.    Past Medical History:   Diagnosis Date    Acid reflux     Constipation     Falls     Hip fracture requiring operative repair ([facility])     left    Hypertension     Incontinence     wears depends    Stroke ([facility]) 2019    left side weakness    Thyroid disease     thyroid nodules      Past Surgical History:   Procedure Laterality Date    BREAST SURGERY Right     biopsy, benign    COLONOSCOPY      EP DEVICE PROCEDURE N/A 2024    Ablation A-flutter performed by Daniel Addison MD at [facility] CARDIAC CATH LAB    EP DEVICE PROCEDURE N/A 2024    Watchman jennifer closure device performed by Daniel Addison MD at [facility] CARDIAC CATH LAB    EP DEVICE PROCEDURE N/A 2024    Watchman jennifer closure device performed by Daniel Addison MD at [facility] CARDIAC CATH LAB    GYN  1996    hysterectomy ? BSO    INVASIVE VASCULAR N/A 2024    Ultrasound guided vascular access performed by Daniel Addison MD at [facility] CARDIAC CATH LAB    INVASIVE VASCULAR N/A 2024    Ultrasound guided vascular access performed by Daniel Addison MD at [facility] CARDIAC CATH LAB    ORTHOPEDIC SURGERY      back l5 herniated disc    TOTAL HIP ARTHROPLASTY Left 2022     Family History   Problem Relation Age of Onset    Heart Disease Maternal Grandfather     Heart Disease Sister     Cancer Mother         lymphoma    Diabetes Paternal Grandmother     Heart Disease Son     No Known Problems Son     No Known Problems Daughter     No Known Problems Daughter       Social History     Tobacco Use    Smoking status: Former     Types: Cigarettes     Start date:      Quit date:      Years since quittin.5    Smokeless tobacco: Never   Substance Use Topics    Alcohol use: No         Current Facility-Administered Medications:     ondansetron (ZOFRAN) injection 4 
patches    Nitrofurantoin Rash          Current Facility-Administered Medications   Medication Dose Route Frequency    ondansetron (ZOFRAN) injection 4 mg  4 mg IntraVENous Q4H PRN    acetaminophen (TYLENOL) tablet 650 mg  650 mg Oral Q4H PRN    heparin (porcine) injection 5,000 Units  5,000 Units SubCUTAneous 3 times per day    aspirin chewable tablet 81 mg  81 mg Oral Daily    clopidogrel (PLAVIX) tablet 75 mg  75 mg Oral Daily    pantoprazole (PROTONIX) tablet 40 mg  40 mg Oral QAM AC    metoprolol tartrate (LOPRESSOR) tablet 25 mg  25 mg Oral BID    polyethylene glycol (GLYCOLAX) packet 17 g  17 g Oral Daily    pravastatin (PRAVACHOL) tablet 80 mg  80 mg Oral Daily    Vitamin D (CHOLECALCIFEROL) tablet 1,000 Units  1,000 Units Oral Daily    hydrALAZINE (APRESOLINE) tablet 25 mg  25 mg Oral Q8H PRN       Review of Symptoms:  See HPI as well.  General: +weakness, negative for fever, chills, sweats, weight loss   Eyes: negative for blurred vision, eye pain, loss of vision, diplopia   Ear Nose and Throat: negative for rhinorrhea, pharyngitis, otalgia, tinnitus, speech or swallowing difficulties   Respiratory: negative for SOB, cough, sputum production, wheezing, BALTAZAR, pleuritic pain   Cardiology: negative for chest pain, palpitations, orthopnea, PND, edema, syncope   Gastrointestinal: negative for abdominal pain, N/V, dysphagia, change in bowel habits, bleeding   Genitourinary: +incontinence, negative for dysuria, hematuria   Muskuloskeletal : +back pain, negative for myalgia   Hematology: negative for easy bruising, bleeding, lymphadenopathy   Dermatological: negative for rash, ulceration, mole change, new lesion   Endocrine: negative for hot flashes or polydipsia   Neurological: +gait disturbance, negative for headache, dizziness, confusion, paresthesia  Psychological: negative for anxiety, depression, agitation       Objective:      Physical Exam:  Temp (24hrs), Av.6 °F (36.4 °C), Min:97.3 °F (36.3 °C), 
microvascular disease    Acute alteration in mental status    Convulsive syncope  Resolved Problems:    * No resolved hospital problems. *      Plan:     Patient with frequent falls, most likely secondary to deconditioning and her degenerative arthritis, rule out lumbar spinal stenosis, rule out cervical spinal stenosis, rule out thoracic spinal stenosis.  Patient not much of a surgical candidate.  Will continue PT and OT evaluation pending the results of her x-rays, but the best treatment for her would just be therapy.  She did not appear to have any other neurologic problem like a stroke but is demented and thyroid test are pending so we will check a B12 level also.  Will check a carotid Doppler just to make sure there is no carotid stenosis and check a EEG to make sure she is not having some type of a typical drop attacks    Patient's MRI scan of the cervical spine shows no significant spinal stenosis, just moderate degenerative changes but no surgical lesions, an MRI of the thoracic spine was relatively unremarkable except for new compression fracture at T11 and L2 which is also documented on the lumbar spine and again no spinal stenosis was seen, and she has a chronic T12 compression fracture.  Her back pain and difficulty walking is because of her new compression fractures at T11 and L2, but no surgical spinal disease, and if the pain which is better today she says, still limits her she might be a candidate for kyphoplasty or orthopedic consult.  Neurologically there is not much to do, we will follow as needed and recommend either Ortho or kyphoplasty if her pain persist.    35 minutes spent with the patient and her  going over all this in detail discussing her diagnosis prognosis and treatment options.    Signed By: Elio Mcdermott MD     July 7, 2024       CC: Soumya Dye MD  FAX: [phone]

## 2024-07-10 ENCOUNTER — TELEPHONE (OUTPATIENT)
Age: 84
End: 2024-07-10

## 2024-07-10 NOTE — TELEPHONE ENCOUNTER
Babita with [facility]    Phone [phone]    States:  Pt  declines Skilled nursing and Occupational therapy  Pt  only wants physical therapy for pt.  Needs orders changed to ONLY physical therapy            Appointments:  Last seen at [facility]:   4/15/2024   Future appointment [facility]:  Visit date not found         .            Caller confirms readback of documented phone/fax number(s) as correct.

## 2024-07-10 NOTE — TELEPHONE ENCOUNTER
Care Transitions Initial Follow Up Call    Outreach made within 2 business days of discharge: Yes    Patient: Audrey Alamnzar Patient : 1940   MRN: [id number]  Reason for Admission: There are no discharge diagnoses documented for the most recent discharge.  Discharge Date: 24       Spoke with: PT     Discharge department/facility:     [facility] Interactive Patient Contact:  Was patient able to fill all prescriptions: Yes  Was patient instructed to bring all medications to the follow-up visit: Yes  Is patient taking all medications as directed in the discharge summary? Yes  Does patient understand their discharge instructions: Yes  Does patient have questions or concerns that need addressed prior to 7-14 day follow up office visit: no    Scheduled appointment with PCP within 7-14 days    Follow Up  No future appointments.    Jeanette Alfredo MA

## 2024-07-10 NOTE — TELEPHONE ENCOUNTER
Care Transitions Initial Follow Up Call    Outreach made within 2 business days of discharge: Yes    Patient: Audrey Almanzar Patient : 1940   MRN: [id number]  Reason for Admission: There are no discharge diagnoses documented for the most recent discharge.  Discharge Date: 24       Spoke with: pt's  Martin    Discharge department/facility: [facility]      Scheduled appointment with PCP within 7-14 days    Follow Up  No future appointments.    Masha Wick

## 2024-07-11 LAB
EKG ATRIAL RATE: 99 BPM
EKG DIAGNOSIS: NORMAL
EKG P AXIS: 1 DEGREES
EKG P-R INTERVAL: 134 MS
EKG Q-T INTERVAL: 364 MS
EKG QRS DURATION: 74 MS
EKG QTC CALCULATION (BAZETT): 467 MS
EKG R AXIS: -32 DEGREES
EKG T AXIS: 67 DEGREES
EKG VENTRICULAR RATE: 99 BPM

## 2024-08-01 RX ORDER — PRAVASTATIN SODIUM 80 MG/1
TABLET ORAL
Qty: 90 TABLET | Refills: 0 | Status: SHIPPED | OUTPATIENT
Start: 2024-08-01
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
## 2024-08-21 ENCOUNTER — TRANSCRIBE ORDERS (OUTPATIENT)
Facility: HOSPITAL | Age: 84
End: 2024-08-21

## 2024-08-21 DIAGNOSIS — I48.3 TYPICAL ATRIAL FLUTTER (HCC): Primary | ICD-10-CM

## 2024-08-21 DIAGNOSIS — Z95.818 PRESENCE OF OTHER CARDIAC IMPLANTS AND GRAFTS: ICD-10-CM

## 2024-10-13 ENCOUNTER — HOSPITAL ENCOUNTER (INPATIENT)
Facility: HOSPITAL | Age: 84
LOS: 3 days | Discharge: NURSING, REHAB OR LONG-TERM CARE | DRG: 690 | End: 2024-10-17
Attending: EMERGENCY MEDICINE | Admitting: INTERNAL MEDICINE
Payer: MEDICARE

## 2024-10-13 ENCOUNTER — APPOINTMENT (OUTPATIENT)
Facility: HOSPITAL | Age: 84
DRG: 690 | End: 2024-10-13
Payer: MEDICARE

## 2024-10-13 DIAGNOSIS — R29.6 FREQUENT FALLS: ICD-10-CM

## 2024-10-13 DIAGNOSIS — N39.0 ACUTE UTI: Primary | ICD-10-CM

## 2024-10-13 DIAGNOSIS — F05 DELIRIUM DUE TO ANOTHER MEDICAL CONDITION: ICD-10-CM

## 2024-10-13 DIAGNOSIS — F01.B0 MODERATE VASCULAR DEMENTIA WITHOUT BEHAVIORAL DISTURBANCE, PSYCHOTIC DISTURBANCE, MOOD DISTURBANCE, OR ANXIETY (HCC): ICD-10-CM

## 2024-10-13 LAB
ANION GAP SERPL CALC-SCNC: 2 MMOL/L (ref 2–12)
APPEARANCE UR: CLEAR
BACTERIA URNS QL MICRO: ABNORMAL /HPF
BASOPHILS # BLD: 0 K/UL (ref 0–0.1)
BASOPHILS NFR BLD: 1 % (ref 0–1)
BILIRUB UR QL: NEGATIVE
BUN SERPL-MCNC: 16 MG/DL (ref 6–20)
BUN/CREAT SERPL: 21 (ref 12–20)
CALCIUM SERPL-MCNC: 9.2 MG/DL (ref 8.5–10.1)
CHLORIDE SERPL-SCNC: 111 MMOL/L (ref 97–108)
CO2 SERPL-SCNC: 30 MMOL/L (ref 21–32)
COLOR UR: ABNORMAL
CREAT SERPL-MCNC: 0.75 MG/DL (ref 0.55–1.02)
DIFFERENTIAL METHOD BLD: NORMAL
EOSINOPHIL # BLD: 0.1 K/UL (ref 0–0.4)
EOSINOPHIL NFR BLD: 2 % (ref 0–7)
EPITH CASTS URNS QL MICRO: ABNORMAL /LPF
ERYTHROCYTE [DISTWIDTH] IN BLOOD BY AUTOMATED COUNT: 13.4 % (ref 11.5–14.5)
GLUCOSE SERPL-MCNC: 86 MG/DL (ref 65–100)
GLUCOSE UR STRIP.AUTO-MCNC: NEGATIVE MG/DL
HCT VFR BLD AUTO: 43.8 % (ref 35–47)
HGB BLD-MCNC: 14.2 G/DL (ref 11.5–16)
HGB UR QL STRIP: NEGATIVE
IMM GRANULOCYTES # BLD AUTO: 0 K/UL (ref 0–0.04)
IMM GRANULOCYTES NFR BLD AUTO: 0 % (ref 0–0.5)
KETONES UR QL STRIP.AUTO: NEGATIVE MG/DL
LEUKOCYTE ESTERASE UR QL STRIP.AUTO: ABNORMAL
LYMPHOCYTES # BLD: 2.1 K/UL (ref 0.8–3.5)
LYMPHOCYTES NFR BLD: 31 % (ref 12–49)
MCH RBC QN AUTO: 28.9 PG (ref 26–34)
MCHC RBC AUTO-ENTMCNC: 32.4 G/DL (ref 30–36.5)
MCV RBC AUTO: 89 FL (ref 80–99)
MONOCYTES # BLD: 0.7 K/UL (ref 0–1)
MONOCYTES NFR BLD: 11 % (ref 5–13)
MUCOUS THREADS URNS QL MICRO: ABNORMAL /LPF
NEUTS SEG # BLD: 3.8 K/UL (ref 1.8–8)
NEUTS SEG NFR BLD: 55 % (ref 32–75)
NITRITE UR QL STRIP.AUTO: POSITIVE
NRBC # BLD: 0 K/UL (ref 0–0.01)
NRBC BLD-RTO: 0 PER 100 WBC
PH UR STRIP: 5.5 (ref 5–8)
PLATELET # BLD AUTO: 179 K/UL (ref 150–400)
PMV BLD AUTO: 12.4 FL (ref 8.9–12.9)
POTASSIUM SERPL-SCNC: 3.9 MMOL/L (ref 3.5–5.1)
PROT UR STRIP-MCNC: ABNORMAL MG/DL
RBC # BLD AUTO: 4.92 M/UL (ref 3.8–5.2)
RBC #/AREA URNS HPF: ABNORMAL /HPF (ref 0–5)
SODIUM SERPL-SCNC: 143 MMOL/L (ref 136–145)
SP GR UR REFRACTOMETRY: 1.01
URINE CULTURE IF INDICATED: ABNORMAL
UROBILINOGEN UR QL STRIP.AUTO: 0.2 EU/DL (ref 0.2–1)
WBC # BLD AUTO: 6.8 K/UL (ref 3.6–11)
WBC URNS QL MICRO: ABNORMAL /HPF (ref 0–4)

## 2024-10-13 PROCEDURE — G0378 HOSPITAL OBSERVATION PER HR: HCPCS

## 2024-10-13 PROCEDURE — 87088 URINE BACTERIA CULTURE: CPT

## 2024-10-13 PROCEDURE — 99285 EMERGENCY DEPT VISIT HI MDM: CPT

## 2024-10-13 PROCEDURE — 36415 COLL VENOUS BLD VENIPUNCTURE: CPT

## 2024-10-13 PROCEDURE — 6360000002 HC RX W HCPCS: Performed by: NURSE PRACTITIONER

## 2024-10-13 PROCEDURE — 81001 URINALYSIS AUTO W/SCOPE: CPT

## 2024-10-13 PROCEDURE — 2580000003 HC RX 258: Performed by: NURSE PRACTITIONER

## 2024-10-13 PROCEDURE — 96372 THER/PROPH/DIAG INJ SC/IM: CPT

## 2024-10-13 PROCEDURE — 6370000000 HC RX 637 (ALT 250 FOR IP): Performed by: EMERGENCY MEDICINE

## 2024-10-13 PROCEDURE — 85025 COMPLETE CBC W/AUTO DIFF WBC: CPT

## 2024-10-13 PROCEDURE — 87086 URINE CULTURE/COLONY COUNT: CPT

## 2024-10-13 PROCEDURE — 87186 SC STD MICRODIL/AGAR DIL: CPT

## 2024-10-13 PROCEDURE — 70450 CT HEAD/BRAIN W/O DYE: CPT

## 2024-10-13 PROCEDURE — 6370000000 HC RX 637 (ALT 250 FOR IP): Performed by: NURSE PRACTITIONER

## 2024-10-13 PROCEDURE — 80048 BASIC METABOLIC PNL TOTAL CA: CPT

## 2024-10-13 RX ORDER — POLYETHYLENE GLYCOL 3350 17 G/17G
17 POWDER, FOR SOLUTION ORAL DAILY
Status: DISCONTINUED | OUTPATIENT
Start: 2024-10-13 | End: 2024-10-17 | Stop reason: HOSPADM

## 2024-10-13 RX ORDER — POTASSIUM CHLORIDE 1.5 G/1.58G
40 POWDER, FOR SOLUTION ORAL PRN
Status: DISCONTINUED | OUTPATIENT
Start: 2024-10-13 | End: 2024-10-17 | Stop reason: HOSPADM

## 2024-10-13 RX ORDER — PANTOPRAZOLE SODIUM 40 MG/1
40 TABLET, DELAYED RELEASE ORAL
Status: DISCONTINUED | OUTPATIENT
Start: 2024-10-14 | End: 2024-10-17 | Stop reason: HOSPADM

## 2024-10-13 RX ORDER — MAGNESIUM SULFATE IN WATER 40 MG/ML
2000 INJECTION, SOLUTION INTRAVENOUS PRN
Status: DISCONTINUED | OUTPATIENT
Start: 2024-10-13 | End: 2024-10-17 | Stop reason: HOSPADM

## 2024-10-13 RX ORDER — PRAVASTATIN SODIUM 40 MG
80 TABLET ORAL DAILY
Status: DISCONTINUED | OUTPATIENT
Start: 2024-10-13 | End: 2024-10-14 | Stop reason: DRUGHIGH

## 2024-10-13 RX ORDER — ACETAMINOPHEN 325 MG/1
650 TABLET ORAL EVERY 4 HOURS PRN
Status: DISCONTINUED | OUTPATIENT
Start: 2024-10-13 | End: 2024-10-13 | Stop reason: SDUPTHER

## 2024-10-13 RX ORDER — ONDANSETRON 2 MG/ML
4 INJECTION INTRAMUSCULAR; INTRAVENOUS EVERY 6 HOURS PRN
Status: DISCONTINUED | OUTPATIENT
Start: 2024-10-13 | End: 2024-10-17 | Stop reason: HOSPADM

## 2024-10-13 RX ORDER — METOPROLOL TARTRATE 25 MG/1
25 TABLET, FILM COATED ORAL 2 TIMES DAILY
Status: DISCONTINUED | OUTPATIENT
Start: 2024-10-13 | End: 2024-10-17 | Stop reason: HOSPADM

## 2024-10-13 RX ORDER — CLOPIDOGREL BISULFATE 75 MG/1
75 TABLET ORAL DAILY
Status: DISCONTINUED | OUTPATIENT
Start: 2024-10-13 | End: 2024-10-14

## 2024-10-13 RX ORDER — SODIUM CHLORIDE 0.9 % (FLUSH) 0.9 %
5-40 SYRINGE (ML) INJECTION PRN
Status: DISCONTINUED | OUTPATIENT
Start: 2024-10-13 | End: 2024-10-17 | Stop reason: HOSPADM

## 2024-10-13 RX ORDER — ASPIRIN 81 MG/1
81 TABLET, CHEWABLE ORAL DAILY
Status: DISCONTINUED | OUTPATIENT
Start: 2024-10-13 | End: 2024-10-17 | Stop reason: HOSPADM

## 2024-10-13 RX ORDER — ACETAMINOPHEN 325 MG/1
650 TABLET ORAL EVERY 6 HOURS PRN
Status: DISCONTINUED | OUTPATIENT
Start: 2024-10-13 | End: 2024-10-17 | Stop reason: HOSPADM

## 2024-10-13 RX ORDER — POTASSIUM CHLORIDE 1500 MG/1
40 TABLET, EXTENDED RELEASE ORAL PRN
Status: DISCONTINUED | OUTPATIENT
Start: 2024-10-13 | End: 2024-10-17 | Stop reason: HOSPADM

## 2024-10-13 RX ORDER — SODIUM CHLORIDE 0.9 % (FLUSH) 0.9 %
5-40 SYRINGE (ML) INJECTION EVERY 12 HOURS SCHEDULED
Status: DISCONTINUED | OUTPATIENT
Start: 2024-10-13 | End: 2024-10-17 | Stop reason: HOSPADM

## 2024-10-13 RX ORDER — POTASSIUM CHLORIDE 7.45 MG/ML
10 INJECTION INTRAVENOUS PRN
Status: DISCONTINUED | OUTPATIENT
Start: 2024-10-13 | End: 2024-10-17 | Stop reason: HOSPADM

## 2024-10-13 RX ORDER — ONDANSETRON 4 MG/1
4 TABLET, ORALLY DISINTEGRATING ORAL EVERY 8 HOURS PRN
Status: DISCONTINUED | OUTPATIENT
Start: 2024-10-13 | End: 2024-10-17 | Stop reason: HOSPADM

## 2024-10-13 RX ORDER — ENOXAPARIN SODIUM 100 MG/ML
40 INJECTION SUBCUTANEOUS DAILY
Status: DISCONTINUED | OUTPATIENT
Start: 2024-10-13 | End: 2024-10-17 | Stop reason: HOSPADM

## 2024-10-13 RX ORDER — POLYETHYLENE GLYCOL 3350 17 G/17G
17 POWDER, FOR SOLUTION ORAL DAILY PRN
Status: DISCONTINUED | OUTPATIENT
Start: 2024-10-13 | End: 2024-10-17 | Stop reason: HOSPADM

## 2024-10-13 RX ORDER — ACETAMINOPHEN 650 MG/1
650 SUPPOSITORY RECTAL EVERY 6 HOURS PRN
Status: DISCONTINUED | OUTPATIENT
Start: 2024-10-13 | End: 2024-10-17 | Stop reason: HOSPADM

## 2024-10-13 RX ORDER — SODIUM CHLORIDE 9 MG/ML
INJECTION, SOLUTION INTRAVENOUS PRN
Status: DISCONTINUED | OUTPATIENT
Start: 2024-10-13 | End: 2024-10-17 | Stop reason: HOSPADM

## 2024-10-13 RX ADMIN — SODIUM CHLORIDE, PRESERVATIVE FREE 10 ML: 5 INJECTION INTRAVENOUS at 22:24

## 2024-10-13 RX ADMIN — CEPHALEXIN 500 MG: 250 CAPSULE ORAL at 18:21

## 2024-10-13 RX ADMIN — CEPHALEXIN 250 MG: 250 CAPSULE ORAL at 22:22

## 2024-10-13 RX ADMIN — ENOXAPARIN SODIUM 40 MG: 100 INJECTION SUBCUTANEOUS at 18:22

## 2024-10-13 RX ADMIN — PRAVASTATIN SODIUM 80 MG: 40 TABLET ORAL at 18:23

## 2024-10-13 RX ADMIN — METOPROLOL TARTRATE 25 MG: 25 TABLET, FILM COATED ORAL at 21:08

## 2024-10-13 RX ADMIN — ASPIRIN 81 MG: 81 TABLET, CHEWABLE ORAL at 18:21

## 2024-10-13 ASSESSMENT — PAIN SCALES - GENERAL
PAINLEVEL_OUTOF10: 0

## 2024-10-13 NOTE — H&P
Hospitalist Admission Note    NAME:   Audrey Almanzar   : 1940   MRN: [id number]     Date/Time: 10/13/2024 4:54 PM    Patient PCP: Soumya Dye MD    ______________________________________________________________________  Given the patient's current clinical presentation, I have a high level of concern for decompensation if discharged from the emergency department.  Complex decision making was performed, which includes reviewing the patient's available past medical records, laboratory results, and x-ray films.       My assessment of this patient's clinical condition and my plan of care is as follows.    Assessment / Plan:    Urinary tract infection  UA indicative of UTI  Urine culture pending  Received Keflex in the ED, continue    Frequent falls/Debility   Gait disturbance  Deconditioning  Cognitive impairment/Dementia   Admit to observation for placement    is patient's caregiver who is 86 years old and reports that patient has been falling more frequently and needing more assistance with ADLs/transferring.  He is unable to care for her at home anymore due to his physical limitations.  He is interested in SNF with possible long-term care placement.  He asked to be called on his home phone as he does not often turn his cell phone on.  CTA of head with no acute process  PT/OT consults  Consult to case management for assistance with placement    Paroxysmal atrial fibrillation  Right atrial flutter with ablation by Dr. Addison in   S/p watchman 2024 after anticoagulation for JOVAN thrombus in 3/2024  Essential hypertension  Hyperlipidemia  Continue aspirin, Plavix, metoprolol, pravastatin  Monitor BP    Prior strokes continue aspirin  Hx of PE  GERD continue Protonix    From hospitalization 2024:   Multilevel spondylitic changes and stenoses throughout the entire spine   Acute/subacute T11 and L2 vertebral body fx  -MRI of cervical, thoracic and lumbar spine: Acute-subacute T11 and L2     CO2 30 21 - 32 mmol/L    Anion Gap 2 2 - 12 mmol/L    Glucose 86 65 - 100 mg/dL    BUN 16 6 - 20 MG/DL    Creatinine 0.75 0.55 - 1.02 MG/DL    BUN/Creatinine Ratio 21 (H) 12 - 20      Est, Glom Filt Rate 79 >60 ml/min/1.73m2    Calcium 9.2 8.5 - 10.1 MG/DL   CBC with Auto Differential    Collection Time: 10/13/24  1:55 PM   Result Value Ref Range    WBC 6.8 3.6 - 11.0 K/uL    RBC 4.92 3.80 - 5.20 M/uL    Hemoglobin 14.2 11.5 - 16.0 g/dL    Hematocrit 43.8 35.0 - 47.0 %    MCV 89.0 80.0 - 99.0 FL    MCH 28.9 26.0 - 34.0 PG    MCHC 32.4 30.0 - 36.5 g/dL    RDW 13.4 11.5 - 14.5 %    Platelets 179 150 - 400 K/uL    MPV 12.4 8.9 - 12.9 FL    Nucleated RBCs 0.0 0  WBC    nRBC 0.00 0.00 - 0.01 K/uL    Neutrophils % 55 32 - 75 %    Lymphocytes % 31 12 - 49 %    Monocytes % 11 5 - 13 %    Eosinophils % 2 0 - 7 %    Basophils % 1 0 - 1 %    Immature Granulocytes % 0 0.0 - 0.5 %    Neutrophils Absolute 3.8 1.8 - 8.0 K/UL    Lymphocytes Absolute 2.1 0.8 - 3.5 K/UL    Monocytes Absolute 0.7 0.0 - 1.0 K/UL    Eosinophils Absolute 0.1 0.0 - 0.4 K/UL    Basophils Absolute 0.0 0.0 - 0.1 K/UL    Immature Granulocytes Absolute 0.0 0.00 - 0.04 K/UL    Differential Type AUTOMATED     Urinalysis with Reflex to Culture    Collection Time: 10/13/24  4:06 PM    Specimen: Urine   Result Value Ref Range    Color, UA YELLOW/STRAW      Appearance CLEAR CLEAR      Specific Gravity, UA 1.015      pH, Urine 5.5 5.0 - 8.0      Protein, UA TRACE (A) NEG mg/dL    Glucose, Ur Negative NEG mg/dL    Ketones, Urine Negative NEG mg/dL    Bilirubin, Urine Negative NEG      Blood, Urine Negative NEG      Urobilinogen, Urine 0.2 0.2 - 1.0 EU/dL    Nitrite, Urine Positive (A) NEG      Leukocyte Esterase, Urine MODERATE (A) NEG      WBC, UA PENDING /hpf    RBC, UA PENDING /hpf    Epithelial Cells, UA PENDING /lpf    BACTERIA, URINE PENDING /hpf    Urine Culture if Indicated PENDING          CT HEAD WO CONTRAST    Result Date: 10/13/2024  EXAM: CT

## 2024-10-13 NOTE — ED NOTES
Visitor at bedside inquiring about meal and transfer upstairs. Explanation of wait provided. Spoke with dietary staff, they report meal tray is on the way. Visitor updated with this information.

## 2024-10-13 NOTE — ED NOTES
TRANSFER - OUT REPORT:    Verbal report given to Tania on Audrey Almanzar  being transferred to [facility] for routine progression of patient care       Report consisted of patient's Situation, Background, Assessment and   Recommendations(SBAR).     Information from the following report(s) Nurse Handoff Report, Index, ED SBAR, MAR, and Med Rec Status was reviewed with the receiving nurse.    [place] Fall Assessment:    Presents to emergency department  because of falls (Syncope, seizure, or loss of consciousness): Yes  Age > 70: Yes  Altered Mental Status, Intoxication with alcohol or substance confusion (Disorientation, impaired judgment, poor safety awaremess, or inability to follow instructions): Yes  Impaired Mobility: Ambulates or transfers with assistive devices or assistance; Unable to ambulate or transer.: Yes  Nursing Judgement: No          Lines:   Peripheral IV 10/13/24 Right Antecubital (Active)        Opportunity for questions and clarification was provided.      Patient transported with:  Monitor and Tech

## 2024-10-13 NOTE — CARE COORDINATION
5:33 PM  No return call from pt's spouse. CM acknowledges that pt has been admitted under observation status. Recommend PT/OT evaluations.     4:02 PM  CM informed that pt's spouse has left facility; CM placed call to spouse, Martin Almanzar, [phone] requesting return call.     Initial note -   CM acknowledges consult, staffed case with ED MD. Disposition is unknown at this time, awaiting results of medical work-up. CM to speak with pt/family when appropriate.      HIRAL Ray.  Care Manager, [facility]  [phone]/Available on [employer]

## 2024-10-13 NOTE — ED PROVIDER NOTES
EMERGENCY DEPARTMENT HISTORY AND PHYSICAL EXAM    Date: 10/13/2024  Patient Name: Audrey Almanzar  Patient Age and Sex: 84 y.o. female  MRN:  [id number]  CSN:  [id number]    History of Present Illness     Chief Complaint   Patient presents with    Altered Mental Status     Pt arrives via EMS from home with 2 weeks of increased confusion and lethargy per , pt with prior history of stroke without deficits, pt had fall two weeks ago and another fall this morning - pt told EMS to \"check pt out\", EMS suggests pt's  concerned about ability to care for pt at home given confusion and increased falls -          History Provided By: Patient's Son    Ability to gather history was limited by: Dementia    HPI: Audrey Almanzar, 84 y.o. female   With history of dementia, atrial fibrillation status post Watchman, not anticoagulated, history of prior strokes, frequent falls, uses a walker at home, brought to the emergency department by her son for approximately 2 weeks of gradually worsening and waxing/waning confusion, lethargy, general fatigue, sleeping later than usual, and a few recent falls including a minor fall this morning.  Patient has no complaints.  No dyspnea.  No reported pain.      Tobacco Use      Smoking status: Former        Types: Cigarettes        Start date:         Quit date:         Years since quittin.8      Smokeless tobacco: Never     Past History   The patient's medical, surgical, and social history were reviewed by me today.    Current Medications:  No current facility-administered medications on file prior to encounter.     Current Outpatient Medications on File Prior to Encounter   Medication Sig Dispense Refill    pravastatin (PRAVACHOL) 80 MG tablet Take 1 tablet by mouth once daily 90 tablet 0    acetaminophen (TYLENOL) 325 MG tablet Take 2 tablets by mouth every 6 hours as needed for Pain 120 tablet 0    clopidogrel (PLAVIX) 75 MG tablet Take 1 tablet by mouth

## 2024-10-14 PROBLEM — R65.10 SIRS (SYSTEMIC INFLAMMATORY RESPONSE SYNDROME) (HCC): Status: ACTIVE | Noted: 2024-10-14

## 2024-10-14 PROCEDURE — G0378 HOSPITAL OBSERVATION PER HR: HCPCS

## 2024-10-14 PROCEDURE — 97116 GAIT TRAINING THERAPY: CPT

## 2024-10-14 PROCEDURE — 6370000000 HC RX 637 (ALT 250 FOR IP): Performed by: NURSE PRACTITIONER

## 2024-10-14 PROCEDURE — 97165 OT EVAL LOW COMPLEX 30 MIN: CPT

## 2024-10-14 PROCEDURE — 97530 THERAPEUTIC ACTIVITIES: CPT

## 2024-10-14 PROCEDURE — 1100000000 HC RM PRIVATE

## 2024-10-14 PROCEDURE — 97535 SELF CARE MNGMENT TRAINING: CPT

## 2024-10-14 PROCEDURE — 2580000003 HC RX 258: Performed by: NURSE PRACTITIONER

## 2024-10-14 PROCEDURE — 96372 THER/PROPH/DIAG INJ SC/IM: CPT

## 2024-10-14 PROCEDURE — 6360000002 HC RX W HCPCS: Performed by: NURSE PRACTITIONER

## 2024-10-14 PROCEDURE — 6370000000 HC RX 637 (ALT 250 FOR IP): Performed by: INTERNAL MEDICINE

## 2024-10-14 PROCEDURE — 97161 PT EVAL LOW COMPLEX 20 MIN: CPT

## 2024-10-14 RX ORDER — PRAVASTATIN SODIUM 40 MG
80 TABLET ORAL NIGHTLY
Status: DISCONTINUED | OUTPATIENT
Start: 2024-10-14 | End: 2024-10-17 | Stop reason: HOSPADM

## 2024-10-14 RX ADMIN — PRAVASTATIN SODIUM 80 MG: 40 TABLET ORAL at 22:34

## 2024-10-14 RX ADMIN — CEPHALEXIN 250 MG: 250 CAPSULE ORAL at 18:07

## 2024-10-14 RX ADMIN — METOPROLOL TARTRATE 25 MG: 25 TABLET, FILM COATED ORAL at 22:34

## 2024-10-14 RX ADMIN — CEPHALEXIN 250 MG: 250 CAPSULE ORAL at 06:04

## 2024-10-14 RX ADMIN — ENOXAPARIN SODIUM 40 MG: 100 INJECTION SUBCUTANEOUS at 09:36

## 2024-10-14 RX ADMIN — POLYETHYLENE GLYCOL 3350 17 G: 17 POWDER, FOR SOLUTION ORAL at 09:36

## 2024-10-14 RX ADMIN — ASPIRIN 81 MG: 81 TABLET, CHEWABLE ORAL at 09:35

## 2024-10-14 RX ADMIN — SODIUM CHLORIDE, PRESERVATIVE FREE 10 ML: 5 INJECTION INTRAVENOUS at 09:39

## 2024-10-14 RX ADMIN — SODIUM CHLORIDE, PRESERVATIVE FREE 10 ML: 5 INJECTION INTRAVENOUS at 22:34

## 2024-10-14 RX ADMIN — METOPROLOL TARTRATE 25 MG: 25 TABLET, FILM COATED ORAL at 09:35

## 2024-10-14 RX ADMIN — CEPHALEXIN 250 MG: 250 CAPSULE ORAL at 12:30

## 2024-10-14 RX ADMIN — PANTOPRAZOLE SODIUM 40 MG: 40 TABLET, DELAYED RELEASE ORAL at 06:04

## 2024-10-14 ASSESSMENT — PAIN SCALES - GENERAL
PAINLEVEL_OUTOF10: 0
PAINLEVEL_OUTOF10: 0

## 2024-10-14 NOTE — PROGRESS NOTES
Hospitalist Progress Note    NAME:   Audrey Almanzar   : 1940   MRN: [id number]     Date/Time: 10/14/2024 9:37 AM  Patient PCP: Soumya Dye MD    Estimated discharge date:  Barriers:       Assessment / Plan:    Urinary tract infection  UA indicative of UTI  Urine culture pending  Continue oral Keflex.  No significant signs or sequelae of sepsis.     Frequent falls/Debility   Gait disturbance  Deconditioning  Cognitive impairment/Dementia   Given significant deconditioning and decline with ongoing UTI, will change to admission.     is patient's caregiver who is 86 years old and reports that patient has been falling more frequently and needing more assistance with ADLs/transferring.  He is unable to care for her at home anymore due to his physical limitations.  He is interested in SNF with possible long-term care placement.  He asked to be called on his home phone as he does not often turn his cell phone on.  CTA of head with no acute process  PT/OT consults  Consult to case management for assistance with placement     Paroxysmal atrial fibrillation  Right atrial flutter with ablation by Dr. 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 Chronic T12 compression fracture.  Overall mild lumbar spinal stenosis.  At C5-C6 there is moderate spinal stenosis and severe left and moderate right foraminal stenosis.    -Likely contributing to debility as well as dementia    DVT prophylaxis  Lovenox SC      Medical Decision  examined this patient on 10/14/2024, as outlined below:  PHYSICAL EXAM:  General: Alert, cooperative  EENT:  EOMI. Anicteric sclerae.  Resp:  CTA bilaterally, no wheezing or rales.  No accessory muscle use  CV:  Regular  rhythm,  No edema  GI:  Soft, Non distended, Non tender.  +Bowel sounds  Neurologic:  Alert and oriented X 3, normal speech,   Psych:   Good insight. Not anxious nor agitated  Skin:  No rashes.  No jaundice    Reviewed most current lab test results and cultures  YES  Reviewed most current radiology test results   YES  Review and summation of old records today    NO  Reviewed patient's current orders and MAR    YES  PMH/SH reviewed - no change compared to H&P    Procedures: see electronic medical records for all procedures/Xrays and details which were not copied into this note but were reviewed prior to creation of Plan.      LABS:  I reviewed today's most current labs and imaging studies.  Pertinent labs include:  Recent Labs     10/13/24  1355   WBC 6.8   HGB 14.2   HCT 43.8        Recent Labs     10/13/24  1355      K 3.9   *   CO2 30   GLUCOSE 86   BUN 16   CREATININE 0.75   CALCIUM 9.2       Signed: FABIANO FUNES MD

## 2024-10-14 NOTE — PLAN OF CARE
Problem: Physical Therapy - Adult  Goal: By Discharge: Performs mobility at highest level of function for planned discharge setting.  See evaluation for individualized goals.  Description: FUNCTIONAL STATUS PRIOR TO ADMISSION: Patient receives help from spouse for ADL at baseline, ambulates with rollator walker with supervision due to history of falls, and has a paid caregiver that comes and helps her bathe 2x/week.     HOME SUPPORT PRIOR TO ADMISSION: The patient lived with spouse.    Physical Therapy Goals  Initiated 10/14/2024  1.  Patient will move from supine to sit and sit to supine, scoot up and down, and roll side to side in bed with minimal assistance within 7 day(s).    2.  Patient will perform sit to stand with minimal assistance within 7 day(s).  3.  Patient will transfer from bed to chair and chair to bed with minimal assistance using the least restrictive device within 7 day(s).  4.  Patient will ambulate with minimal assistance for 25 feet with the least restrictive device within 7 day(s).   Outcome: Progressing   PHYSICAL THERAPY EVALUATION    Patient: Audrey Almanzar (84 y.o. female)  Date: 10/14/2024  Primary Diagnosis: Acute UTI [N39.0]  Frequent falls [R29.6]  Delirium due to another medical condition [F05]  Moderate vascular dementia without behavioral disturbance, psychotic disturbance, mood disturbance, or anxiety (HCC) [F01.B0]       Precautions: Restrictions/Precautions: Fall Risk, Bed Alarm                    ASSESSMENT :   DEFICITS/IMPAIRMENTS:   The patient is limited by decreased functional mobility, independence in ADLs, ROM, strength, activity tolerance, endurance, safety awareness, cognition, balance     Based on the impairments listed above patient presents below baseline for functional mobility. She tolerated transferring to chair and to/from [facility] with assistance for balance and weight shifting. Requires increased time for processing commands and initiation of movement. Mod A  Inpatient Short Form (6-Clicks) Version 2  How much HELP from another person do you currently need... (If the patient hasn't done an activity recently, how much help from another person do you think they would need if they tried?) Total A Lot A Little None   1.  Turning from your back to your side while in a flat bed without using bedrails? []  1 [x]  2 []  3  []  4   2.  Moving from lying on your back to sitting on the side of a flat bed without using bedrails? []  1 [x]  2 []  3  []  4   3.  Moving to and from a bed to a chair (including a wheelchair)? []  1 [x]  2 []  3  []  4   4. Standing up from a chair using your arms (e.g. wheelchair or bedside chair)? []  1 [x]  2 []  3  []  4   5.  Walking in hospital room? [x]  1 []  2 []  3  []  4   6.  Climbing 3-5 steps with a railing? [x]  1 []  2 []  3  []  4     Raw Score: 10/24                            Cutoff score <=171,2,3 had higher odds of discharging home with home health or need of SNF/IPR.    1. Rosa Maria Hall, Ro Mtz, Cliff Lara, Carolyn Street, Juan Young, Tarun Hall.  Validity of the AM-PAC “6-Clicks” Inpatient Daily Activity and Basic Mobility Short Forms. Physical Therapy Mar 2014, [phone]) [phone]; DOI: 10.2522/ptj.[id number]  2. Werner CONTRERAS, Alyssa GLASS, Eva GLASS, Lyndsey GLASS. Association of AM-PAC \"6-Clicks\" Basic Mobility and Daily Activity Scores With Discharge Destination. Phys Ther. 2021 Apr 4;101(4):amkd161. doi: 10.1093/ptj/isrt755. PMID: 88942102.  3. Sheri GLASS, Lori BATES, Cassandra S, Ludwig K, Ladonna S. Activity Measure for Post-Acute Care \"6-Clicks\" Basic Mobility Scores Predict Discharge Destination After Acute Care Hospitalization in Select Patient Groups: A Retrospective, Observational Study. Arch Rehabil Res Clin Transl. 2022 Jul 16;4(3):548686. doi: 10.1016/j.arrct.2022.189952. PMID: 55083420; PMCID: [id number].  4. Isabel GAYLE, María S, Martin W, Dot P. AM-PAC Short Forms Manual 4.0. Revised 2/2020.

## 2024-10-14 NOTE — PLAN OF CARE
continue assisting. PMHx significant for dementia, CVA, afib s/p watchman.    Based on the impairments listed above pt is presenting below her baseline, currently requiring up to mod assist x1-2 for functional mobility and SBA-max assist for ADLs. Pt presents with impaired cognition, oriented x2, impaired processing, slow to respond, follows simple one step commands with extra time and multimodal cues. Tolerated transfer to/from commode, with cues for sequencing of walker and BLE during transfer. Pt will benefit from acute OT services while admitted and continued skilled therapy services at next LOC prior to returning home.     Functional Outcome Measure:  The patient scored 17/24 on the [facility] outcome measure.         PLAN :  Recommendations and Planned Interventions:   self care training, therapeutic activities, functional mobility training, balance training, therapeutic exercise, endurance activities, and patient education    Frequency/Duration: OT Plan of Care: 4 times/week    Recommendation for discharge: (in order for the patient to meet his/her long term goals):   Moderate intensity short-term skilled occupational therapy up to 5x/week    Other factors to consider for discharge: patient's current support system is unable to meet their requirements for physical assistance, poor safety awareness, impaired cognition, high risk for falls, not safe to be alone, and concern for safely navigating or managing the home environment    IF patient discharges home will need the following DME: continuing to assess with progress       SUBJECTIVE:   Patient stated, “I can try myself .”  OBJECTIVE DATA SUMMARY:     Past Medical History:   Diagnosis Date    Acid reflux     Constipation     Falls     Hip fracture requiring operative repair ([facility]) 2022    left    Hypertension     Incontinence     wears depends    Stroke ([facility]) 2019    left side weakness    Thyroid disease     thyroid nodules     Past Surgical History:   Procedure  hygiene seated in recliner with mod verbal cues for sequencing    UE Bathing: Stand by assistance       Skin Care: Foam skin cleanser    LE Bathing: Maximum assistance       UE Dressing: Stand by assistance       LE Dressing: Maximum assistance       Toileting: Maximum assistance            Functional Mobility: Moderate assistance  Functional Mobility Skilled Clinical Factors: with RW BSC<>recliner         ADL Intervention and task modifications:        Patient instructed and verbalized understanding regarding of benefits of maintaining activity tolerance, functional mobility, and independence with self care tasks during acute stay  to ensure safe return home and to baseline. Encouraged patient to increase frequency and duration OOB, be out of bed for all meals, perform daily ADLs (as approved by RN/MD regarding bathing etc), and performing functional mobility to/from commode.               [facility] AM-PACTM \"6 Clicks\"                                                       Daily Activity Inpatient Short Form  How much help from another person does the patient currently need... Total; A Lot A Little None   1.  Putting on and taking off regular lower body clothing? []  1 [x]  2 []  3 []  4   2.  Bathing (including washing, rinsing, drying)? []  1 [x]  2 []  3 []  4   3.  Toileting, which includes using toilet, bedpan or urinal? [] 1 [x]  2 []  3 []  4   4.  Putting on and taking off regular upper body clothing? []  1 []  2 [x]  3 []  4   5.  Taking care of personal grooming such as brushing teeth? []  1 []  2 []  3 [x]  4   6.  Eating meals? []  1 []  2 []  3 [x]  4   © 2007, Trustees of [facility], under license to [employer]. All rights reserved     Score: 17/24     Interpretation of Tool:  Represents clinically-significant functional categories (i.e. Activities of daily living).    Cutoff score 39.4 (19) correlates to a good likelihood of discharging home versus a facility  Ro Morales

## 2024-10-14 NOTE — PROGRESS NOTES
End of Shift Note    Bedside shift change report given to Nidia (oncoming nurse) by Lauren Hamilton RN (offgoing nurse).  Report included the following information SBAR, Kardex, and MAR    Shift worked:  7p-7a     Shift summary and any significant changes:     Scheduled medications were given, see MAR.  IV has been flushed and is patent.  Patient is up with the assistance of two.  Patient was repositioned during my shift.  Bed alarm has been engaged and the bed was left in the lowest position.     Concerns for physician to address:       Zone phone for oncoming shift:          Activity:     Number times ambulated in hallways past shift: 0  Number of times OOB to chair past shift: 0    Cardiac:   Cardiac Monitoring: Yes           Access:  Current line(s): PIV     Genitourinary:   Urinary status: incontinent    Respiratory:      Chronic home O2 use?: NO  Incentive spirometer at bedside: NO       GI:     Current diet:  ADULT DIET; Regular  Passing flatus: YES  Tolerating current diet: YES       Pain Management:   Patient states pain is manageable on current regimen: YES    Skin:     Interventions: nutritional support    Patient Safety:  Fall Score:    Interventions: bed/chair alarm and gripper socks       Length of Stay:  Expected LOS: 2  Actual LOS: 0      Lauren Haimlton, LEFTY

## 2024-10-14 NOTE — CARE COORDINATION
Care Management Initial Assessment       RUR:  na, observation  Readmission? No  1st IM letter given? No  1st  letter given: No    Chart reviewed. CM discussed patient with  [facility] Hospitalist. Therapy orders were submitted, as patient will need SNF stay.     CM met with patient and . Patient states that patient has physically declined in the last month, with falls. He has paid caregivers through [employer] 2 times per week. Therapy had just left room, after reporting need for SNF. Referrals sent to [facility] and [facility], as requested by . Noted that patient is observation status. Message sent to [facility] :Hospitalist about considering for inpatient status.    CM will continue to follow.      10/14/24 1766   Service Assessment   Patient Orientation Person;Place   Cognition Dementia / Early Alzheimer's   History Provided By Spouse   Primary Caregiver Spouse   Support Systems Spouse/Significant Other;Home Care Staff  (has paid caregivers through [employer])   Patient's Healthcare Decision Maker is: Patient Declined (Legal Next of Kin Remains as Decision Maker)   PCP Verified by CM Yes   Prior Functional Level Assistance with the following:;Bathing;Dressing;Toileting;Cooking;Housework;Shopping;Mobility   Current Functional Level Assistance with the following:;Bathing;Dressing;Toileting;Cooking;Housework;Shopping;Mobility   Can patient return to prior living arrangement No  (will need to get stronger, first)   Ability to make needs known: Fair   Family able to assist with home care needs: Yes   Would you like for me to discuss the discharge plan with any other family members/significant others, and if so, who? Yes  (, Martin Almanzar)   Financial Resources Medicare   Community Resources ECF/Home Care   Social/Functional History   Lives With Spouse   Type of Home House   Home Equipment Hospital bed;Rollator;Walker - Standard;Wheelchair - Manual   Active  No   Patient's   Info  transports   Discharge Planning   Type of Residence Skilled Nursing Facility  (recommended by therapy)   Living Arrangements Spouse/Significant Other   Current Services Prior To Admission Private Duty Homecare   DME Ordered? No   Patient expects to be discharged to: Skilled nursing facility     Zandra Mckeon RN  Care Manager  [phone]

## 2024-10-15 PROCEDURE — 6360000002 HC RX W HCPCS: Performed by: NURSE PRACTITIONER

## 2024-10-15 PROCEDURE — 6370000000 HC RX 637 (ALT 250 FOR IP): Performed by: NURSE PRACTITIONER

## 2024-10-15 PROCEDURE — 6370000000 HC RX 637 (ALT 250 FOR IP): Performed by: INTERNAL MEDICINE

## 2024-10-15 PROCEDURE — 97535 SELF CARE MNGMENT TRAINING: CPT

## 2024-10-15 PROCEDURE — 1100000000 HC RM PRIVATE

## 2024-10-15 PROCEDURE — 2580000003 HC RX 258: Performed by: NURSE PRACTITIONER

## 2024-10-15 PROCEDURE — 97530 THERAPEUTIC ACTIVITIES: CPT

## 2024-10-15 RX ADMIN — SODIUM CHLORIDE, PRESERVATIVE FREE 10 ML: 5 INJECTION INTRAVENOUS at 10:17

## 2024-10-15 RX ADMIN — POLYETHYLENE GLYCOL 3350 17 G: 17 POWDER, FOR SOLUTION ORAL at 10:14

## 2024-10-15 RX ADMIN — CEPHALEXIN 250 MG: 250 CAPSULE ORAL at 18:00

## 2024-10-15 RX ADMIN — CEPHALEXIN 250 MG: 250 CAPSULE ORAL at 00:30

## 2024-10-15 RX ADMIN — SODIUM CHLORIDE, PRESERVATIVE FREE 10 ML: 5 INJECTION INTRAVENOUS at 22:25

## 2024-10-15 RX ADMIN — METOPROLOL TARTRATE 25 MG: 25 TABLET, FILM COATED ORAL at 10:15

## 2024-10-15 RX ADMIN — PANTOPRAZOLE SODIUM 40 MG: 40 TABLET, DELAYED RELEASE ORAL at 06:55

## 2024-10-15 RX ADMIN — CEPHALEXIN 250 MG: 250 CAPSULE ORAL at 12:49

## 2024-10-15 RX ADMIN — PRAVASTATIN SODIUM 80 MG: 40 TABLET ORAL at 22:25

## 2024-10-15 RX ADMIN — CEPHALEXIN 250 MG: 250 CAPSULE ORAL at 06:55

## 2024-10-15 RX ADMIN — ENOXAPARIN SODIUM 40 MG: 100 INJECTION SUBCUTANEOUS at 10:16

## 2024-10-15 RX ADMIN — METOPROLOL TARTRATE 25 MG: 25 TABLET, FILM COATED ORAL at 22:25

## 2024-10-15 RX ADMIN — ASPIRIN 81 MG: 81 TABLET, CHEWABLE ORAL at 10:15

## 2024-10-15 ASSESSMENT — PAIN SCALES - GENERAL: PAINLEVEL_OUTOF10: 0

## 2024-10-15 NOTE — PROGRESS NOTES
Hospitalist Progress Note    NAME:   Audrey Almanzar   : 1940   MRN: [id number]     Date/Time: 10/15/2024 4:37 PM  Patient PCP: Soumya Dye MD    Estimated discharge date:10/15  Barriers: Medically stable for discharge      Assessment / Plan:    Urinary tract infection  UA indicative of UTI  Urine culture showed ecoli   Continue oral Keflex.  No significant signs or sequelae of sepsis.     Frequent falls/Debility   Gait disturbance  Deconditioning  Cognitive impairment/Dementia   Given significant deconditioning and decline with ongoing UTI, will change to admission.     is patient's caregiver who is 84 years old and reports that patient has been falling more frequently and needing more assistance with ADLs/transferring.  He is unable to care for her at home anymore due to his physical limitations.  He is interested in SNF with possible long-term care placement.  He asked to be called on his home phone as he does not often turn his cell phone on.  CTA of head with no acute process  PT/OT consults  Consult to case management for assistance with placement     Paroxysmal atrial fibrillation  Right atrial flutter with ablation by Dr. 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 Chronic T12 compression fracture.  Overall mild lumbar spinal stenosis.  At C5-C6 there is moderate spinal stenosis and severe left and moderate right foraminal stenosis.    -Likely contributing to debility as well as dementia    DVT  prophylaxis  Lovenox SC      Medical Decision Making:   I personally reviewed labs: BMP, CBC, urinalysis  I personally reviewed imaging:  I personally reviewed EKG:  Toxic drug monitoring:   Discussed case with: , patient, nursing      Subjective:     Chief Complaint / Reason for Physician Visit  FU UTI   No acute issues    Objective:     VITALS:   Last 24hrs VS reviewed since prior progress note. Most recent are:  Patient Vitals for the past 24 hrs:   BP Temp Temp src Pulse Resp SpO2   10/15/24 1503 106/61 97.3 °F (36.3 °C) Oral 72 20 97 %   10/15/24 1015 127/69 -- -- 69 -- --   10/15/24 0816 127/69 97.3 °F (36.3 °C) Oral 69 20 95 %   10/14/24 2030 115/67 97.5 °F (36.4 °C) Oral 78 14 95 %         Intake/Output Summary (Last 24 hours) at 10/15/2024 1637  Last data filed at 10/15/2024 1200  Gross per 24 hour   Intake --   Output 500 ml   Net -500 ml        I had a face to face encounter and independently examined this patient on 10/15/2024, as outlined below:  PHYSICAL EXAM:  General: Alert, cooperative  EENT:  EOMI. Anicteric sclerae.  Resp:  CTA bilaterally, no wheezing or rales.  No accessory muscle use  CV:  Regular  rhythm,  No edema  GI:  Soft, Non distended, Non tender.  +Bowel sounds  Neurologic:  Alert and oriented X 3, normal speech,   Psych:   Good insight. Not anxious nor agitated  Skin:  No rashes.  No jaundice    Reviewed most current lab test results and cultures  YES  Reviewed most current radiology test results   YES  Review and summation of old records today    NO  Reviewed patient's current orders and MAR    YES  PMH/SH reviewed - no change compared to H&P    Procedures: see electronic medical records for all procedures/Xrays and details which were not copied into this note but were reviewed prior to creation of Plan.      LABS:  I reviewed today's most current labs and imaging studies.  Pertinent labs include:  Recent Labs     10/13/24  1355   WBC 6.8   HGB 14.2   HCT 43.8

## 2024-10-15 NOTE — PLAN OF CARE
generalized weakness, limited lower body access and decreased functional activity tolerance. Patient received in bed and agreeable to therapy. Patient required min-mod A for bed mobility. Patient required min A x2 using RW to transfer to chair. Patient noted to be wet, required max A to change brief and for kishore care. Patient with setup for breakfast sitting in chair. Patient left sitting up in chair with call bell in reach, RN aware, VSS, all needs met. Will continue to follow.      PLAN :  Patient continues to benefit from skilled intervention to address the above impairments.  Continue treatment per established plan of care to address goals.    Recommend with staff: Recommend with nursing, ADLs with supervision/setup, OOB to chair 3x/day via rolling walker with 2 assist and toileting via beside commode. Thank you for completing as able in order to maintain patient strength, endurance and independence.     Recommend next OT session: grooming at sink    Recommendation for discharge: (in order for the patient to meet his/her long term goals):   Moderate intensity short-term skilled occupational therapy up to 5x/week    Other factors to consider for discharge: poor safety awareness, impaired cognition, high risk for falls, not safe to be alone, and concern for safely navigating or managing the home environment    IF patient discharges home will need the following DME: continuing to assess with progress       SUBJECTIVE:   Patient stated “Thank you.”    OBJECTIVE DATA SUMMARY:   Cognitive/Behavioral Status:  Orientation  Overall Orientation Status: Impaired  Orientation Level: Oriented to person;Oriented to place  Cognition  Overall Cognitive Status: WFL    Functional Mobility and Transfers for ADLs:  Bed Mobility:  Bed Mobility Training  Supine to Sit: Minimum assistance;Moderate assistance;Assist X2     Transfers:   Transfer Training  Transfer Training: Yes  Stand to Sit: Minimum assistance;Assist X2  Stand Pivot

## 2024-10-15 NOTE — WOUND CARE
Wound care nurse consultfor POA redness to buttocks and perineum.    83 y/o female admitted for frequent falls.  Past Medical History:   Diagnosis Date    Acid reflux     Constipation     Falls     Hip fracture requiring operative repair ([facility]) 2022    left    Hypertension     Incontinence     wears depends    Stroke ([facility]) 2019    left side weakness    Thyroid disease     thyroid nodules     Past Surgical History:   Procedure Laterality Date    BREAST SURGERY Right     biopsy, benign    COLONOSCOPY      EP DEVICE PROCEDURE N/A 02/02/2024    Ablation A-flutter performed by Daniel Addison MD at [facility] CARDIAC CATH LAB    EP DEVICE PROCEDURE N/A 03/25/2024    Watchman jennifer closure device performed by Daniel Addison MD at [facility] CARDIAC CATH LAB    EP DEVICE PROCEDURE N/A 5/13/2024    Watchman jennifer closure device performed by Daniel Addison MD at [facility] CARDIAC CATH LAB    GYN  1996    hysterectomy ? BSO    INVASIVE VASCULAR N/A 02/02/2024    Ultrasound guided vascular access performed by Daniel Addison MD at [facility] CARDIAC CATH LAB    INVASIVE VASCULAR N/A 5/13/2024    Ultrasound guided vascular access performed by Daniel Addison MD at [facility] CARDIAC CATH LAB    ORTHOPEDIC SURGERY      back l5 herniated disc    TOTAL HIP ARTHROPLASTY Left 03/27/2022     Patient has erythema that is blanchable and caused by urinary incontinence to perineum and inner thighs. Patient has an adult brief that is soaked with urine and a Purewick in place.      Recommend:    Buttocks and perineum MASD: PRN incontinence clean up. Wash with soap and water, pat dry and apply zinc oxide cream (orange tube). Incontinence checks with q 2 hour turns.    OPAL MARROQUIN RN, CWON

## 2024-10-15 NOTE — PROGRESS NOTES
End of Shift Note    Bedside shift change report given to SAPNA Peña (oncoming nurse) by Nidia Stewart RN (offgoing nurse).  Report included the following information SBAR, Kardex, Intake/Output, MAR, Recent Results, and Cardiac Rhythm Sinus Rhythm    Shift worked:  7 am to 7:30 pm     Shift summary and any significant changes:     All scheduled medications administered. Patient denied pain during shift. Patient worked with PT/OT during shift; 2 assist with walker to bedside commode and chair. Bath, incontinence care, gown and linen change provided; patient had 1 bowel movement. Wound consult placed for excoriation to buttock. IV flushed and patent. Set-up patient for all meals. Patient's  came to visit during shift and was provided with an update. Bed alarm engaged. Nursing rounds and education completed.     Concerns for physician to address:  N/a     [employer] phone for oncoming shift:   [phone]       Activity:  Level of Assistance: Moderate assist, patient does 50-74%    Cardiac:   Cardiac Monitoring:  yes - Sinus Rhythm    Access:  Current line(s): PIV     Genitourinary:   Urinary Status: Voiding, Incontinent briefs    Respiratory:   O2 Device: None (Room air)    GI:  Current diet: ADULT DIET; Regular    Pain Management:   Patient states pain is manageable on current regimen: YES    Skin:  Sumit Scale Score: 16  Interventions: Wound Offloading (Prevention Methods): Bed, pressure reduction mattress, Pillows, Repositioning, Turning, Elevate heels  Pressure injury: no- excoriation with slow blanching redness buttock and inner thighs    Patient Safety:  Fall Score: Barker Total Score: 100  Fall Risk Interventions  Nursing Judgement-Fall Risk High(Add Comments): Yes  Toilet Every 2 Hours-In Advance of Need: Yes  Hourly Visual Checks: Awake, Confused, In bed  Fall Visual Posted: Socks, Fall sign posted  Room Door Open: Yes  Alarm On: Bed  Patient Moved Closer to Nursing Station: No    Active Consults:  IP CONSULT

## 2024-10-15 NOTE — PLAN OF CARE
benefit from continue therapy in rehab setting at d/c.         PLAN:  Patient continues to benefit from skilled intervention to address the above impairments.  Continue treatment per established plan of care.    Recommend with staff: therapy recommendations for staff: Recommend mobility with staff assist x2 using gait belt and rolling walker.      Recommendation for discharge: (in order for the patient to meet his/her long term goals):   Moderate intensity short-term skilled physical therapy up to 5x/week    Other factors to consider for discharge: patient's current support system is unable to meet their requirements for physical assistance, impaired cognition, high risk for falls, not safe to be alone, and concern for safely navigating or managing the home environment    IF patient discharges home will need the following DME: patient owns DME required for discharge       SUBJECTIVE:   Patient stated, \"I'm ok.\"    OBJECTIVE DATA SUMMARY:   Critical Behavior:  Orientation  Overall Orientation Status: Impaired  Orientation Level: Oriented to person;Oriented to place  Cognition  Overall Cognitive Status: Exceptions  Following Commands: Follows multistep commands with repitition  Memory: Decreased short term memory;Decreased recall of recent events;Decreased recall of biographical Information  Safety Judgement: Decreased awareness of need for safety  Problem Solving: Decreased awareness of errors  Insights: Decreased awareness of deficits  Initiation: Requires cues for some  Sequencing: Requires cues for some    Functional Mobility Training:  Bed Mobility:  Bed Mobility Training  Bed Mobility Training: Yes  Interventions: Safety awareness training;Tactile cues;Verbal cues  Supine to Sit: Minimum assistance;Moderate assistance;Assist X2  Scooting: Minimum assistance;Moderate assistance;Assist X1  Transfers:  Transfer Training  Transfer Training: Yes  Interventions: Safety awareness training;Tactile cues;Verbal cues  Sit  to Stand: Moderate assistance;Additional time;Other (comment) (cues for forward weight shift)  Stand to Sit: Minimum assistance;Assist X2  Stand Pivot Transfers: Minimum assistance;Assist X2  Bed to Chair: Minimum assistance;Assist X2 (with RW)  Balance:  Balance  Sitting: Impaired  Sitting - Static: Good (unsupported)  Sitting - Dynamic: Fair (occasional)  Standing: Impaired  Standing - Static: Constant support;Fair (with RW)  Standing - Dynamic: Constant support;Fair (with RW)   Ambulation/Gait Training:     Gait  Gait Training: No (turning steps with RW to chair, min A of 2)            Pain Rating:  No c/o    Activity Tolerance:   Fair     After treatment:   Patient left in no apparent distress sitting up in chair, Call bell within reach, Bed/ chair alarm activated, and Updated patient's board on functional status and mobility recommendations      COMMUNICATION/EDUCATION:   The patient's plan of care was discussed with: occupational therapist and registered nurse    Patient Education  Education Given To: Patient  Education Provided: Plan of Care;Role of Therapy;Transfer Training;Fall Prevention Strategies  Education Provided Comments: bed mobility training  Education Method: Demonstration;Verbal;Teach Back  Barriers to Learning: Cognition  Education Outcome: Continued education needed      Geneva Rangel, PT  Minutes: 20

## 2024-10-15 NOTE — CARE COORDINATION
1423 - son is hoping pt returns to baseline within 100 days. Son is requesting to continue with other choices. CM suggested to son a discussion of realistic long term goal with physician and PCP to help him determine if LTC will be needed after SNF.  CM will continue to follow for response from [facility] and [facility].    1420 - Left message on son's contact number to update [facility] ($460 per day and 600,000 available funds for long term care - Mr. Mcdermott [phone] to discuss with son if he wishes to chose).    1315 -  requested [facility], [facility] and [facility] - CM sent request through [facility].    8375 - CM spoke with pt and  at bedside, advised decline from [facility] and [facility].  wants to review choices today and supply choices tomorrow. CM encouraged review and choices today (asap) for possible d/c tomorrow, 10/16/24.  CM supplied SNF list to pt and .    Brenda Throckmorton RN  [facility] Case Management  [phone]

## 2024-10-16 PROCEDURE — 97535 SELF CARE MNGMENT TRAINING: CPT

## 2024-10-16 PROCEDURE — 6360000002 HC RX W HCPCS: Performed by: NURSE PRACTITIONER

## 2024-10-16 PROCEDURE — 2580000003 HC RX 258: Performed by: NURSE PRACTITIONER

## 2024-10-16 PROCEDURE — 1100000000 HC RM PRIVATE

## 2024-10-16 PROCEDURE — 6370000000 HC RX 637 (ALT 250 FOR IP): Performed by: INTERNAL MEDICINE

## 2024-10-16 PROCEDURE — 6370000000 HC RX 637 (ALT 250 FOR IP): Performed by: NURSE PRACTITIONER

## 2024-10-16 PROCEDURE — 97530 THERAPEUTIC ACTIVITIES: CPT

## 2024-10-16 RX ADMIN — CEPHALEXIN 250 MG: 250 CAPSULE ORAL at 11:06

## 2024-10-16 RX ADMIN — CEPHALEXIN 250 MG: 250 CAPSULE ORAL at 01:20

## 2024-10-16 RX ADMIN — METOPROLOL TARTRATE 25 MG: 25 TABLET, FILM COATED ORAL at 08:43

## 2024-10-16 RX ADMIN — METOPROLOL TARTRATE 25 MG: 25 TABLET, FILM COATED ORAL at 22:51

## 2024-10-16 RX ADMIN — ASPIRIN 81 MG: 81 TABLET, CHEWABLE ORAL at 08:43

## 2024-10-16 RX ADMIN — PRAVASTATIN SODIUM 80 MG: 40 TABLET ORAL at 22:51

## 2024-10-16 RX ADMIN — ENOXAPARIN SODIUM 40 MG: 100 INJECTION SUBCUTANEOUS at 08:43

## 2024-10-16 RX ADMIN — CEPHALEXIN 250 MG: 250 CAPSULE ORAL at 18:09

## 2024-10-16 RX ADMIN — SODIUM CHLORIDE, PRESERVATIVE FREE 10 ML: 5 INJECTION INTRAVENOUS at 08:44

## 2024-10-16 RX ADMIN — CEPHALEXIN 250 MG: 250 CAPSULE ORAL at 06:32

## 2024-10-16 RX ADMIN — POLYETHYLENE GLYCOL 3350 17 G: 17 POWDER, FOR SOLUTION ORAL at 08:43

## 2024-10-16 RX ADMIN — PANTOPRAZOLE SODIUM 40 MG: 40 TABLET, DELAYED RELEASE ORAL at 06:32

## 2024-10-16 NOTE — PLAN OF CARE
Problem: Physical Therapy - Adult  Goal: By Discharge: Performs mobility at highest level of function for planned discharge setting.  See evaluation for individualized goals.  Description: FUNCTIONAL STATUS PRIOR TO ADMISSION: Patient receives help from spouse for ADL at baseline, ambulates with rollator walker with supervision due to history of falls, and has a paid caregiver that comes and helps her bathe 2x/week.     HOME SUPPORT PRIOR TO ADMISSION: The patient lived with spouse.    Physical Therapy Goals  Initiated 10/14/2024  1.  Patient will move from supine to sit and sit to supine, scoot up and down, and roll side to side in bed with minimal assistance within 7 day(s).    2.  Patient will perform sit to stand with minimal assistance within 7 day(s).  3.  Patient will transfer from bed to chair and chair to bed with minimal assistance using the least restrictive device within 7 day(s).  4.  Patient will ambulate with minimal assistance for 25 feet with the least restrictive device within 7 day(s).   Outcome: Progressing   PHYSICAL THERAPY TREATMENT    Patient: Audrey Almanzar (84 y.o. female)  Date: 10/16/2024  Diagnosis: Acute UTI [N39.0]  Frequent falls [R29.6]  Delirium due to another medical condition [F05]  Moderate vascular dementia without behavioral disturbance, psychotic disturbance, mood disturbance, or anxiety ([facility]) [F01.B0]  SIRS (systemic inflammatory response syndrome) ([facility]) [R65.10] Frequent falls      Precautions: Fall Risk, Bed Alarm                      ASSESSMENT:  Patient continues to benefit from skilled PT services and is slowly progressing towards goals. Pt received in chair, voicing some fatigue and ready to return to bed. Pt overall min to mod A to stand, min to mod A with rolling walker to turn to the bed. Able to side step with the walker with min to mod A of 1 with more cues needed for LLE placement than R. Overall short shuffling steps. Pt returned to supine with mod to max A

## 2024-10-16 NOTE — PROGRESS NOTES
Spiritual Health History and Assessment/Progress Note  [facility]    Initial Encounter,  , Adjustment to illness, Initial Encounter    Name: Audrey Almanzar MRN: [id number]    Age: 84 y.o.     Sex: female   Language: English   Evangelical: Lutheran   Frequent falls     Date: 10/16/2024            Total Time Calculated: 17 min              Spiritual Assessment began in [facility] MEDICAL ONCOLOGY        Referral/Consult From: Rounding   Encounter Overview/Reason: Initial Encounter  Service Provided For: Patient    Elizabeth, Belief, Meaning:   Patient is connected with a elizabeth tradition or spiritual practice and has beliefs or practices that help with coping during difficult times  Family/Friends No family/friends present      Importance and Influence:  Patient has spiritual/personal beliefs that influence decisions regarding their health  Family/Friends No family/friends present    Community:  Patient feels well-supported. Support system includes: Spouse/Partner, Children, Friends, and Extended family  Family/Friends No family/friends present    Assessment and Plan of Care:     Patient Interventions include: Facilitated expression of thoughts and feelings, Explored spiritual coping/struggle/distress, Affirmed coping skills/support systems, and Facilitated life review and/ or legacy  Family/Friends Interventions include: No family/friends present    Patient Plan of Care: No spiritual needs identified for follow-up and Spiritual Care available upon further referral  Family/Friends Plan of Care: Spiritual Care available upon further referral    Electronically signed by CHRISTINA Garcia on 10/16/2024 at 2:02 PM

## 2024-10-16 NOTE — PROGRESS NOTES
End of Shift Note    Bedside shift change report given to SAPNA Peña (oncoming nurse) by Nidia Stewart RN (offgoing nurse).  Report included the following information SBAR, Kardex, Intake/Output, MAR, Recent Results, and Cardiac Rhythm Sinus Rhythm    Shift worked:  7 am to 7:30 pm     Shift summary and any significant changes:      All scheduled medications administered. Patient denied pain during shift. Patient worked with PT/OT again during shift; 2 assist with walker to chair. Bath, incontinence care, gown and linen change provided; purewick placed and output documented. Wound came and assessed patient; Zinc cream applied to buttock. IV flushed and patent. Set-up patient for all meals. Patient's  and son came to visit during shift and was provided with an update. Bed alarm engaged. Nursing rounds and education completed.         Concerns for physician to address:  N/a     [employer] phone for oncoming shift:   [phone]       Activity:  Level of Assistance: Moderate assist, patient does 50-74%    Cardiac:   Cardiac Monitoring:  yes - Sinus Rhythm    Access:  Current line(s): PIV     Genitourinary:   Urinary Status: Voiding, External catheter, Incontinent briefs    Respiratory:   O2 Device: None (Room air)    GI:  Current diet: ADULT DIET; Regular    Pain Management:   Patient states pain is manageable on current regimen: YES    Skin:  Sumit Scale Score: 16  Interventions: Wound Offloading (Prevention Methods): Bed, pressure reduction mattress, Repositioning, Pillows, Turning, Elevate heels  Pressure injury: no- excoriation with slow blanching redness buttock and inner thighs      Patient Safety:  Fall Score: Barker Total Score: 100  Fall Risk Interventions  Nursing Judgement-Fall Risk High(Add Comments): Yes  Toilet Every 2 Hours-In Advance of Need: Yes  Hourly Visual Checks: Awake, Confused, In bed  Fall Visual Posted: Socks, Fall sign posted, Armband  Room Door Open: Yes  Alarm On: Bed  Patient Moved Closer

## 2024-10-16 NOTE — PROGRESS NOTES
End of Shift Note    Bedside shift change report given to LEFTY Horne (oncoming nurse) by PATRICK KING LPN (offgoing nurse).  Report included the following information SBAR, Kardex, and MAR    Shift worked:  [id number]     Shift summary and any significant changes:     Patient received scheduled medications per MAR. Patient had no complaints of n/v. Patient had no complaints of pain. Patient rested fairly well during the shift. Nursing rounds completed.            PATRICK KING LPN

## 2024-10-16 NOTE — CARE COORDINATION
MICHAEL: Haritha Claros is currently working with pt in the Onc Unit.  CM informed that pt is medically stable to d/c.  CM informed that pt will transition to snf at the time of d/c.  CM informed that previous CM sent snf referrals, and pt was accepted to [facility].      CM informed pts spouse of the following and he is agreeable to facility choice.    Pt will require 3 midnight stay-due to Medicare Guidelines.  Pt will be ready to d/c on 10/17/24.    GISSEL Warren CM  [phone]

## 2024-10-16 NOTE — PROGRESS NOTES
Hospitalist Progress Note    NAME:   Audrey Almanzar   : 1940   MRN: [id number]     Date/Time: 10/16/2024 3:46 PM  Patient PCP: Soumya Dye MD    Estimated discharge date:10/15  Barriers: Medically stable for discharge  Need 3 inpatient days for placement    Assessment / Plan:    Urinary tract infection  UA indicative of UTI  Urine culture showed ecoli   Continue oral Keflex.  No significant signs or sequelae of sepsis.     Frequent falls/Debility   Gait disturbance  Deconditioning  Cognitive impairment/Dementia   Given significant deconditioning and decline with ongoing UTI, will change to admission.     is patient's caregiver who is 84 years old and reports that patient has been falling more frequently and needing more assistance with ADLs/transferring.  He is unable to care for her at home anymore due to his physical limitations.  He is interested in SNF with possible long-term care placement.  He asked to be called on his home phone as he does not often turn his cell phone on.  CTA of head with no acute process  PT/OT consults rehab  Consult to case management for assistance with placement     Paroxysmal atrial fibrillation  Right atrial flutter with ablation by Dr. 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 Chronic T12 compression fracture.  Overall mild lumbar spinal stenosis.  At C5-C6 there is moderate spinal stenosis and severe left and moderate right foraminal stenosis.    -Likely contributing to  \"WBC\", \"HGB\", \"HCT\", \"PLT\" in the last 72 hours.    No results for input(s): \"NA\", \"K\", \"CL\", \"CO2\", \"GLUCOSE\", \"BUN\", \"CREATININE\", \"CALCIUM\", \"MG\", \"PHOS\", \"LABALBU\", \"BILITOT\", \"AST\", \"ALT\", \"INR\" in the last 72 hours.      Signed: Angela Rhodes MD

## 2024-10-16 NOTE — PROGRESS NOTES
..End of Shift Note    Bedside shift change report given to LEFTY Armstrong (oncoming nurse) by Ozzie Melchor RN (offgoing nurse).  Report included the following information SBAR, Kardex, Intake/Output, MAR, and Recent Results    Shift worked:  6428-6563     Shift summary and any significant changes:     Pt given prescribed meds per MAR. Had x1 BM. Up in chair. Caring rounds completed.      Concerns for physician to address:  none     Zone phone for oncoming shift:   [phone]           Ozzie Melchor, RN

## 2024-10-16 NOTE — PLAN OF CARE
Problem: Occupational Therapy - Adult  Goal: By Discharge: Performs self-care activities at highest level of function for planned discharge setting.  See evaluation for individualized goals.  Description: FUNCTIONAL STATUS PRIOR TO ADMISSION:  Pt required varying levels of assist for ADLs from spouse and CG. Supervision for functional ambulation. CG 2x/week for showering. Pt with recent cognitive and physical decline, requiring increased assist from spouse and with recurrent falls  Receives Help From: Family, Personal care attendant, ADL Assistance: Needs assistance,  ,  ,  ,  ,  ,  , Ambulation Assistance: Needs assistance, Transfer Assistance: Needs assistance, Active : No     HOME SUPPORT: Patient lived with spouse.    Occupational Therapy Goals:  Initiated 10/14/2024  1.  Patient will perform lower body dressing with Moderate Assist within 7 day(s).  2.  Patient will perform toileting with Moderate Assist within 7 day(s).  3.  Patient will perform supine<>sit with Minimal Assist within 7 day(s).  4.  Patient will perform toilet transfers with Minimal Assist  within 7 day(s).  5.  Patient will perform functional ambulation with least restrictive AD with Minimal Assist within 7 day(s).      Outcome: Progressing    OCCUPATIONAL THERAPY TREATMENT  Patient: Audrey Almanzar (84 y.o. female)  Date: 10/16/2024  Primary Diagnosis: Acute UTI [N39.0]  Frequent falls [R29.6]  Delirium due to another medical condition [F05]  Moderate vascular dementia without behavioral disturbance, psychotic disturbance, mood disturbance, or anxiety (HCC) [F01.B0]  SIRS (systemic inflammatory response syndrome) (HCC) [R65.10]       Precautions: Fall Risk, Bed Alarm                Chart, occupational therapy assessment, plan of care, and goals were reviewed.    ASSESSMENT  Patient continues to benefit from skilled OT services and is progressing towards goals. Patient received in bed and agreeable to therapy. Patient ADLs continue  to be limited by impaired balance, generalized weakness, decreased functional activity tolerance, limited lower body access, and decreased insight into deficits. Patient required mod A for bed mobility and min-mod A x1 using RW for transfer to chair. Once in chair, patient with setup for grooming sitting in chair. Patient left with call bell in reach, RN aware, all needs met. Will continue to follow.      PLAN :  Patient continues to benefit from skilled intervention to address the above impairments.  Continue treatment per established plan of care to address goals.    Recommend with staff: Recommend with nursing, ADLs with supervision/setup, OOB to chair 3x/day via rolling walker and toileting via beside commode. Thank you for completing as able in order to maintain patient strength, endurance and independence.     Recommend next OT session: dressing and bathing     Recommendation for discharge: (in order for the patient to meet his/her long term goals):   Moderate intensity short-term skilled occupational therapy up to 5x/week    Other factors to consider for discharge: patient's current support system is unable to meet their requirements for physical assistance, impaired cognition, high risk for falls, not safe to be alone, and concern for safely navigating or managing the home environment    IF patient discharges home will need the following DME: continuing to assess with progress       SUBJECTIVE:   Patient stated “Sure.” re: getting out of bed to chair    OBJECTIVE DATA SUMMARY:   Cognitive/Behavioral Status:  Orientation  Overall Orientation Status: Impaired  Orientation Level: Oriented to person;Oriented to place  Cognition  Overall Cognitive Status: Exceptions  Arousal/Alertness: Delayed responses to stimuli  Following Commands: Follows multistep commands with increased time  Memory: Decreased recall of recent events;Decreased recall of biographical Information  Safety Judgement: Decreased awareness of need

## 2024-10-17 VITALS
OXYGEN SATURATION: 92 % | WEIGHT: 161 LBS | TEMPERATURE: 97.2 F | HEART RATE: 72 BPM | BODY MASS INDEX: 26.82 KG/M2 | HEIGHT: 65 IN | DIASTOLIC BLOOD PRESSURE: 66 MMHG | RESPIRATION RATE: 18 BRPM | SYSTOLIC BLOOD PRESSURE: 112 MMHG

## 2024-10-17 PROCEDURE — 2580000003 HC RX 258: Performed by: NURSE PRACTITIONER

## 2024-10-17 PROCEDURE — 6370000000 HC RX 637 (ALT 250 FOR IP): Performed by: NURSE PRACTITIONER

## 2024-10-17 PROCEDURE — 6360000002 HC RX W HCPCS: Performed by: NURSE PRACTITIONER

## 2024-10-17 RX ADMIN — PANTOPRAZOLE SODIUM 40 MG: 40 TABLET, DELAYED RELEASE ORAL at 06:36

## 2024-10-17 RX ADMIN — METOPROLOL TARTRATE 25 MG: 25 TABLET, FILM COATED ORAL at 08:29

## 2024-10-17 RX ADMIN — SODIUM CHLORIDE, PRESERVATIVE FREE 10 ML: 5 INJECTION INTRAVENOUS at 08:30

## 2024-10-17 RX ADMIN — POLYETHYLENE GLYCOL 3350 17 G: 17 POWDER, FOR SOLUTION ORAL at 08:29

## 2024-10-17 RX ADMIN — ASPIRIN 81 MG: 81 TABLET, CHEWABLE ORAL at 08:29

## 2024-10-17 RX ADMIN — ENOXAPARIN SODIUM 40 MG: 100 INJECTION SUBCUTANEOUS at 08:29

## 2024-10-17 NOTE — PLAN OF CARE
Problem: Chronic Conditions and Co-morbidities  Goal: Patient's chronic conditions and co-morbidity symptoms are monitored and maintained or improved  Outcome: HH/HSPC Resolved Met     Problem: Safety - Adult  Goal: Free from fall injury  Outcome: HH/HSPC Resolved Met

## 2024-10-17 NOTE — PROGRESS NOTES
..I have reviewed discharge instructions with the patient. The patient verbalized understanding. Discharge medications reviewed with patient and appropriate educational materials and side effects teaching were provided. Follow-up appointments reviewed. Opportunity for questions and clarification was provided.  Venous access removed without difficulty.  Patient's belongings gathered and sent with patient. Patient is ready for discharge.     Ozzie Melchor RN

## 2024-10-17 NOTE — DISCHARGE SUMMARY
Discharge Summary    Name: Audrey Almanzar  [id number]  YOB: 1940 (Age: 84 y.o.)   Date of Admission: 10/13/2024  Date of Discharge: 10/17/2024  Attending Physician: Angela Rhodes MD    Discharge Diagnosis:   UTI  Frequent falls  Gait disturbance  Deconditioning  Paroxysmal A-fib  Right atrial flutter status post ablation  Status post Watchman  Anticoagulation for JOVAN thrombus in 3/2024  Hypertension  Hyperlipidemia  Prior strokes  History of PE  GERD    Consultations:  IP CONSULT TO CASE MANAGEMENT  IP CONSULT TO CASE MANAGEMENT  IP WOUND CARE NURSE CONSULT TO EVAL      Brief Admission History/Reason for Admission Per Kieran Grajeda MD:   Audrey Almanzar is a 84 y.o.  female with PMHx significant for dementia, frequent falls, hypertension, incontinence, CVA, paroxysmal atrial fibrillation, s/p Watchman, hyperlipidemia, history of PE.  Patient was brought to the ED by her .  She is alert to self only.   reports that patient has had worsening debility and frequent falls at home.  He is no longer able to care for her at home given his advanced age.  She is requiring physical assistance with mobility and all transfers.  He is requesting admission for nursing home placement.     We were asked to admit for work up and evaluation of the above problems.        Brief Hospital Course by Main Problems:   UTI  Urine culture E. coli  Completed Keflex    Frequent falls/Debility   Gait disturbance  Deconditioning  Cognitive impairment/Dementia   CTA of head unremarkable  PT OT recommended rehab  Patient's spouse unable to take care of patient currently due to physical limitations    Paroxysmal atrial fibrillation  Right atrial flutter with ablation by Dr. Addison in 2024  S/p watchman 5/2024 after anticoagulation for JOVAN thrombus in 3/2024  Essential hypertension  Hyperlipidemia  Resume PTA aspirin, metoprolol, statin  Per spouse patient was taken off Plavix last

## 2024-10-17 NOTE — CARE COORDINATION
10/17/24 0949   Services At/After Discharge   Transition of Care Consult (CM Consult) SNF  ([facility])   Partner SNF Yes   Services At/After Discharge Skilled Nursing Facility (SNF)  ([facility])    Resource Information Provided? No   Mode of Transport at Discharge BLS  ([facility] 5p)   Confirm Follow Up Transport Family   Condition of Participation: Discharge Planning   The Patient and/or Patient Representative was provided with a Choice of Provider? Patient Representative  (pts spouse)   The Patient and/Or Patient Representative agree with the Discharge Plan? Yes   Freedom of Choice list was provided with basic dialogue that supports the patient's individualized plan of care/goals, treatment preferences, and shares the quality data associated with the providers?  Yes     Transition of Care Plan to SNF/Rehab    Communication to Patient/Family:  Met with patient and family and they are agreeable to the transition plan. The Plan for Transition of Care is related to the following treatment goals:     CM aware that pt will d/c on today and will transition to snf: [facility] on today.  CM arranged transport for 5P.  CM will inform pts family of the following.    The Patient and/or patient representative was provided with a choice of provider and agrees  with the discharge plan.      Yes [x] No []    A Freedom of choice list was provided with basic dialogue that supports the patient's individualized plan of care/goals and shares the quality data associated with the providers.       Yes [x] No []    SNF/Rehab Transition:  Patient has been accepted to [facility] SNF/Rehab and meets criteria for admission.   Patient will transported by [facility] and expected to leave at 5P.    Communication to SNF/Rehab:  Bedside RN, ONC Unit , has been notified to update the transition plan to the facility and call report ([phone]).  Discharge information has been updated on the AVS. And communicated to facility via [facility]

## 2024-10-17 NOTE — PROGRESS NOTES
Bedside shift change report given to LEFTY Horne (oncoming nurse) by LEFTY Armstrong (offgoing nurse). Report included the following information Nurse Handoff Report, ED SBAR, Adult Overview, Intake/Output, MAR, and Recent Results.

## 2024-10-17 NOTE — DISCHARGE INSTRUCTIONS
Buttocks and perineum MASD: PRN incontinence clean up. Wash with soap and water, pat dry and apply zinc oxide cream (orange tube). Incontinence checks with q 2 hour turns.

## 2024-12-09 ENCOUNTER — TELEPHONE (OUTPATIENT)
Age: 84
End: 2024-12-09

## 2024-12-09 NOTE — TELEPHONE ENCOUNTER
Patient's  came in the office he wants to know does he need to transition patient to geriatric care now since she has dementia.

## 2025-01-01 ENCOUNTER — APPOINTMENT (OUTPATIENT)
Facility: HOSPITAL | Age: 85
DRG: 871 | End: 2025-01-01
Payer: MEDICARE

## 2025-01-01 ENCOUNTER — HOSPITAL ENCOUNTER (INPATIENT)
Facility: HOSPITAL | Age: 85
LOS: 13 days | Discharge: NURSING, REHAB OR LONG-TERM CARE | DRG: 871 | End: 2025-01-14
Attending: EMERGENCY MEDICINE | Admitting: INTERNAL MEDICINE
Payer: MEDICARE

## 2025-01-01 DIAGNOSIS — R65.20 SEVERE SEPSIS (HCC): ICD-10-CM

## 2025-01-01 DIAGNOSIS — G93.40 ENCEPHALOPATHY ACUTE: Primary | ICD-10-CM

## 2025-01-01 DIAGNOSIS — A41.9 SEVERE SEPSIS (HCC): ICD-10-CM

## 2025-01-01 DIAGNOSIS — R91.8 LEFT LOWER LOBE PULMONARY INFILTRATE: ICD-10-CM

## 2025-01-01 LAB
ALBUMIN SERPL-MCNC: 3.4 G/DL (ref 3.5–5)
ALBUMIN/GLOB SERPL: 0.9 (ref 1.1–2.2)
ALP SERPL-CCNC: 104 U/L (ref 45–117)
ALT SERPL-CCNC: 17 U/L (ref 12–78)
ANION GAP SERPL CALC-SCNC: 7 MMOL/L (ref 2–12)
APPEARANCE UR: CLEAR
AST SERPL-CCNC: 11 U/L (ref 15–37)
BACTERIA URNS QL MICRO: NEGATIVE /HPF
BASOPHILS # BLD: 0 K/UL (ref 0–0.1)
BASOPHILS NFR BLD: 0 % (ref 0–1)
BILIRUB SERPL-MCNC: 0.7 MG/DL (ref 0.2–1)
BILIRUB UR QL: NEGATIVE
BUN SERPL-MCNC: 14 MG/DL (ref 6–20)
BUN/CREAT SERPL: 16 (ref 12–20)
CALCIUM SERPL-MCNC: 9.3 MG/DL (ref 8.5–10.1)
CHLORIDE SERPL-SCNC: 108 MMOL/L (ref 97–108)
CO2 SERPL-SCNC: 25 MMOL/L (ref 21–32)
COLOR UR: ABNORMAL
CREAT SERPL-MCNC: 0.85 MG/DL (ref 0.55–1.02)
DIFFERENTIAL METHOD BLD: ABNORMAL
EOSINOPHIL # BLD: 0 K/UL (ref 0–0.4)
EOSINOPHIL NFR BLD: 0 % (ref 0–7)
EPITH CASTS URNS QL MICRO: ABNORMAL /LPF
ERYTHROCYTE [DISTWIDTH] IN BLOOD BY AUTOMATED COUNT: 13.2 % (ref 11.5–14.5)
FLUAV RNA SPEC QL NAA+PROBE: NOT DETECTED
FLUBV RNA SPEC QL NAA+PROBE: NOT DETECTED
GLOBULIN SER CALC-MCNC: 4 G/DL (ref 2–4)
GLUCOSE SERPL-MCNC: 177 MG/DL (ref 65–100)
GLUCOSE UR STRIP.AUTO-MCNC: NEGATIVE MG/DL
HCT VFR BLD AUTO: 45.1 % (ref 35–47)
HGB BLD-MCNC: 14.4 G/DL (ref 11.5–16)
HGB UR QL STRIP: ABNORMAL
HYALINE CASTS URNS QL MICRO: ABNORMAL /LPF (ref 0–2)
IMM GRANULOCYTES # BLD AUTO: 0.3 K/UL (ref 0–0.04)
IMM GRANULOCYTES NFR BLD AUTO: 1 % (ref 0–0.5)
KETONES UR QL STRIP.AUTO: ABNORMAL MG/DL
LACTATE BLD-SCNC: 1.58 MMOL/L (ref 0.4–2)
LACTATE BLD-SCNC: 2.77 MMOL/L (ref 0.4–2)
LEUKOCYTE ESTERASE UR QL STRIP.AUTO: ABNORMAL
LIPASE SERPL-CCNC: 12 U/L (ref 13–75)
LYMPHOCYTES # BLD: 1 K/UL (ref 0.8–3.5)
LYMPHOCYTES NFR BLD: 3 % (ref 12–49)
MCH RBC QN AUTO: 28.2 PG (ref 26–34)
MCHC RBC AUTO-ENTMCNC: 31.9 G/DL (ref 30–36.5)
MCV RBC AUTO: 88.4 FL (ref 80–99)
MONOCYTES # BLD: 1.6 K/UL (ref 0–1)
MONOCYTES NFR BLD: 5 % (ref 5–13)
NEUTS SEG # BLD: 29.9 K/UL (ref 1.8–8)
NEUTS SEG NFR BLD: 91 % (ref 32–75)
NITRITE UR QL STRIP.AUTO: NEGATIVE
NRBC # BLD: 0 K/UL (ref 0–0.01)
NRBC BLD-RTO: 0 PER 100 WBC
PH UR STRIP: 6 (ref 5–8)
PLATELET # BLD AUTO: 194 K/UL (ref 150–400)
PMV BLD AUTO: 12.6 FL (ref 8.9–12.9)
POTASSIUM SERPL-SCNC: 3.4 MMOL/L (ref 3.5–5.1)
PROT SERPL-MCNC: 7.4 G/DL (ref 6.4–8.2)
PROT UR STRIP-MCNC: 30 MG/DL
RBC # BLD AUTO: 5.1 M/UL (ref 3.8–5.2)
RBC #/AREA URNS HPF: ABNORMAL /HPF (ref 0–5)
RBC MORPH BLD: ABNORMAL
SARS-COV-2 RNA RESP QL NAA+PROBE: NOT DETECTED
SODIUM SERPL-SCNC: 140 MMOL/L (ref 136–145)
SOURCE: NORMAL
SP GR UR REFRACTOMETRY: 1.02
TROPONIN I SERPL HS-MCNC: 15 NG/L (ref 0–51)
URINE CULTURE IF INDICATED: ABNORMAL
UROBILINOGEN UR QL STRIP.AUTO: 1 EU/DL (ref 0.2–1)
WBC # BLD AUTO: 32.8 K/UL (ref 3.6–11)
WBC URNS QL MICRO: ABNORMAL /HPF (ref 0–4)

## 2025-01-01 PROCEDURE — 2500000003 HC RX 250 WO HCPCS: Performed by: INTERNAL MEDICINE

## 2025-01-01 PROCEDURE — 87088 URINE BACTERIA CULTURE: CPT

## 2025-01-01 PROCEDURE — 6370000000 HC RX 637 (ALT 250 FOR IP): Performed by: INTERNAL MEDICINE

## 2025-01-01 PROCEDURE — 74176 CT ABD & PELVIS W/O CONTRAST: CPT

## 2025-01-01 PROCEDURE — 83690 ASSAY OF LIPASE: CPT

## 2025-01-01 PROCEDURE — 81001 URINALYSIS AUTO W/SCOPE: CPT

## 2025-01-01 PROCEDURE — 87636 SARSCOV2 & INF A&B AMP PRB: CPT

## 2025-01-01 PROCEDURE — 83605 ASSAY OF LACTIC ACID: CPT

## 2025-01-01 PROCEDURE — 87040 BLOOD CULTURE FOR BACTERIA: CPT

## 2025-01-01 PROCEDURE — 2580000003 HC RX 258: Performed by: INTERNAL MEDICINE

## 2025-01-01 PROCEDURE — 71045 X-RAY EXAM CHEST 1 VIEW: CPT

## 2025-01-01 PROCEDURE — 1100000003 HC PRIVATE W/ TELEMETRY

## 2025-01-01 PROCEDURE — 99285 EMERGENCY DEPT VISIT HI MDM: CPT

## 2025-01-01 PROCEDURE — 70450 CT HEAD/BRAIN W/O DYE: CPT

## 2025-01-01 PROCEDURE — 2580000003 HC RX 258: Performed by: EMERGENCY MEDICINE

## 2025-01-01 PROCEDURE — 36415 COLL VENOUS BLD VENIPUNCTURE: CPT

## 2025-01-01 PROCEDURE — 87186 SC STD MICRODIL/AGAR DIL: CPT

## 2025-01-01 PROCEDURE — 84484 ASSAY OF TROPONIN QUANT: CPT

## 2025-01-01 PROCEDURE — 80053 COMPREHEN METABOLIC PANEL: CPT

## 2025-01-01 PROCEDURE — 93005 ELECTROCARDIOGRAM TRACING: CPT | Performed by: EMERGENCY MEDICINE

## 2025-01-01 PROCEDURE — 87086 URINE CULTURE/COLONY COUNT: CPT

## 2025-01-01 PROCEDURE — 85025 COMPLETE CBC W/AUTO DIFF WBC: CPT

## 2025-01-01 PROCEDURE — 6360000002 HC RX W HCPCS: Performed by: INTERNAL MEDICINE

## 2025-01-01 RX ORDER — POLYETHYLENE GLYCOL 3350 17 G/17G
17 POWDER, FOR SOLUTION ORAL DAILY PRN
Status: DISCONTINUED | OUTPATIENT
Start: 2025-01-01 | End: 2025-01-14 | Stop reason: HOSPADM

## 2025-01-01 RX ORDER — ACETAMINOPHEN 650 MG/1
650 SUPPOSITORY RECTAL EVERY 6 HOURS PRN
Status: DISCONTINUED | OUTPATIENT
Start: 2025-01-01 | End: 2025-01-14 | Stop reason: HOSPADM

## 2025-01-01 RX ORDER — SODIUM CHLORIDE 0.9 % (FLUSH) 0.9 %
5-40 SYRINGE (ML) INJECTION EVERY 12 HOURS SCHEDULED
Status: DISCONTINUED | OUTPATIENT
Start: 2025-01-01 | End: 2025-01-14 | Stop reason: HOSPADM

## 2025-01-01 RX ORDER — POTASSIUM CHLORIDE 7.45 MG/ML
10 INJECTION INTRAVENOUS
Status: COMPLETED | OUTPATIENT
Start: 2025-01-01 | End: 2025-01-01

## 2025-01-01 RX ORDER — ONDANSETRON 2 MG/ML
4 INJECTION INTRAMUSCULAR; INTRAVENOUS EVERY 6 HOURS PRN
Status: DISCONTINUED | OUTPATIENT
Start: 2025-01-01 | End: 2025-01-14 | Stop reason: HOSPADM

## 2025-01-01 RX ORDER — ASPIRIN 300 MG/1
300 SUPPOSITORY RECTAL
Status: COMPLETED | OUTPATIENT
Start: 2025-01-01 | End: 2025-01-01

## 2025-01-01 RX ORDER — ASPIRIN 81 MG/1
81 TABLET, CHEWABLE ORAL DAILY
Status: DISCONTINUED | OUTPATIENT
Start: 2025-01-02 | End: 2025-01-14 | Stop reason: HOSPADM

## 2025-01-01 RX ORDER — SODIUM CHLORIDE 9 MG/ML
INJECTION, SOLUTION INTRAVENOUS PRN
Status: DISCONTINUED | OUTPATIENT
Start: 2025-01-01 | End: 2025-01-14 | Stop reason: HOSPADM

## 2025-01-01 RX ORDER — ONDANSETRON 4 MG/1
4 TABLET, ORALLY DISINTEGRATING ORAL EVERY 8 HOURS PRN
Status: DISCONTINUED | OUTPATIENT
Start: 2025-01-01 | End: 2025-01-14 | Stop reason: HOSPADM

## 2025-01-01 RX ORDER — PRAVASTATIN SODIUM 40 MG
80 TABLET ORAL DAILY
Status: DISCONTINUED | OUTPATIENT
Start: 2025-01-01 | End: 2025-01-14 | Stop reason: HOSPADM

## 2025-01-01 RX ORDER — METOPROLOL TARTRATE 25 MG/1
25 TABLET, FILM COATED ORAL 2 TIMES DAILY
Status: DISCONTINUED | OUTPATIENT
Start: 2025-01-02 | End: 2025-01-14 | Stop reason: HOSPADM

## 2025-01-01 RX ORDER — ACETAMINOPHEN 325 MG/1
650 TABLET ORAL EVERY 6 HOURS PRN
Status: DISCONTINUED | OUTPATIENT
Start: 2025-01-01 | End: 2025-01-14 | Stop reason: HOSPADM

## 2025-01-01 RX ORDER — VITAMIN B COMPLEX
1000 TABLET ORAL DAILY
Status: DISCONTINUED | OUTPATIENT
Start: 2025-01-01 | End: 2025-01-14 | Stop reason: HOSPADM

## 2025-01-01 RX ORDER — 0.9 % SODIUM CHLORIDE 0.9 %
1000 INTRAVENOUS SOLUTION INTRAVENOUS ONCE
Status: COMPLETED | OUTPATIENT
Start: 2025-01-01 | End: 2025-01-01

## 2025-01-01 RX ORDER — SODIUM CHLORIDE 9 MG/ML
INJECTION, SOLUTION INTRAVENOUS CONTINUOUS
Status: ACTIVE | OUTPATIENT
Start: 2025-01-01 | End: 2025-01-02

## 2025-01-01 RX ORDER — ENOXAPARIN SODIUM 100 MG/ML
40 INJECTION SUBCUTANEOUS DAILY
Status: DISCONTINUED | OUTPATIENT
Start: 2025-01-01 | End: 2025-01-14 | Stop reason: HOSPADM

## 2025-01-01 RX ORDER — ASPIRIN 325 MG
325 TABLET ORAL
Status: COMPLETED | OUTPATIENT
Start: 2025-01-01 | End: 2025-01-01

## 2025-01-01 RX ORDER — POLYETHYLENE GLYCOL 3350 17 G/17G
17 POWDER, FOR SOLUTION ORAL DAILY
Status: DISCONTINUED | OUTPATIENT
Start: 2025-01-01 | End: 2025-01-14 | Stop reason: HOSPADM

## 2025-01-01 RX ORDER — PANTOPRAZOLE SODIUM 40 MG/1
40 TABLET, DELAYED RELEASE ORAL
Status: DISCONTINUED | OUTPATIENT
Start: 2025-01-02 | End: 2025-01-14 | Stop reason: HOSPADM

## 2025-01-01 RX ORDER — SODIUM CHLORIDE 0.9 % (FLUSH) 0.9 %
5-40 SYRINGE (ML) INJECTION PRN
Status: DISCONTINUED | OUTPATIENT
Start: 2025-01-01 | End: 2025-01-14 | Stop reason: HOSPADM

## 2025-01-01 RX ADMIN — SODIUM CHLORIDE: 9 INJECTION, SOLUTION INTRAVENOUS at 22:08

## 2025-01-01 RX ADMIN — ASPIRIN 300 MG: 300 SUPPOSITORY RECTAL at 14:03

## 2025-01-01 RX ADMIN — SODIUM CHLORIDE 1000 ML: 9 INJECTION, SOLUTION INTRAVENOUS at 13:55

## 2025-01-01 RX ADMIN — SODIUM CHLORIDE: 9 INJECTION, SOLUTION INTRAVENOUS at 15:45

## 2025-01-01 RX ADMIN — AZITHROMYCIN MONOHYDRATE 500 MG: 500 INJECTION, POWDER, LYOPHILIZED, FOR SOLUTION INTRAVENOUS at 14:02

## 2025-01-01 RX ADMIN — SODIUM CHLORIDE, PRESERVATIVE FREE 10 ML: 5 INJECTION INTRAVENOUS at 22:01

## 2025-01-01 RX ADMIN — POTASSIUM CHLORIDE 10 MEQ: 7.45 INJECTION INTRAVENOUS at 18:19

## 2025-01-01 RX ADMIN — ENOXAPARIN SODIUM 40 MG: 100 INJECTION SUBCUTANEOUS at 15:44

## 2025-01-01 RX ADMIN — WATER 1000 MG: 1 INJECTION INTRAMUSCULAR; INTRAVENOUS; SUBCUTANEOUS at 13:56

## 2025-01-01 RX ADMIN — POTASSIUM CHLORIDE 10 MEQ: 7.45 INJECTION INTRAVENOUS at 15:46

## 2025-01-01 NOTE — ED PROVIDER NOTES
[facility] EMERGENCY DEPT  EMERGENCY DEPARTMENT ENCOUNTER       Pt Name: Audrey Almanzar  MRN: [id number]  Birthdate 1940  Date of evaluation: 1/1/2025  Provider: Jose Guadalupe Pimentel MD   PCP: Soumya Dye MD  Note Started: 12:38 PM EST 1/1/25     CHIEF COMPLAINT       Chief Complaint   Patient presents with    Altered Mental Status     Pt arrives via EMS from home, acting strange while eating breakfast this morning per , recent UTI, concern for sepsis, no code S at this time per Dr. Pimentel        HISTORY OF PRESENT ILLNESS: 1 or more elements      History From: spouse, EMS, History limited by: AMS     Audrey Almanzar is a 84 y.o. female with a history of prior CVA, frequent falls, atrial flutter recent diagnosis of UTI presents to the emergency department with chief plaint of altered mental status.    Patient's son reports patient was in normal state of health yesterday.  Reports this morning upon waking up noted patient was more confused, lethargic and seemed \"off balance.\"  No falls.  EMS was called.  Glucose was normal.    Patient does not provide significant history.    Please See MDM for Additional Details of the HPI/PMH  Nursing Notes were all reviewed and agreed with or any disagreements were addressed in the HPI.     REVIEW OF SYSTEMS        Positives and Pertinent negatives as per HPI.    PAST HISTORY     Past Medical History:  Past Medical History:   Diagnosis Date    Acid reflux     Constipation     Falls     Hip fracture requiring operative repair ([facility]) 2022    left    Hypertension     Incontinence     wears depends    Stroke ([facility]) 2019    left side weakness    Thyroid disease     thyroid nodules       Past Surgical History:  Past Surgical History:   Procedure Laterality Date    BREAST SURGERY Right     biopsy, benign    COLONOSCOPY      EP DEVICE PROCEDURE N/A 02/02/2024    Ablation A-flutter performed by Daniel Addison MD at [facility] CARDIAC CATH LAB    EP DEVICE PROCEDURE N/A 03/25/2024

## 2025-01-02 ENCOUNTER — APPOINTMENT (OUTPATIENT)
Facility: HOSPITAL | Age: 85
DRG: 871 | End: 2025-01-02
Payer: MEDICARE

## 2025-01-02 LAB
ANION GAP SERPL CALC-SCNC: 4 MMOL/L (ref 2–12)
BASOPHILS # BLD: 0.1 K/UL (ref 0–0.1)
BASOPHILS NFR BLD: 0 % (ref 0–1)
BUN SERPL-MCNC: 9 MG/DL (ref 6–20)
BUN/CREAT SERPL: 18 (ref 12–20)
CALCIUM SERPL-MCNC: 8.2 MG/DL (ref 8.5–10.1)
CHLORIDE SERPL-SCNC: 115 MMOL/L (ref 97–108)
CO2 SERPL-SCNC: 24 MMOL/L (ref 21–32)
CREAT SERPL-MCNC: 0.51 MG/DL (ref 0.55–1.02)
DIFFERENTIAL METHOD BLD: ABNORMAL
EKG ATRIAL RATE: 84 BPM
EKG DIAGNOSIS: NORMAL
EKG P AXIS: 46 DEGREES
EKG P-R INTERVAL: 172 MS
EKG Q-T INTERVAL: 384 MS
EKG QRS DURATION: 78 MS
EKG QTC CALCULATION (BAZETT): 453 MS
EKG R AXIS: -36 DEGREES
EKG T AXIS: 9 DEGREES
EKG VENTRICULAR RATE: 84 BPM
EOSINOPHIL # BLD: 0.2 K/UL (ref 0–0.4)
EOSINOPHIL NFR BLD: 1 % (ref 0–7)
ERYTHROCYTE [DISTWIDTH] IN BLOOD BY AUTOMATED COUNT: 13.3 % (ref 11.5–14.5)
GLUCOSE SERPL-MCNC: 99 MG/DL (ref 65–100)
HCT VFR BLD AUTO: 38.4 % (ref 35–47)
HGB BLD-MCNC: 12.4 G/DL (ref 11.5–16)
IMM GRANULOCYTES # BLD AUTO: 0.1 K/UL (ref 0–0.04)
IMM GRANULOCYTES NFR BLD AUTO: 1 % (ref 0–0.5)
LACTATE SERPL-SCNC: 0.9 MMOL/L (ref 0.4–2)
LYMPHOCYTES # BLD: 1.7 K/UL (ref 0.8–3.5)
LYMPHOCYTES NFR BLD: 8 % (ref 12–49)
MAGNESIUM SERPL-MCNC: 1.9 MG/DL (ref 1.6–2.4)
MCH RBC QN AUTO: 28.7 PG (ref 26–34)
MCHC RBC AUTO-ENTMCNC: 32.3 G/DL (ref 30–36.5)
MCV RBC AUTO: 88.9 FL (ref 80–99)
MONOCYTES # BLD: 1.1 K/UL (ref 0–1)
MONOCYTES NFR BLD: 5 % (ref 5–13)
NEUTS SEG # BLD: 18.9 K/UL (ref 1.8–8)
NEUTS SEG NFR BLD: 85 % (ref 32–75)
NRBC # BLD: 0 K/UL (ref 0–0.01)
NRBC BLD-RTO: 0 PER 100 WBC
PHOSPHATE SERPL-MCNC: 2.3 MG/DL (ref 2.6–4.7)
PLATELET # BLD AUTO: 149 K/UL (ref 150–400)
PMV BLD AUTO: 12.5 FL (ref 8.9–12.9)
POTASSIUM SERPL-SCNC: 3.1 MMOL/L (ref 3.5–5.1)
RBC # BLD AUTO: 4.32 M/UL (ref 3.8–5.2)
SODIUM SERPL-SCNC: 143 MMOL/L (ref 136–145)
SPECIMEN HOLD: NORMAL
WBC # BLD AUTO: 22 K/UL (ref 3.6–11)

## 2025-01-02 PROCEDURE — 80048 BASIC METABOLIC PNL TOTAL CA: CPT

## 2025-01-02 PROCEDURE — 2500000003 HC RX 250 WO HCPCS: Performed by: STUDENT IN AN ORGANIZED HEALTH CARE EDUCATION/TRAINING PROGRAM

## 2025-01-02 PROCEDURE — 2500000003 HC RX 250 WO HCPCS: Performed by: INTERNAL MEDICINE

## 2025-01-02 PROCEDURE — 6360000002 HC RX W HCPCS: Performed by: INTERNAL MEDICINE

## 2025-01-02 PROCEDURE — 83605 ASSAY OF LACTIC ACID: CPT

## 2025-01-02 PROCEDURE — 85025 COMPLETE CBC W/AUTO DIFF WBC: CPT

## 2025-01-02 PROCEDURE — 36415 COLL VENOUS BLD VENIPUNCTURE: CPT

## 2025-01-02 PROCEDURE — 70551 MRI BRAIN STEM W/O DYE: CPT

## 2025-01-02 PROCEDURE — 87899 AGENT NOS ASSAY W/OPTIC: CPT

## 2025-01-02 PROCEDURE — 83735 ASSAY OF MAGNESIUM: CPT

## 2025-01-02 PROCEDURE — 84100 ASSAY OF PHOSPHORUS: CPT

## 2025-01-02 PROCEDURE — 87581 M.PNEUMON DNA AMP PROBE: CPT

## 2025-01-02 PROCEDURE — 6370000000 HC RX 637 (ALT 250 FOR IP): Performed by: INTERNAL MEDICINE

## 2025-01-02 PROCEDURE — 6370000000 HC RX 637 (ALT 250 FOR IP): Performed by: STUDENT IN AN ORGANIZED HEALTH CARE EDUCATION/TRAINING PROGRAM

## 2025-01-02 PROCEDURE — 2580000003 HC RX 258: Performed by: INTERNAL MEDICINE

## 2025-01-02 PROCEDURE — 2580000003 HC RX 258: Performed by: STUDENT IN AN ORGANIZED HEALTH CARE EDUCATION/TRAINING PROGRAM

## 2025-01-02 PROCEDURE — 1100000003 HC PRIVATE W/ TELEMETRY

## 2025-01-02 RX ORDER — POTASSIUM CHLORIDE 1500 MG/1
40 TABLET, EXTENDED RELEASE ORAL ONCE
Status: COMPLETED | OUTPATIENT
Start: 2025-01-02 | End: 2025-01-02

## 2025-01-02 RX ORDER — GAUZE BANDAGE 2" X 2"
100 BANDAGE TOPICAL DAILY
Status: DISCONTINUED | OUTPATIENT
Start: 2025-01-02 | End: 2025-01-14 | Stop reason: HOSPADM

## 2025-01-02 RX ADMIN — ASPIRIN 81 MG: 81 TABLET, CHEWABLE ORAL at 08:40

## 2025-01-02 RX ADMIN — WATER 1000 MG: 1 INJECTION INTRAMUSCULAR; INTRAVENOUS; SUBCUTANEOUS at 15:38

## 2025-01-02 RX ADMIN — SODIUM CHLORIDE, PRESERVATIVE FREE 10 ML: 5 INJECTION INTRAVENOUS at 20:57

## 2025-01-02 RX ADMIN — ENOXAPARIN SODIUM 40 MG: 100 INJECTION SUBCUTANEOUS at 08:40

## 2025-01-02 RX ADMIN — POTASSIUM CHLORIDE 40 MEQ: 1500 TABLET, EXTENDED RELEASE ORAL at 08:40

## 2025-01-02 RX ADMIN — METOPROLOL TARTRATE 25 MG: 25 TABLET, FILM COATED ORAL at 20:56

## 2025-01-02 RX ADMIN — Medication 3 MG: at 20:56

## 2025-01-02 RX ADMIN — POTASSIUM PHOSPHATE, MONOBASIC POTASSIUM PHOSPHATE, DIBASIC 30 MMOL: 224; 236 INJECTION, SOLUTION, CONCENTRATE INTRAVENOUS at 11:07

## 2025-01-02 RX ADMIN — METOPROLOL TARTRATE 25 MG: 25 TABLET, FILM COATED ORAL at 08:40

## 2025-01-02 RX ADMIN — AZITHROMYCIN MONOHYDRATE 500 MG: 500 INJECTION, POWDER, LYOPHILIZED, FOR SOLUTION INTRAVENOUS at 15:39

## 2025-01-02 RX ADMIN — THIAMINE HCL TAB 100 MG 100 MG: 100 TAB at 15:47

## 2025-01-02 RX ADMIN — Medication 1000 UNITS: at 08:40

## 2025-01-02 RX ADMIN — PANTOPRAZOLE SODIUM 40 MG: 40 TABLET, DELAYED RELEASE ORAL at 08:40

## 2025-01-02 RX ADMIN — PRAVASTATIN SODIUM 80 MG: 40 TABLET ORAL at 08:40

## 2025-01-02 RX ADMIN — MICONAZOLE NITRATE: 2 POWDER TOPICAL at 20:57

## 2025-01-02 NOTE — H&P
Hospitalist Admission Note    NAME:   Audrey Almanzar   : 1940   MRN: [id number]     Date/Time: 2025 8:26 PM    Patient PCP: Soumya Dye MD    ______________________________________________________________________  Given the patient's current clinical presentation, I have a high level of concern for decompensation if discharged from the emergency department.  Complex decision making was performed, which includes reviewing the patient's available past medical records, laboratory results, and x-ray films.       My assessment of this patient's clinical condition and my plan of care is as follows.    Assessment / Plan:    Acute metabolic encephalopathy  Severe sepsis  Suspected urinary tract infection, likely gram-negative rods  -CT head is abnormal and shows possibility of artifact versus infarct so we will check MRI of the brain  -Noted to have lactic acidosis of 2.7 on arrival and received IV fluids with improvement of lactic acid  -Urine culture sent in the ED.  Continue ceftriaxone.  Follow urine culture results.  Follow blood culture results.  Will check CBC and BMP in a.m. tomorrow    Suspected community-acquired pneumonia  -Chest x-ray showed small focus of patchy airspace disease in the left lung.  -Continue ceftriaxone and azithromycin.  Follow blood cultures.  She is currently on room air.    Abnormal CT with possibility of artifact versus infarct.  -Check MRI of the brain and based on MRI, will initiate further workup    Hypokalemia  -KCl replaced.  Recheck in a.m. tomorrow    Normal CT with multiple incidental findings including  Constipation: Will add laxatives  Bilateral hydronephrosis and no hydroureter.  Could be related to urinary retention so we will check bladder scan.  Consider Page placement.    Interval L2 fracture  -She does not have any L2 tenderness currently but will reevaluate again after mental status is improved and consider

## 2025-01-03 LAB
BACTERIA SPEC CULT: ABNORMAL
CC UR VC: ABNORMAL
SERVICE CMNT-IMP: ABNORMAL

## 2025-01-03 PROCEDURE — 6370000000 HC RX 637 (ALT 250 FOR IP): Performed by: INTERNAL MEDICINE

## 2025-01-03 PROCEDURE — 1100000003 HC PRIVATE W/ TELEMETRY

## 2025-01-03 PROCEDURE — 6370000000 HC RX 637 (ALT 250 FOR IP): Performed by: STUDENT IN AN ORGANIZED HEALTH CARE EDUCATION/TRAINING PROGRAM

## 2025-01-03 PROCEDURE — 2500000003 HC RX 250 WO HCPCS: Performed by: INTERNAL MEDICINE

## 2025-01-03 PROCEDURE — 94761 N-INVAS EAR/PLS OXIMETRY MLT: CPT

## 2025-01-03 PROCEDURE — 2580000003 HC RX 258: Performed by: INTERNAL MEDICINE

## 2025-01-03 PROCEDURE — 6360000002 HC RX W HCPCS: Performed by: INTERNAL MEDICINE

## 2025-01-03 RX ORDER — AZITHROMYCIN 250 MG/1
500 TABLET, FILM COATED ORAL DAILY
Status: COMPLETED | OUTPATIENT
Start: 2025-01-04 | End: 2025-01-05

## 2025-01-03 RX ADMIN — SODIUM CHLORIDE, PRESERVATIVE FREE 10 ML: 5 INJECTION INTRAVENOUS at 21:06

## 2025-01-03 RX ADMIN — AZITHROMYCIN MONOHYDRATE 500 MG: 500 INJECTION, POWDER, LYOPHILIZED, FOR SOLUTION INTRAVENOUS at 13:08

## 2025-01-03 RX ADMIN — MICONAZOLE NITRATE: 2 POWDER TOPICAL at 21:05

## 2025-01-03 RX ADMIN — THIAMINE HCL TAB 100 MG 100 MG: 100 TAB at 08:18

## 2025-01-03 RX ADMIN — PRAVASTATIN SODIUM 80 MG: 40 TABLET ORAL at 08:18

## 2025-01-03 RX ADMIN — METOPROLOL TARTRATE 25 MG: 25 TABLET, FILM COATED ORAL at 21:04

## 2025-01-03 RX ADMIN — ASPIRIN 81 MG: 81 TABLET, CHEWABLE ORAL at 08:18

## 2025-01-03 RX ADMIN — METOPROLOL TARTRATE 25 MG: 25 TABLET, FILM COATED ORAL at 08:18

## 2025-01-03 RX ADMIN — WATER 1000 MG: 1 INJECTION INTRAMUSCULAR; INTRAVENOUS; SUBCUTANEOUS at 12:59

## 2025-01-03 RX ADMIN — MICONAZOLE NITRATE: 2 POWDER TOPICAL at 08:19

## 2025-01-03 RX ADMIN — Medication 3 MG: at 21:04

## 2025-01-03 RX ADMIN — POLYETHYLENE GLYCOL 3350 17 G: 17 POWDER, FOR SOLUTION ORAL at 08:18

## 2025-01-03 RX ADMIN — PANTOPRAZOLE SODIUM 40 MG: 40 TABLET, DELAYED RELEASE ORAL at 08:18

## 2025-01-03 RX ADMIN — ENOXAPARIN SODIUM 40 MG: 100 INJECTION SUBCUTANEOUS at 08:18

## 2025-01-03 RX ADMIN — Medication 1000 UNITS: at 08:18

## 2025-01-03 RX ADMIN — SODIUM CHLORIDE, PRESERVATIVE FREE 10 ML: 5 INJECTION INTRAVENOUS at 08:19

## 2025-01-03 NOTE — CARE COORDINATION
Care Management Initial Assessment       RUR: 24%  Readmission? No  1st IM letter given? Yes - 1/1/25  1st  letter given: No      CM introduce self, explain role and confirmed demographics with pt.    Pt lives with her spouse in an one story home with three steps to enter.    Pt has a hx of SNF-[place] and home health but was unable to recall the name of the agency.    No hx of inpatient rehab.    Pt uses [facility] [employer].    At the time of d/c pt's spouse will transport.    CM will follow and assist with d/c planning.    01/03/25 1209   Service Assessment   Patient Orientation Alert and Oriented   Cognition Alert   History Provided By Patient;Medical Record   Primary Caregiver Spouse   Support Systems Spouse/Significant Other;Children   Patient's Healthcare Decision Maker is: Legal Next of Kin  (Pt's spouse-Martin Almanzar)   PCP Verified by CM Yes   Last Visit to PCP Within last 6 months   Prior Functional Level Assistance with the following:;Bathing;Dressing;Toileting;Cooking;Housework;Shopping;Mobility   Current Functional Level Assistance with the following:;Bathing;Dressing;Toileting;Cooking;Housework;Shopping;Mobility   Can patient return to prior living arrangement Yes   Family able to assist with home care needs: Yes   Would you like for me to discuss the discharge plan with any other family members/significant others, and if so, who? Yes  (Pt's spouse-Martin Almanzar)   Financial Resources Medicare;Other (Comment)  ([place])   Community Resources Other (Comment)   Social/Functional History   Lives With Spouse   Type of Home House   Home Equipment Cane;Walker - Rolling;Wheelchair - Manual;Grab bars;Hospital bed  (hospital bed)   Active  No   Patient's  Info Pt's spouse   Discharge Planning   Type of Residence House   Current Services Prior To Admission Durable Medical Equipment   Patient expects to be discharged to: House     Advance Care Planning     General Advance Care

## 2025-01-04 LAB
ANION GAP SERPL CALC-SCNC: 8 MMOL/L (ref 2–12)
BUN SERPL-MCNC: 12 MG/DL (ref 6–20)
BUN/CREAT SERPL: 25 (ref 12–20)
CALCIUM SERPL-MCNC: 8.8 MG/DL (ref 8.5–10.1)
CHLORIDE SERPL-SCNC: 110 MMOL/L (ref 97–108)
CO2 SERPL-SCNC: 23 MMOL/L (ref 21–32)
CREAT SERPL-MCNC: 0.48 MG/DL (ref 0.55–1.02)
ERYTHROCYTE [DISTWIDTH] IN BLOOD BY AUTOMATED COUNT: 13.4 % (ref 11.5–14.5)
FLUID CULTURE: NORMAL
GLUCOSE SERPL-MCNC: 118 MG/DL (ref 65–100)
HCT VFR BLD AUTO: 39.2 % (ref 35–47)
HGB BLD-MCNC: 12.7 G/DL (ref 11.5–16)
Lab: NORMAL
MAGNESIUM SERPL-MCNC: 1.9 MG/DL (ref 1.6–2.4)
MCH RBC QN AUTO: 28.1 PG (ref 26–34)
MCHC RBC AUTO-ENTMCNC: 32.4 G/DL (ref 30–36.5)
MCV RBC AUTO: 86.7 FL (ref 80–99)
NRBC # BLD: 0 K/UL (ref 0–0.01)
NRBC BLD-RTO: 0 PER 100 WBC
ORGANISM ID: NORMAL
PHOSPHATE SERPL-MCNC: 2.8 MG/DL (ref 2.6–4.7)
PLATELET # BLD AUTO: 191 K/UL (ref 150–400)
PMV BLD AUTO: 12.1 FL (ref 8.9–12.9)
POTASSIUM SERPL-SCNC: 3.4 MMOL/L (ref 3.5–5.1)
RBC # BLD AUTO: 4.52 M/UL (ref 3.8–5.2)
S PNEUM AG SPEC QL LA: NEGATIVE
SODIUM SERPL-SCNC: 141 MMOL/L (ref 136–145)
SPECIMEN SOURCE: NORMAL
SPECIMEN: NORMAL
WBC # BLD AUTO: 11.9 K/UL (ref 3.6–11)

## 2025-01-04 PROCEDURE — 97162 PT EVAL MOD COMPLEX 30 MIN: CPT

## 2025-01-04 PROCEDURE — 84100 ASSAY OF PHOSPHORUS: CPT

## 2025-01-04 PROCEDURE — 1100000003 HC PRIVATE W/ TELEMETRY

## 2025-01-04 PROCEDURE — 6370000000 HC RX 637 (ALT 250 FOR IP): Performed by: INTERNAL MEDICINE

## 2025-01-04 PROCEDURE — 2500000003 HC RX 250 WO HCPCS: Performed by: INTERNAL MEDICINE

## 2025-01-04 PROCEDURE — 36415 COLL VENOUS BLD VENIPUNCTURE: CPT

## 2025-01-04 PROCEDURE — 6360000002 HC RX W HCPCS: Performed by: INTERNAL MEDICINE

## 2025-01-04 PROCEDURE — 83735 ASSAY OF MAGNESIUM: CPT

## 2025-01-04 PROCEDURE — 80048 BASIC METABOLIC PNL TOTAL CA: CPT

## 2025-01-04 PROCEDURE — 97166 OT EVAL MOD COMPLEX 45 MIN: CPT

## 2025-01-04 PROCEDURE — 94761 N-INVAS EAR/PLS OXIMETRY MLT: CPT

## 2025-01-04 PROCEDURE — 6370000000 HC RX 637 (ALT 250 FOR IP): Performed by: STUDENT IN AN ORGANIZED HEALTH CARE EDUCATION/TRAINING PROGRAM

## 2025-01-04 PROCEDURE — 97535 SELF CARE MNGMENT TRAINING: CPT

## 2025-01-04 PROCEDURE — 85027 COMPLETE CBC AUTOMATED: CPT

## 2025-01-04 PROCEDURE — 97530 THERAPEUTIC ACTIVITIES: CPT

## 2025-01-04 RX ORDER — POTASSIUM CHLORIDE 1500 MG/1
40 TABLET, EXTENDED RELEASE ORAL ONCE
Status: COMPLETED | OUTPATIENT
Start: 2025-01-04 | End: 2025-01-04

## 2025-01-04 RX ORDER — LANOLIN ALCOHOL/MO/W.PET/CERES
400 CREAM (GRAM) TOPICAL 2 TIMES DAILY
Status: COMPLETED | OUTPATIENT
Start: 2025-01-04 | End: 2025-01-04

## 2025-01-04 RX ORDER — LEVALBUTEROL INHALATION SOLUTION 1.25 MG/3ML
1.25 SOLUTION RESPIRATORY (INHALATION) EVERY 8 HOURS PRN
Status: DISCONTINUED | OUTPATIENT
Start: 2025-01-04 | End: 2025-01-14 | Stop reason: HOSPADM

## 2025-01-04 RX ADMIN — ACETAMINOPHEN 650 MG: 325 TABLET ORAL at 09:17

## 2025-01-04 RX ADMIN — ENOXAPARIN SODIUM 40 MG: 100 INJECTION SUBCUTANEOUS at 08:22

## 2025-01-04 RX ADMIN — PRAVASTATIN SODIUM 80 MG: 40 TABLET ORAL at 08:23

## 2025-01-04 RX ADMIN — CEPHALEXIN 500 MG: 250 CAPSULE ORAL at 08:22

## 2025-01-04 RX ADMIN — ASPIRIN 81 MG: 81 TABLET, CHEWABLE ORAL at 08:23

## 2025-01-04 RX ADMIN — CEPHALEXIN 500 MG: 250 CAPSULE ORAL at 21:11

## 2025-01-04 RX ADMIN — SODIUM CHLORIDE, PRESERVATIVE FREE 10 ML: 5 INJECTION INTRAVENOUS at 21:07

## 2025-01-04 RX ADMIN — SODIUM CHLORIDE, PRESERVATIVE FREE 10 ML: 5 INJECTION INTRAVENOUS at 08:24

## 2025-01-04 RX ADMIN — POTASSIUM CHLORIDE 40 MEQ: 1500 TABLET, EXTENDED RELEASE ORAL at 09:16

## 2025-01-04 RX ADMIN — METOPROLOL TARTRATE 25 MG: 25 TABLET, FILM COATED ORAL at 08:23

## 2025-01-04 RX ADMIN — AZITHROMYCIN 500 MG: 250 TABLET, FILM COATED ORAL at 08:22

## 2025-01-04 RX ADMIN — MICONAZOLE NITRATE: 2 POWDER TOPICAL at 08:23

## 2025-01-04 RX ADMIN — POLYETHYLENE GLYCOL 3350 17 G: 17 POWDER, FOR SOLUTION ORAL at 08:24

## 2025-01-04 RX ADMIN — Medication 3 MG: at 21:11

## 2025-01-04 RX ADMIN — Medication 400 MG: at 09:16

## 2025-01-04 RX ADMIN — MICONAZOLE NITRATE: 2 POWDER TOPICAL at 21:12

## 2025-01-04 RX ADMIN — Medication 1000 UNITS: at 08:23

## 2025-01-04 RX ADMIN — THIAMINE HCL TAB 100 MG 100 MG: 100 TAB at 08:25

## 2025-01-04 RX ADMIN — METOPROLOL TARTRATE 25 MG: 25 TABLET, FILM COATED ORAL at 21:11

## 2025-01-04 RX ADMIN — Medication 400 MG: at 21:11

## 2025-01-04 RX ADMIN — PANTOPRAZOLE SODIUM 40 MG: 40 TABLET, DELAYED RELEASE ORAL at 06:41

## 2025-01-05 ENCOUNTER — APPOINTMENT (OUTPATIENT)
Facility: HOSPITAL | Age: 85
DRG: 871 | End: 2025-01-05
Payer: MEDICARE

## 2025-01-05 LAB
ARTERIAL PATENCY WRIST A: YES
BASE EXCESS BLDA CALC-SCNC: 0.5 MMOL/L
BDY SITE: ABNORMAL
HCO3 BLDA-SCNC: 25 MMOL/L (ref 22–26)
PCO2 BLDA: 38 MMHG (ref 35–45)
PH BLDA: 7.43 (ref 7.35–7.45)
PO2 BLDA: 64 MMHG (ref 80–100)
SAO2 % BLD: 93 % (ref 92–97)
SAO2% DEVICE SAO2% SENSOR NAME: ABNORMAL
SPECIMEN SITE: ABNORMAL

## 2025-01-05 PROCEDURE — 6360000002 HC RX W HCPCS: Performed by: STUDENT IN AN ORGANIZED HEALTH CARE EDUCATION/TRAINING PROGRAM

## 2025-01-05 PROCEDURE — 1100000003 HC PRIVATE W/ TELEMETRY

## 2025-01-05 PROCEDURE — 82803 BLOOD GASES ANY COMBINATION: CPT

## 2025-01-05 PROCEDURE — 36600 WITHDRAWAL OF ARTERIAL BLOOD: CPT

## 2025-01-05 PROCEDURE — 6360000002 HC RX W HCPCS: Performed by: INTERNAL MEDICINE

## 2025-01-05 PROCEDURE — 71045 X-RAY EXAM CHEST 1 VIEW: CPT

## 2025-01-05 PROCEDURE — 6370000000 HC RX 637 (ALT 250 FOR IP): Performed by: INTERNAL MEDICINE

## 2025-01-05 PROCEDURE — 2500000003 HC RX 250 WO HCPCS: Performed by: STUDENT IN AN ORGANIZED HEALTH CARE EDUCATION/TRAINING PROGRAM

## 2025-01-05 PROCEDURE — 94640 AIRWAY INHALATION TREATMENT: CPT

## 2025-01-05 PROCEDURE — 6370000000 HC RX 637 (ALT 250 FOR IP): Performed by: STUDENT IN AN ORGANIZED HEALTH CARE EDUCATION/TRAINING PROGRAM

## 2025-01-05 PROCEDURE — 2500000003 HC RX 250 WO HCPCS: Performed by: INTERNAL MEDICINE

## 2025-01-05 PROCEDURE — 94761 N-INVAS EAR/PLS OXIMETRY MLT: CPT

## 2025-01-05 RX ADMIN — Medication 3 MG: at 21:53

## 2025-01-05 RX ADMIN — CEPHALEXIN 500 MG: 250 CAPSULE ORAL at 21:53

## 2025-01-05 RX ADMIN — AZITHROMYCIN 500 MG: 250 TABLET, FILM COATED ORAL at 07:57

## 2025-01-05 RX ADMIN — WATER 40 MG: 1 INJECTION INTRAMUSCULAR; INTRAVENOUS; SUBCUTANEOUS at 11:17

## 2025-01-05 RX ADMIN — SODIUM CHLORIDE, PRESERVATIVE FREE 10 ML: 5 INJECTION INTRAVENOUS at 21:53

## 2025-01-05 RX ADMIN — ENOXAPARIN SODIUM 40 MG: 100 INJECTION SUBCUTANEOUS at 07:57

## 2025-01-05 RX ADMIN — METOPROLOL TARTRATE 25 MG: 25 TABLET, FILM COATED ORAL at 21:53

## 2025-01-05 RX ADMIN — PRAVASTATIN SODIUM 80 MG: 40 TABLET ORAL at 07:58

## 2025-01-05 RX ADMIN — Medication 1000 UNITS: at 07:58

## 2025-01-05 RX ADMIN — METOPROLOL TARTRATE 25 MG: 25 TABLET, FILM COATED ORAL at 07:58

## 2025-01-05 RX ADMIN — PANTOPRAZOLE SODIUM 40 MG: 40 TABLET, DELAYED RELEASE ORAL at 06:23

## 2025-01-05 RX ADMIN — LEVALBUTEROL HYDROCHLORIDE 1.25 MG: 1.25 SOLUTION RESPIRATORY (INHALATION) at 07:24

## 2025-01-05 RX ADMIN — MICONAZOLE NITRATE: 2 POWDER TOPICAL at 07:59

## 2025-01-05 RX ADMIN — CEPHALEXIN 500 MG: 250 CAPSULE ORAL at 07:58

## 2025-01-05 RX ADMIN — POLYETHYLENE GLYCOL 3350 17 G: 17 POWDER, FOR SOLUTION ORAL at 07:59

## 2025-01-05 RX ADMIN — THIAMINE HCL TAB 100 MG 100 MG: 100 TAB at 07:58

## 2025-01-05 RX ADMIN — MICONAZOLE NITRATE: 2 POWDER TOPICAL at 21:54

## 2025-01-05 RX ADMIN — ASPIRIN 81 MG: 81 TABLET, CHEWABLE ORAL at 07:58

## 2025-01-05 NOTE — MANAGEMENT PLAN
Computer round only. Responding to conservative medical therapy only. WBC down to 11. AFVSS. Cr stable at 0.4. Proteus positive urine culture.     No plan for ureteral stent or PCN.    Recommend non-urgent follow up for renal stones, recurrent UTIs.     Plan to be PRN only for rest of weekend, call with issues.

## 2025-01-06 LAB
BACTERIA SPEC CULT: NORMAL
BACTERIA SPEC CULT: NORMAL
M PNEUMO DNA SPEC QL NAA+PROBE: NEGATIVE
SERVICE CMNT-IMP: NORMAL
SERVICE CMNT-IMP: NORMAL
SPECIMEN SOURCE: NORMAL

## 2025-01-06 PROCEDURE — 6370000000 HC RX 637 (ALT 250 FOR IP): Performed by: STUDENT IN AN ORGANIZED HEALTH CARE EDUCATION/TRAINING PROGRAM

## 2025-01-06 PROCEDURE — 2700000000 HC OXYGEN THERAPY PER DAY

## 2025-01-06 PROCEDURE — 94761 N-INVAS EAR/PLS OXIMETRY MLT: CPT

## 2025-01-06 PROCEDURE — 94640 AIRWAY INHALATION TREATMENT: CPT

## 2025-01-06 PROCEDURE — 6370000000 HC RX 637 (ALT 250 FOR IP): Performed by: INTERNAL MEDICINE

## 2025-01-06 PROCEDURE — 2500000003 HC RX 250 WO HCPCS: Performed by: INTERNAL MEDICINE

## 2025-01-06 PROCEDURE — 2500000003 HC RX 250 WO HCPCS: Performed by: STUDENT IN AN ORGANIZED HEALTH CARE EDUCATION/TRAINING PROGRAM

## 2025-01-06 PROCEDURE — 6360000002 HC RX W HCPCS: Performed by: INTERNAL MEDICINE

## 2025-01-06 PROCEDURE — 6360000002 HC RX W HCPCS: Performed by: STUDENT IN AN ORGANIZED HEALTH CARE EDUCATION/TRAINING PROGRAM

## 2025-01-06 PROCEDURE — 1100000003 HC PRIVATE W/ TELEMETRY

## 2025-01-06 RX ORDER — PREDNISONE 20 MG/1
20 TABLET ORAL DAILY
Status: DISCONTINUED | OUTPATIENT
Start: 2025-01-06 | End: 2025-01-13

## 2025-01-06 RX ADMIN — METOPROLOL TARTRATE 25 MG: 25 TABLET, FILM COATED ORAL at 08:40

## 2025-01-06 RX ADMIN — MICONAZOLE NITRATE: 2 POWDER TOPICAL at 21:18

## 2025-01-06 RX ADMIN — Medication 3 MG: at 21:18

## 2025-01-06 RX ADMIN — ENOXAPARIN SODIUM 40 MG: 100 INJECTION SUBCUTANEOUS at 08:40

## 2025-01-06 RX ADMIN — THIAMINE HCL TAB 100 MG 100 MG: 100 TAB at 08:41

## 2025-01-06 RX ADMIN — MICONAZOLE NITRATE: 2 POWDER TOPICAL at 08:40

## 2025-01-06 RX ADMIN — PANTOPRAZOLE SODIUM 40 MG: 40 TABLET, DELAYED RELEASE ORAL at 08:40

## 2025-01-06 RX ADMIN — ASPIRIN 81 MG: 81 TABLET, CHEWABLE ORAL at 08:40

## 2025-01-06 RX ADMIN — POLYETHYLENE GLYCOL 3350 17 G: 17 POWDER, FOR SOLUTION ORAL at 08:41

## 2025-01-06 RX ADMIN — SODIUM CHLORIDE, PRESERVATIVE FREE 10 ML: 5 INJECTION INTRAVENOUS at 21:18

## 2025-01-06 RX ADMIN — PRAVASTATIN SODIUM 80 MG: 40 TABLET ORAL at 08:40

## 2025-01-06 RX ADMIN — SODIUM CHLORIDE, PRESERVATIVE FREE 10 ML: 5 INJECTION INTRAVENOUS at 08:41

## 2025-01-06 RX ADMIN — METOPROLOL TARTRATE 25 MG: 25 TABLET, FILM COATED ORAL at 21:18

## 2025-01-06 RX ADMIN — Medication 1000 UNITS: at 08:40

## 2025-01-06 RX ADMIN — LEVALBUTEROL HYDROCHLORIDE 1.25 MG: 1.25 SOLUTION RESPIRATORY (INHALATION) at 07:55

## 2025-01-06 RX ADMIN — PREDNISONE 20 MG: 20 TABLET ORAL at 12:12

## 2025-01-06 RX ADMIN — WATER 40 MG: 1 INJECTION INTRAMUSCULAR; INTRAVENOUS; SUBCUTANEOUS at 08:40

## 2025-01-06 NOTE — CARE COORDINATION
Transition of Care Plan:    RUR: 23%  Prior Level of Functioning: Assistance   Disposition: Home with spouse   MATTHEW: 1/7/25  If SNF or IPR: Date FOC offered:   Date FOC received:   Accepting facility:   Date authorization started with reference number:   Date authorization received and expires:   Follow up appointments: PCP   DME needed: No DME needed   Transportation at discharge: Pt's spouse   IM/IMM Medicare/ letter given: 2nd IMM letter   Is patient a  and connected with VA? No   If yes, was [place] transfer form completed and VA notified? No  Caregiver Contact: pt's spouse   Discharge Caregiver contacted prior to discharge? Pt will be contacted   Care Conference needed? No  Barriers to discharge: Medical clearance       CM reviewed pt's chart and pt is not medically clearance.    CM spoke to pt's spouse Martin Almanzar regarding the recommendation is for Moderate intensity short-term skilled up to 5x/week. Pt's spouse stated to CM that his spouse has been place before and that they are not desirable places to put a love one in. Pt's spouse stated to CM that he will work with his dtr with arranging other options.    CM will continue to follow and assist with d/c planning.    Cailin Colbert

## 2025-01-07 ENCOUNTER — APPOINTMENT (OUTPATIENT)
Facility: HOSPITAL | Age: 85
DRG: 871 | End: 2025-01-07
Payer: MEDICARE

## 2025-01-07 PROCEDURE — 6370000000 HC RX 637 (ALT 250 FOR IP): Performed by: STUDENT IN AN ORGANIZED HEALTH CARE EDUCATION/TRAINING PROGRAM

## 2025-01-07 PROCEDURE — 6370000000 HC RX 637 (ALT 250 FOR IP): Performed by: INTERNAL MEDICINE

## 2025-01-07 PROCEDURE — 94760 N-INVAS EAR/PLS OXIMETRY 1: CPT

## 2025-01-07 PROCEDURE — 97535 SELF CARE MNGMENT TRAINING: CPT

## 2025-01-07 PROCEDURE — 2500000003 HC RX 250 WO HCPCS: Performed by: INTERNAL MEDICINE

## 2025-01-07 PROCEDURE — 71045 X-RAY EXAM CHEST 1 VIEW: CPT

## 2025-01-07 PROCEDURE — 6360000002 HC RX W HCPCS: Performed by: INTERNAL MEDICINE

## 2025-01-07 PROCEDURE — 1100000003 HC PRIVATE W/ TELEMETRY

## 2025-01-07 PROCEDURE — 2700000000 HC OXYGEN THERAPY PER DAY

## 2025-01-07 RX ORDER — GUAIFENESIN 600 MG/1
600 TABLET, EXTENDED RELEASE ORAL 2 TIMES DAILY
Status: DISCONTINUED | OUTPATIENT
Start: 2025-01-07 | End: 2025-01-14 | Stop reason: HOSPADM

## 2025-01-07 RX ADMIN — Medication 1000 UNITS: at 08:14

## 2025-01-07 RX ADMIN — PANTOPRAZOLE SODIUM 40 MG: 40 TABLET, DELAYED RELEASE ORAL at 06:12

## 2025-01-07 RX ADMIN — SODIUM CHLORIDE, PRESERVATIVE FREE 10 ML: 5 INJECTION INTRAVENOUS at 20:46

## 2025-01-07 RX ADMIN — GUAIFENESIN 600 MG: 600 TABLET ORAL at 16:41

## 2025-01-07 RX ADMIN — Medication 3 MG: at 20:46

## 2025-01-07 RX ADMIN — METOPROLOL TARTRATE 25 MG: 25 TABLET, FILM COATED ORAL at 08:14

## 2025-01-07 RX ADMIN — ACETAMINOPHEN 650 MG: 325 TABLET ORAL at 08:14

## 2025-01-07 RX ADMIN — PREDNISONE 20 MG: 20 TABLET ORAL at 08:14

## 2025-01-07 RX ADMIN — THIAMINE HCL TAB 100 MG 100 MG: 100 TAB at 08:14

## 2025-01-07 RX ADMIN — POLYETHYLENE GLYCOL 3350 17 G: 17 POWDER, FOR SOLUTION ORAL at 08:15

## 2025-01-07 RX ADMIN — METOPROLOL TARTRATE 25 MG: 25 TABLET, FILM COATED ORAL at 20:46

## 2025-01-07 RX ADMIN — ASPIRIN 81 MG: 81 TABLET, CHEWABLE ORAL at 08:14

## 2025-01-07 RX ADMIN — SODIUM CHLORIDE, PRESERVATIVE FREE 10 ML: 5 INJECTION INTRAVENOUS at 08:15

## 2025-01-07 RX ADMIN — ENOXAPARIN SODIUM 40 MG: 100 INJECTION SUBCUTANEOUS at 08:13

## 2025-01-07 RX ADMIN — MICONAZOLE NITRATE: 2 POWDER TOPICAL at 08:15

## 2025-01-07 RX ADMIN — PRAVASTATIN SODIUM 80 MG: 40 TABLET ORAL at 08:14

## 2025-01-07 RX ADMIN — MICONAZOLE NITRATE: 2 POWDER TOPICAL at 20:45

## 2025-01-08 ENCOUNTER — APPOINTMENT (OUTPATIENT)
Facility: HOSPITAL | Age: 85
DRG: 871 | End: 2025-01-08
Attending: INTERNAL MEDICINE
Payer: MEDICARE

## 2025-01-08 PROCEDURE — 2580000003 HC RX 258: Performed by: INTERNAL MEDICINE

## 2025-01-08 PROCEDURE — 1100000003 HC PRIVATE W/ TELEMETRY

## 2025-01-08 PROCEDURE — 97530 THERAPEUTIC ACTIVITIES: CPT

## 2025-01-08 PROCEDURE — 97535 SELF CARE MNGMENT TRAINING: CPT

## 2025-01-08 PROCEDURE — 2500000003 HC RX 250 WO HCPCS: Performed by: INTERNAL MEDICINE

## 2025-01-08 PROCEDURE — 6370000000 HC RX 637 (ALT 250 FOR IP): Performed by: INTERNAL MEDICINE

## 2025-01-08 PROCEDURE — 76604 US EXAM CHEST: CPT

## 2025-01-08 PROCEDURE — 2500000003 HC RX 250 WO HCPCS: Performed by: STUDENT IN AN ORGANIZED HEALTH CARE EDUCATION/TRAINING PROGRAM

## 2025-01-08 PROCEDURE — 6370000000 HC RX 637 (ALT 250 FOR IP): Performed by: STUDENT IN AN ORGANIZED HEALTH CARE EDUCATION/TRAINING PROGRAM

## 2025-01-08 PROCEDURE — 6360000002 HC RX W HCPCS: Performed by: INTERNAL MEDICINE

## 2025-01-08 RX ORDER — LIDOCAINE HYDROCHLORIDE 10 MG/ML
30 INJECTION, SOLUTION EPIDURAL; INFILTRATION; INTRACAUDAL; PERINEURAL ONCE
Status: DISCONTINUED | OUTPATIENT
Start: 2025-01-08 | End: 2025-01-14 | Stop reason: HOSPADM

## 2025-01-08 RX ORDER — IPRATROPIUM BROMIDE AND ALBUTEROL SULFATE 2.5; .5 MG/3ML; MG/3ML
1 SOLUTION RESPIRATORY (INHALATION)
Status: DISCONTINUED | OUTPATIENT
Start: 2025-01-08 | End: 2025-01-10

## 2025-01-08 RX ADMIN — SODIUM CHLORIDE, PRESERVATIVE FREE 10 ML: 5 INJECTION INTRAVENOUS at 21:40

## 2025-01-08 RX ADMIN — PREDNISONE 20 MG: 20 TABLET ORAL at 08:21

## 2025-01-08 RX ADMIN — MICONAZOLE NITRATE: 2 POWDER TOPICAL at 21:43

## 2025-01-08 RX ADMIN — SODIUM CHLORIDE: 9 INJECTION, SOLUTION INTRAVENOUS at 16:34

## 2025-01-08 RX ADMIN — SODIUM CHLORIDE, PRESERVATIVE FREE 10 ML: 5 INJECTION INTRAVENOUS at 08:21

## 2025-01-08 RX ADMIN — PRAVASTATIN SODIUM 80 MG: 40 TABLET ORAL at 08:21

## 2025-01-08 RX ADMIN — METOPROLOL TARTRATE 25 MG: 25 TABLET, FILM COATED ORAL at 21:40

## 2025-01-08 RX ADMIN — Medication 1000 UNITS: at 08:21

## 2025-01-08 RX ADMIN — THIAMINE HCL TAB 100 MG 100 MG: 100 TAB at 08:21

## 2025-01-08 RX ADMIN — PANTOPRAZOLE SODIUM 40 MG: 40 TABLET, DELAYED RELEASE ORAL at 08:21

## 2025-01-08 RX ADMIN — ASPIRIN 81 MG: 81 TABLET, CHEWABLE ORAL at 08:21

## 2025-01-08 RX ADMIN — CEFEPIME 2000 MG: 2 INJECTION, POWDER, FOR SOLUTION INTRAVENOUS at 16:36

## 2025-01-08 RX ADMIN — GUAIFENESIN 600 MG: 600 TABLET ORAL at 08:21

## 2025-01-08 RX ADMIN — GUAIFENESIN 600 MG: 600 TABLET ORAL at 21:40

## 2025-01-08 RX ADMIN — POLYETHYLENE GLYCOL 3350 17 G: 17 POWDER, FOR SOLUTION ORAL at 08:21

## 2025-01-08 RX ADMIN — MICONAZOLE NITRATE: 2 POWDER TOPICAL at 09:00

## 2025-01-08 RX ADMIN — METOPROLOL TARTRATE 25 MG: 25 TABLET, FILM COATED ORAL at 08:21

## 2025-01-08 RX ADMIN — Medication 3 MG: at 21:40

## 2025-01-08 NOTE — CARE COORDINATION
Transition of Care Plan:     RUR: 20%  Prior Level of Functioning: Assistance   Disposition: Home with spouse and home health-pending  MATTHEW: 1/10/25  If SNF or IPR: Date FOC offered:   Date FOC received:   Accepting facility:   Date authorization started with reference number:   Date authorization received and expires:   Follow up appointments: PCP   DME needed: No DME needed   Transportation at discharge: Pt's spouse   IM/IMM Medicare/ letter given: 2nd IMM letter   Is patient a  and connected with VA? No              If yes, was  transfer form completed and VA notified? No  Caregiver Contact: pt's spouse   Discharge Caregiver contacted prior to discharge? Pt will be contacted   Care Conference needed? No  Barriers to discharge: Medical clearance           CM reviewed pt's chart and pt is not medically stable for d/c due to needing to wean oxygen.    CM spoke to pt's spouse who wants pt's information to be sent to [facility] for home health services at the time of d/c.    Referral was sent to [facility].    CM will continue to follow and assist with d/c planning.    Cailin Colbert

## 2025-01-09 ENCOUNTER — APPOINTMENT (OUTPATIENT)
Facility: HOSPITAL | Age: 85
DRG: 871 | End: 2025-01-09
Payer: MEDICARE

## 2025-01-09 ENCOUNTER — TELEPHONE (OUTPATIENT)
Age: 85
End: 2025-01-09

## 2025-01-09 LAB
APPEARANCE UR: CLEAR
BACTERIA URNS QL MICRO: NEGATIVE /HPF
BASOPHILS # BLD: 0.05 K/UL (ref 0–0.1)
BASOPHILS NFR BLD: 0.5 % (ref 0–1)
BILIRUB UR QL: NEGATIVE
COLOR UR: NORMAL
CRP SERPL-MCNC: 1.02 MG/DL (ref 0–0.3)
DIFFERENTIAL METHOD BLD: ABNORMAL
EOSINOPHIL # BLD: 0.11 K/UL (ref 0–0.4)
EOSINOPHIL NFR BLD: 1 % (ref 0–7)
EPITH CASTS URNS QL MICRO: NORMAL /LPF
ERYTHROCYTE [DISTWIDTH] IN BLOOD BY AUTOMATED COUNT: 13.2 % (ref 11.5–14.5)
GLUCOSE UR STRIP.AUTO-MCNC: NEGATIVE MG/DL
HCT VFR BLD AUTO: 42.5 % (ref 35–47)
HGB BLD-MCNC: 13.4 G/DL (ref 11.5–16)
HGB UR QL STRIP: NEGATIVE
HYALINE CASTS URNS QL MICRO: NORMAL /LPF (ref 0–2)
IMM GRANULOCYTES # BLD AUTO: 0.14 K/UL (ref 0–0.04)
IMM GRANULOCYTES NFR BLD AUTO: 1.3 % (ref 0–0.5)
KETONES UR QL STRIP.AUTO: NEGATIVE MG/DL
LEUKOCYTE ESTERASE UR QL STRIP.AUTO: NEGATIVE
LYMPHOCYTES # BLD: 4.02 K/UL (ref 0.8–3.5)
LYMPHOCYTES NFR BLD: 37.5 % (ref 12–49)
MCH RBC QN AUTO: 27.7 PG (ref 26–34)
MCHC RBC AUTO-ENTMCNC: 31.5 G/DL (ref 30–36.5)
MCV RBC AUTO: 87.8 FL (ref 80–99)
MONOCYTES # BLD: 1 K/UL (ref 0–1)
MONOCYTES NFR BLD: 9.3 % (ref 5–13)
NEUTS SEG # BLD: 5.39 K/UL (ref 1.8–8)
NEUTS SEG NFR BLD: 50.4 % (ref 32–75)
NITRITE UR QL STRIP.AUTO: NEGATIVE
NRBC # BLD: 0 K/UL (ref 0–0.01)
NRBC BLD-RTO: 0 PER 100 WBC
PH UR STRIP: 6 (ref 5–8)
PLATELET # BLD AUTO: 265 K/UL (ref 150–400)
PMV BLD AUTO: 11.3 FL (ref 8.9–12.9)
PROCALCITONIN SERPL-MCNC: <0.05 NG/ML
PROT UR STRIP-MCNC: NEGATIVE MG/DL
RBC # BLD AUTO: 4.84 M/UL (ref 3.8–5.2)
RBC #/AREA URNS HPF: NORMAL /HPF (ref 0–5)
SP GR UR REFRACTOMETRY: 1.02
URINE CULTURE IF INDICATED: NORMAL
UROBILINOGEN UR QL STRIP.AUTO: 0.2 EU/DL (ref 0.2–1)
WBC # BLD AUTO: 10.7 K/UL (ref 3.6–11)
WBC URNS QL MICRO: NORMAL /HPF (ref 0–4)

## 2025-01-09 PROCEDURE — 71250 CT THORAX DX C-: CPT

## 2025-01-09 PROCEDURE — 6370000000 HC RX 637 (ALT 250 FOR IP): Performed by: INTERNAL MEDICINE

## 2025-01-09 PROCEDURE — 94640 AIRWAY INHALATION TREATMENT: CPT

## 2025-01-09 PROCEDURE — 2500000003 HC RX 250 WO HCPCS: Performed by: INTERNAL MEDICINE

## 2025-01-09 PROCEDURE — 1100000003 HC PRIVATE W/ TELEMETRY

## 2025-01-09 PROCEDURE — 6370000000 HC RX 637 (ALT 250 FOR IP): Performed by: STUDENT IN AN ORGANIZED HEALTH CARE EDUCATION/TRAINING PROGRAM

## 2025-01-09 PROCEDURE — 81001 URINALYSIS AUTO W/SCOPE: CPT

## 2025-01-09 PROCEDURE — 85025 COMPLETE CBC W/AUTO DIFF WBC: CPT

## 2025-01-09 PROCEDURE — 86140 C-REACTIVE PROTEIN: CPT

## 2025-01-09 PROCEDURE — 94761 N-INVAS EAR/PLS OXIMETRY MLT: CPT

## 2025-01-09 PROCEDURE — 84145 PROCALCITONIN (PCT): CPT

## 2025-01-09 PROCEDURE — 6360000002 HC RX W HCPCS: Performed by: INTERNAL MEDICINE

## 2025-01-09 PROCEDURE — 36415 COLL VENOUS BLD VENIPUNCTURE: CPT

## 2025-01-09 PROCEDURE — 71045 X-RAY EXAM CHEST 1 VIEW: CPT

## 2025-01-09 PROCEDURE — 97535 SELF CARE MNGMENT TRAINING: CPT

## 2025-01-09 PROCEDURE — 2580000003 HC RX 258: Performed by: INTERNAL MEDICINE

## 2025-01-09 RX ORDER — LEVOFLOXACIN 500 MG/1
750 TABLET, FILM COATED ORAL DAILY
Status: COMPLETED | OUTPATIENT
Start: 2025-01-09 | End: 2025-01-12

## 2025-01-09 RX ADMIN — PREDNISONE 20 MG: 20 TABLET ORAL at 11:13

## 2025-01-09 RX ADMIN — Medication 3 MG: at 21:54

## 2025-01-09 RX ADMIN — MICONAZOLE NITRATE: 2 POWDER TOPICAL at 13:32

## 2025-01-09 RX ADMIN — SODIUM CHLORIDE, PRESERVATIVE FREE 10 ML: 5 INJECTION INTRAVENOUS at 21:55

## 2025-01-09 RX ADMIN — METOPROLOL TARTRATE 25 MG: 25 TABLET, FILM COATED ORAL at 21:54

## 2025-01-09 RX ADMIN — PRAVASTATIN SODIUM 80 MG: 40 TABLET ORAL at 11:14

## 2025-01-09 RX ADMIN — PANTOPRAZOLE SODIUM 40 MG: 40 TABLET, DELAYED RELEASE ORAL at 11:23

## 2025-01-09 RX ADMIN — ENOXAPARIN SODIUM 40 MG: 100 INJECTION SUBCUTANEOUS at 11:15

## 2025-01-09 RX ADMIN — SODIUM CHLORIDE, PRESERVATIVE FREE 10 ML: 5 INJECTION INTRAVENOUS at 11:17

## 2025-01-09 RX ADMIN — LEVOFLOXACIN 750 MG: 500 TABLET, FILM COATED ORAL at 13:58

## 2025-01-09 RX ADMIN — MICONAZOLE NITRATE: 2 POWDER TOPICAL at 22:00

## 2025-01-09 RX ADMIN — GUAIFENESIN 600 MG: 600 TABLET ORAL at 11:14

## 2025-01-09 RX ADMIN — THIAMINE HCL TAB 100 MG 100 MG: 100 TAB at 11:14

## 2025-01-09 RX ADMIN — CEFEPIME 2000 MG: 2 INJECTION, POWDER, FOR SOLUTION INTRAVENOUS at 01:01

## 2025-01-09 RX ADMIN — GUAIFENESIN 600 MG: 600 TABLET ORAL at 21:54

## 2025-01-09 RX ADMIN — POLYETHYLENE GLYCOL 3350 17 G: 17 POWDER, FOR SOLUTION ORAL at 11:22

## 2025-01-09 RX ADMIN — IPRATROPIUM BROMIDE AND ALBUTEROL SULFATE 1 DOSE: .5; 3 SOLUTION RESPIRATORY (INHALATION) at 14:08

## 2025-01-09 RX ADMIN — METOPROLOL TARTRATE 25 MG: 25 TABLET, FILM COATED ORAL at 11:16

## 2025-01-09 RX ADMIN — Medication 1000 UNITS: at 11:13

## 2025-01-09 RX ADMIN — IPRATROPIUM BROMIDE AND ALBUTEROL SULFATE 1 DOSE: .5; 3 SOLUTION RESPIRATORY (INHALATION) at 07:17

## 2025-01-09 RX ADMIN — IPRATROPIUM BROMIDE AND ALBUTEROL SULFATE 1 DOSE: .5; 3 SOLUTION RESPIRATORY (INHALATION) at 20:57

## 2025-01-09 RX ADMIN — ASPIRIN 81 MG: 81 TABLET, CHEWABLE ORAL at 11:15

## 2025-01-09 NOTE — TELEPHONE ENCOUNTER
[facility]    Phone [phone]    States:  Will pcp follow for home health services?  Physical therapy  Occupational therapy  Skilled nursing.  MSW  Home health aid   States pt emergency contact advised caller they only want Phys Therapy    Call to advise.                Appointments:  Last seen at [facility]:   4/15/2024   Future appointment [facility]:  1/24/2025         .            Caller confirms readback of documented phone/fax number(s) as correct.   Ethan Welch

## 2025-01-09 NOTE — CONSULTS
Pulmonary Consult Note            Name: Audrey Almanzar   : 1940   MRN: [id number]   Date: 2025      Reason for Consult: Severe atelectasis on left,?  Mucous plugging/pleural effusion.    Requesting Provider: Dr Nany Desouza    Mode of visit - In person on floor       HPI:     Audrey Almanzar is 84 y.o. female with history of hypertension, prior stroke, GERD admitted here on the New Year's Day with severe sepsis.  Workup showed UTI with Proteus and left lower lobe infiltrate for which a 5-day course of cephalosporins was completed.  However, her left lower lung opacification persisted on the x-ray.  Chest ultrasound today showed showed only small amount of fluid and suspected atelectasis/consolidation.  She was started back on antibiotics today, IV cefepime, and we are asked to evaluate her.  Of note, respiratory viral PCR panel at admission was negative.     From symptom standpoint, patient denies shortness of breath, cough or chest congestion.  Denies swallowing difficulties or other upper GI symptoms as well.  She is afebrile and currently saturating 92% on room air.    There is no personal history of prior pneumonia/asthma/ COPD etc.  She is a never smoker.  Family pulmonary history is noncontributory.    Assessment/Plan:     1.  Left lower lung atelectasis +/- pneumonia  2.  Pleural effusion-small as per ultrasound exam today  3.  Acute hypoxia secondary to 1 and 2, improved  4.  Leukocytosis suspicious for infection    Management plan    -Start pulmonary toilet and scheduled bronchodilator nebulizations, repeat chest x-ray in the morning  -Obtain urinary pneumonia antigens and follow-up on MRSA PCR  -Ordered CBC and inflammatory markers as well.  -Continue current antibiotics    Thank you for the consult, she will be followed up    Total time spent on the encounter >55 min    Review of systems:   All system reviewed, and negative except mentioned in HPI.      Objective:     Vital Signs:  BP 
cancers.  No recent abdominal imaging prior to the CT scan this admission      CT ABDOMEN PELVIS WO CONTRAST Additional Contrast? None    Result Date: 1/1/2025  Patchy bibasilar subsegmental atelectasis Constipation Bilateral hydronephrosis without hydroureter Nonobstructing bilateral renal calculi. Evolved T12 fracture. Interval L2 fracture as compared to the prior 2021 study Electronically signed by Geneva Jimenez      Allergies   Allergen Reactions    Iodine Anaphylaxis    Amoxicillin Other (See Comments)     \"It makes my heart race\"    Oxycodone Nausea Only    Potassium Iodide     Sodium Iodide     Lidocaine Rash     Get a rash from the patches    Nitrofurantoin Rash      Prior to Admission medications    Medication Sig Start Date End Date Taking? Authorizing Provider   pravastatin (PRAVACHOL) 80 MG tablet Take 1 tablet by mouth once daily 8/1/24   Soumya Dye MD   acetaminophen (TYLENOL) 325 MG tablet Take 2 tablets by mouth every 6 hours as needed for Pain 7/8/24   Bernice Altamirano MD   metoprolol tartrate (LOPRESSOR) 25 MG tablet Take 1 tablet by mouth twice daily 5/8/24   Soumya Dye MD   polyethylene glycol (GLYCOLAX) 17 GM/SCOOP powder Take 17 g by mouth daily    Provider, MD Alhaji   aspirin 81 MG chewable tablet Take 1 tablet by mouth daily 7/27/19   Automatic Reconciliation, Ar   vitamin D (CHOLECALCIFEROL) 25 MCG (1000 UT) TABS tablet Take by mouth daily    Automatic Reconciliation, Ar   esomeprazole (NEXIUM) 40 MG delayed release capsule Take 1 capsule by mouth daily    Automatic Reconciliation, Ar      PMHx:  has a past medical history of Acid reflux, Constipation, Falls, Hip fracture requiring operative repair (HCC), Hypertension, Incontinence, Stroke (HCC), and Thyroid disease.   PSurgHx:  has a past surgical history that includes orthopedic surgery; gyn (1996); Total hip arthroplasty (Left, 03/27/2022); ep device procedure (N/A, 02/02/2024); invasive vascular (N/A, 02/02/2024);

## 2025-01-10 LAB
ANION GAP SERPL CALC-SCNC: 3 MMOL/L (ref 2–12)
BACTERIA SPEC CULT: ABNORMAL
BUN SERPL-MCNC: 21 MG/DL (ref 6–20)
BUN/CREAT SERPL: 31 (ref 12–20)
CALCIUM SERPL-MCNC: 9.5 MG/DL (ref 8.5–10.1)
CHLORIDE SERPL-SCNC: 110 MMOL/L (ref 97–108)
CO2 SERPL-SCNC: 28 MMOL/L (ref 21–32)
CREAT SERPL-MCNC: 0.67 MG/DL (ref 0.55–1.02)
GLUCOSE SERPL-MCNC: 119 MG/DL (ref 65–100)
POTASSIUM SERPL-SCNC: 3.9 MMOL/L (ref 3.5–5.1)
SERVICE CMNT-IMP: ABNORMAL
SODIUM SERPL-SCNC: 141 MMOL/L (ref 136–145)

## 2025-01-10 PROCEDURE — 97535 SELF CARE MNGMENT TRAINING: CPT

## 2025-01-10 PROCEDURE — 97116 GAIT TRAINING THERAPY: CPT

## 2025-01-10 PROCEDURE — 6370000000 HC RX 637 (ALT 250 FOR IP): Performed by: INTERNAL MEDICINE

## 2025-01-10 PROCEDURE — 6360000002 HC RX W HCPCS: Performed by: INTERNAL MEDICINE

## 2025-01-10 PROCEDURE — 1100000003 HC PRIVATE W/ TELEMETRY

## 2025-01-10 PROCEDURE — 80048 BASIC METABOLIC PNL TOTAL CA: CPT

## 2025-01-10 PROCEDURE — 94761 N-INVAS EAR/PLS OXIMETRY MLT: CPT

## 2025-01-10 PROCEDURE — 2500000003 HC RX 250 WO HCPCS: Performed by: INTERNAL MEDICINE

## 2025-01-10 PROCEDURE — 6370000000 HC RX 637 (ALT 250 FOR IP): Performed by: STUDENT IN AN ORGANIZED HEALTH CARE EDUCATION/TRAINING PROGRAM

## 2025-01-10 PROCEDURE — 36415 COLL VENOUS BLD VENIPUNCTURE: CPT

## 2025-01-10 RX ORDER — IPRATROPIUM BROMIDE AND ALBUTEROL SULFATE 2.5; .5 MG/3ML; MG/3ML
1 SOLUTION RESPIRATORY (INHALATION) AS NEEDED
Status: DISCONTINUED | OUTPATIENT
Start: 2025-01-10 | End: 2025-01-14 | Stop reason: HOSPADM

## 2025-01-10 RX ORDER — DOXYCYCLINE HYCLATE 100 MG
100 TABLET ORAL EVERY 12 HOURS SCHEDULED
Status: DISCONTINUED | OUTPATIENT
Start: 2025-01-10 | End: 2025-01-14 | Stop reason: HOSPADM

## 2025-01-10 RX ADMIN — METOPROLOL TARTRATE 25 MG: 25 TABLET, FILM COATED ORAL at 21:00

## 2025-01-10 RX ADMIN — Medication 3 MG: at 21:00

## 2025-01-10 RX ADMIN — SODIUM CHLORIDE, PRESERVATIVE FREE 10 ML: 5 INJECTION INTRAVENOUS at 20:59

## 2025-01-10 RX ADMIN — DOXYCYCLINE HYCLATE 100 MG: 100 TABLET, COATED ORAL at 21:00

## 2025-01-10 RX ADMIN — GUAIFENESIN 600 MG: 600 TABLET ORAL at 21:00

## 2025-01-10 RX ADMIN — PANTOPRAZOLE SODIUM 40 MG: 40 TABLET, DELAYED RELEASE ORAL at 09:51

## 2025-01-10 RX ADMIN — SODIUM CHLORIDE, PRESERVATIVE FREE 10 ML: 5 INJECTION INTRAVENOUS at 09:54

## 2025-01-10 RX ADMIN — MICONAZOLE NITRATE: 2 POWDER TOPICAL at 22:08

## 2025-01-10 RX ADMIN — ENOXAPARIN SODIUM 40 MG: 100 INJECTION SUBCUTANEOUS at 09:50

## 2025-01-10 RX ADMIN — PREDNISONE 20 MG: 20 TABLET ORAL at 09:50

## 2025-01-10 RX ADMIN — METOPROLOL TARTRATE 25 MG: 25 TABLET, FILM COATED ORAL at 09:51

## 2025-01-10 RX ADMIN — DOXYCYCLINE HYCLATE 100 MG: 100 TABLET, COATED ORAL at 09:53

## 2025-01-10 RX ADMIN — LEVOFLOXACIN 750 MG: 500 TABLET, FILM COATED ORAL at 09:50

## 2025-01-10 RX ADMIN — Medication 1000 UNITS: at 09:53

## 2025-01-10 RX ADMIN — ASPIRIN 81 MG: 81 TABLET, CHEWABLE ORAL at 09:54

## 2025-01-10 RX ADMIN — POLYETHYLENE GLYCOL 3350 17 G: 17 POWDER, FOR SOLUTION ORAL at 09:50

## 2025-01-10 RX ADMIN — THIAMINE HCL TAB 100 MG 100 MG: 100 TAB at 09:53

## 2025-01-10 RX ADMIN — GUAIFENESIN 600 MG: 600 TABLET ORAL at 09:52

## 2025-01-10 RX ADMIN — MICONAZOLE NITRATE: 2 POWDER TOPICAL at 09:50

## 2025-01-10 RX ADMIN — PRAVASTATIN SODIUM 80 MG: 40 TABLET ORAL at 09:53

## 2025-01-10 NOTE — DISCHARGE INSTRUCTIONS
Needs chest X ray 2 weeks after discharge         HOSPITALIST DISCHARGE INSTRUCTIONS    NAME: Audrey Almanzar   :  1940   MRN:  [id number]     Date/Time:  2025 2:04 PM    ADMIT DATE: 2025   DISCHARGE DATE: 2025     Attending Physician: Nany Desouza MD  Persistent acute hypoxic respiratory failure  Persistent left pleural effusion with severe left-sided atelectasis, ?  Mucous plugging  Nonobstructing renal stones  Acute metabolic encephalopathy 2/2 sepsis, UTI, resolved  Severe urosepsis, resolved  Proteus mirabilis UTI  Lactic acidosis, resolved  Leukocytosis likely due to UTI, resolving  Suspected community-acquired pneumonia  -Abnormal CT with possibility of artifact versus infarct.  Hypokalemia  Hypophosphatemia  Constipation  Increased risk for hospital delirium  Bilateral hydronephrosis and no hydroureter.    Hypertension  GERD  Dyslipidemia      Medications: Per above medication reconciliation.    Pain Management: per above medications    Recommended diet: {diet:98949}    Recommended activity: {discharge activity:78194}    Wound care: {WOUND CARE:73215737}    Indwelling devices:  {INDWELLING DEVICES:37668316}    Supplemental Oxygen: {SUPPLEMENTAL 02:81244566}    Required Lab work: {REQUIRED LAB WORK:13541258}    Glucose management:  {GLUCOSE MANAGEMENT:22884163}    Code status: {Status:}

## 2025-01-10 NOTE — CARE COORDINATION
Transition of Care Plan:     RUR: 20%  Prior Level of Functioning: Assistance   Disposition: Home with spouse and home health-[facility]   MATTHEW: 1/10/25  If SNF or IPR: Date FOC offered:   Date FOC received:   Accepting facility:   Date authorization started with reference number:   Date authorization received and expires:   Follow up appointments: PCP   DME needed: No DME needed   Transportation at discharge: Pt's spouse   IM/IMM Medicare/ letter given: 2nd IMM letter   Is patient a [place] and connected with [facility]? No              If yes, was [place] transfer form completed and VA notified? No  Caregiver Contact: pt's spouse   Discharge Caregiver contacted prior to discharge? Pt will be contacted   Care Conference needed? No  Barriers to discharge: Medical clearance     CM reviewed pt's chart and pt is not medically stable for d/c due to pulm clearance and lab result.    CM will continue to follow and assist with d/c planning.    Cailin Colbert

## 2025-01-10 NOTE — TELEPHONE ENCOUNTER
LM to inform Randi from [facility] that PCP will follow pt as long as they come in for their scheduled appointment or complete a hospital follow up after discharge.

## 2025-01-11 LAB — PROCALCITONIN SERPL-MCNC: <0.05 NG/ML

## 2025-01-11 PROCEDURE — 6370000000 HC RX 637 (ALT 250 FOR IP): Performed by: INTERNAL MEDICINE

## 2025-01-11 PROCEDURE — 84145 PROCALCITONIN (PCT): CPT

## 2025-01-11 PROCEDURE — 36415 COLL VENOUS BLD VENIPUNCTURE: CPT

## 2025-01-11 PROCEDURE — 6360000002 HC RX W HCPCS: Performed by: INTERNAL MEDICINE

## 2025-01-11 PROCEDURE — 2500000003 HC RX 250 WO HCPCS: Performed by: INTERNAL MEDICINE

## 2025-01-11 PROCEDURE — 6370000000 HC RX 637 (ALT 250 FOR IP): Performed by: STUDENT IN AN ORGANIZED HEALTH CARE EDUCATION/TRAINING PROGRAM

## 2025-01-11 PROCEDURE — 1100000003 HC PRIVATE W/ TELEMETRY

## 2025-01-11 RX ADMIN — SODIUM CHLORIDE, PRESERVATIVE FREE 10 ML: 5 INJECTION INTRAVENOUS at 21:17

## 2025-01-11 RX ADMIN — THIAMINE HCL TAB 100 MG 100 MG: 100 TAB at 09:31

## 2025-01-11 RX ADMIN — PANTOPRAZOLE SODIUM 40 MG: 40 TABLET, DELAYED RELEASE ORAL at 07:02

## 2025-01-11 RX ADMIN — Medication 3 MG: at 21:11

## 2025-01-11 RX ADMIN — MICONAZOLE NITRATE: 2 POWDER TOPICAL at 21:13

## 2025-01-11 RX ADMIN — PREDNISONE 20 MG: 20 TABLET ORAL at 09:31

## 2025-01-11 RX ADMIN — PRAVASTATIN SODIUM 80 MG: 40 TABLET ORAL at 09:31

## 2025-01-11 RX ADMIN — POLYETHYLENE GLYCOL 3350 17 G: 17 POWDER, FOR SOLUTION ORAL at 09:26

## 2025-01-11 RX ADMIN — DOXYCYCLINE HYCLATE 100 MG: 100 TABLET, COATED ORAL at 09:31

## 2025-01-11 RX ADMIN — LEVOFLOXACIN 750 MG: 500 TABLET, FILM COATED ORAL at 09:27

## 2025-01-11 RX ADMIN — DOXYCYCLINE HYCLATE 100 MG: 100 TABLET, COATED ORAL at 21:11

## 2025-01-11 RX ADMIN — POLYETHYLENE GLYCOL 3350 17 G: 17 POWDER, FOR SOLUTION ORAL at 21:51

## 2025-01-11 RX ADMIN — GUAIFENESIN 600 MG: 600 TABLET ORAL at 09:27

## 2025-01-11 RX ADMIN — METOPROLOL TARTRATE 25 MG: 25 TABLET, FILM COATED ORAL at 09:28

## 2025-01-11 RX ADMIN — METOPROLOL TARTRATE 25 MG: 25 TABLET, FILM COATED ORAL at 21:12

## 2025-01-11 RX ADMIN — ENOXAPARIN SODIUM 40 MG: 100 INJECTION SUBCUTANEOUS at 09:24

## 2025-01-11 RX ADMIN — ASPIRIN 81 MG: 81 TABLET, CHEWABLE ORAL at 09:31

## 2025-01-11 RX ADMIN — SODIUM CHLORIDE, PRESERVATIVE FREE 10 ML: 5 INJECTION INTRAVENOUS at 09:24

## 2025-01-11 RX ADMIN — GUAIFENESIN 600 MG: 600 TABLET ORAL at 21:13

## 2025-01-11 RX ADMIN — Medication 1000 UNITS: at 09:31

## 2025-01-12 PROCEDURE — 6370000000 HC RX 637 (ALT 250 FOR IP): Performed by: INTERNAL MEDICINE

## 2025-01-12 PROCEDURE — 2500000003 HC RX 250 WO HCPCS: Performed by: INTERNAL MEDICINE

## 2025-01-12 PROCEDURE — 6360000002 HC RX W HCPCS: Performed by: INTERNAL MEDICINE

## 2025-01-12 PROCEDURE — 2500000003 HC RX 250 WO HCPCS: Performed by: STUDENT IN AN ORGANIZED HEALTH CARE EDUCATION/TRAINING PROGRAM

## 2025-01-12 PROCEDURE — 6370000000 HC RX 637 (ALT 250 FOR IP): Performed by: STUDENT IN AN ORGANIZED HEALTH CARE EDUCATION/TRAINING PROGRAM

## 2025-01-12 PROCEDURE — 1100000003 HC PRIVATE W/ TELEMETRY

## 2025-01-12 RX ADMIN — PANTOPRAZOLE SODIUM 40 MG: 40 TABLET, DELAYED RELEASE ORAL at 06:24

## 2025-01-12 RX ADMIN — SODIUM CHLORIDE, PRESERVATIVE FREE 10 ML: 5 INJECTION INTRAVENOUS at 21:33

## 2025-01-12 RX ADMIN — Medication 3 MG: at 21:32

## 2025-01-12 RX ADMIN — POLYETHYLENE GLYCOL 3350 17 G: 17 POWDER, FOR SOLUTION ORAL at 09:09

## 2025-01-12 RX ADMIN — Medication 1000 UNITS: at 09:09

## 2025-01-12 RX ADMIN — MICONAZOLE NITRATE: 2 POWDER TOPICAL at 21:50

## 2025-01-12 RX ADMIN — MICONAZOLE NITRATE: 2 POWDER TOPICAL at 12:00

## 2025-01-12 RX ADMIN — DOXYCYCLINE HYCLATE 100 MG: 100 TABLET, COATED ORAL at 09:10

## 2025-01-12 RX ADMIN — METOPROLOL TARTRATE 25 MG: 25 TABLET, FILM COATED ORAL at 09:10

## 2025-01-12 RX ADMIN — METOPROLOL TARTRATE 25 MG: 25 TABLET, FILM COATED ORAL at 21:50

## 2025-01-12 RX ADMIN — ENOXAPARIN SODIUM 40 MG: 100 INJECTION SUBCUTANEOUS at 09:07

## 2025-01-12 RX ADMIN — ASPIRIN 81 MG: 81 TABLET, CHEWABLE ORAL at 09:10

## 2025-01-12 RX ADMIN — SODIUM CHLORIDE, PRESERVATIVE FREE 10 ML: 5 INJECTION INTRAVENOUS at 09:07

## 2025-01-12 RX ADMIN — LEVOFLOXACIN 750 MG: 500 TABLET, FILM COATED ORAL at 09:10

## 2025-01-12 RX ADMIN — DOXYCYCLINE HYCLATE 100 MG: 100 TABLET, COATED ORAL at 21:32

## 2025-01-12 RX ADMIN — PRAVASTATIN SODIUM 80 MG: 40 TABLET ORAL at 09:10

## 2025-01-12 RX ADMIN — GUAIFENESIN 600 MG: 600 TABLET ORAL at 09:10

## 2025-01-12 RX ADMIN — GUAIFENESIN 600 MG: 600 TABLET ORAL at 21:32

## 2025-01-12 RX ADMIN — THIAMINE HCL TAB 100 MG 100 MG: 100 TAB at 09:10

## 2025-01-12 RX ADMIN — PREDNISONE 20 MG: 20 TABLET ORAL at 09:10

## 2025-01-13 PROCEDURE — 6370000000 HC RX 637 (ALT 250 FOR IP): Performed by: INTERNAL MEDICINE

## 2025-01-13 PROCEDURE — 97116 GAIT TRAINING THERAPY: CPT

## 2025-01-13 PROCEDURE — 2500000003 HC RX 250 WO HCPCS: Performed by: INTERNAL MEDICINE

## 2025-01-13 PROCEDURE — 97535 SELF CARE MNGMENT TRAINING: CPT

## 2025-01-13 PROCEDURE — 1100000003 HC PRIVATE W/ TELEMETRY

## 2025-01-13 PROCEDURE — 6360000002 HC RX W HCPCS: Performed by: INTERNAL MEDICINE

## 2025-01-13 PROCEDURE — 6370000000 HC RX 637 (ALT 250 FOR IP): Performed by: STUDENT IN AN ORGANIZED HEALTH CARE EDUCATION/TRAINING PROGRAM

## 2025-01-13 PROCEDURE — 94760 N-INVAS EAR/PLS OXIMETRY 1: CPT

## 2025-01-13 PROCEDURE — 97110 THERAPEUTIC EXERCISES: CPT

## 2025-01-13 PROCEDURE — 97530 THERAPEUTIC ACTIVITIES: CPT

## 2025-01-13 RX ORDER — PREDNISONE 5 MG/1
2.5 TABLET ORAL DAILY
Status: DISCONTINUED | OUTPATIENT
Start: 2025-01-17 | End: 2025-01-14 | Stop reason: HOSPADM

## 2025-01-13 RX ORDER — PREDNISONE 5 MG/1
10 TABLET ORAL DAILY
Status: COMPLETED | OUTPATIENT
Start: 2025-01-13 | End: 2025-01-14

## 2025-01-13 RX ORDER — PREDNISONE 5 MG/1
5 TABLET ORAL DAILY
Status: DISCONTINUED | OUTPATIENT
Start: 2025-01-15 | End: 2025-01-14 | Stop reason: HOSPADM

## 2025-01-13 RX ADMIN — PREDNISONE 20 MG: 20 TABLET ORAL at 09:26

## 2025-01-13 RX ADMIN — GUAIFENESIN 600 MG: 600 TABLET ORAL at 09:26

## 2025-01-13 RX ADMIN — PREDNISONE 10 MG: 5 TABLET ORAL at 20:59

## 2025-01-13 RX ADMIN — SODIUM CHLORIDE, PRESERVATIVE FREE 10 ML: 5 INJECTION INTRAVENOUS at 20:37

## 2025-01-13 RX ADMIN — Medication 1000 UNITS: at 09:26

## 2025-01-13 RX ADMIN — GUAIFENESIN 600 MG: 600 TABLET ORAL at 20:36

## 2025-01-13 RX ADMIN — POLYETHYLENE GLYCOL 3350 17 G: 17 POWDER, FOR SOLUTION ORAL at 09:25

## 2025-01-13 RX ADMIN — THIAMINE HCL TAB 100 MG 100 MG: 100 TAB at 09:26

## 2025-01-13 RX ADMIN — DOXYCYCLINE HYCLATE 100 MG: 100 TABLET, COATED ORAL at 09:26

## 2025-01-13 RX ADMIN — MICONAZOLE NITRATE: 2 POWDER TOPICAL at 20:59

## 2025-01-13 RX ADMIN — DOXYCYCLINE HYCLATE 100 MG: 100 TABLET, COATED ORAL at 20:36

## 2025-01-13 RX ADMIN — METOPROLOL TARTRATE 25 MG: 25 TABLET, FILM COATED ORAL at 20:36

## 2025-01-13 RX ADMIN — PANTOPRAZOLE SODIUM 40 MG: 40 TABLET, DELAYED RELEASE ORAL at 06:56

## 2025-01-13 RX ADMIN — PRAVASTATIN SODIUM 80 MG: 40 TABLET ORAL at 09:26

## 2025-01-13 RX ADMIN — ENOXAPARIN SODIUM 40 MG: 100 INJECTION SUBCUTANEOUS at 09:25

## 2025-01-13 RX ADMIN — Medication 3 MG: at 20:36

## 2025-01-13 RX ADMIN — MICONAZOLE NITRATE: 2 POWDER TOPICAL at 09:27

## 2025-01-13 RX ADMIN — ASPIRIN 81 MG: 81 TABLET, CHEWABLE ORAL at 09:26

## 2025-01-13 RX ADMIN — SODIUM CHLORIDE, PRESERVATIVE FREE 10 ML: 5 INJECTION INTRAVENOUS at 09:27

## 2025-01-13 NOTE — CASE COMMUNICATION
Transition of Care Plan:     RUR: 20%  Prior Level of Functioning: Assistance   Disposition: Home with spouse and home health-[facility]   MATTHEW: 1/14/25  If SNF or IPR: Date FOC offered: 1/10/25  Date FOC received: 1/10/25  Accepting facility: [facility]  Date authorization started with reference number:   Date authorization received and expires:   Follow up appointments: PCP   DME needed: No DME needed   Transportation at discharge: Pt's spouse   IM/IMM Medicare/ letter given: 2nd IMM letter   Is patient a  and connected with VA? No              If yes, was [place] transfer form completed and VA notified? No  Caregiver Contact: pt's spouse   Discharge Caregiver contacted prior to discharge? Pt will be contacted   Care Conference needed? No  Barriers to discharge: Bed        CM reviewed pt's chart and CM is aware that pt is medically stable for d/c but waiting for bed at [facility]    CM will continue to follow and assist with d/c planning.    Cailin Colbert

## 2025-01-13 NOTE — TELEPHONE ENCOUNTER
Spoke to Randi and informed her that PCP will follow pt as long as they are seen for a hospital follow up.

## 2025-01-14 ENCOUNTER — APPOINTMENT (OUTPATIENT)
Facility: HOSPITAL | Age: 85
DRG: 871 | End: 2025-01-14
Payer: MEDICARE

## 2025-01-14 VITALS
SYSTOLIC BLOOD PRESSURE: 134 MMHG | RESPIRATION RATE: 18 BRPM | OXYGEN SATURATION: 92 % | WEIGHT: 170.1 LBS | TEMPERATURE: 97.3 F | HEART RATE: 75 BPM | BODY MASS INDEX: 28.34 KG/M2 | DIASTOLIC BLOOD PRESSURE: 71 MMHG | HEIGHT: 65 IN

## 2025-01-14 PROCEDURE — 6370000000 HC RX 637 (ALT 250 FOR IP): Performed by: INTERNAL MEDICINE

## 2025-01-14 PROCEDURE — 94760 N-INVAS EAR/PLS OXIMETRY 1: CPT

## 2025-01-14 PROCEDURE — 97535 SELF CARE MNGMENT TRAINING: CPT

## 2025-01-14 PROCEDURE — 6370000000 HC RX 637 (ALT 250 FOR IP): Performed by: STUDENT IN AN ORGANIZED HEALTH CARE EDUCATION/TRAINING PROGRAM

## 2025-01-14 PROCEDURE — 6360000002 HC RX W HCPCS: Performed by: INTERNAL MEDICINE

## 2025-01-14 PROCEDURE — 71045 X-RAY EXAM CHEST 1 VIEW: CPT

## 2025-01-14 PROCEDURE — 97116 GAIT TRAINING THERAPY: CPT

## 2025-01-14 PROCEDURE — 2500000003 HC RX 250 WO HCPCS: Performed by: INTERNAL MEDICINE

## 2025-01-14 RX ORDER — THIAMINE MONONITRATE (VIT B1) 100 MG
100 TABLET ORAL DAILY
Qty: 30 TABLET | Refills: 0 | Status: SHIPPED | OUTPATIENT
Start: 2025-01-15 | End: 2025-02-14

## 2025-01-14 RX ORDER — PANTOPRAZOLE SODIUM 40 MG/1
40 TABLET, DELAYED RELEASE ORAL
Qty: 30 TABLET | Refills: 0 | Status: SHIPPED | OUTPATIENT
Start: 2025-01-15 | End: 2025-02-14

## 2025-01-14 RX ORDER — DOXYCYCLINE HYCLATE 100 MG
100 TABLET ORAL EVERY 12 HOURS SCHEDULED
Qty: 4 TABLET | Refills: 0 | Status: SHIPPED | OUTPATIENT
Start: 2025-01-14 | End: 2025-01-16

## 2025-01-14 RX ORDER — PREDNISONE 2.5 MG/1
TABLET ORAL
Qty: 6 TABLET | Refills: 0 | Status: SHIPPED | OUTPATIENT
Start: 2025-01-15 | End: 2025-01-24

## 2025-01-14 RX ORDER — GUAIFENESIN 600 MG/1
600 TABLET, EXTENDED RELEASE ORAL 2 TIMES DAILY
Qty: 10 TABLET | Refills: 0 | Status: SHIPPED
Start: 2025-01-14 | End: 2025-01-19

## 2025-01-14 RX ADMIN — Medication 1000 UNITS: at 09:52

## 2025-01-14 RX ADMIN — METOPROLOL TARTRATE 25 MG: 25 TABLET, FILM COATED ORAL at 09:53

## 2025-01-14 RX ADMIN — MICONAZOLE NITRATE: 2 POWDER TOPICAL at 09:54

## 2025-01-14 RX ADMIN — SODIUM CHLORIDE, PRESERVATIVE FREE 10 ML: 5 INJECTION INTRAVENOUS at 09:53

## 2025-01-14 RX ADMIN — ENOXAPARIN SODIUM 40 MG: 100 INJECTION SUBCUTANEOUS at 09:53

## 2025-01-14 RX ADMIN — DOXYCYCLINE HYCLATE 100 MG: 100 TABLET, COATED ORAL at 09:53

## 2025-01-14 RX ADMIN — POLYETHYLENE GLYCOL 3350 17 G: 17 POWDER, FOR SOLUTION ORAL at 09:54

## 2025-01-14 RX ADMIN — ASPIRIN 81 MG: 81 TABLET, CHEWABLE ORAL at 09:52

## 2025-01-14 RX ADMIN — PANTOPRAZOLE SODIUM 40 MG: 40 TABLET, DELAYED RELEASE ORAL at 06:29

## 2025-01-14 RX ADMIN — PRAVASTATIN SODIUM 80 MG: 40 TABLET ORAL at 09:52

## 2025-01-14 RX ADMIN — GUAIFENESIN 600 MG: 600 TABLET ORAL at 09:53

## 2025-01-14 RX ADMIN — THIAMINE HCL TAB 100 MG 100 MG: 100 TAB at 09:53

## 2025-01-14 RX ADMIN — PREDNISONE 10 MG: 5 TABLET ORAL at 09:52

## 2025-01-14 NOTE — DISCHARGE SUMMARY
[address]  [address]  [facility] [address] [phone]  Follow up  As needed - [facility] is a mobile urgent care provider that comes to your home. You may call them if you would like to set up an appt to be seen for a follow up while waiting to be seen by your PCP.    Kayla Walker MD  [address] [facility] [address] [phone]    Follow up in 2 week(s)  with a CXR before          Total time in minutes spent coordinating this discharge (includes going over instructions, follow-up, prescriptions, and preparing report for sign off to her PCP) :  35 minutes

## 2025-01-14 NOTE — PROGRESS NOTES
Pulmonary Progress Note    Patient: Audrey Almanzar                     YOB: 1940        Date- 1/13/2025                           Admit Date: 1/1/2025       CC: Follow up for left lower lung opacification            IMPRESSION & PLAN:   1.  Left lower lung pneumonia, completed a course of cephalosporin and azithromycin, PNA w/u nonrevealing, MRSA nares screen positive  2.  Pleural effusion-small as per ultrasound exam today  3.  Acute hypoxia secondary to 1 and 2, improved  4.  Leukocytosis -resolved  5.  Severe sepsis with Proteus UTI-resolved.     Management plan    -Pneumonia workup nonrevealing, completed a course of cephalosporin and azithromycin  -Clinically improved, on room air now  -Taper off Prednisone   -Obtain chest x-ray in the morning  -MRSA nares positive, continue doxycycline  -Continue pulmonary toilet and as needed nebs     Martin Almanzar  ([phone])     She will be followed up    Total encounter time > 35 minutes       Subjective:  Interval History:     No pulmonary symptoms.  Remains on room air.  Patient continues doxycycline, Levaquin was discontinued      Medications:  Current Facility-Administered Medications   Medication Dose Route Frequency Provider Last Rate Last Admin    doxycycline hyclate (VIBRA-TABS) tablet 100 mg  100 mg Oral 2 times per day Nany Desouza MD   100 mg at 01/13/25 0926    ipratropium 0.5 mg-albuterol 2.5 mg (DUONEB) nebulizer solution 1 Dose  1 Dose Inhalation PRN Nany Desouza MD        lidocaine PF 1 % injection 30 mL  30 mL IntraDERmal Once Tracy Larkin MD        Please enter Wt in chart   Other RX Placeholder Nany Desouza MD        guaiFENesin (MUCINEX) extended release tablet 600 mg  600 mg Oral BID Nany Desouza MD   600 mg at 01/13/25 0926    predniSONE (DELTASONE) tablet 20 mg  20 mg Oral Daily Whitney Wayne MD   20 mg at 01/13/25 0926    levalbuterol (XOPENEX) nebulizer solution 1.25 mg  1.25 mg Nebulization 
        Pulmonary Progress Note    Patient: Audrey Almanzar                     YOB: 1940        Date- 1/14/2025                           Admit Date: 1/1/2025       CC: Follow up for left lower lung opacification            IMPRESSION & PLAN:   1.  Left lower lung pneumonia, completed a course of cephalosporin and azithromycin, PNA w/u nonrevealing, MRSA nares screen positive  2.  Pleural effusion-small as per ultrasound exam today  3.  Acute hypoxia secondary to 1 and 2, resolved   4.  Leukocytosis -resolved  5.  Severe sepsis with Proteus UTI-resolved.     Management plan    -Pneumonia workup nonrevealing, completed a course of cephalosporin and azithromycin  -Clinically improved, on room air now  - Improving left lower lobe opacities on chest x-ray today  -Taper off Prednisone   -MRSA nares positive, continue doxycycline course  -Continue pulmonary toilet and as needed nebs     Martin Almanzar  ([phone])     We will sign off, thank you for involving us in her care.  Office follow-up at [facility] in about 2 weeks.    Total encounter time > 35 minutes       Subjective:  Interval History:     No pulmonary symptoms.  Remains on room air.    Chest x-ray looking better    Medications:  Current Facility-Administered Medications   Medication Dose Route Frequency Provider Last Rate Last Admin    [START ON 1/15/2025] predniSONE (DELTASONE) tablet 5 mg  5 mg Oral Daily Toshia Milner MD        Followed by    [START ON 1/17/2025] predniSONE (DELTASONE) tablet 2.5 mg  2.5 mg Oral Daily Toshia Milner MD        doxycycline hyclate (VIBRA-TABS) tablet 100 mg  100 mg Oral 2 times per day Nany Desouza MD   100 mg at 01/14/25 0953    ipratropium 0.5 mg-albuterol 2.5 mg (DUONEB) nebulizer solution 1 Dose  1 Dose Inhalation PRN Nany Desouza MD        lidocaine PF 1 % injection 30 mL  30 mL IntraDERmal Once Tracy Larkin MD        Please enter Wt in chart   Other RX 
        Pulmonary Progress Note    Patient: Audrey Almanzar                     YOB: 1940        Date- 1/9/2025                           Admit Date: 1/1/2025       CC: Follow up for left lower lung opacification            IMPRESSION & PLAN:   1.  Left lower lung atelectasis +/- pneumonia, completed a course of cephalosporin and azithromycin   2.  Pleural effusion-small as per ultrasound exam today  3.  Acute hypoxia secondary to 1 and 2, improved  4.  Leukocytosis -resolved  5.  Severe sepsis with Proteus UTI-resolved.     Management plan    -No significant change in the left lower lung opacification on reviewing chest film today, obtain a chest CT for a closer evaluation of this area.   -Oxygenation preserved, reviewed the importance of neb treatments and pulmonary toilet with the patient, she agrees to be compliant.  -Resp viral and mycoplasma PCR, urine pneumococcal antigen neg, urinary legionella antigen and MRSA PCR pending, but normal procalcitonin level and unimpressive CRP make bacterial pneumonia less likely.  -Completed a course of cephalosporin and azithromycin, no obvious signs of sepsis at this time or bacterial pneumonia at this time, we can probably de-escalate antibiotics probably to Levaquin.    Spoke with  Martin Almanzar () and reviewed the plan.    She will be followed up.    Total encounter time > 35 minutes       Subjective:  Interval History:     Patient denies shortness of breath, cough or other pulmonary symptoms.  No sputum to be collected.  She apparently denied neb treatment last night and had only 1 treatment so far.    Procalcitonin is normal and CRP is minimally elevated, chest x-ray this morning looks similar to the prior film.    Medications:  Current Facility-Administered Medications   Medication Dose Route Frequency Provider Last Rate Last Admin    lidocaine PF 1 % injection 30 mL  30 mL IntraDERmal Once Tracy Larkin MD        [id number] 
      Hospitalist Progress Note    NAME:   Audrey Almanzar   : 1940   MRN: [id number]     Date/Time: 2025 2:25 PM  Patient PCP: Soumya Dye MD    Estimated discharge date:  Barriers : Placement      Assessment / Plan:  Persistent acute hypoxic respiratory failure  Persistent left pleural effusion with severe left-sided atelectasis, ?  Mucous plugging  Order for thoracocentesis placed, but unable to be performed due to not much fluid to drain  Patient still requiring oxygen 2 L still have persistent cough  Will get incentive spirometry  Scheduled Mucinex  Pulmonology consult  Will restart antibiotics with cefepime  Check MRSA  : Reviewed in pulmonology input, cefepime switched to Levaquin  Continue with nebs, prednisone  Repeat CT chest ordered by pulmonology, appreciate input  Patient is on room air today  01/10: clinically improving , less congested   But still very weak , need rehab   Added doxycycline for MRSA , MRSA nares +  : Clinically improving, PT OT recommended inpatient rehab  : Medically stable for placement  Nonobstructing renal stones  Acute metabolic encephalopathy 2/2 sepsis, UTI, resolved  Severe urosepsis, resolved  Proteus mirabilis UTI  Lactic acidosis, resolved  Leukocytosis likely due to UTI, resolving  -CT head is abnormal and shows possibility of artifact versus infarct so we will check MRI of the brain  -Noted to have lactic acidosis of 2.7 on arrival and received IV fluids with improvement of lactic acid   -MRI negative for intracranial infract  S/p ceftriaxone   - Follow-up with urology for final recs    Suspected community-acquired pneumonia  -Chest x-ray showed small focus of patchy airspace disease in the left lung.  Repeat CXR shows mod L sided pl effusion.  Discussed with RN, pt is non compliance with pulse ox, there was some hypoxia overnight, although unclear if this is true reading.  Monitor O2 sat today, if hypoxia, will need thoracentsis. 
      Hospitalist Progress Note    NAME:   Audrey Almanzar   : 1940   MRN: [id number]     Date/Time: 2025 2:13 PM  Patient PCP: Soumya Dye MD    Estimated discharge date:01/10  Barriers : Weaning oxygen,, pulmonology clearance      Assessment / Plan:  Persistent acute hypoxic respiratory failure  Persistent left pleural effusion with severe left-sided atelectasis, ?  Mucous plugging  Order for thoracocentesis placed, but unable to be performed due to not much fluid to drain  Patient still requiring oxygen 2 L still have persistent cough  Will get incentive spirometry  Scheduled Mucinex  Pulmonology consult  Will restart antibiotics with cefepime  Check MRSA  : Reviewed in pulmonology input, cefepime switched to Levaquin  Continue with nebs, prednisone  Repeat CT chest ordered by pulmonology, appreciate input  Patient is on room air today  Nonobstructing renal stones  Acute metabolic encephalopathy 2/2 sepsis, UTI, resolved  Severe urosepsis, resolved  Proteus mirabilis UTI  Lactic acidosis, resolved  Leukocytosis likely due to UTI, resolving  -CT head is abnormal and shows possibility of artifact versus infarct so we will check MRI of the brain  -Noted to have lactic acidosis of 2.7 on arrival and received IV fluids with improvement of lactic acid   -MRI negative for intracranial infract  -Continue ceftriaxone.  - Follow-up with urology for final recs    Suspected community-acquired pneumonia  -Chest x-ray showed small focus of patchy airspace disease in the left lung.  Repeat CXR shows mod L sided pl effusion.  Discussed with RN, pt is non compliance with pulse ox, there was some hypoxia overnight, although unclear if this is true reading.  Monitor O2 sat today, if hypoxia, will need thoracentsis.   -change to PO prednisone, completed abx course   : Patient still short of breath and needing oxygen with diminished breath sounds on the left lung  Repeat chest x-ray showed persistent 
      Hospitalist Progress Note    NAME:   Audrey Almanzar   : 1940   MRN: [id number]     Date/Time: 2025 10:41 AM  Patient PCP: Soumya Dye MD    Estimated discharge date:, needs placement      Assessment / Plan:    Nonobstructing renal stones  Acute metabolic encephalopathy 2/2 sepsis, UTI, resolved  Severe urosepsis, resolved  Proteus mirabilis UTI  Lactic acidosis, resolved  Leukocytosis likely due to UTI, resolving  -CT head is abnormal and shows possibility of artifact versus infarct so we will check MRI of the brain  -Noted to have lactic acidosis of 2.7 on arrival and received IV fluids with improvement of lactic acid   -MRI negative for intracranial infract  -Continue ceftriaxone.  - Follow-up with urology for final recs    Suspected community-acquired pneumonia  -Chest x-ray showed small focus of patchy airspace disease in the left lung.  Repeat CXR shows mod L sided pl effusion.  Discussed with RN, pt is non compliance with pulse ox, there was some hypoxia overnight, although unclear if this is true reading.  Monitor O2 sat today, if hypoxia, will need thoracentsis.   -change to PO prednisone, completed abx course     Abnormal CT with possibility of artifact versus infarct.  Brain MRI negative for infarcts     Hypokalemia  Hypophosphatemia  Replace as needed     Constipation  Continue bowel regimen     Increased risk for hospital delirium  Delirium precautions ordered. AAOx1.  Melatonin nightly, thiamine daily     Bilateral hydronephrosis and no hydroureter.    Could be related to urinary retention so we will check bladder scan.    Recurrent UTIs outpatient as per    Urology consult further eval hydro & recurrent UTIs.  Consider Page placement.     Interval L2 fracture  Evolved T12 fracture, does not appear to be in severe pain during movement per RN.  Tylenol prn   Reevaluate daily, if pt has severe pain and/or not able to participate with PT/OT, proceed with MRI.   
      Hospitalist Progress Note    NAME:   Audrey Almanzar   : 1940   MRN: [id number]     Date/Time: 1/3/2025 11:51 AM  Patient PCP: Soumya Dye MD    Estimated discharge date:25  Barriers: mri, urology      Assessment / Plan:    Acute metabolic encephalopathy 2/2 sepsis, UTI, resolved  Severe urosepsis, resolved  Proteus mirabilis UTI  Lactic acidosis, resolved  -CT head is abnormal and shows possibility of artifact versus infarct so we will check MRI of the brain  -Noted to have lactic acidosis of 2.7 on arrival and received IV fluids with improvement of lactic acid   -Will check CBC and BMP in a.m. tomorrow  -MRI negative for intracranial infract  -Continue ceftriaxone.     Suspected community-acquired pneumonia  -Chest x-ray showed small focus of patchy airspace disease in the left lung.  -Continue ceftriaxone and azithromycin.  Follow blood cultures.  currently on room air.     Abnormal CT with possibility of artifact versus infarct.  -Check MRI of the brain and based on MRI, will initiate further workup     Hypokalemia  Hypophosphatemia  -KCl replaced.  Recheck in a.m. tomorrow     Constipation  - Has not had a bowel movement in a few days, will start bowel regimen as ordered     Increased risk for hospital delirium  - Delirium precautions ordered  - Melatonin nightly, thiamine daily     Bilateral hydronephrosis and no hydroureter.    Could be related to urinary retention so we will check bladder scan.    Recurrent UTIs outpatient as per    Urology consult further eval hydro & recurrent UTIs.  Consider Page placement.     Interval L2 fracture  --Evolved T12 fracture   -She does not have any L2 tenderness currently but will reevaluate again after mental status is improved and consider MRI  -- pain mangment, ptot     Hypertension  GERD  Dyslipidemia  -Continue PTA metoprolol, PPI, statin       Medical Decision Making:   I personally reviewed labs: bmp, cbc  I personally reviewed 
      Hospitalist Progress Note    NAME:   Audrey Almanzar   : 1940   MRN: [id number]     Date/Time: 2025 2:52 PM  Patient PCP: Soumya Dye MD    Estimated discharge date:  Barriers : Weaning oxygen, thoracocentesis      Assessment / Plan:    Nonobstructing renal stones  Acute metabolic encephalopathy 2/2 sepsis, UTI, resolved  Severe urosepsis, resolved  Proteus mirabilis UTI  Lactic acidosis, resolved  Leukocytosis likely due to UTI, resolving  -CT head is abnormal and shows possibility of artifact versus infarct so we will check MRI of the brain  -Noted to have lactic acidosis of 2.7 on arrival and received IV fluids with improvement of lactic acid   -MRI negative for intracranial infract  -Continue ceftriaxone.  - Follow-up with urology for final recs    Suspected community-acquired pneumonia  -Chest x-ray showed small focus of patchy airspace disease in the left lung.  Repeat CXR shows mod L sided pl effusion.  Discussed with RN, pt is non compliance with pulse ox, there was some hypoxia overnight, although unclear if this is true reading.  Monitor O2 sat today, if hypoxia, will need thoracentsis.   -change to PO prednisone, completed abx course   : Patient still short of breath and needing oxygen with diminished breath sounds on the left lung  Repeat chest x-ray showed persistent left pleural effusion  Ultrasound thoracentesis ordered    Abnormal CT with possibility of artifact versus infarct.  Brain MRI negative for infarcts     Hypokalemia  Hypophosphatemia  Replace as needed     Constipation  Continue bowel regimen     Increased risk for hospital delirium  Delirium precautions ordered. AAOx1.  Melatonin nightly, thiamine daily     Bilateral hydronephrosis and no hydroureter.    Could be related to urinary retention so we will check bladder scan.    Recurrent UTIs outpatient as per    Urology consult further eval hydro & recurrent UTIs.  Consider Page placement.   
      Hospitalist Progress Note    NAME:   Audrey Almanzar   : 1940   MRN: [id number]     Date/Time: 2025 12:15 PM  Patient PCP: Soumya Dye MD    Estimated discharge date:  Barriers : Placement      Assessment / Plan:  Persistent acute hypoxic respiratory failure  Persistent left pleural effusion with severe left-sided atelectasis, ?  Mucous plugging  Order for thoracocentesis placed, but unable to be performed due to not much fluid to drain  Patient still requiring oxygen 2 L still have persistent cough  Will get incentive spirometry  Scheduled Mucinex  Pulmonology consult  Will restart antibiotics with cefepime  Check MRSA  : Reviewed in pulmonology input, cefepime switched to Levaquin  Continue with nebs, prednisone  Repeat CT chest ordered by pulmonology, appreciate input  Patient is on room air today  01/10: clinically improving , less congested   But still very weak , need rehab   Added doxycycline for MRSA , MRSA nares +  : Clinically improving, PT OT recommended inpatient rehab  : Medically stable for placement  Nonobstructing renal stones  Acute metabolic encephalopathy 2/2 sepsis, UTI, resolved  Severe urosepsis, resolved  Proteus mirabilis UTI  Lactic acidosis, resolved  Leukocytosis likely due to UTI, resolving  -CT head is abnormal and shows possibility of artifact versus infarct so we will check MRI of the brain  -Noted to have lactic acidosis of 2.7 on arrival and received IV fluids with improvement of lactic acid   -MRI negative for intracranial infract  S/p ceftriaxone   - Follow-up with urology for final recs    Suspected community-acquired pneumonia  -Chest x-ray showed small focus of patchy airspace disease in the left lung.  Repeat CXR shows mod L sided pl effusion.  Discussed with RN, pt is non compliance with pulse ox, there was some hypoxia overnight, although unclear if this is true reading.  Monitor O2 sat today, if hypoxia, will need thoracentsis. 
      Hospitalist Progress Note    NAME:   Audrey Almanzar   : 1940   MRN: [id number]     Date/Time: 2025 8:28 AM  Patient PCP: Soumya Dye MD    Estimated discharge date:25  Barriers:  urology      Assessment / Plan:    Nonobstructing renal stones  Acute metabolic encephalopathy 2/2 sepsis, UTI, resolved  Severe urosepsis, resolved  Proteus mirabilis UTI  Lactic acidosis, resolved  Leukocytosis likely due to UTI, resolving  -CT head is abnormal and shows possibility of artifact versus infarct so we will check MRI of the brain  -Noted to have lactic acidosis of 2.7 on arrival and received IV fluids with improvement of lactic acid   -Will check CBC and BMP in a.m. tomorrow  -MRI negative for intracranial infract  -Continue ceftriaxone.  - Follow-up with urology for final recs    Suspected community-acquired pneumonia  -Chest x-ray showed small focus of patchy airspace disease in the left lung.  -Continue Keflex and azithromycin p.o., discontinued IV antibiotics     Abnormal CT with possibility of artifact versus infarct.  -Brain MRI negative for infarcts     Hypokalemia  Hypophosphatemia  -KCl replaced.  Recheck in a.m. tomorrow     Constipation  - Has not had a bowel movement in a few days, will start bowel regimen as ordered     Increased risk for hospital delirium  - Delirium precautions ordered  - Melatonin nightly, thiamine daily     Bilateral hydronephrosis and no hydroureter.    Could be related to urinary retention so we will check bladder scan.    Recurrent UTIs outpatient as per    Urology consult further eval hydro & recurrent UTIs.  Consider Page placement.     Interval L2 fracture  --Evolved T12 fracture   -She does not have any L2 tenderness currently but will reevaluate again after mental status is improved and consider MRI  -- pain mangment, ptot     Hypertension  GERD  Dyslipidemia  -Continue PTA metoprolol, PPI, statin       Medical Decision Making:   I personally 
0950: consent obtained from  via phone with Flora OLEA. Per , pt is only allergic to lidocaine patch not the injection. He stated he believes she has had the lidocaine injection before and should be fine.     1050: ultrasound at bedside    1110: per Katya IZAGUIRRE, no enough fluid to safely stick pt.   
1400 - VIRTUAL PCP Hospital follow-up visit has been scheduled with Dr. Soumya Dye on 1/7/25 0900. Pending patient discharge.       Attempted to schedule PCP hospital follow up. Sent PCP office a message, awaiting return call from PCP office with appt information. [facility] information on AVS for patient resource.  Pending patient discharge.   
ADULT PROTOCOL: JET AEROSOL ASSESSMENT    Patient  Audrey Almanzar     84 y.o.   female     1/10/2025  4:58 AM    Breath Sounds Pre Procedure: Breath Sounds Pre-Tx DEION: Diminished                                  Breath Sounds Pre-Tx LLL: Diminished        Breath Sounds Pre-Tx RUL: Diminished        Breath Sounds Pre-Tx RML: Diminished        Breath Sounds Pre-Tx RLL: Diminished  Breath Sounds Post Procedure: Breath Sounds Post-Tx DEION: Diminished          Breath Sounds Post-Tx LLL: Diminished          Breath Sounds Post-Tx RUL: Diminished          Breath Sounds Post-Tx RML: Diminished          Breath Sounds Post-Tx RLL: Diminished                                     Heart Rate: Pre procedure Pre-Tx Pulse: 71           Post procedure Post-Tx Pulse: 72    Resp Rate: Pre procedure Pre-Tx Resps: 14           Post procedure Post-Tx Resps: 16    Oxygen: O2 Therapy: Room air        Changed: No    SpO2:  SpO2: 92 %   without Oxygen                Nebulizer Therapy: Current medications Medications: Albuterol/Ipratropium QID      Changed: No    Problem List:   Patient Active Problem List   Diagnosis    TIA (transient ischemic attack)    Gastroesophageal reflux disease without esophagitis    Hyperlipidemia    Multiple thyroid nodules    Hip fracture ([facility])    Hypoxia    Pulmonary edema, acute    Acute respiratory failure with hypoxia    Encephalopathy acute    General weakness    Left leg weakness    Cerebral vascular disease    AMS (altered mental status)    Atrial flutter with rapid ventricular response ([facility])    PAF (paroxysmal atrial fibrillation) ([facility])    Low back pain    Degenerative lumbar spinal stenosis    Degenerative cervical spinal stenosis    Degenerative thoracic spinal stenosis    Frequent falls    Bilateral carotid artery stenosis    Cerebral microvascular disease    Acute alteration in mental status    Convulsive syncope    Ambulatory dysfunction    SIRS (systemic inflammatory response syndrome) ([facility])    
Admit pneumonia  Afebrile WBC 32 -> 12  10K Proteus on ultimate urine culture.  Discharge anticipated tomorrow.    Followup in office recurrent UTI.    
Admit pneumonia, likely UTI (UA > 100 WBC) febrile, elevated LA. Creat 0.51  CT images seen, mild L>R extrarenal pelvis appearance, no ureteral stones or ureteronephrosis, or UPJO. Bladder not distended. Incidental R>L punctate renal calculi and single 3mm RLP stone.  Urine culture 10K probably proteus on Rocephin.     Remains afebrile now on Rocephin, Cx unchanged  WBC 32 -> 22 -> today pending.    Impression:     3mm RLP stone non-obstructing, smaller punctate renal stones - good chance of passage eventually.  CT L>R mild collecting system dilation, preserved parenchymal thickness - Probably some statis of pyelo on top of mild extrarenal pelvis. Does not need consideration of diversion with stents or PCN unless not clinically responding.  UTI r/o sepsis - being treated appropriately.  Recurrent UTI - no complicating factors. Will have to treat PRN.    Will follow peripherally over weekend, call for issues.  I will followup Monday.  
Comprehensive Nutrition Assessment    Type and Reason for Visit:  Initial, LOS    Nutrition Recommendations/Plan:   Continue current diet  Please document % meals and supplements consumed in flowsheet I/O's under intake      Malnutrition Assessment:  Malnutrition Status:  Insufficient data (01/10/25 1655). Not suspected.  Context:  Acute Illness     Findings of the 6 clinical characteristics of malnutrition:  Energy Intake:  Unable to assess  Weight Loss:  No weight loss     Body Fat Loss:  Unable to assess     Muscle Mass Loss:  Unable to assess    Fluid Accumulation:  No fluid accumulation     Strength:  Not Performed    Nutrition Assessment:     Chart reviewed remotely for LOS. Pt medically noted for persistent acute hypoxic respiratory failure, pleural effusion, UTI, PNA, hx of HTN, GERD, DLD. The MST was negative for malnutrition risk factors PTA. Weight hx per EMR is stable with overall trend up. Intake of meals has varied while inpatient, per limited nursing documentation. No significant nutrition concerns identified at this time. Will continue monitoring.     Patient Vitals for the past 120 hrs:   PO Meals Eaten (%)   01/10/25 0900 51 - 75%   01/08/25 0830 1 - 25%     Wt Readings from Last 10 Encounters:   01/08/25 77.2 kg (170 lb 1.6 oz)   10/13/24 73 kg (161 lb)   07/05/24 70.3 kg (155 lb)   07/01/24 72.6 kg (160 lb)   05/13/24 73 kg (161 lb)   05/06/24 75.2 kg (165 lb 12.6 oz)   04/15/24 73 kg (161 lb)   03/25/24 76.2 kg (168 lb)   03/19/24 76.4 kg (168 lb 6.9 oz)   02/19/24 73 kg (161 lb)   ]    Nutrition Related Findings:    Labs: reviewed.   Meds: Doxycycline, Levofloxacin, Pantoprazole, Miralax, Prednisone, Thiamine, Cholecalciferol.   BM 1/8.   Wound Type: None       Current Nutrition Intake & Therapies:    Average Meal Intake: 51-75%  Average Supplements Intake: None Ordered  ADULT DIET; Regular; Low Fat/Low Chol/High Fiber/2 gm Na    Anthropometric Measures:  Height: 165.1 cm (5' 5\")  Ideal 
End of Shift Note    Bedside shift change report given to Ana Cristina (oncoming nurse) by Lauren Hamilton RN (offgoing nurse).  Report included the following information SBAR, Kardex, and MAR    Shift worked:  7p-7a     Shift summary and any significant changes:     Scheduled medications were given, see MAR.  IV has been flushed and is patent.  Patient teaching and routine rounding has been done.       Concerns for physician to address:       Zone phone for oncoming shift:          Activity:  Level of Assistance: Moderate assist, patient does 50-74%  Number times ambulated in hallways past shift: 0  Number of times OOB to chair past shift: 0    Cardiac:   Cardiac Monitoring: Yes      Cardiac Rhythm: Sinus rhythm    Access:  Current line(s): PIV     Genitourinary:   Urinary Status: Voiding    Respiratory:   O2 Device: None (Room air)  Chronic home O2 use?: NO  Incentive spirometer at bedside: NO    GI:  Last BM (including prior to admit): 01/01/25  Current diet:  ADULT DIET; Regular; Low Fat/Low Chol/High Fiber/2 gm Na  Passing flatus: YES    Pain Management:   Patient states pain is manageable on current regimen: YES    Skin:  Sumit Scale Score: 19  Interventions: Wound Offloading (Prevention Methods): Blankets, Pillows, Repositioning, Turning    Patient Safety:  Fall Risk: Nursing Judgement-Fall Risk High(Add Comments): Yes  Fall Risk Interventions  Nursing Judgement-Fall Risk High(Add Comments): Yes  Toilet Every 2 Hours-In Advance of Need: Yes  Hourly Visual Checks: In bed  Fall Visual Posted: Socks, Armband  Room Door Open: Yes  Alarm On: Bed  Patient Moved Closer to Nursing Station: No    Active Consults:   IP CONSULT TO UROLOGY    Length of Stay:  Expected LOS: 4  Actual LOS: 3    Lauren Hamilton, RN                            
End of Shift Note    Bedside shift change report given to LEFTY Blanco (oncoming nurse) by JANINE HINKLE RN (offgoing nurse).  Report included the following information SBAR, Kardex, and MAR    Shift worked:  7p-7a     Shift summary and any significant changes:     AAOx4, VSS, Q2 turns done, no complaint of pain, safety rounding completed     Concerns for physician to address:  no     Zone phone for oncoming shift:   [phone]       Activity:  Level of Assistance: Moderate assist, patient does 50-74%    Cardiac:   Cardiac Monitoring:  yes     Access:  Current line(s): PIV     Genitourinary:   Urinary Status: Incontinent    Respiratory:   O2 Device: None (Room air)    GI:  Current diet: ADULT DIET; Regular; Low Fat/Low Chol/High Fiber/2 gm Na    Pain Management:   Patient states pain is manageable on current regimen: N/A    Skin:  Sumit Scale Score: 18  Interventions: Wound Offloading (Prevention Methods): Elevate heels, Pillows, Repositioning, Turning  Pressure injury: no    Patient Safety:  Fall Score: Barker Total Score: 85  Fall Risk Interventions  Nursing Judgement-Fall Risk High(Add Comments): Yes  Toilet Every 2 Hours-In Advance of Need: Yes  Hourly Visual Checks: Awake, In bed  Fall Visual Posted: Armband, Socks  Room Door Open: Deferred to promote rest  Alarm On: Bed  Patient Moved Closer to Nursing Station: No    Active Consults:  IP CONSULT TO UROLOGY  IP CONSULT TO PULMONOLOGY    Length of Stay:  Expected LOS: 8  Actual LOS: 8      JANINE HINKLE RN    
End of Shift Note    Bedside shift change report given to LEFTY De La Paz (oncoming nurse) by JANINE HINKLE RN (offgoing nurse).  Report included the following information SBAR, Kardex, and MAR    Shift worked:  7p-7a     Shift summary and any significant changes:     AAOx3, VSS, no complaint of pain, safety rounding completed.     Concerns for physician to address:  no     Zone phone for oncoming shift:   [phone]       Activity:  Level of Assistance: Moderate assist, patient does 50-74%    Cardiac:   Cardiac Monitoring:  yes    Access:  Current line(s): PIV     Genitourinary:   Urinary Status: Incontinent    Respiratory:   O2 Device: Nasal cannula    GI:  Current diet: ADULT DIET; Regular; Low Fat/Low Chol/High Fiber/2 gm Na    Pain Management:   Patient states pain is manageable on current regimen: N/A    Skin:  Sumit Scale Score: 18  Interventions: Wound Offloading (Prevention Methods): Pillows, Repositioning, Turning  Pressure injury: no    Patient Safety:  Fall Score: Barker Total Score: 85  Fall Risk Interventions  Nursing Judgement-Fall Risk High(Add Comments): Yes  Toilet Every 2 Hours-In Advance of Need: Yes  Hourly Visual Checks: In bed  Fall Visual Posted: Socks  Room Door Open: Yes  Alarm On: Bed  Patient Moved Closer to Nursing Station: No    Active Consults:  IP CONSULT TO UROLOGY    Length of Stay:  Expected LOS: 6  Actual LOS: 6      JANINE HINKLE RN    
End of Shift Note    Bedside shift change report given to LEFTY De La Paz (oncoming nurse) by Madai Carlson RN (offgoing nurse).  Report included the following information SBAR, Kardex, ED Summary, Procedure Summary, Intake/Output, MAR, Recent Results, and Cardiac Rhythm NSR    Shift worked:  3230-8958     Shift summary and any significant changes:     Pt had large BM at end of shift, voiding small amount over shift 275 daniel, cloudy output. Sacral area red and excoriated but blanching. Pt resting well over shift, no AM labs ordered.     Concerns for physician to address:       Zone phone for oncoming shift:   [phone]         Madai Carlson RN                            
End of Shift Note    Bedside shift change report given to LEFTY Fernández (oncoming nurse) by Bella Mullins RN (offgoing nurse).  Report included the following information SBAR, MAR, Recent Results, and Cardiac Rhythm      Shift worked:  7a-7p     Shift summary and any significant changes:     Pt had ID consult. Urine to be collected Pt had CT performed showed pnumonia small plueral effusion.      Concerns for physician to address:  SNF placement     Zone phone for oncoming shift:   [phone]       Activity:  Level of Assistance: Moderate assist, patient does 50-74%    Cardiac:   Cardiac Monitoring:  yes - NSR    Access:  Current line(s): PIV     Genitourinary:   Urinary Status: Incontinent, Voiding    Respiratory:   O2 Device: None (Room air)    GI:  Current diet: ADULT DIET; Regular; Low Fat/Low Chol/High Fiber/2 gm Na    Pain Management:   Patient states pain is manageable on current regimen: YES    Skin:  Sumit Scale Score: 18  Interventions: Wound Offloading (Prevention Methods): Pillows, Repositioning  Pressure injury: no    Patient Safety:  Fall Score: Barker Total Score: 85  Fall Risk Interventions  Nursing Judgement-Fall Risk High(Add Comments): Yes  Toilet Every 2 Hours-In Advance of Need: Yes  Hourly Visual Checks: Awake, In bed  Fall Visual Posted: Armband, Socks  Room Door Open: Deferred to decrease stimulation  Alarm On: Bed  Patient Moved Closer to Nursing Station: No    Active Consults:  IP CONSULT TO UROLOGY  IP CONSULT TO PULMONOLOGY  IP CONSULT TO CASE MANAGEMENT    Length of Stay:  Expected LOS: 10  Actual LOS: 8      Bella Mullins RN   
End of Shift Note    Bedside shift change report given to LEFTY Lucas (oncoming nurse) by JANINE HINKLE RN (offgoing nurse).  Report included the following information SBAR, Kardex, and MAR    Shift worked:  7p-7a     Shift summary and any significant changes:     AAOx4, VSS, no complaint of pain, safety rounding completed.     Concerns for physician to address:  no     Zone phone for oncoming shift:   [phone]       Activity:  Level of Assistance: Moderate assist, patient does 50-74%    Cardiac:   Cardiac Monitoring:  yes     Access:  Current line(s): PIV     Genitourinary:   Urinary Status: Incontinent    Respiratory:   O2 Device: Nasal cannula    GI:  Current diet: ADULT DIET; Regular; Low Fat/Low Chol/High Fiber/2 gm Na    Pain Management:   Patient states pain is manageable on current regimen: N/A    Skin:  Sumit Scale Score: 19  Interventions: Wound Offloading (Prevention Methods): Elevate heels, Pillows, Repositioning, Turning  Pressure injury: no    Patient Safety:  Fall Score: Barker Total Score: 85  Fall Risk Interventions  Nursing Judgement-Fall Risk High(Add Comments): Yes  Toilet Every 2 Hours-In Advance of Need: Yes  Hourly Visual Checks: Awake, In bed  Fall Visual Posted: Armband, Socks  Room Door Open: Yes  Alarm On: Bed  Patient Moved Closer to Nursing Station: No    Active Consults:  IP CONSULT TO UROLOGY    Length of Stay:  Expected LOS: 7  Actual LOS: 7      JANINE HINKLE RN    
End of Shift Note    Bedside shift change report given to LEFTY Masterson (oncoming nurse) by Jolene Stewart RN (offgoing nurse).  Report included the following information SBAR REPORTS LIST: SBAR    Shift worked:  4540-8571     Shift summary and any significant changes:     PT slept well, with one episode of pulling everything off last night. She did come back positive for MRSA in her nares.      Concerns for physician to address:  Can we discontinue Tel orders & cont pulse ox     Zone phone for oncoming shift:   [phone]       Activity:  In bed    Cardiac:   Cardiac Monitoring: YES / NO: Yes        Access:  Peripheral IV 01/05/25 Left;Posterior Wrist4 days    Genitourinary:   External Urinary Catheter8 days    Respiratory:   Room air    GI:  Current diet:  ADULT DIET; Regular; Low Fat/Low Chol/High Fiber/2 gm Na      Pain Management:   None needed    Skin:  Wound 10/14/24 Buttocks Posterior slow blanching redness [id number] days      Patient Safety:  Bed/Chair alarm on; Bed/Chair-Wheels locked; Bed in low position; Call light within reach, gripper socks, bedside table within reach      Length of Stay:  Expected LOS: 10  Actual LOS: 9      Jolene Stewart RN                            
End of Shift Note    Bedside shift change report given to LEFTY Melara (oncoming nurse) by Jolene Stewart RN (offgoing nurse).  Report included the following information SBAR REPORTS LIST: SBAR    Shift worked:  0922-0735     Shift summary and any significant changes:     Pt slept well, she woke up at 0500 and watched tv after getting labs collected.     Concerns for physician to address:  None     Zone phone for oncoming shift:   [phone]       Activity:  In bed    Cardiac:   Cardiac Monitoring: YES / NO: Yes        Access:  Peripheral IV 01/05/25 Left;Posterior Wrist5 days    Genitourinary:   External Urinary Catheter9 days    Respiratory:   Room air    GI:  Current diet:  ADULT DIET; Regular; Low Fat/Low Chol/High Fiber/2 gm Na    Pain Management:   None needed    Skin:  Wound 10/14/24 Buttocks Posterior slow blanching redness [id number] days      Patient Safety:  Bed/Chair alarm on; Bed/Chair-Wheels locked; Bed in low position; Call light within reach, gripper socks, bedside table within reach      Length of Stay:  Expected LOS: 12  Actual LOS: 10      Jolene Stewart RN                            
End of Shift Note    Bedside shift change report given to LEFTY Rice (oncoming nurse) by Madai Carlson RN (offgoing nurse).  Report included the following information SBAR, Kardex, Procedure Summary, Intake/Output, MAR, Recent Results, Med Rec Status, and Cardiac Rhythm NSR    Shift worked:  9878-9874     Shift summary and any significant changes:     Pt had 1x BM over shift - hard & loose, pt voiding via external catheter.     VSS, pt denied discomfort, no significant events, no AM labs ordered.     Concerns for physician to address:       Zone phone for oncoming shift:   [phone]         Madai Carlson RN                            
End of Shift Note    Bedside shift change report given to LEFTY Sebastian (oncoming nurse) by JANINE HINKLE RN (offgoing nurse).  Report included the following information SBAR, Kardex, and MAR    Shift worked:  7p-7a     Shift summary and any significant changes:     AAOx2, VSS, no complaint of pain, Q2 turns done, safety rounding completed.     Concerns for physician to address:  no     Zone phone for oncoming shift:   [phone]       Activity:  Level of Assistance: Moderate assist, patient does 50-74%    Cardiac:   Cardiac Monitoring:  yes     Access:  Current line(s): PIV     Genitourinary:   Urinary Status: Voiding    Respiratory:   O2 Device: None (Room air)    GI:  Current diet: ADULT DIET; Regular; Low Fat/Low Chol/High Fiber/2 gm Na    Pain Management:   Patient states pain is manageable on current regimen: N/A    Skin:  Sumit Scale Score: 17  Interventions: Wound Offloading (Prevention Methods): Pillows, Repositioning  Pressure injury: no    Patient Safety:  Fall Score: Barker Total Score: 60  Fall Risk Interventions  Nursing Judgement-Fall Risk High(Add Comments): Yes  Toilet Every 2 Hours-In Advance of Need: Yes  Hourly Visual Checks: Awake, In bed  Fall Visual Posted: Armband, Socks  Room Door Open: Deferred to promote rest  Alarm On: Bed  Patient Moved Closer to Nursing Station: No    Active Consults:  IP CONSULT TO UROLOGY    Length of Stay:  Expected LOS: 3  Actual LOS: 2      JANINE HINKLE RN    
End of Shift Note    Bedside shift change report given to Saritha OLEA (oncoming nurse) by Santiago Matos RN (offgoing nurse).  Report included the following information SBAR, Kardex, Intake/Output, MAR, and Cardiac Rhythm SR    Shift worked: Night   Shift summary and any significant changes:    Had a quiet night. Turned and cleaned every 2 hrly and pure wick changed. VS stable. Took her meds per MAR.   Concerns for physician to address:  None     Zone phone for oncoming shift:   [phone]       Activity:  Level of Assistance: Moderate assist, patient does 50-74%  Number times ambulated in hallways past shift: 0  Number of times OOB to chair past shift: 0    Cardiac:   Cardiac Monitoring: Yes      Cardiac Rhythm: Sinus rhythm    Access:  Current line(s): PIV     Genitourinary:   Urinary Status: Voiding, External catheter    Respiratory:   O2 Device: None (Room air)  Chronic home O2 use?: NO  Incentive spirometer at bedside: NO    GI:  Last BM (including prior to admit): 01/08/25  Current diet:  ADULT DIET; Regular; Low Fat/Low Chol/High Fiber/2 gm Na  Passing flatus: YES    Pain Management:   Patient states pain is manageable on current regimen: YES    Skin:  Sumit Scale Score: 16  Interventions: Wound Offloading (Prevention Methods): Pillows, Repositioning, Other (comment) (pharmacetical)    Patient Safety:  Fall Risk: Nursing Judgement-Fall Risk High(Add Comments): Yes  Fall Risk Interventions  Nursing Judgement-Fall Risk High(Add Comments): Yes  Toilet Every 2 Hours-In Advance of Need: No (Comment) (annck)  Hourly Visual Checks: Awake, In bed  Fall Visual Posted: Socks, Fall sign posted, Armband  Room Door Open: Yes  Alarm On: Bed  Patient Moved Closer to Nursing Station: No    Active Consults:   IP CONSULT TO UROLOGY  IP CONSULT TO PULMONOLOGY  IP CONSULT TO CASE MANAGEMENT    Length of Stay:  Expected LOS: 12  Actual LOS: 11    Santiago Matos RN                              
End of Shift Note    Bedside shift change report given to Sondra (oncoming nurse) by Lauren Hamilton RN (offgoing nurse).  Report included the following information SBAR, Kardex, and MAR    Shift worked:  7p-7a     Shift summary and any significant changes:     Scheduled medications were given, see MAR.  IV has been flushed and is patent.  Patient was repositioned during my shift.  Patient teaching and routine rounding has been done.     Concerns for physician to address:       Zone phone for oncoming shift:          Activity:  Level of Assistance: Dependent, patient does less than 25%  Number times ambulated in hallways past shift: 0  Number of times OOB to chair past shift: 0    Cardiac:   Cardiac Monitoring: Yes      Cardiac Rhythm: Sinus rhythm    Access:  Current line(s): PIV     Genitourinary:   Urinary Status: Voiding, External catheter    Respiratory:   O2 Device: None (Room air)  Chronic home O2 use?: NO  Incentive spirometer at bedside: NO    GI:  Last BM (including prior to admit): 01/12/25  Current diet:  ADULT DIET; Regular; Low Fat/Low Chol/High Fiber/2 gm Na  Passing flatus: YES    Pain Management:   Patient states pain is manageable on current regimen: YES    Skin:  Sumit Scale Score: 17  Interventions: Wound Offloading (Prevention Methods): Blankets, Pillows, Repositioning, Turning    Patient Safety:  Fall Risk: Nursing Judgement-Fall Risk High(Add Comments): No  Fall Risk Interventions  Nursing Judgement-Fall Risk High(Add Comments): No  Toilet Every 2 Hours-In Advance of Need: Yes  Hourly Visual Checks: Awake, In bed, Quiet  Fall Visual Posted: Socks  Room Door Open: Deferred to promote rest  Alarm On: Bed  Patient Moved Closer to Nursing Station: No    Active Consults:   IP CONSULT TO UROLOGY  IP CONSULT TO PULMONOLOGY  IP CONSULT TO CASE MANAGEMENT    Length of Stay:  Expected LOS: 13  Actual LOS: 13    Lauren Hamilton RN                            
End of Shift Note    Bedside shift change report given to Trent OLEA (oncoming nurse) by JANINE HINKLE RN (offgoing nurse).  Report included the following information SBAR, Kardex, and MAR    Shift worked:  7p-7a     Shift summary and any significant changes:     AAOx2, VSS, no complaint of pain, potassium 3.1, safety rounding completed     Concerns for physician to address:  no     Zone phone for oncoming shift:   no       Activity:       Cardiac:   Cardiac Monitoring:  yes     Access:  Current line(s): PIV     Genitourinary:   Urinary Status: Voiding    Respiratory:        GI:  Current diet: Diet NPO Exceptions are: Sips of Water with Meds    Pain Management:   Patient states pain is manageable on current regimen: N/A    Skin:  Sumit Scale Score: 17  Interventions: Wound Offloading (Prevention Methods): Pillows, Repositioning  Pressure injury: no    Patient Safety:  Fall Score:         Active Consults:  None    Length of Stay:  Expected LOS: 2  Actual LOS: 1      JANINE HINKLE RN    
Full consult to follow  Admit pneumonia, likely UTI (UA > 100 WBC) febrile, elevated LA. Creat 0.51  CT images seen, mild L>R extrarenal pelvis appearance, no ureteral stones or ureteronephrosis. Bladder not distended. Incidental R>L punctate renal calculi and single 3mm RLP stone.  Urine culture 10K probably proteus on Rocephin.    Impression:    3mm RLP stone non-obstructing, smaller punctate renal stones - good chance of passage eventually.  CT L>R mild collecting system dilation, preserved parenchymal thickness - Probably some statis of pyelo on top of mild extrarenal pelvis. Does not need consideration of diversion with stents or PCN unless not clinically responding.  UTI r/o sepsis - being treated appropriately.  Recurrent UTI - no complicating factors. Will have to treat PRN.         
MRI ON HOLD - SIGNATURES    Signatures are needed to finalize MRI screening. MRI screening must be completed and signed before patient is considered eligible for MRI.    Please complete and sign electronically or fax to [phone].    Please call [phone] when complete.    Thank You  
Occupational Therapy   Chart reviewed; leaving unit for CT scan; will retry later as able for OT eval; continue to recommend rehab. Jeanette Hrenandez OTR/L  
Occupational Therapy   Chart reviewed; patient currently off unit for thoracentesis. Will retry later as able; continue to recommend SNF rehab. Jeanette Hernandez OTR/L  
PCP hospital follow-up transitional care appointment has been scheduled with Dr. Soumya Dye on 1/24/25 3671. This is a previously scheduled appt and still first available. [facility] information on AVS for patient resource. Pending patient discharge.    
Physical Therapy  Chart reviewed and attempted to see patient for PT treatment. Patient currently JANNA for thoracentesis. Will continue to follow.   Garfield Whitney, PT, DPT    
Physical Therapy:    Chart reviewed and attempted to see this pt for PT session. Patient is currently JANNA for CT scan. Will defer and continue to follow as appropriate. Thanks.    Chelo Pruitt, PTA  
Pt off floor to CT scan via stretcher with transportation  
Pt returned to the unit. Tolerated procedure well.  
Remote rounding:    Proteus UTI and PNA admission with L>R extrarenal pelvis with possible pyelonephritis without obvious  obstruction. NOBS stones    She remains AF, VSS  No recent labs to review however wbc had normalized over weekend  Ongoing abx therapy for UTI   No further urology recs. OP follow up with urology as needed for recurrent UTI    Call if needed, signing off   
Spiritual Health History and Assessment/Progress Note  [facility]    Initial Encounter,  ,  ,      Name: Audrey Almanzar MRN: [id number]    Age: 84 y.o.     Sex: female   Language: English   Mormon: [employer]iness   Severe sepsis (HCC)     Date: 1/7/2025            Total Time Calculated: 10 min              Spiritual Assessment began in [facility] MULTI-SPECIALTY TELEMETRY        Referral/Consult From: Rounding   Encounter Overview/Reason: Initial Encounter  Service Provided For: Patient    Elizabeth, Belief, Meaning:   Patient unable to assess at this time  Family/Friends No family/friends present      Importance and Influence:  Patient has no beliefs influential to healthcare decision-making identified during this visit  Family/Friends No family/friends present    Community:  Patient Other: unable to assess  Family/Friends No family/friends present    Assessment and Plan of Care:     Patient Interventions include: Facilitated expression of thoughts and feelings  Family/Friends Interventions include: No family/friends present    Patient Plan of Care: No spiritual needs identified for follow-up  Family/Friends Plan of Care: No family/friends present    Patient declined visit.     Electronically signed by Chaplain GRAY [facility] on 1/7/2025 at 4:32 PM    
Making:   I personally reviewed labs: bmp, cbc  I personally reviewed imaging: CT head,      Discussed case with: Patient and patient         Code Status: Full  DVT Prophylaxis: Lovenox  GI Prophylaxis:    Subjective:       Seen and evaluated the patient at bedside, patient alert oriented x 3,  at bedside reports feeling well, better than when she came in, denies any fevers, chills, shortness of breath coughing,urinary symptoms..      Objective:     VITALS:   Last 24hrs VS reviewed since prior progress note. Most recent are:  Patient Vitals for the past 24 hrs:   BP Temp Temp src Pulse Resp SpO2   01/02/25 0833 134/71 97.3 °F (36.3 °C) Oral 87 20 94 %   01/02/25 0356 (!) 141/61 98.1 °F (36.7 °C) Oral 89 18 95 %   01/01/25 2000 (!) 143/85 -- -- 88 25 --   01/01/25 1930 (!) 127/99 98.2 °F (36.8 °C) -- 89 25 --   01/01/25 1915 -- -- -- 89 24 --   01/01/25 1900 (!) 141/69 -- -- 87 24 --   01/01/25 1831 129/86 -- -- 84 19 --   01/01/25 1800 (!) 128/94 -- -- 82 22 99 %   01/01/25 1730 (!) 114/58 -- -- 82 21 95 %   01/01/25 1700 129/65 -- -- 80 23 96 %   01/01/25 1600 (!) 138/55 -- -- 82 21 95 %   01/01/25 1500 (!) 155/81 -- -- 86 24 96 %   01/01/25 1445 137/69 -- -- 81 26 96 %   01/01/25 1405 -- -- -- 88 18 95 %   01/01/25 1404 -- -- -- 84 26 95 %   01/01/25 1403 -- -- -- 85 22 95 %   01/01/25 1402 -- -- -- 83 22 95 %   01/01/25 1401 -- -- -- 86 21 95 %   01/01/25 1400 137/74 97.8 °F (36.6 °C) Oral 83 24 95 %       No intake or output data in the 24 hours ending 01/02/25 1357     I had a face to face encounter and independently examined this patient on 1/2/2025, as outlined below:    Physical Exam  Vitals reviewed.   Constitutional:       General: She is not in acute distress.  Cardiovascular:      Rate and Rhythm: Normal rate and regular rhythm.      Heart sounds: No murmur heard.  Pulmonary:      Effort: No respiratory distress.      Breath sounds: Rales present. No wheezing.   Abdominal:      General: 
to PO prednisone, completed abx course   01/07: Patient still short of breath and needing oxygen with diminished breath sounds on the left lung  Repeat chest x-ray showed persistent left pleural effusion  Ultrasound thoracentesis ordered  01/09: Patient is on room air  Abnormal CT with possibility of artifact versus infarct.  Brain MRI negative for infarcts     Hypokalemia  Hypophosphatemia  Resolved     Constipation  Continue bowel regimen     Increased risk for hospital delirium  Delirium precautions ordered. AAOx1.  Melatonin nightly, thiamine daily     Bilateral hydronephrosis and no hydroureter.    Could be related to urinary retention so we will check bladder scan.    Recurrent UTIs outpatient as per    Urology consult further eval hydro & recurrent UTIs.  Consider Page placement.     Interval L2 fracture  Evolved T12 fracture, does not appear to be in severe pain during movement per RN.  Tylenol prn   Reevaluate daily, if pt has severe pain and/or not able to participate with PT/OT, proceed with MRI.   01/07: Patient does not seems to be in pain, will follow-up  Hypertension  GERD  Dyslipidemia  Continue PTA metoprolol, PPI, statin       Medical Decision Making:   I personally reviewed labs: bmp, cbc  I personally reviewed imaging: CT chest pending      Discussed case with: Patient and patient  at bedside, all questions answered        Code Status: Full  DVT Prophylaxis: Lovenox on hold for possible thoracocentesis    Subjective:   Follow-up pneumonia, UTI  Clinically improving    Objective:     VITALS:   Last 24hrs VS reviewed since prior progress note. Most recent are:  Patient Vitals for the past 24 hrs:   BP Temp Temp src Pulse Resp SpO2 Height   01/11/25 0900 111/67 97.5 °F (36.4 °C) Oral 79 -- 97 % --   01/11/25 0617 130/72 -- -- 72 -- -- --   01/11/25 0300 (!) 139/110 98 °F (36.7 °C) Oral 80 -- 94 % --   01/10/25 2014 115/82 98.2 °F (36.8 °C) Oral 80 16 93 % --   01/10/25 1652 -- -- -- 
(36.9 °C) Oral 73 16 93 % --         Intake/Output Summary (Last 24 hours) at 1/10/2025 1845  Last data filed at 1/10/2025 0900  Gross per 24 hour   Intake 250 ml   Output --   Net 250 ml          I had a face to face encounter and independently examined this patient on 1/10/2025, as outlined below:    Physical Exam  Vitals reviewed.   Constitutional:       General: She is not in acute distress.  Cardiovascular:      Rate and Rhythm: Normal rate and regular rhythm.      Heart sounds: No murmur heard.  Pulmonary:      Effort: No respiratory distress.      Breath sounds: Normal breath sounds.   Abdominal:      General: Bowel sounds are normal.      Palpations: Abdomen is soft.      Tenderness: There is no abdominal tenderness.   Musculoskeletal:      Right lower leg: No edema.      Left lower leg: No edema.   Neurological:      General: No focal deficit present.      Mental Status: She is alert and oriented to person, place, and time.            Reviewed most current lab test results and cultures  YES  Reviewed most current radiology test results   YES  Review and summation of old records today    NO  Reviewed patient's current orders and MAR    YES  PMH/SH reviewed - no change compared to H&P    Procedures: see electronic medical records for all procedures/Xrays and details which were not copied into this note but were reviewed prior to creation of Plan.      LABS:  I reviewed today's most current labs and imaging studies.  Pertinent labs include:  Recent Labs     01/09/25  0417   WBC 10.7   HGB 13.4   HCT 42.5          Recent Labs     01/10/25  0420      K 3.9   *   CO2 28   GLUCOSE 119*   BUN 21*   CREATININE 0.67   CALCIUM 9.5         Signed: Nany Desouza MD          
Normal rate and regular rhythm.      Heart sounds: No murmur heard.  Pulmonary:      Effort: No respiratory distress.      Breath sounds: Normal breath sounds.   Abdominal:      General: Bowel sounds are normal.      Palpations: Abdomen is soft.      Tenderness: There is no abdominal tenderness.   Musculoskeletal:      Right lower leg: No edema.      Left lower leg: No edema.   Neurological:      General: No focal deficit present.      Mental Status: She is alert and oriented to person, place, and time.            Reviewed most current lab test results and cultures  YES  Reviewed most current radiology test results   YES  Review and summation of old records today    NO  Reviewed patient's current orders and MAR    YES  PMH/SH reviewed - no change compared to H&P    Procedures: see electronic medical records for all procedures/Xrays and details which were not copied into this note but were reviewed prior to creation of Plan.      LABS:  I reviewed today's most current labs and imaging studies.  Pertinent labs include:  No results for input(s): \"WBC\", \"HGB\", \"HCT\", \"PLT\" in the last 72 hours.    No results for input(s): \"NA\", \"K\", \"CL\", \"CO2\", \"GLUCOSE\", \"BUN\", \"CREATININE\", \"CALCIUM\", \"MG\", \"PHOS\", \"LABALBU\", \"BILITOT\", \"AST\", \"ALT\", \"INR\" in the last 72 hours.      Signed: Nany Desouza MD          
SubCUTAneous Daily Nany Desouza MD   40 mg at 01/10/25 0950    ondansetron (ZOFRAN-ODT) disintegrating tablet 4 mg  4 mg Oral Q8H PRN Rigo Zhang MD        Or    ondansetron (ZOFRAN) injection 4 mg  4 mg IntraVENous Q6H PRN Rigo Zhang MD        polyethylene glycol (GLYCOLAX) packet 17 g  17 g Oral Daily PRN Rigo Zhang MD        acetaminophen (TYLENOL) tablet 650 mg  650 mg Oral Q6H PRN Rigo Zhang MD   650 mg at 01/07/25 0814    Or    acetaminophen (TYLENOL) suppository 650 mg  650 mg Rectal Q6H PRN Rigo Zhang MD           I/O's:    Intake/Output Summary (Last 24 hours) at 1/10/2025 1024  Last data filed at 1/9/2025 1737  Gross per 24 hour   Intake --   Output 700 ml   Net -700 ml       Vital Signs:  BP (!) 147/92   Pulse 83   Temp 98.8 °F (37.1 °C) (Oral)   Resp 16   Ht 1.651 m (5' 5\")   Wt 77.2 kg (170 lb 1.6 oz)   SpO2 92%   BMI 28.31 kg/m²  O2 Device: None (Room air)      Physical exam:     GEN: Patient lying comfortably in bed in no apparent discomfort.  HEENT: No conjunctival pallor, oral mucosa is moist.  NECK: No JVD   HEART: Regular with normal S1 and S2   LUNGS: No accessory muscle use.  No chest wall tenderness.  Marked coarse crackles in left  lung base and faint crackles in right lung base  EXTREMITIES: Warm and well-perfused.  No cyanosis  NEURO:  follows commands.  Mild weakness noted in left upper extremity, chronic from prior stroke as per patient.      Lab/Diagnostic Studies:    Reviewed    Recent Results (from the past 24 hour(s))   Urinalysis with Reflex to Culture    Collection Time: 01/09/25 10:21 PM    Specimen: Urine   Result Value Ref Range    Color, UA YELLOW/STRAW      Appearance CLEAR CLEAR      Specific Gravity, UA 1.017      pH, Urine 6.0 5.0 - 8.0      Protein, UA Negative NEG mg/dL    Glucose, Ur Negative NEG mg/dL    Ketones, Urine Negative NEG mg/dL    Bilirubin, Urine Negative NEG      Blood, Urine Negative NEG      Urobilinogen, Urine 0.2 0.2 - 
YES  Review and summation of old records today    NO  Reviewed patient's current orders and MAR    YES  PMH/SH reviewed - no change compared to H&P    Procedures: see electronic medical records for all procedures/Xrays and details which were not copied into this note but were reviewed prior to creation of Plan.      LABS:  I reviewed today's most current labs and imaging studies.  Pertinent labs include:  Recent Labs     01/04/25  0340   WBC 11.9*   HGB 12.7   HCT 39.2        Recent Labs     01/04/25  0340      K 3.4*   *   CO2 23   GLUCOSE 118*   BUN 12   CREATININE 0.48*   CALCIUM 8.8   MG 1.9   PHOS 2.8       Signed: Sagar Lomax MD

## 2025-01-14 NOTE — CARE COORDINATION
Chart reviewed. Patient waiting for a bed at [facility]. CM called [facility] Liaison, María, at [phone]. She is checking on the bed situation. States she will call CM later if bed available today or if it must wait until tomorrow.    1100 CM spoke with patient and  that we are still waiting for [facility] to call ,with report of available bed.    1330 Call from María at [facility]. Requests patient arrives between 1600 and 1700. Bed assigned upon arrival.   Report can be called to [phone].   Physician following is Dr Jackelyn Beasley.    set up [facility] ambulance at 1630.  Nurse and physician notified. Message left for .    No other dc needs identified.     01/14/25 1338   Services At/After Discharge   Transition of Care Consult (CM Consult) Discharge Planning   Services At/After Discharge In ambulance;Inpatient rehab  ([facility])    Resource Information Provided? No   Mode of Transport at Discharge BLS  ([facility])   Confirm Follow Up Transport Other (see comment)  ([facility])   Condition of Participation: Discharge Planning   The Plan for Transition of Care is related to the following treatment goals: Patient transferring to [facility] to continue rehab, prior to going home. Transport set up.   The Patient and/or Patient Representative was provided with a Choice of Provider? Patient;Patient Representative   Name of the Patient Representative who was provided with the Choice of Provider and agrees with the Discharge Plan?     The Patient and/Or Patient Representative agree with the Discharge Plan? Yes   Freedom of Choice list was provided with basic dialogue that supports the patient's individualized plan of care/goals, treatment preferences, and shares the quality data associated with the providers?  Yes       CM to follow up.    Zandra Mckeon, RN  Care Manager  [phone]

## 2025-01-14 NOTE — PLAN OF CARE
Problem: Chronic Conditions and Co-morbidities  Goal: Patient's chronic conditions and co-morbidity symptoms are monitored and maintained or improved  1/10/2025 1155 by Shayy Clark, RN  Outcome: Progressing  1/10/2025 0242 by Jolene Stewart, RN  Outcome: Progressing     
  Problem: Chronic Conditions and Co-morbidities  Goal: Patient's chronic conditions and co-morbidity symptoms are monitored and maintained or improved  1/10/2025 2237 by Jolene Stewart RN  Outcome: Progressing  1/10/2025 1155 by Shayy Clark RN  Outcome: Progressing     Problem: Discharge Planning  Goal: Discharge to home or other facility with appropriate resources  1/10/2025 2237 by Jolene Stewart RN  Outcome: Progressing  1/10/2025 1155 by Shayy Clark RN  Outcome: Progressing     Problem: Safety - Adult  Goal: Free from fall injury  1/10/2025 2237 by Jolene Stewart RN  Outcome: Progressing  1/10/2025 1155 by Shayy Clark RN  Outcome: Progressing     Problem: Skin/Tissue Integrity  Goal: Absence of new skin breakdown  Description: 1.  Monitor for areas of redness and/or skin breakdown  2.  Assess vascular access sites hourly  3.  Every 4-6 hours minimum:  Change oxygen saturation probe site  4.  Every 4-6 hours:  If on nasal continuous positive airway pressure, respiratory therapy assess nares and determine need for appliance change or resting period.  1/10/2025 2237 by Jolene Stewart RN  Outcome: Progressing  1/10/2025 1155 by Shayy Clark RN  Outcome: Progressing     Problem: Occupational Therapy - Adult  Goal: By Discharge: Performs self-care activities at highest level of function for planned discharge setting.  See evaluation for individualized goals.  Description: FUNCTIONAL STATUS PRIOR TO ADMISSION:    Receives Help From: Family, Prior Level of Assist for ADLs: Needs assistance, Bath: Moderate assistance, Dressing: Moderate assistance, Grooming: Minimal assistance, Feeding: Supervision, Toileting: Needs assistance (wears pull up with change 3x/day; incontinent since CVA), Prior Level of Assist for Homemaking: Needs assistance,  , Prior Level of Assist for Transfers: Needs assistance, Active : No     HOME SUPPORT: Patient lived with spouse who assists with most ADLs and 
  Problem: Chronic Conditions and Co-morbidities  Goal: Patient's chronic conditions and co-morbidity symptoms are monitored and maintained or improved  1/4/2025 1054 by Ana Cristina Alvarez RN  Flowsheets (Taken 1/2/2025 2015 by Bushra Hamilton RN)  Care Plan - Patient's Chronic Conditions and Co-Morbidity Symptoms are Monitored and Maintained or Improved: Monitor and assess patient's chronic conditions and comorbid symptoms for stability, deterioration, or improvement  1/4/2025 0049 by Lauren Hamilton RN  Outcome: Progressing     Problem: Discharge Planning  Goal: Discharge to home or other facility with appropriate resources  1/4/2025 1054 by Ana Cristina Alvarez RN  Flowsheets (Taken 1/2/2025 2015 by Bushra Hamilton RN)  Discharge to home or other facility with appropriate resources: Identify barriers to discharge with patient and caregiver  1/4/2025 0049 by Lauren Hamilton RN  Outcome: Progressing     Problem: Safety - Adult  Goal: Free from fall injury  1/4/2025 1054 by Ana Cristina Alvarez RN  Flowsheets (Taken 1/3/2025 1044)  Free From Fall Injury: Instruct family/caregiver on patient safety  1/4/2025 0049 by Lauren Hamilton RN  Outcome: Progressing     
  Problem: Chronic Conditions and Co-morbidities  Goal: Patient's chronic conditions and co-morbidity symptoms are monitored and maintained or improved  1/5/2025 0859 by Ana Cristina Alvarez RN  Flowsheets (Taken 1/4/2025 1936 by Madai Carlson RN)  Care Plan - Patient's Chronic Conditions and Co-Morbidity Symptoms are Monitored and Maintained or Improved:   Monitor and assess patient's chronic conditions and comorbid symptoms for stability, deterioration, or improvement   Collaborate with multidisciplinary team to address chronic and comorbid conditions and prevent exacerbation or deterioration   Update acute care plan with appropriate goals if chronic or comorbid symptoms are exacerbated and prevent overall improvement and discharge  1/4/2025 2151 by Madai Carlson RN  Outcome: Progressing  Flowsheets (Taken 1/4/2025 1936)  Care Plan - Patient's Chronic Conditions and Co-Morbidity Symptoms are Monitored and Maintained or Improved:   Monitor and assess patient's chronic conditions and comorbid symptoms for stability, deterioration, or improvement   Collaborate with multidisciplinary team to address chronic and comorbid conditions and prevent exacerbation or deterioration   Update acute care plan with appropriate goals if chronic or comorbid symptoms are exacerbated and prevent overall improvement and discharge     Problem: Discharge Planning  Goal: Discharge to home or other facility with appropriate resources  1/4/2025 2151 by Madai Carlson RN  Outcome: Progressing  Flowsheets (Taken 1/4/2025 1936)  Discharge to home or other facility with appropriate resources:   Identify barriers to discharge with patient and caregiver   Arrange for needed discharge resources and transportation as appropriate   Identify discharge learning needs (meds, wound care, etc)     Problem: Safety - Adult  Goal: Free from fall injury  1/5/2025 0859 by Ana Cristina Alvarez RN  Flowsheets (Taken 1/4/2025 1936 by Madai Carlson, 
  Problem: Chronic Conditions and Co-morbidities  Goal: Patient's chronic conditions and co-morbidity symptoms are monitored and maintained or improved  1/8/2025 1233 by Shayy Clark RN  Outcome: Progressing  1/8/2025 0007 by Bushra Hamilton RN  Outcome: Progressing  Flowsheets (Taken 1/7/2025 2043)  Care Plan - Patient's Chronic Conditions and Co-Morbidity Symptoms are Monitored and Maintained or Improved: Monitor and assess patient's chronic conditions and comorbid symptoms for stability, deterioration, or improvement     
  Problem: Chronic Conditions and Co-morbidities  Goal: Patient's chronic conditions and co-morbidity symptoms are monitored and maintained or improved  Outcome: Progressing     Problem: Discharge Planning  Goal: Discharge to home or other facility with appropriate resources  Outcome: Progressing     Problem: Safety - Adult  Goal: Free from fall injury  Outcome: Progressing     
  Problem: Chronic Conditions and Co-morbidities  Goal: Patient's chronic conditions and co-morbidity symptoms are monitored and maintained or improved  Outcome: Progressing     Problem: Discharge Planning  Goal: Discharge to home or other facility with appropriate resources  Outcome: Progressing     Problem: Safety - Adult  Goal: Free from fall injury  Outcome: Progressing     Problem: Skin/Tissue Integrity  Goal: Absence of new skin breakdown  Description: 1.  Monitor for areas of redness and/or skin breakdown  2.  Assess vascular access sites hourly  3.  Every 4-6 hours minimum:  Change oxygen saturation probe site  4.  Every 4-6 hours:  If on nasal continuous positive airway pressure, respiratory therapy assess nares and determine need for appliance change or resting period.  Outcome: Progressing     Problem: Occupational Therapy - Adult  Goal: By Discharge: Performs self-care activities at highest level of function for planned discharge setting.  See evaluation for individualized goals.  Description: FUNCTIONAL STATUS PRIOR TO ADMISSION:    Receives Help From: Family, Prior Level of Assist for ADLs: Needs assistance, Bath: Moderate assistance, Dressing: Moderate assistance, Grooming: Minimal assistance, Feeding: Supervision, Toileting: Needs assistance (wears pull up with change 3x/day; incontinent since CVA), Prior Level of Assist for Homemaking: Needs assistance,  , Prior Level of Assist for Transfers: Needs assistance, Active : No     HOME SUPPORT: Patient lived with spouse who assists with most ADLs and provideds CGA with rollator for all functional mobility; wears pull ups/changes 3x/day at D level with spouse assist.    Occupational Therapy Goals:  Initiated 1/4/2025  1.  Patient will perform self-feeding and grooming with Stand by Assist within 7 day(s).  2.  Patient will perform upper body dressing with Supervision within 7 day(s).  3.  Patient will perform lower body dressing with Maximal Assist 
  Problem: Chronic Conditions and Co-morbidities  Goal: Patient's chronic conditions and co-morbidity symptoms are monitored and maintained or improved  Outcome: Progressing  Flowsheets (Taken 1/1/2025 2015)  Care Plan - Patient's Chronic Conditions and Co-Morbidity Symptoms are Monitored and Maintained or Improved: Monitor and assess patient's chronic conditions and comorbid symptoms for stability, deterioration, or improvement     Problem: Discharge Planning  Goal: Discharge to home or other facility with appropriate resources  Outcome: Progressing  Flowsheets (Taken 1/1/2025 2015)  Discharge to home or other facility with appropriate resources: Identify barriers to discharge with patient and caregiver     Problem: Safety - Adult  Goal: Free from fall injury  Outcome: Progressing     
  Problem: Chronic Conditions and Co-morbidities  Goal: Patient's chronic conditions and co-morbidity symptoms are monitored and maintained or improved  Outcome: Progressing  Flowsheets (Taken 1/6/2025 2118)  Care Plan - Patient's Chronic Conditions and Co-Morbidity Symptoms are Monitored and Maintained or Improved: Monitor and assess patient's chronic conditions and comorbid symptoms for stability, deterioration, or improvement     Problem: Discharge Planning  Goal: Discharge to home or other facility with appropriate resources  Outcome: Progressing  Flowsheets (Taken 1/6/2025 2118)  Discharge to home or other facility with appropriate resources: Identify barriers to discharge with patient and caregiver     Problem: Safety - Adult  Goal: Free from fall injury  Outcome: Progressing     
  Problem: Chronic Conditions and Co-morbidities  Goal: Patient's chronic conditions and co-morbidity symptoms are monitored and maintained or improved  Outcome: Progressing  Flowsheets (Taken 1/7/2025 2043)  Care Plan - Patient's Chronic Conditions and Co-Morbidity Symptoms are Monitored and Maintained or Improved: Monitor and assess patient's chronic conditions and comorbid symptoms for stability, deterioration, or improvement     Problem: Discharge Planning  Goal: Discharge to home or other facility with appropriate resources  Outcome: Progressing  Flowsheets (Taken 1/7/2025 2043)  Discharge to home or other facility with appropriate resources: Identify barriers to discharge with patient and caregiver     Problem: Safety - Adult  Goal: Free from fall injury  Outcome: Progressing     Problem: Skin/Tissue Integrity  Goal: Absence of new skin breakdown  Description: 1.  Monitor for areas of redness and/or skin breakdown  2.  Assess vascular access sites hourly  3.  Every 4-6 hours minimum:  Change oxygen saturation probe site  4.  Every 4-6 hours:  If on nasal continuous positive airway pressure, respiratory therapy assess nares and determine need for appliance change or resting period.  Outcome: Progressing     Problem: Occupational Therapy - Adult  Goal: By Discharge: Performs self-care activities at highest level of function for planned discharge setting.  See evaluation for individualized goals.  Description: FUNCTIONAL STATUS PRIOR TO ADMISSION:    Receives Help From: Family, Prior Level of Assist for ADLs: Needs assistance, Bath: Moderate assistance, Dressing: Moderate assistance, Grooming: Minimal assistance, Feeding: Supervision, Toileting: Needs assistance (wears pull up with change 3x/day; incontinent since CVA), Prior Level of Assist for Homemaking: Needs assistance,  , Prior Level of Assist for Transfers: Needs assistance, Active : No     HOME SUPPORT: Patient lived with spouse who assists with 
  Problem: Discharge Planning  Goal: Discharge to home or other facility with appropriate resources  Flowsheets (Taken 1/2/2025 2015 by Bushra Hamilton RN)  Discharge to home or other facility with appropriate resources: Identify barriers to discharge with patient and caregiver     Problem: Safety - Adult  Goal: Free from fall injury  Flowsheets (Taken 1/3/2025 1044)  Free From Fall Injury: Instruct family/caregiver on patient safety     
  Problem: Occupational Therapy - Adult  Goal: By Discharge: Performs self-care activities at highest level of function for planned discharge setting.  See evaluation for individualized goals.  Description: FUNCTIONAL STATUS PRIOR TO ADMISSION:    Receives Help From: Family, Prior Level of Assist for ADLs: Needs assistance, Bath: Moderate assistance, Dressing: Moderate assistance, Grooming: Minimal assistance, Feeding: Supervision, Toileting: Needs assistance (wears pull up with change 3x/day; incontinent since CVA), Prior Level of Assist for Homemaking: Needs assistance,  , Prior Level of Assist for Transfers: Needs assistance, Active : No     HOME SUPPORT: Patient lived with spouse who assists with most ADLs and provideds CGA with rollator for all functional mobility; wears pull ups/changes 3x/day at D level with spouse assist.    Occupational Therapy Goals:  Initiated 1/4/2025  1.  Patient will perform self-feeding and grooming with Stand by Assist within 7 day(s).  2.  Patient will perform upper body dressing with Supervision within 7 day(s).  3.  Patient will perform lower body dressing with Maximal Assist within 7 day(s).  4.  Patient will perform toilet transfers with Maximal Assist  within 7 day(s).  5.  Patient will perform all aspects of toileting with Maximal Assist within 7 day(s).  6.  Patient will participate in upper extremity therapeutic exercise/activities (2019 L alex) with min/Moderate Assist for 5 minutes within 7 day(s).    7.  Patient will utilize energy conservation techniques during functional activities with verbal cues within 7 day(s).   Outcome: Progressing   OCCUPATIONAL THERAPY EVALUATION    Patient: Audrey Almanzar (84 y.o. female)  Date: 1/4/2025  Primary Diagnosis: Encephalopathy acute [G93.40]  Severe sepsis (HCC) [A41.9, R65.20]  Left lower lobe pulmonary infiltrate [R91.8]         Precautions: Fall Risk, Aspiration Risk, General Precautions, Bed Alarm (PMH L hemiparesis 
  Problem: Occupational Therapy - Adult  Goal: By Discharge: Performs self-care activities at highest level of function for planned discharge setting.  See evaluation for individualized goals.  Description: FUNCTIONAL STATUS PRIOR TO ADMISSION:    Receives Help From: Family, Prior Level of Assist for ADLs: Needs assistance, Bath: Moderate assistance, Dressing: Moderate assistance, Grooming: Minimal assistance, Feeding: Supervision, Toileting: Needs assistance (wears pull up with change 3x/day; incontinent since CVA), Prior Level of Assist for Homemaking: Needs assistance,  , Prior Level of Assist for Transfers: Needs assistance, Active : No     HOME SUPPORT: Patient lived with spouse who assists with most ADLs and provideds CGA with rollator for all functional mobility; wears pull ups/changes 3x/day at D level with spouse assist.    Occupational Therapy Goals:  Initiated 1/4/2025  1.  Patient will perform self-feeding and grooming with Stand by Assist within 7 day(s).  2.  Patient will perform upper body dressing with Supervision within 7 day(s).  3.  Patient will perform lower body dressing with Maximal Assist within 7 day(s).  4.  Patient will perform toilet transfers with Maximal Assist  within 7 day(s).  5.  Patient will perform all aspects of toileting with Maximal Assist within 7 day(s).  6.  Patient will participate in upper extremity therapeutic exercise/activities (2019 L alex) with min/Moderate Assist for 5 minutes within 7 day(s).    7.  Patient will utilize energy conservation techniques during functional activities with verbal cues within 7 day(s).   Outcome: Progressing   OCCUPATIONAL THERAPY TREATMENT  Patient: Audrey Almanzar (84 y.o. female)  Date: 1/8/2025  Primary Diagnosis: Encephalopathy acute [G93.40]  Severe sepsis (HCC) [A41.9, R65.20]  Left lower lobe pulmonary infiltrate [R91.8]       Precautions: Fall Risk, General Precautions, Bed Alarm, Aspiration Risk (L alex 2019; use of O2 is 
  Problem: Occupational Therapy - Adult  Goal: By Discharge: Performs self-care activities at highest level of function for planned discharge setting.  See evaluation for individualized goals.  Description: FUNCTIONAL STATUS PRIOR TO ADMISSION:    Receives Help From: Family, Prior Level of Assist for ADLs: Needs assistance, Bath: Moderate assistance, Dressing: Moderate assistance, Grooming: Minimal assistance, Feeding: Supervision, Toileting: Needs assistance (wears pull up with change 3x/day; incontinent since CVA), Prior Level of Assist for Homemaking: Needs assistance,  , Prior Level of Assist for Transfers: Needs assistance, Active : No     HOME SUPPORT: Patient lived with spouse who assists with most ADLs and provideds CGA with rollator for all functional mobility; wears pull ups/changes 3x/day at D level with spouse assist.    Occupational Therapy Goals:  Initiated 1/4/2025  1.  Patient will perform self-feeding and grooming with Stand by Assist within 7 day(s).  2.  Patient will perform upper body dressing with Supervision within 7 day(s).  3.  Patient will perform lower body dressing with Maximal Assist within 7 day(s).  4.  Patient will perform toilet transfers with Maximal Assist  within 7 day(s).  5.  Patient will perform all aspects of toileting with Maximal Assist within 7 day(s).  6.  Patient will participate in upper extremity therapeutic exercise/activities (2019 L alex) with min/Moderate Assist for 5 minutes within 7 day(s).    7.  Patient will utilize energy conservation techniques during functional activities with verbal cues within 7 day(s).   Outcome: Progressing slowly  OCCUPATIONAL THERAPY TREATMENT  Patient: Audrey Almanzar (84 y.o. female)  Date: 1/9/2025  Primary Diagnosis: Encephalopathy acute [G93.40]  Severe sepsis (HCC) [A41.9, R65.20]  Left lower lobe pulmonary infiltrate [R91.8]       Precautions: Fall Risk, General Precautions, Bed Alarm, Aspiration Risk (L alex 2019; use of 
  Problem: Occupational Therapy - Adult  Goal: By Discharge: Performs self-care activities at highest level of function for planned discharge setting.  See evaluation for individualized goals.  Description: FUNCTIONAL STATUS PRIOR TO ADMISSION:    Receives Help From: Family, Prior Level of Assist for ADLs: Needs assistance, Bath: Moderate assistance, Dressing: Moderate assistance, Grooming: Minimal assistance, Feeding: Supervision, Toileting: Needs assistance (wears pull up with change 3x/day; incontinent since CVA), Prior Level of Assist for Homemaking: Needs assistance,  , Prior Level of Assist for Transfers: Needs assistance, Active : No     HOME SUPPORT: Patient lived with spouse who assists with most ADLs and provideds CGA with rollator for all functional mobility; wears pull ups/changes 3x/day at D level with spouse assist.    Occupational Therapy Goals:  Initiated 1/4/2025 goals reviewed and continued 1-10-25  1.  Patient will perform self-feeding and grooming with Stand by Assist within 7 day(s).  2.  Patient will perform upper body dressing with Supervision within 7 day(s).  3.  Patient will perform lower body dressing with Maximal Assist within 7 day(s).  4.  Patient will perform toilet transfers with Maximal Assist  within 7 day(s).  5.  Patient will perform all aspects of toileting with Maximal Assist within 7 day(s).  6.  Patient will participate in upper extremity therapeutic exercise/activities (2019 L alex) with min/Moderate Assist for 5 minutes within 7 day(s).    7.  Patient will utilize energy conservation techniques during functional activities with verbal cues within 7 day(s).   Outcome: Progressing   OCCUPATIONAL THERAPY TREATMENT  Patient: Audrey Almanzar (84 y.o. female)  Date: 1/13/2025  Primary Diagnosis: Encephalopathy acute [G93.40]  Severe sepsis (HCC) [A41.9, R65.20]  Left lower lobe pulmonary infiltrate [R91.8]       Precautions:  (falls, contact (MRSA))                Chart, 
  Problem: Occupational Therapy - Adult  Goal: By Discharge: Performs self-care activities at highest level of function for planned discharge setting.  See evaluation for individualized goals.  Description: FUNCTIONAL STATUS PRIOR TO ADMISSION:    Receives Help From: Family, Prior Level of Assist for ADLs: Needs assistance, Bath: Moderate assistance, Dressing: Moderate assistance, Grooming: Minimal assistance, Feeding: Supervision, Toileting: Needs assistance (wears pull up with change 3x/day; incontinent since CVA), Prior Level of Assist for Homemaking: Needs assistance,  , Prior Level of Assist for Transfers: Needs assistance, Active : No     HOME SUPPORT: Patient lived with spouse who assists with most ADLs and provideds CGA with rollator for all functional mobility; wears pull ups/changes 3x/day at D level with spouse assist.    Occupational Therapy Goals:  Initiated 1/4/2025 goals reviewed and continued 1-10-25  1.  Patient will perform self-feeding and grooming with Stand by Assist within 7 day(s).  2.  Patient will perform upper body dressing with Supervision within 7 day(s).  3.  Patient will perform lower body dressing with Maximal Assist within 7 day(s).  4.  Patient will perform toilet transfers with Maximal Assist  within 7 day(s).  5.  Patient will perform all aspects of toileting with Maximal Assist within 7 day(s).  6.  Patient will participate in upper extremity therapeutic exercise/activities (2019 L alex) with min/Moderate Assist for 5 minutes within 7 day(s).    7.  Patient will utilize energy conservation techniques during functional activities with verbal cues within 7 day(s).   Outcome: Progressing Slowly; has weaned O2   OCCUPATIONAL THERAPY TREATMENT: WEEKLY REASSESSMENT    Patient: Audrey Almanzar (84 y.o. female)  Date: 1/10/2025  Primary Diagnosis: Encephalopathy acute [G93.40]  Severe sepsis (HCC) [A41.9, R65.20]  Left lower lobe pulmonary infiltrate [R91.8]       Precautions: 
  Problem: Physical Therapy - Adult  Goal: By Discharge: Performs mobility at highest level of function for planned discharge setting.  See evaluation for individualized goals.  Description: FUNCTIONAL STATUS PRIOR TO ADMISSION: Pt lives with  and he is present for eval. He states that he assists her by holding at her hips to steady during amb with her rollator; he assists with ADLS. He reports working daily on exercises with the pt. He also states that since he has started guarding pt in amb, she has had no falls.  She did go to [facility] and rehab post last hospitalization and  was pleased with progress and care. He does have an aide come 2x/ week to assist pt in showering and some other ADLS activities    HOME SUPPORT PRIOR TO ADMISSION: The patient lived with .    Physical Therapy Goals  Initiated 1/4/2025  1.  Patient will move from supine to sit and sit to supine, scoot up and down, and roll side to side in bed with minimal assistance within 7 day(s).    2.  Patient will perform sit to stand with minimal assistance within 7 day(s).  3.  Patient will transfer from bed to chair and chair to bed with minimal assistance using the least restrictive device within 7 day(s).  4.  Patient will ambulate with minimal assistance for 35 feet with the least restrictive device within 7 day(s).       Outcome: Progressing     PHYSICAL THERAPY TREATMENT    Patient: Audrey Almanzar (84 y.o. female)  Date: 1/14/2025  Diagnosis:   Encephalopathy acute [G93.40]  Severe sepsis (HCC) [A41.9, R65.20]  Left lower lobe pulmonary infiltrate [R91.8] Severe sepsis (HCC)      Precautions:  (falls, contact (MRSA))                    ASSESSMENT:  Pt tolerated PT services well and continues to slowly progress toward PT POC goals. Pt received in bed with lunch/meal tray in place. Spouse also present. Pt amenable to therapy services to include assist with transfer to bedside chair for more normalized upright 
  Problem: Physical Therapy - Adult  Goal: By Discharge: Performs mobility at highest level of function for planned discharge setting.  See evaluation for individualized goals.  Description: FUNCTIONAL STATUS PRIOR TO ADMISSION: Pt lives with  and he is present for eval. He states that he assists her by holding at her hips to steady during amb with her rollator; he assists with ADLS. He reports working daily on exercises with the pt. He also states that since he has started guarding pt in amb, she has had no falls.  She did go to [facility] and rehab post last hospitalization and  was pleased with progress and care. He does have an aide come 2x/ week to assist pt in showering and some other ADLS activities    HOME SUPPORT PRIOR TO ADMISSION: The patient lived with .    Physical Therapy Goals  Initiated 1/4/2025  1.  Patient will move from supine to sit and sit to supine, scoot up and down, and roll side to side in bed with minimal assistance within 7 day(s).    2.  Patient will perform sit to stand with minimal assistance within 7 day(s).  3.  Patient will transfer from bed to chair and chair to bed with minimal assistance using the least restrictive device within 7 day(s).  4.  Patient will ambulate with minimal assistance for 35 feet with the least restrictive device within 7 day(s).       Outcome: Progressing   PHYSICAL THERAPY EVALUATION    Patient: Audrey Almanzar (84 y.o. female)  Date: 1/4/2025  Primary Diagnosis: Encephalopathy acute [G93.40]  Severe sepsis (HCC) [A41.9, R65.20]  Left lower lobe pulmonary infiltrate [R91.8]       Precautions: Restrictions/Precautions: Fall Risk, Bed Alarm                      ASSESSMENT :   DEFICITS/IMPAIRMENTS:   The patient is limited by decreased functional mobility, independence in ADLs, strength, activity tolerance, safety awareness, cognition, command following, attention/concentration, balance with hx of CVA and L sided weakness now adm 
  Problem: Physical Therapy - Adult  Goal: By Discharge: Performs mobility at highest level of function for planned discharge setting.  See evaluation for individualized goals.  Description: FUNCTIONAL STATUS PRIOR TO ADMISSION: Pt lives with  and he is present for eval. He states that he assists her by holding at her hips to steady during amb with her rollator; he assists with ADLS. He reports working daily on exercises with the pt. He also states that since he has started guarding pt in amb, she has had no falls.  She did go to [facility] and rehab post last hospitalization and  was pleased with progress and care. He does have an aide come 2x/ week to assist pt in showering and some other ADLS activities    HOME SUPPORT PRIOR TO ADMISSION: The patient lived with .    Physical Therapy Goals  Initiated 1/4/2025  1.  Patient will move from supine to sit and sit to supine, scoot up and down, and roll side to side in bed with minimal assistance within 7 day(s).    2.  Patient will perform sit to stand with minimal assistance within 7 day(s).  3.  Patient will transfer from bed to chair and chair to bed with minimal assistance using the least restrictive device within 7 day(s).  4.  Patient will ambulate with minimal assistance for 35 feet with the least restrictive device within 7 day(s).     Outcome: Progressing   PHYSICAL THERAPY TREATMENT    Patient: Audrey Almanzar (84 y.o. female)  Date: 1/8/2025  Diagnosis: Encephalopathy acute [G93.40]  Severe sepsis (HCC) [A41.9, R65.20]  Left lower lobe pulmonary infiltrate [R91.8] Severe sepsis (HCC)      Precautions: Fall Risk, General Precautions, Bed Alarm, Aspiration Risk (L alex 2019; use of O2 is new)                    ASSESSMENT:  Patient continues to benefit from skilled PT services and is progressing towards goals. Patient in bed on arrival, agreeable and motivated for therapy. Tolerated transferring to chair via Gloria Steady. Improved 
  Problem: Physical Therapy - Adult  Goal: By Discharge: Performs mobility at highest level of function for planned discharge setting.  See evaluation for individualized goals.  Description: FUNCTIONAL STATUS PRIOR TO ADMISSION: Pt lives with  and he is present for eval. He states that he assists her by holding at her hips to steady during amb with her rollator; he assists with ADLS. He reports working daily on exercises with the pt. He also states that since he has started guarding pt in amb, she has had no falls.  She did go to [facility] and rehab post last hospitalization and  was pleased with progress and care. He does have an aide come 2x/ week to assist pt in showering and some other ADLS activities    HOME SUPPORT PRIOR TO ADMISSION: The patient lived with .    Physical Therapy Goals  Initiated 1/4/2025  1.  Patient will move from supine to sit and sit to supine, scoot up and down, and roll side to side in bed with minimal assistance within 7 day(s).    2.  Patient will perform sit to stand with minimal assistance within 7 day(s).  3.  Patient will transfer from bed to chair and chair to bed with minimal assistance using the least restrictive device within 7 day(s).  4.  Patient will ambulate with minimal assistance for 35 feet with the least restrictive device within 7 day(s).     PHYSICAL THERAPY TREATMENT    Patient: Audrey Almanzar (84 y.o. female)  Date: 1/13/2025  Diagnosis:   Encephalopathy acute [G93.40]  Severe sepsis (HCC) [A41.9, R65.20]  Left lower lobe pulmonary infiltrate [R91.8] Severe sepsis (HCC)      Precautions:  (falls, contact (MRSA))                      ASSESSMENT:  Pt tolerated PT services well and continues to progress toward PT POC goals. Progress to date less than anticipated 2/2 underlying medical status. Pt received in bed and amenable to therapy services. Pt presents as AO x3 with noted disorientation to time. Per report, Pt is ambulatory with RW at 
  Problem: Safety - Adult  Goal: Free from fall injury  1/13/2025 0810 by Trent Vale RN  Outcome: Progressing  1/12/2025 2228 by Madai Carlson RN  Outcome: Progressing  Flowsheets (Taken 1/12/2025 1950)  Free From Fall Injury: Instruct family/caregiver on patient safety     Problem: Skin/Tissue Integrity  Goal: Absence of new skin breakdown  Description: 1.  Monitor for areas of redness and/or skin breakdown  2.  Assess vascular access sites hourly  3.  Every 4-6 hours minimum:  Change oxygen saturation probe site  4.  Every 4-6 hours:  If on nasal continuous positive airway pressure, respiratory therapy assess nares and determine need for appliance change or resting period.  1/13/2025 0810 by Trent Vale RN  Outcome: Progressing  1/12/2025 2228 by Madai Carlson RN  Outcome: Progressing     Problem: Chronic Conditions and Co-morbidities  Goal: Patient's chronic conditions and co-morbidity symptoms are monitored and maintained or improved  1/13/2025 0810 by Trent Vale RN  Outcome: Not Progressing  1/12/2025 2228 by Madai Carlson RN  Outcome: Progressing  Flowsheets (Taken 1/12/2025 1950)  Care Plan - Patient's Chronic Conditions and Co-Morbidity Symptoms are Monitored and Maintained or Improved:   Monitor and assess patient's chronic conditions and comorbid symptoms for stability, deterioration, or improvement   Collaborate with multidisciplinary team to address chronic and comorbid conditions and prevent exacerbation or deterioration   Update acute care plan with appropriate goals if chronic or comorbid symptoms are exacerbated and prevent overall improvement and discharge     Problem: Discharge Planning  Goal: Discharge to home or other facility with appropriate resources  1/13/2025 0810 by Trent Vale RN  Outcome: Not Progressing  1/12/2025 2228 by Madai Carlson RN  Outcome: Progressing  Flowsheets (Taken 1/12/2025 1950)  Discharge to home or other facility with 
  Problem: Safety - Adult  Goal: Free from fall injury  1/2/2025 1323 by Trent Vale RN  Outcome: Progressing  1/2/2025 0457 by Bushra Hamilton RN  Outcome: Progressing     Problem: Chronic Conditions and Co-morbidities  Goal: Patient's chronic conditions and co-morbidity symptoms are monitored and maintained or improved  1/2/2025 1323 by Trent Vale RN  Outcome: Not Progressing  1/2/2025 0457 by Bushra Hamilton RN  Outcome: Progressing  Flowsheets (Taken 1/1/2025 2015)  Care Plan - Patient's Chronic Conditions and Co-Morbidity Symptoms are Monitored and Maintained or Improved: Monitor and assess patient's chronic conditions and comorbid symptoms for stability, deterioration, or improvement     Problem: Discharge Planning  Goal: Discharge to home or other facility with appropriate resources  1/2/2025 1323 by Trent Vale RN  Outcome: Not Progressing  1/2/2025 0457 by Bushra Hamilton RN  Outcome: Progressing  Flowsheets (Taken 1/1/2025 2015)  Discharge to home or other facility with appropriate resources: Identify barriers to discharge with patient and caregiver     
optimal ventilation and oxygenation  Outcome: Progressing  Flowsheets (Taken 1/14/2025 1257)  Achieves optimal ventilation and oxygenation:   Assess for changes in respiratory status   Assess for changes in mentation and behavior   Position to facilitate oxygenation and minimize respiratory effort     
Training  Bed Mobility Training: No (Pt received in bedside chair)  Scooting: Minimum assistance;Moderate assistance    Transfers:    Transfer Training  Transfer Training: Yes  Overall Level of Assistance: Maximum assistance  Interventions: Demonstration;Manual cues;Safety awareness training;Tactile cues;Verbal cues  Sit to Stand: Maximum assistance  Stand to Sit: Maximum assistance  Bed to Chair: Maximum assistance;Total assistance (Pt transferred to bedside chair via Gloria Ariza)    Balance:  Balance  Sitting: Impaired  Sitting - Static:  (fair/fair plus)  Sitting - Dynamic:  (fair minus)  Standing: Impaired  Standing - Static: Constant support (fair)  Standing - Dynamic: Constant support (fair minus/fair)     Ambulation/Gait Training:     Gait  Gait Training: Yes (pre gait/partial task)  Overall Level of Assistance: Maximum assistance  Distance (ft): 0 Feet  Assistive Device: Gait belt;Walker, rolling  Interventions: Tactile cues;Safety awareness training;Manual cues;Demonstration;Verbal cues  Number of Stairs Trained: 0    Neuro Re-Education:                                                                                                                                                                                                                                                 [facility] AM-PAC®      Basic Mobility Inpatient Short Form (6-Clicks) Version 2  How much HELP from another person do you currently need... (If the patient hasn't done an activity recently, how much help from another person do you think they would need if they tried?) Total A Lot A Little None   1.  Turning from your back to your side while in a flat bed without using bedrails? []  1 [x]  2 []  3  []  4   2.  Moving from lying on your back to sitting on the side of a flat bed without using bedrails? []  1 [x]  2 []  3  []  4   3.  Moving to and from a bed to a chair (including a wheelchair)? [x]  1 [x]  2 []  3  []  4   4. Standing 
Intervention(s):   pain is at a level acceptable to the patient      Activity Tolerance:   Fair , requires rest breaks, and SpO2 stable on room air  Please refer to the flowsheet for vital signs taken during this treatment.    After treatment:   Patient left in no apparent distress sitting up in chair, Call bell within reach, Bed/ chair alarm activated, Caregiver / family present, and Updated patient's board on functional status and mobility recommendations    COMMUNICATION/EDUCATION:   The patient's plan of care was discussed with: physical therapist, registered nurse, , and certified nursing assistant/patient care technician    Patient Education  Education Given To: Patient  Education Provided: Role of Therapy;Fall Prevention Strategies;Plan of Care;Orientation;Mobility Training;Equipment;ADL Adaptive Strategies;Transfer Training;Family Education;Home Exercise Program;Precautions  Education Method: Verbal;Demonstration;Teach Back  Barriers to Learning: Cognition  Education Outcome: Verbalized understanding;Continued education needed    Thank you for this referral.  Clifton Hernandez OTR/L  Minutes: 18   
VSS)  Toilet Transfer: Maximum assistance;Additional time;Adaptive equipment (inferred; would be safe for Gloria Steady to [facility]; patient, staff aware)           Balance:     Balance  Sitting: Impaired  Sitting - Static: Fair (occasional)  Sitting - Dynamic: Poor (constant support)  Standing: Impaired  Standing - Static: Constant support;Fair;Poor (very good tolerance for Gloria Steady standing device; VSS)  Standing - Dynamic: Constant support;Poor (unable to take functional steps in standing, lifted L LE but not R LE)      ADL Intervention:         Feeding: Setup;Stand by assistance;Increased time to complete         Grooming: Minimal assistance;Verbal cueing;Increased time to complete   Grooming Skilled Clinical Factors: hair very tangled                    UE Dressing: Moderate assistance;Maximum assistance;Increased time to complete  UE Dressing Skilled Clinical Factors: with gown; limited by dynamic balance    LE Dressing: Dependent/Total       Toileting: Dependent/Total  Toileting Skilled Clinical Factors: recommend Gloria Steady to [facility] for BM (incontinent bladder since CVA)                        Pain Ratin/10   Pain Intervention(s):   pain is at a level acceptable to the patient      Activity Tolerance:   Fair , requires rest breaks, and desaturates with activity and requires oxygen  Please refer to the flowsheet for vital signs taken during this treatment.    After treatment:   Patient left in no apparent distress sitting up in chair, Call bell within reach, Bed/ chair alarm activated, Caregiver / family present, and Updated patient's board on functional status and mobility recommendations    COMMUNICATION/EDUCATION:   The patient's plan of care was discussed with: physical therapist, registered nurse, and     Patient Education  Education Given To: Patient;Family  Education Provided: Role of Therapy;Plan of Care;Home Exercise Program;Precautions;Transfer Training;ADL Adaptive

## 2025-01-23 ENCOUNTER — TELEPHONE (OUTPATIENT)
Age: 85
End: 2025-01-23

## 2025-01-23 NOTE — TELEPHONE ENCOUNTER
[facility] called in requesting PCP to follow and sign orders for HH    Please call to advise - ok to leave verbal on     [phone]

## 2025-01-28 ENCOUNTER — TELEPHONE (OUTPATIENT)
Age: 85
End: 2025-01-28

## 2025-01-28 ENCOUNTER — TELEMEDICINE (OUTPATIENT)
Age: 85
End: 2025-01-28

## 2025-01-28 DIAGNOSIS — Z91.81 AT HIGH RISK FOR FALLS: ICD-10-CM

## 2025-01-28 DIAGNOSIS — I67.9 CEREBRAL VASCULAR DISEASE: ICD-10-CM

## 2025-01-28 DIAGNOSIS — E78.00 PURE HYPERCHOLESTEROLEMIA: ICD-10-CM

## 2025-01-28 DIAGNOSIS — I48.0 PAROXYSMAL ATRIAL FIBRILLATION (HCC): ICD-10-CM

## 2025-01-28 DIAGNOSIS — R54 AGE-RELATED PHYSICAL DEBILITY: Primary | ICD-10-CM

## 2025-01-28 DIAGNOSIS — F01.B0 MODERATE VASCULAR DEMENTIA WITHOUT BEHAVIORAL DISTURBANCE, PSYCHOTIC DISTURBANCE, MOOD DISTURBANCE, OR ANXIETY (HCC): ICD-10-CM

## 2025-01-28 PROBLEM — J96.01 ACUTE RESPIRATORY FAILURE WITH HYPOXIA: Status: RESOLVED | Noted: 2023-05-18 | Resolved: 2025-01-28

## 2025-01-28 PROBLEM — J81.0 PULMONARY EDEMA, ACUTE: Status: RESOLVED | Noted: 2023-05-17 | Resolved: 2025-01-28

## 2025-01-28 RX ORDER — METOPROLOL TARTRATE 25 MG/1
12.5 TABLET, FILM COATED ORAL 2 TIMES DAILY
Qty: 60 TABLET | Refills: 5
Start: 2025-01-28

## 2025-01-28 RX ORDER — ROSUVASTATIN CALCIUM 10 MG/1
TABLET, COATED ORAL
COMMUNITY
Start: 2024-10-04

## 2025-01-28 NOTE — TELEPHONE ENCOUNTER
[facility] with [facility] called in states pt's  declined to schedule appt with nurse and disconnected the call.     No call back needed unless pcp has further questions.

## 2025-01-28 NOTE — PROGRESS NOTES
Audery Almanzar is a 84 y.o. female who presents for follow-up after hospitalization.  Admitted 1/1-1/14 with urosepsis and left pneumonia.  Seen by pulmonary and urology.      Noted to have T12 and L2 compression fractures.  Seen by PT/OT. No back pain.      Hospital discharge was sent to [facility] on 1/14.  Discharge 1/23.  Has [facility]  .    Has pulmonary follow up.     Eating and drinking ok.  Normal BM.  Remains weak. Up with assistance.      Metoprolol was reduced in rehab to 12.5mg BID.  Changed to crestor from pravastatin.           Audrey Almanzar, was evaluated through a synchronous (real-time) audio-video encounter. The patient (or guardian if applicable) is aware that this is a billable service, which includes applicable co-pays. This Virtual Visit was conducted with patient's (and/or legal guardian's) consent. Patient identification was verified, and a caregiver was present when appropriate.   The patient was located at Home: [address] [place] [id number]  Provider was located at Home (Appt Dept State): [place]  Confirm you are appropriately licensed, registered, or certified to deliver care in the state where the patient is located as indicated above. If you are not or unsure, please re-schedule the visit: Yes, I confirm.        Are you appropriately licensed in the patient's state?: Yes      Total time spent for this encounter: Not billed by time    --Soumya Dye MD on 1/28/2025     An electronic signature was used to authenticate this note.       Past Medical History:   Diagnosis Date    Acid reflux     Constipation     Falls     Hip fracture requiring operative repair ([facility]) 2022    left    Hypertension     Incontinence     wears depends    Stroke ([facility]) 2019    left side weakness    Thyroid disease     thyroid nodules       Family History   Problem Relation Age of Onset    Heart Disease Maternal Grandfather     Heart Disease Sister

## 2025-03-17 NOTE — TELEPHONE ENCOUNTER
Caller requests Refill of:    metoprolol tartrate (LOPRESSOR) 25 MG tablet     Please send to:    [facility] [address] [place], [place] - [address] - P [phone] - F [phone] [address] [facility] [address]  Phone: [phone] Fax: [phone]         Visit / Appointment History:  Future Appointment at [facility]:  Visit date not found   Last Appointment With PCP:  1/28/2025       Caller confirmed instructions and dosages as correct.    Caller was advised that Meds will be refilled as soon as possible, however there can be a 48-72 business hour turn around on refill requests.

## 2025-03-17 NOTE — TELEPHONE ENCOUNTER
PCP: Soumay Dye MD    Last appt:   1/28/2025    No future appointments.    Requested Prescriptions     Pending Prescriptions Disp Refills    metoprolol tartrate (LOPRESSOR) 25 MG tablet 60 tablet 5     Sig: Take 0.5 tablets by mouth 2 times daily

## 2025-03-18 RX ORDER — METOPROLOL TARTRATE 25 MG/1
12.5 TABLET, FILM COATED ORAL 2 TIMES DAILY
Qty: 60 TABLET | Refills: 5 | Status: SHIPPED | OUTPATIENT
Start: 2025-03-18

## 2025-06-03 ENCOUNTER — TELEPHONE (OUTPATIENT)
Age: 85
End: 2025-06-03

## 2025-06-03 RX ORDER — CEPHALEXIN 500 MG/1
500 CAPSULE ORAL 2 TIMES DAILY
Qty: 14 CAPSULE | Refills: 0 | Status: SHIPPED | OUTPATIENT
Start: 2025-06-03

## 2025-06-03 NOTE — TELEPHONE ENCOUNTER
Martin states pt is experiencing UTI symptoms and would like to know if pcp can send in RX.     Martin states can smell odor when changing pt and discoloration of urine.    Confirmed pharmacy on file.    Please call to discuss.

## 2025-06-03 NOTE — TELEPHONE ENCOUNTER
I sent in cephalexin 500 mg twice daily for 7 days.  If the patient fails to improve she will need to be seen

## 2025-06-05 ENCOUNTER — TELEPHONE (OUTPATIENT)
Age: 85
End: 2025-06-05

## 2025-06-05 NOTE — TELEPHONE ENCOUNTER
Trying to get auth for [facility]. Please call  about this.  Thanks.     Phone #[phone] [facility]

## 2025-06-09 ENCOUNTER — TELEPHONE (OUTPATIENT)
Age: 85
End: 2025-06-09

## 2025-06-09 ENCOUNTER — OFFICE VISIT (OUTPATIENT)
Age: 85
End: 2025-06-09
Payer: MEDICARE

## 2025-06-09 VITALS
OXYGEN SATURATION: 94 % | BODY MASS INDEX: 26.68 KG/M2 | HEART RATE: 71 BPM | DIASTOLIC BLOOD PRESSURE: 74 MMHG | RESPIRATION RATE: 20 BRPM | WEIGHT: 170 LBS | HEIGHT: 67 IN | SYSTOLIC BLOOD PRESSURE: 115 MMHG | TEMPERATURE: 97.6 F

## 2025-06-09 DIAGNOSIS — F01.B0 MODERATE VASCULAR DEMENTIA WITHOUT BEHAVIORAL DISTURBANCE, PSYCHOTIC DISTURBANCE, MOOD DISTURBANCE, OR ANXIETY (HCC): Primary | ICD-10-CM

## 2025-06-09 DIAGNOSIS — I48.0 PAROXYSMAL ATRIAL FIBRILLATION (HCC): ICD-10-CM

## 2025-06-09 DIAGNOSIS — E78.00 PURE HYPERCHOLESTEROLEMIA: ICD-10-CM

## 2025-06-09 DIAGNOSIS — E55.9 VITAMIN D DEFICIENCY: ICD-10-CM

## 2025-06-09 DIAGNOSIS — I67.9 CEREBRAL VASCULAR DISEASE: ICD-10-CM

## 2025-06-09 DIAGNOSIS — R54 AGE-RELATED PHYSICAL DEBILITY: ICD-10-CM

## 2025-06-09 DIAGNOSIS — Z91.81 AT HIGH RISK FOR FALLS: ICD-10-CM

## 2025-06-09 PROCEDURE — 1160F RVW MEDS BY RX/DR IN RCRD: CPT | Performed by: FAMILY MEDICINE

## 2025-06-09 PROCEDURE — 1123F ACP DISCUSS/DSCN MKR DOCD: CPT | Performed by: FAMILY MEDICINE

## 2025-06-09 PROCEDURE — G8419 CALC BMI OUT NRM PARAM NOF/U: HCPCS | Performed by: FAMILY MEDICINE

## 2025-06-09 PROCEDURE — 99214 OFFICE O/P EST MOD 30 MIN: CPT | Performed by: FAMILY MEDICINE

## 2025-06-09 PROCEDURE — 1159F MED LIST DOCD IN RCRD: CPT | Performed by: FAMILY MEDICINE

## 2025-06-09 PROCEDURE — G8427 DOCREV CUR MEDS BY ELIG CLIN: HCPCS | Performed by: FAMILY MEDICINE

## 2025-06-09 PROCEDURE — 1036F TOBACCO NON-USER: CPT | Performed by: FAMILY MEDICINE

## 2025-06-09 PROCEDURE — G8399 PT W/DXA RESULTS DOCUMENT: HCPCS | Performed by: FAMILY MEDICINE

## 2025-06-09 PROCEDURE — 1090F PRES/ABSN URINE INCON ASSESS: CPT | Performed by: FAMILY MEDICINE

## 2025-06-09 RX ORDER — ASPIRIN 81 MG/1
81 TABLET ORAL DAILY
COMMUNITY

## 2025-06-09 ASSESSMENT — PATIENT HEALTH QUESTIONNAIRE - PHQ9
SUM OF ALL RESPONSES TO PHQ QUESTIONS 1-9: 0
2. FEELING DOWN, DEPRESSED OR HOPELESS: NOT AT ALL
SUM OF ALL RESPONSES TO PHQ QUESTIONS 1-9: 0
SUM OF ALL RESPONSES TO PHQ QUESTIONS 1-9: 0
1. LITTLE INTEREST OR PLEASURE IN DOING THINGS: NOT AT ALL
SUM OF ALL RESPONSES TO PHQ QUESTIONS 1-9: 0

## 2025-06-09 NOTE — PROGRESS NOTES
Have you been to the ER, urgent care clinic since your last visit?  Hospitalized since your last visit?   1/2025: fusion pneumonia, sepsis, UTI: see encounters.    Have you seen or consulted any other health care providers outside our system since your last visit?     No.           
noted in HPI.    Objective:     /74 (BP Site: Left Upper Arm, Patient Position: Sitting)   Pulse 71   Temp 97.6 °F (36.4 °C) (Temporal)   Resp 20   Ht 1.702 m (5' 7\")   Wt 77.1 kg (170 lb) Comment: pt unable to stand.  SpO2 94%   BMI 26.63 kg/m²   Gen: well appearing female in wheelchair  HEENT:   PERRL,normal conjunctiva. OP no erythema, no exudates, MM  Pulm: No wheezing, no rhonchi, bibasilar rales.  CV: Regular rhythm  GI: soft, nontender, upright exam  Extrem:   no edema, warm distally  Neuro: Alert, minimally interactive.  Answer simple questions with one-word answers.  Unable to stand without assistance      Assessment/Plan:      Diagnosis Orders   1. 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 Paroxysmal atrial fibrillation (HCC)        3. Pure hypercholesterolemia  Comprehensive Metabolic Panel    Lipid Panel      4. At high risk for falls  External Referral To Home Health      5. Cerebral vascular disease  External Referral To Home Health      6. Age-related physical debility  External Referral To Home Health      7. Vitamin D deficiency  Vitamin D 25 Hydroxy        Request PT. Continue supportive measures. No change in medication.      Soumya Dye MD

## 2025-06-09 NOTE — TELEPHONE ENCOUNTER
[facility]geta With [facility]    Phone [phone]  Fax  [phone]    States:  Please fax a copy of most recent office note to accompany referral          Appointments:  Last seen at [facility]:   6/9/2025   Future appointment [facility]:  7/21/2025         .            Caller confirms readback of documented phone/fax number(s) as correct.

## 2025-06-10 LAB
25(OH)D3 SERPL-MCNC: 96.5 NG/ML (ref 30–100)
ALBUMIN SERPL-MCNC: 3.4 G/DL (ref 3.5–5)
ALBUMIN/GLOB SERPL: 1.1 (ref 1.1–2.2)
ALP SERPL-CCNC: 103 U/L (ref 45–117)
ALT SERPL-CCNC: 21 U/L (ref 12–78)
ANION GAP SERPL CALC-SCNC: 7 MMOL/L (ref 2–12)
AST SERPL-CCNC: 16 U/L (ref 15–37)
BILIRUB SERPL-MCNC: 0.6 MG/DL (ref 0.2–1)
BUN SERPL-MCNC: 15 MG/DL (ref 6–20)
BUN/CREAT SERPL: 20 (ref 12–20)
CALCIUM SERPL-MCNC: 9.3 MG/DL (ref 8.5–10.1)
CHLORIDE SERPL-SCNC: 111 MMOL/L (ref 97–108)
CHOLEST SERPL-MCNC: 196 MG/DL
CO2 SERPL-SCNC: 27 MMOL/L (ref 21–32)
CREAT SERPL-MCNC: 0.74 MG/DL (ref 0.55–1.02)
GLOBULIN SER CALC-MCNC: 3.2 G/DL (ref 2–4)
GLUCOSE SERPL-MCNC: 101 MG/DL (ref 65–100)
HDLC SERPL-MCNC: 58 MG/DL
HDLC SERPL: 3.4 (ref 0–5)
LDLC SERPL CALC-MCNC: 106.2 MG/DL (ref 0–100)
POTASSIUM SERPL-SCNC: 3.7 MMOL/L (ref 3.5–5.1)
PROT SERPL-MCNC: 6.6 G/DL (ref 6.4–8.2)
SODIUM SERPL-SCNC: 145 MMOL/L (ref 136–145)
TRIGL SERPL-MCNC: 159 MG/DL
VLDLC SERPL CALC-MCNC: 31.8 MG/DL

## 2025-06-11 ENCOUNTER — TELEPHONE (OUTPATIENT)
Age: 85
End: 2025-06-11

## 2025-06-11 NOTE — TELEPHONE ENCOUNTER
[facility] needs verbal for PT plan of care.     2/wk for 8 wks    Please call and you may leave on a vm.

## 2025-06-19 ENCOUNTER — TELEPHONE (OUTPATIENT)
Age: 85
End: 2025-06-19

## 2025-06-19 DIAGNOSIS — Z91.81 AT HIGH RISK FOR FALLS: ICD-10-CM

## 2025-06-19 DIAGNOSIS — R54 AGE-RELATED PHYSICAL DEBILITY: ICD-10-CM

## 2025-06-19 DIAGNOSIS — F01.B0 MODERATE VASCULAR DEMENTIA WITHOUT BEHAVIORAL DISTURBANCE, PSYCHOTIC DISTURBANCE, MOOD DISTURBANCE, OR ANXIETY (HCC): Primary | ICD-10-CM

## 2025-06-19 DIAGNOSIS — I67.9 CEREBRAL VASCULAR DISEASE: ICD-10-CM

## 2025-06-19 NOTE — TELEPHONE ENCOUNTER
Called patients  . He will have to call back with the name of the PT place. He was tied up at the time of the call.

## 2025-06-19 NOTE — TELEPHONE ENCOUNTER
Pt  returned call.      States:  Physical therapy now is from [facility].      Would like referral for:   Would like to use [facility] in [facility]  Phone: [phone]      Are we able to do a referral to them?  Call to advise.

## 2025-06-19 NOTE — TELEPHONE ENCOUNTER
New referral added.  Please fax to [facility] and sent to patient

## 2025-06-19 NOTE — TELEPHONE ENCOUNTER
Pt's  called in states pt is relocating to [facility] next week. Pt requesting referral to new PT in [place] Please call to advise.    Pt's  would like to pass along gratitude for the amazing care provided by Dr. Dye.

## 2025-06-26 ENCOUNTER — RESULTS FOLLOW-UP (OUTPATIENT)
Age: 85
End: 2025-06-26

## 2025-07-07 ENCOUNTER — TELEPHONE (OUTPATIENT)
Age: 85
End: 2025-07-07

## 2025-07-07 NOTE — TELEPHONE ENCOUNTER
Martin romero would like PT referral sent over to [facility] PT rehab.     Phone: [phone]

## 2025-07-09 NOTE — TELEPHONE ENCOUNTER
Leticia, daughter, caregiver states that the fax/referral to [facility] did not get received.    I see the referral states \"busy\" can that be re faxed please?

## 2025-07-28 ENCOUNTER — TELEPHONE (OUTPATIENT)
Age: 85
End: 2025-07-28

## (undated) DEVICE — ACCESS SHEATH WITH DILATOR: Brand: WATCHMAN FXD CURVE™ ACCESS SYSTEM

## (undated) DEVICE — KIT POS FOAM HANA TBL

## (undated) DEVICE — CATHETER ANGIO JR4 PIG 145 DEG 6 FRX100 CM MP SUPER TORQUE +

## (undated) DEVICE — SOL TOP ALC ISO 70% 4OZ --

## (undated) DEVICE — PINNACLE INTRODUCER SHEATH: Brand: PINNACLE

## (undated) DEVICE — TUBING PRSS MON L6IN PVC M FEM CONN

## (undated) DEVICE — CHECK-FLO INTRODUCER SET: Brand: PERFORMER

## (undated) DEVICE — SUTURE VCRL SZ 2-0 L36IN ABSRB UD L36MM CT-1 1/2 CIR J945H

## (undated) DEVICE — PRESSURE MONITORING SET: Brand: TRUWAVE

## (undated) DEVICE — HEART CATH-MRMC: Brand: MEDLINE INDUSTRIES, INC.

## (undated) DEVICE — SPONGE GZ W4XL4IN COT 12 PLY TYP VII WVN C FLD DSGN

## (undated) DEVICE — PATCH REF EXT FOR CARTO 3 SYS (EA = 6 PACKS)

## (undated) DEVICE — ELECTRODE PT RET AD L9FT HI MOIST COND ADH HYDRGEL CORDED

## (undated) DEVICE — SUTURE VICRYL SZ 1 L36IN ABSRB VLT L48MM CTX 1/2 CIR J371H

## (undated) DEVICE — CATHETER EP 6FR L115CM 2-8-2MM SPC TIP 2MM 10 ELECTRD CSF

## (undated) DEVICE — INTRODUCER SPLIT SHEATH PRELUDE SNAP 8FR X 13CM

## (undated) DEVICE — SHEATH GUID 11.5X8.5FR L71MM M CRV L22MM BIDIR STEER CARTO

## (undated) DEVICE — SUTURE PERMAHAND SZ 0 L30IN NONABSORBABLE BLK L26MM SH 1/2 K834H

## (undated) DEVICE — 3M™ TEGADERM™ TRANSPARENT FILM DRESSING FRAME STYLE, 1626W, 4 IN X 4-3/4 IN (10 CM X 12 CM), 50/CT 4CT/CASE: Brand: 3M™ TEGADERM™

## (undated) DEVICE — PAD,ABDOMINAL,5"X9",ST,LF,25/BX: Brand: MEDLINE INDUSTRIES, INC.

## (undated) DEVICE — PROVE COVER: Brand: UNBRANDED

## (undated) DEVICE — 1 X VERSACROSS CONNECT TRANSSEPTAL DILATOR (INCLUDING 1 X J-TIP MECHANICAL GUIDEWIRE); 1 X VERSACROSS RF WIRE (INCLUDING 1 X CONNECTOR CABLE (SINGLE USE)); 1 X DISPERSIVE ELECTRODE: Brand: VERSACROSS CONNECT LAAC ACCESS SOLUTION

## (undated) DEVICE — GLOVE ORTHO 7 1/2   MSG9475

## (undated) DEVICE — CATHETER ULTRASOUND 10 FRX90 CM FOR CARTO 3 SOUNDSTAR ECO

## (undated) DEVICE — STERILE (15.2 TAPERED TO 7.6 X 183CM) POLYETHYLENE ACCORDION-FOLDED COVER FOR USE WITH SIEMENS ACUNAV ULTRASOUND CATHETER FAMILY CONNECTOR: Brand: SWIFTLINK TRANSDUCER COVER

## (undated) DEVICE — GOWN,SIRUS,FABRNF,XL,20/CS: Brand: MEDLINE

## (undated) DEVICE — TUBING PMP FOR CARTO SYS SMARTABLATE

## (undated) DEVICE — OPTIFOAM GENTLE SA, POSTOP, 4X8: Brand: MEDLINE

## (undated) DEVICE — GLOVE ORANGE PI 7 1/2   MSG9075

## (undated) DEVICE — CABLE CATH L10FT RED PIN CONN 34-34 FOR THERMOCOOL

## (undated) DEVICE — MAJOR LAPAROTOMY-MRMC: Brand: MEDLINE INDUSTRIES, INC.

## (undated) DEVICE — MEDI-TRACE CADENCE ADULT, DEFIBRILLATION ELECTRODE -RTS  (10 PR/PK) - PHYSIO-CONTROL: Brand: MEDI-TRACE CADENCE

## (undated) DEVICE — CATHETER REPROC SNDSTR ECO 3D DGNSTC ULTRSND USE SMNS IMGNG SSTM 10

## (undated) DEVICE — SUTURE VCRL SZ 0 L36IN ABSRB UD L36MM CT-1 1/2 CIR J946H

## (undated) DEVICE — CATHETER ABLAT 8FR L115CM 1-6-2MM SPC TIP 3.5MM FJ CRV

## (undated) DEVICE — DRAPE,U/ SHT,SPLIT,PLAS,STERIL: Brand: MEDLINE

## (undated) DEVICE — Device

## (undated) DEVICE — Z DISC USE 2428345 SUTURE VCRL SZ 2-0 L36IN ABSRB UD L40MM CT 1/2 CIR J957H

## (undated) DEVICE — DRESSING HEMOSTATIC SFT INTVENT W/O SLT DBL WRP QUIKCLOT LF

## (undated) DEVICE — C-ARM: Brand: UNBRANDED